# Patient Record
Sex: FEMALE | Race: BLACK OR AFRICAN AMERICAN | NOT HISPANIC OR LATINO | Employment: UNEMPLOYED | URBAN - METROPOLITAN AREA
[De-identification: names, ages, dates, MRNs, and addresses within clinical notes are randomized per-mention and may not be internally consistent; named-entity substitution may affect disease eponyms.]

---

## 2017-03-05 ENCOUNTER — HOSPITAL ENCOUNTER (EMERGENCY)
Facility: HOSPITAL | Age: 57
Discharge: HOME | End: 2017-03-05
Attending: FAMILY MEDICINE
Payer: COMMERCIAL

## 2017-03-05 VITALS
HEART RATE: 90 BPM | BODY MASS INDEX: 43.95 KG/M2 | HEIGHT: 68 IN | OXYGEN SATURATION: 100 % | SYSTOLIC BLOOD PRESSURE: 160 MMHG | WEIGHT: 290 LBS | TEMPERATURE: 99 F | RESPIRATION RATE: 18 BRPM | DIASTOLIC BLOOD PRESSURE: 89 MMHG

## 2017-03-05 DIAGNOSIS — M17.11 OSTEOARTHRITIS OF RIGHT KNEE, UNSPECIFIED OSTEOARTHRITIS TYPE: Primary | ICD-10-CM

## 2017-03-05 PROCEDURE — 99283 EMERGENCY DEPT VISIT LOW MDM: CPT

## 2017-03-05 RX ORDER — HYDROCODONE BITARTRATE AND ACETAMINOPHEN 5; 325 MG/1; MG/1
1 TABLET ORAL
Qty: 20 TABLET | Refills: 0 | Status: SHIPPED | OUTPATIENT
Start: 2017-03-05 | End: 2017-09-11 | Stop reason: SDUPTHER

## 2017-03-05 RX ORDER — METHYLPREDNISOLONE 4 MG/1
TABLET ORAL
Qty: 1 PACKAGE | Refills: 0 | Status: SHIPPED | OUTPATIENT
Start: 2017-03-05 | End: 2017-03-26

## 2017-03-05 RX ORDER — METHYLPREDNISOLONE 4 MG/1
TABLET ORAL
Qty: 1 PACKAGE | Refills: 0 | Status: SHIPPED | OUTPATIENT
Start: 2017-03-05 | End: 2017-03-05

## 2017-03-05 NOTE — ED AVS SNAPSHOT
[facility]  [address]  [place] [address]               Debbie Vo   3/5/2017 12:37 PM   ED    Description:  Female : 1960   Department:  [facility]           Your Care was Coordinated By:     Provider Role From Galindo Rowland MD Attending Provider 17 1304 --      Reason for Visit     Knee Pain           Diagnoses this Visit        Comments    Osteoarthritis of right knee, unspecified osteoarthritis type    -  Primary       ED Disposition     ED Disposition Condition Comment    Discharge             To Do List           Follow-up Information     Call Frank Krishnan MD.    Specialty:  Orthopedic Surgery    Why:  As needed, If symptoms worsen    Contact information:    [address]  [address]  [place] [address]  [phone]         These Medications        Disp Refills Start End    hydrocodone-acetaminophen 5-325mg (NORCO) 5-325 mg per tablet 20 tablet 0 3/5/2017     Take 1 tablet by mouth every 4 to 6 hours as needed for Pain. - Oral    Pharmacy: \[facility] [place]   [facility] #: [phone]       methylPREDNISolone (MEDROL DOSEPACK) 4 mg tablet 1 Package 0 3/5/2017 3/26/2017    Po as directed for joint inflammation    Pharmacy: [facility] [place]  [address] [place] [address] #: [phone]         [facility] On Call     [facility] On Call Nurse Care Line -  Assistance  Registered nurses in the [facility] On Call Center provide clinical advisement, health education, appointment booking, and other advisory services.  Call for this free service at [phone].             Medications           Message regarding Medications     Verify the changes and/or additions to your medication regime listed below are the same as discussed with your clinician today.  If any of these changes or additions are incorrect, please notify your healthcare provider.        START taking  these NEW medications        Refills    hydrocodone-acetaminophen 5-325mg (NORCO) 5-325 mg per tablet 0    Sig: Take 1 tablet by mouth every 4 to 6 hours as needed for Pain.    Class: Print    Route: Oral    methylPREDNISolone (MEDROL DOSEPACK) 4 mg tablet 0    Sig: Po as directed for joint inflammation    Class: Print           Verify that the below list of medications is an accurate representation of the medications you are currently taking.  If none reported, the list may be blank. If incorrect, please contact your healthcare provider. Carry this list with you in case of emergency.           Current Medications     acetaminophen (TYLENOL) 500 MG tablet Take 2 tablets (1,000 mg total) by mouth 3 (three) times daily as needed for Pain.    ibuprofen (ADVIL,MOTRIN) 200 MG tablet Take 2 tablets (400 mg total) by mouth every 6 (six) hours as needed for Pain.    losartan-hydrochlorothiazide 100-25 mg (HYZAAR) 100-25 mg per tablet Take 1 tablet by mouth once daily.    meclizine (MEDI-MECLIZINE) 25 mg tablet Take 25 mg by mouth 3 (three) times daily as needed.    metoprolol tartrate (LOPRESSOR) 50 MG tablet Take 1 tablet (50 mg total) by mouth 2 (two) times daily.    dextromethorphan-guaifenesin  mg (MUCINEX DM)  mg per 12 hr tablet Take 1 tablet by mouth every 12 (twelve) hours.    hydrocodone-acetaminophen 5-325mg (NORCO) 5-325 mg per tablet Take 1 tablet by mouth every 4 (four) hours as needed for Pain.    hydrocodone-acetaminophen 5-325mg (NORCO) 5-325 mg per tablet Take 1 tablet by mouth every 4 to 6 hours as needed for Pain.    methocarbamol (ROBAXIN) 500 MG Tab TK 2 T PO  TID    methylPREDNISolone (MEDROL DOSEPACK) 4 mg tablet Po as directed for joint inflammation    omeprazole (PRILOSEC) 20 MG capsule Take 1 capsule (20 mg total) by mouth once daily.    OXYMETAZOLINE HCL (NASAL SPRAY 12 HOUR NASL) by Nasal route.    valacyclovir (VALTREX) 1000 MG tablet Take 1 tablet (1,000 mg total) by mouth 3 (three)  "times daily.           Clinical Reference Information           Your Vitals Were     BP Pulse Temp Resp Height Weight    168/98 (BP Location: Left arm, Patient Position: Sitting, BP Method: Automatic) 97 98.7 °F (37.1 °C) (Oral) 14 5' 8" (1.727 m) 131.5 kg (290 lb)    SpO2 BMI             96% 44.09 kg/m2         Allergies as of 3/5/2017     No Known Allergies      Immunizations Administered on Date of Encounter - 3/5/2017     None      ED Micro, Lab, POCT     None      ED Imaging Orders     Start Ordered       Status Ordering Provider    03/05/17 1307 03/05/17 1307  X-Ray Knee 1 or 2 View Right  1 time imaging      Final result       Discharge References/Attachments     OSTEOARTHRITIS OF THE KNEE, UNDERSTANDING (ENGLISH)      [facility] Sign-Up     Activating your [facility] account is as easy as 1-2-3!     1) Visit [email or web address], select Sign Up Now, enter this activation code and your date of birth, then select Next.  Activation code not generated  Current Patient Portal Status: Account disabled      2) Create a username and password to use when you visit [facility] in the future and select a security question in case you lose your password and select Next.    3) Enter your e-mail address and click Sign Up!    Additional Information  If you have questions, please e-mail [email or web address] or call [phone] to talk to our [facility] staff. Remember, [facility] is NOT to be used for urgent needs. For medical emergencies, dial 911.          [facility] complies with applicable Federal civil rights laws and does not discriminate on the basis of race, color, national origin, age, disability, or sex.        Language Assistance Services     ATTENTION: Language assistance services are available, free of charge. Please call [phone].      ATENCIÓN: Si etiennela deep, tiene a marshall disposición servicios gratuitos de asistencia lingüística. Llame al [phone].     JAYDON Ý: N?u b?n nói Ti?ng Vi?t, có " các d?ch v? h? tr? sravanthi ng? mi?n phí dành cho b?n. G?i s? [phone].

## 2017-03-05 NOTE — ED PROVIDER NOTES
Encounter Date: 3/5/2017       History     Chief Complaint   Patient presents with    Knee Pain     I have right knee pain for some time now. I have an appointment with Dr Krishnan next month after my insurance kicks in April 1st. 5 days ago my right knee just started hurting more and I can't take it anymore     Review of patient's allergies indicates:  No Known Allergies  HPI Comments: Patient's a 56-year-old black female with a history of degenerative joint disease which has been progressively worsening over the last couple of months and is much more severe this week to the point patient can barely walk and comes for pain relief.  No fever or skin changes.    The history is provided by the patient.     Past Medical History:   Diagnosis Date    Benign essential hypertension     Colon cancer     Osteoarthritis      Past Surgical History:   Procedure Laterality Date    COLON SURGERY  2002    COLONOSCOPY N/A 3/24/2016    Procedure: COLONOSCOPY;  Surgeon: Ernst Bobby MD;  Location[facility];  Service: Endoscopy;  Laterality: N/A;  Needs Flex sigmoidoscopy    PARTIAL HYSTERECTOMY       Family History   Problem Relation Age of Onset    Cancer Mother     Kidney disease Father      Social History   Substance Use Topics    Smoking status: Never Smoker    Smokeless tobacco: Never Used    Alcohol use No     Review of Systems   Constitutional: Negative for fever.   Musculoskeletal: Positive for arthralgias, gait problem, joint swelling and myalgias. Negative for neck pain and neck stiffness.   Skin: Negative for color change.   Neurological: Negative for weakness and numbness.   All other systems reviewed and are negative.      Physical Exam   Initial Vitals   BP Pulse Resp Temp SpO2   03/05/17 1231 03/05/17 1231 03/05/17 1231 03/05/17 1231 03/05/17 1231   168/98 97 14 98.7 °F (37.1 °C) 96 %     Physical Exam    Nursing note and vitals reviewed.  Constitutional: She appears well-developed and  well-nourished.   HENT:   Head: Normocephalic.   Eyes: Pupils are equal, round, and reactive to light.   Neck: Neck supple.   Cardiovascular: Normal rate and regular rhythm.   Pulmonary/Chest: No respiratory distress.   Abdominal: She exhibits no distension. There is no tenderness.   Musculoskeletal:   Decreased flexion of the right knee compared with the left.  Bilateral sub-patellar crepitance.  Diffuse mild swelling around the right knee; no palpable effusion.   Neurological: She is alert and oriented to person, place, and time. No sensory deficit.   Skin: Skin is warm.   Psychiatric: She has a normal mood and affect.         ED Course   Procedures  Labs Reviewed - No data to display          Medical Decision Making:   Clinical Tests:   Radiological Study: Ordered and Reviewed  ED Management:  Severe degenerative changes right knee.  Patient has orthopedic appointment.  I will give a short course of Norco.  Advised anti-inflammatories, heat, weight loss.  Return to emergency as needed                   ED Course     Clinical Impression:   The encounter diagnosis was Osteoarthritis of right knee, unspecified osteoarthritis type.          ISMA Rowland MD  03/05/17 3220

## 2017-09-07 ENCOUNTER — TELEPHONE (OUTPATIENT)
Dept: INTERNAL MEDICINE | Facility: CLINIC | Age: 57
End: 2017-09-07

## 2017-09-07 DIAGNOSIS — Z00.00 ROUTINE GENERAL MEDICAL EXAMINATION AT A HEALTH CARE FACILITY: Primary | ICD-10-CM

## 2017-09-07 NOTE — TELEPHONE ENCOUNTER
----- Message from Sandi Reyna sent at 9/7/2017  9:29 AM CDT -----  Contact: The pt called  She is scheduled for Monday to see Dr Hussein for a check up.  Will she need labs before?

## 2017-09-11 ENCOUNTER — LAB VISIT (OUTPATIENT)
Dept: LAB | Facility: HOSPITAL | Age: 57
End: 2017-09-11
Attending: INTERNAL MEDICINE
Payer: MEDICARE

## 2017-09-11 ENCOUNTER — OFFICE VISIT (OUTPATIENT)
Dept: INTERNAL MEDICINE | Facility: CLINIC | Age: 57
End: 2017-09-11
Payer: MEDICARE

## 2017-09-11 VITALS
RESPIRATION RATE: 18 BRPM | HEART RATE: 80 BPM | DIASTOLIC BLOOD PRESSURE: 84 MMHG | BODY MASS INDEX: 44.04 KG/M2 | HEIGHT: 68 IN | SYSTOLIC BLOOD PRESSURE: 118 MMHG | TEMPERATURE: 98 F | WEIGHT: 290.56 LBS

## 2017-09-11 DIAGNOSIS — Z12.31 ENCOUNTER FOR SCREENING MAMMOGRAM FOR MALIGNANT NEOPLASM OF BREAST: ICD-10-CM

## 2017-09-11 DIAGNOSIS — I10 ESSENTIAL HYPERTENSION: ICD-10-CM

## 2017-09-11 DIAGNOSIS — Z23 NEED FOR PROPHYLACTIC VACCINATION AND INOCULATION AGAINST INFLUENZA: ICD-10-CM

## 2017-09-11 DIAGNOSIS — K21.9 GASTROESOPHAGEAL REFLUX DISEASE, ESOPHAGITIS PRESENCE NOT SPECIFIED: ICD-10-CM

## 2017-09-11 DIAGNOSIS — Z00.00 ROUTINE GENERAL MEDICAL EXAMINATION AT A HEALTH CARE FACILITY: ICD-10-CM

## 2017-09-11 DIAGNOSIS — M75.51 BURSITIS OF RIGHT SHOULDER: Primary | ICD-10-CM

## 2017-09-11 DIAGNOSIS — M54.16 LUMBAR RADICULOPATHY, CHRONIC: ICD-10-CM

## 2017-09-11 DIAGNOSIS — Z12.39 BREAST CANCER SCREENING, HIGH RISK PATIENT: ICD-10-CM

## 2017-09-11 DIAGNOSIS — E66.01 MORBID OBESITY WITH BMI OF 40.0-44.9, ADULT: ICD-10-CM

## 2017-09-11 DIAGNOSIS — R42 VERTIGO: ICD-10-CM

## 2017-09-11 PROCEDURE — 90471 IMMUNIZATION ADMIN: CPT | Mod: S$GLB,,, | Performed by: INTERNAL MEDICINE

## 2017-09-11 PROCEDURE — 90686 IIV4 VACC NO PRSV 0.5 ML IM: CPT | Mod: S$GLB,,, | Performed by: INTERNAL MEDICINE

## 2017-09-11 PROCEDURE — 99214 OFFICE O/P EST MOD 30 MIN: CPT | Mod: 25,S$GLB,, | Performed by: INTERNAL MEDICINE

## 2017-09-11 PROCEDURE — 99499 UNLISTED E&M SERVICE: CPT | Mod: S$GLB,,, | Performed by: INTERNAL MEDICINE

## 2017-09-11 PROCEDURE — 20610 DRAIN/INJ JOINT/BURSA W/O US: CPT | Mod: S$GLB,,, | Performed by: INTERNAL MEDICINE

## 2017-09-11 PROCEDURE — 3008F BODY MASS INDEX DOCD: CPT | Mod: S$GLB,,, | Performed by: INTERNAL MEDICINE

## 2017-09-11 PROCEDURE — 83036 HEMOGLOBIN GLYCOSYLATED A1C: CPT | Mod: PO

## 2017-09-11 PROCEDURE — 99999 PR PBB SHADOW E&M-EST. PATIENT-LVL III: CPT | Mod: PBBFAC,,, | Performed by: INTERNAL MEDICINE

## 2017-09-11 PROCEDURE — 3074F SYST BP LT 130 MM HG: CPT | Mod: S$GLB,,, | Performed by: INTERNAL MEDICINE

## 2017-09-11 PROCEDURE — 36415 COLL VENOUS BLD VENIPUNCTURE: CPT | Mod: PO

## 2017-09-11 PROCEDURE — 3079F DIAST BP 80-89 MM HG: CPT | Mod: S$GLB,,, | Performed by: INTERNAL MEDICINE

## 2017-09-11 RX ORDER — MECLIZINE HYDROCHLORIDE 25 MG/1
25 TABLET ORAL 3 TIMES DAILY PRN
Qty: 50 TABLET | Refills: 2 | Status: SHIPPED | OUTPATIENT
Start: 2017-09-11 | End: 2021-06-18 | Stop reason: SDUPTHER

## 2017-09-11 RX ORDER — METOPROLOL TARTRATE 50 MG/1
50 TABLET ORAL 2 TIMES DAILY
Qty: 60 TABLET | Refills: 3 | Status: CANCELLED | OUTPATIENT
Start: 2017-09-11

## 2017-09-11 RX ORDER — GABAPENTIN 300 MG/1
300 CAPSULE ORAL 3 TIMES DAILY
Qty: 270 CAPSULE | Refills: 3 | Status: SHIPPED | OUTPATIENT
Start: 2017-09-11 | End: 2018-05-10 | Stop reason: SDUPTHER

## 2017-09-11 RX ORDER — METOPROLOL SUCCINATE 100 MG/1
100 TABLET, EXTENDED RELEASE ORAL DAILY
Qty: 90 TABLET | Refills: 3 | Status: SHIPPED | OUTPATIENT
Start: 2017-09-11 | End: 2019-04-08 | Stop reason: SDUPTHER

## 2017-09-11 RX ORDER — LOSARTAN POTASSIUM AND HYDROCHLOROTHIAZIDE 25; 100 MG/1; MG/1
1 TABLET ORAL DAILY
Qty: 90 TABLET | Refills: 3 | Status: SHIPPED | OUTPATIENT
Start: 2017-09-11 | End: 2018-10-08

## 2017-09-11 RX ORDER — HYDROCODONE BITARTRATE AND ACETAMINOPHEN 5; 325 MG/1; MG/1
1 TABLET ORAL EVERY 4 HOURS PRN
Qty: 18 TABLET | Refills: 0 | Status: SHIPPED | OUTPATIENT
Start: 2017-09-11 | End: 2018-11-09 | Stop reason: SDUPTHER

## 2017-09-11 NOTE — PROGRESS NOTES
Subjective:       Patient ID: Debbie Vo is a 57 y.o. female.    Chief Complaint: Follow-up and Medication Refill    HPI  Checkup.  Labs pending.  LBP at baseline.  C/O 2 weeks of R shoulder pain w/o radicular sxs.  Having nasal congestion with some vertigo.  No CP, no SOB.  No abd pain or gERD sxs.  No recent mammogram.  No recent tetanus vaccine.  Review of Systems    Objective:      Physical Exam   Constitutional: She appears well-developed. No distress.   obese   HENT:   Head: Normocephalic.   Eyes: EOM are normal.   Neck: Normal range of motion. No tracheal deviation present.   Cardiovascular: Normal rate, regular rhythm, normal heart sounds and intact distal pulses.    Pulmonary/Chest: Effort normal and breath sounds normal. No respiratory distress.   Abdominal: Soft. Bowel sounds are normal. She exhibits no distension. There is no tenderness.   Musculoskeletal: She exhibits no edema.   R shoulder with painful internal rotation and + impingement sign   Neurological: She is alert. No cranial nerve deficit. She exhibits normal muscle tone. Coordination normal.   Skin: Skin is warm and dry. No rash noted. She is not diaphoretic. No erythema.   Psychiatric: She has a normal mood and affect. Her behavior is normal.   Vitals reviewed.      Assessment:       1. Bursitis of right shoulder    2. Essential hypertension    3. Gastroesophageal reflux disease, esophagitis presence not specified    4. Morbid obesity with BMI of 40.0-44.9, adult    5. Lumbar radiculopathy, chronic    6. Breast cancer screening, high risk patient    7. Need for prophylactic vaccination and inoculation against influenza    8. Encounter for screening mammogram for malignant neoplasm of breast     9. Vertigo        Plan:       Debbie was seen today for follow-up and medication refill.    Diagnoses and all orders for this visit:    Bursitis of right shoulder    Essential hypertension  -     losartan-hydrochlorothiazide 100-25 mg (HYZAAR)  100-25 mg per tablet; Take 1 tablet by mouth once daily.  -     metoprolol succinate (TOPROL-XL) 100 MG 24 hr tablet; Take 1 tablet (100 mg total) by mouth once daily.    Gastroesophageal reflux disease, esophagitis presence not specified    Morbid obesity with BMI of 40.0-44.9, adult    Lumbar radiculopathy, chronic  -     hydrocodone-acetaminophen 5-325mg (NORCO) 5-325 mg per tablet; Take 1 tablet by mouth every 4 (four) hours as needed for Pain.  -     gabapentin (NEURONTIN) 300 MG capsule; Take 1 capsule (300 mg total) by mouth 3 (three) times daily.    Breast cancer screening, high risk patient  -     Mammo Digital Screening Bilat with CAD; Future    Need for prophylactic vaccination and inoculation against influenza  -     Influenza - Quadrivalent (3 years & older) (PF)    Encounter for screening mammogram for malignant neoplasm of breast   -     Mammo Digital Screening Bilat with CAD; Future    Vertigo  -     meclizine (ANTIVERT) 25 mg tablet; Take 1 tablet (25 mg total) by mouth 3 (three) times daily as needed.    Other orders  -     Cancel: metoprolol tartrate (LOPRESSOR) 50 MG tablet; Take 1 tablet (50 mg total) by mouth 2 (two) times daily.      Return in about 1 month (around 10/11/2017).

## 2017-09-12 LAB
ESTIMATED AVG GLUCOSE: 111 MG/DL
HBA1C MFR BLD HPLC: 5.5 %

## 2018-02-28 ENCOUNTER — TELEPHONE (OUTPATIENT)
Dept: INTERNAL MEDICINE | Facility: CLINIC | Age: 58
End: 2018-02-28

## 2018-02-28 DIAGNOSIS — M54.16 LUMBAR RADICULOPATHY, CHRONIC: ICD-10-CM

## 2018-02-28 NOTE — TELEPHONE ENCOUNTER
----- Message from Nichol Doan sent at 2/28/2018  2:08 PM CST -----  Contact: [phone] self  Patient would like to speak with the doctor about getting a MRI or CT Scan prior to seeing a Pain Mgmt Doctor. Please call and advise.

## 2018-03-01 ENCOUNTER — HOSPITAL ENCOUNTER (OUTPATIENT)
Dept: RADIOLOGY | Facility: HOSPITAL | Age: 58
Discharge: HOME | End: 2018-03-01
Attending: INTERNAL MEDICINE
Payer: MEDICARE

## 2018-03-01 VITALS — BODY MASS INDEX: 43.95 KG/M2 | WEIGHT: 290 LBS | HEIGHT: 68 IN

## 2018-03-01 DIAGNOSIS — Z12.31 ENCOUNTER FOR SCREENING MAMMOGRAM FOR MALIGNANT NEOPLASM OF BREAST: ICD-10-CM

## 2018-03-01 DIAGNOSIS — Z12.39 BREAST CANCER SCREENING, HIGH RISK PATIENT: ICD-10-CM

## 2018-03-01 PROCEDURE — 77067 SCR MAMMO BI INCL CAD: CPT | Mod: TC,PO

## 2018-03-28 ENCOUNTER — HOSPITAL ENCOUNTER (OUTPATIENT)
Dept: RADIOLOGY | Facility: HOSPITAL | Age: 58
Discharge: HOME | End: 2018-03-28
Attending: INTERNAL MEDICINE
Payer: MEDICARE

## 2018-03-28 PROCEDURE — 72148 MRI LUMBAR SPINE W/O DYE: CPT | Mod: TC

## 2018-03-28 PROCEDURE — 72148 MRI LUMBAR SPINE W/O DYE: CPT | Mod: 26,,, | Performed by: RADIOLOGY

## 2018-04-24 ENCOUNTER — TELEPHONE (OUTPATIENT)
Dept: INTERNAL MEDICINE | Facility: CLINIC | Age: 58
End: 2018-04-24

## 2018-04-24 NOTE — TELEPHONE ENCOUNTER
----- Message from Ally Christine sent at 4/24/2018 11:35 AM CDT -----  Want MRI report. Patient can be reach on home phone.

## 2018-04-25 NOTE — TELEPHONE ENCOUNTER
Patient notified at 08:28 will check her schedule and call back to schedule follow-up appointment. Thanks

## 2018-04-27 ENCOUNTER — OFFICE VISIT (OUTPATIENT)
Dept: INTERNAL MEDICINE | Facility: CLINIC | Age: 58
End: 2018-04-27
Payer: MEDICARE

## 2018-04-27 VITALS
RESPIRATION RATE: 16 BRPM | DIASTOLIC BLOOD PRESSURE: 80 MMHG | HEART RATE: 84 BPM | BODY MASS INDEX: 44.41 KG/M2 | TEMPERATURE: 97 F | WEIGHT: 293 LBS | SYSTOLIC BLOOD PRESSURE: 120 MMHG | HEIGHT: 68 IN

## 2018-04-27 DIAGNOSIS — I10 ESSENTIAL HYPERTENSION: Primary | ICD-10-CM

## 2018-04-27 DIAGNOSIS — M54.16 LUMBAR RADICULOPATHY, CHRONIC: ICD-10-CM

## 2018-04-27 DIAGNOSIS — E66.01 MORBID OBESITY WITH BMI OF 40.0-44.9, ADULT: ICD-10-CM

## 2018-04-27 PROCEDURE — 3074F SYST BP LT 130 MM HG: CPT | Mod: CPTII,S$GLB,, | Performed by: INTERNAL MEDICINE

## 2018-04-27 PROCEDURE — 3079F DIAST BP 80-89 MM HG: CPT | Mod: CPTII,S$GLB,, | Performed by: INTERNAL MEDICINE

## 2018-04-27 PROCEDURE — 99999 PR PBB SHADOW E&M-EST. PATIENT-LVL III: CPT | Mod: PBBFAC,,, | Performed by: INTERNAL MEDICINE

## 2018-04-27 PROCEDURE — 99213 OFFICE O/P EST LOW 20 MIN: CPT | Mod: S$GLB,,, | Performed by: INTERNAL MEDICINE

## 2018-04-27 NOTE — PROGRESS NOTES
Subjective:       Patient ID: Debbie Vo is a 57 y.o. female.    Chief Complaint: Results; Follow-up; Back Pain; Rash (back); and Cough    HPI  Checkup.  C/O nasal congestion, HA, nonprod cough.  Still with R sided radicular pain when stands too long.  MRI 3/18 showed R sided disc protrusion at L3-4.  Review of Systems    Objective:      Physical Exam   Constitutional: She appears well-developed. No distress.   obese   HENT:   Head: Normocephalic.   Nasal mucosa inflamed   Eyes: EOM are normal.   Neck: Normal range of motion. No tracheal deviation present.   Cardiovascular: Normal rate, regular rhythm, normal heart sounds and intact distal pulses.    Pulmonary/Chest: Effort normal and breath sounds normal. No respiratory distress.   Abdominal: Soft. Bowel sounds are normal. She exhibits no distension. There is no tenderness.   Musculoskeletal: Normal range of motion. She exhibits no edema.   Neurological: She is alert. No cranial nerve deficit. She exhibits normal muscle tone. Coordination normal.   Skin: Skin is warm and dry. No rash noted. She is not diaphoretic. No erythema.   Psychiatric: She has a normal mood and affect. Her behavior is normal.   Vitals reviewed.      Assessment:       1. Essential hypertension    2. Lumbar radiculopathy, chronic    3. Morbid obesity with BMI of 40.0-44.9, adult        Plan:       Debbie was seen today for results, follow-up, back pain, rash and cough.    Diagnoses and all orders for this visit:    Essential hypertension   Well-cont    Lumbar radiculopathy, chronic   Going to pain mgt    Morbid obesity with BMI of 40.0-44.9, adult   Contemplating bariatric surgery    Follow-up in about 6 months (around 10/27/2018).

## 2018-05-10 ENCOUNTER — OFFICE VISIT (OUTPATIENT)
Dept: INTERNAL MEDICINE | Facility: CLINIC | Age: 58
End: 2018-05-10
Payer: MEDICARE

## 2018-05-10 VITALS
DIASTOLIC BLOOD PRESSURE: 98 MMHG | SYSTOLIC BLOOD PRESSURE: 130 MMHG | WEIGHT: 293 LBS | BODY MASS INDEX: 44.41 KG/M2 | TEMPERATURE: 98 F | HEIGHT: 68 IN | OXYGEN SATURATION: 97 % | HEART RATE: 91 BPM

## 2018-05-10 DIAGNOSIS — I10 ESSENTIAL HYPERTENSION: ICD-10-CM

## 2018-05-10 DIAGNOSIS — B35.4 TINEA CORPORIS: Primary | ICD-10-CM

## 2018-05-10 DIAGNOSIS — M54.16 LUMBAR RADICULOPATHY, CHRONIC: ICD-10-CM

## 2018-05-10 PROCEDURE — 3080F DIAST BP >= 90 MM HG: CPT | Mod: CPTII,S$GLB,, | Performed by: INTERNAL MEDICINE

## 2018-05-10 PROCEDURE — 3008F BODY MASS INDEX DOCD: CPT | Mod: CPTII,S$GLB,, | Performed by: INTERNAL MEDICINE

## 2018-05-10 PROCEDURE — 99213 OFFICE O/P EST LOW 20 MIN: CPT | Mod: S$GLB,,, | Performed by: INTERNAL MEDICINE

## 2018-05-10 PROCEDURE — 3075F SYST BP GE 130 - 139MM HG: CPT | Mod: CPTII,S$GLB,, | Performed by: INTERNAL MEDICINE

## 2018-05-10 PROCEDURE — 99999 PR PBB SHADOW E&M-EST. PATIENT-LVL III: CPT | Mod: PBBFAC,,, | Performed by: INTERNAL MEDICINE

## 2018-05-10 RX ORDER — KETOCONAZOLE 200 MG/1
200 TABLET ORAL DAILY
Qty: 10 TABLET | Refills: 0 | Status: SHIPPED | OUTPATIENT
Start: 2018-05-10 | End: 2018-05-20

## 2018-05-10 RX ORDER — GABAPENTIN 300 MG/1
300 CAPSULE ORAL 3 TIMES DAILY
Qty: 270 CAPSULE | Refills: 3 | Status: SHIPPED | OUTPATIENT
Start: 2018-05-10 | End: 2019-04-08 | Stop reason: SDUPTHER

## 2018-05-10 NOTE — PROGRESS NOTES
Subjective:       Patient ID: Debbie Vo is a 57 y.o. female.    Chief Complaint: Insect Bite    HPI  Pt c/o pruritic rash on arms and legs x 1 week.  Pt has household pets.  Also c/o sciatica flaring.  Review of Systems    Objective:      Physical Exam   Skin: Rash (tinea corporis all extremities) noted.       Assessment:       1. Tinea corporis    2. Essential hypertension    3. Lumbar radiculopathy, chronic        Plan:       Debbie was seen today for insect bite.    Diagnoses and all orders for this visit:    Tinea corporis  -     ketoconazole (NIZORAL) 200 mg Tab; Take 1 tablet (200 mg total) by mouth once daily.    Essential hypertension   Observe    Lumbar radiculopathy, chronic  -     gabapentin (NEURONTIN) 300 MG capsule; Take 1 capsule (300 mg total) by mouth 3 (three) times daily.      No Follow-up on file.

## 2018-05-17 ENCOUNTER — TELEPHONE (OUTPATIENT)
Dept: INTERNAL MEDICINE | Facility: CLINIC | Age: 58
End: 2018-05-17

## 2018-05-17 DIAGNOSIS — R23.2 HOT FLASHES: Primary | ICD-10-CM

## 2018-05-17 RX ORDER — FLUOXETINE HYDROCHLORIDE 20 MG/1
20 CAPSULE ORAL DAILY
Qty: 90 CAPSULE | Refills: 3 | Status: SHIPPED | OUTPATIENT
Start: 2018-05-17 | End: 2019-10-08 | Stop reason: SDUPTHER

## 2018-05-17 NOTE — TELEPHONE ENCOUNTER
----- Message from Nichol Doan sent at 5/17/2018 12:24 PM CDT -----  Contact: [phone]  Patient would like a Rx for medication for hot flashes [facility]. Please call and advise.

## 2018-05-29 ENCOUNTER — TELEPHONE (OUTPATIENT)
Dept: ADMINISTRATIVE | Facility: HOSPITAL | Age: 58
End: 2018-05-29

## 2018-05-29 NOTE — TELEPHONE ENCOUNTER
----- Message from Ines Bliss sent at 5/29/2018 10:11 AM CDT -----  Maddy [facility] called.   No. [phone]    Please fax script for [facility]. Fax no. [phone]

## 2018-05-29 NOTE — TELEPHONE ENCOUNTER
I returned call to [facility], spoke with Deo.  Stated there is nothing in this patient's records that shows request or need for a bath chair.

## 2018-05-31 NOTE — PROGRESS NOTES
[facility] New Patient Evaluation    Referred by: Jon Hussein MD  Reason for referral: M54.16 (ICD-10-CM) - Lumbar radiculopathy, chronic    CC:   Chief Complaint   Patient presents with    Low-back Pain    Hip Pain    Leg Pain       Last 3 PDI Scores 6/4/2018   Pain Disability Index (PDI) 55       HPI: Debbie Vo is a 58 y.o. female referred for low back pain into bilateral leg pain.  Her legs start to get numb and tingle as she walks which improves with sitting or leaning on a grocery cart.  She also notes a sharp pain in the lower back with certain maneuvers.    Location: low back  Severity: Currently: 7-8/10   Typical Range: 8/10     Exacerbation: 9/10   Onset: 5 years ago  Quality: Throbbing, Tingling and Numb  Radiation: bilateral lower extremities  Axial/Extremity Percentage of Pain: lower back 80% and bilateral leg pain 20%  Exacerbating Factors: standing, standing for more than 3 minutes and walking for more than 3 minutes  Mitigating Factors: nothing  Assoc: denies night fever/night sweats, urinary incontinence, bowel incontinence, significant weight loss, significant motor weakness and loss of sensations    Previous Therapies:  PT: Completed ~6 years ago but denies  HEP: Denies  TENS:  Injections:     - SIMONA with Dr. Hernandez in [facility] ~6 years ago for similar symptoms with 70-80% relief  Surgery: Denies  Medications:   - NSAIDS:   - MSK Relaxants:   - TCAs:   - SNRIs:   - Topicals:   - Anticonvulsants: Yes, current  - Opioids:     Current Pain Medications:  1. Gabapentin 300mg tid     Full Medication List:    Current Outpatient Prescriptions:     FLUoxetine (PROZAC) 20 MG capsule, Take 1 capsule (20 mg total) by mouth once daily., Disp: 90 capsule, Rfl: 3    gabapentin (NEURONTIN) 300 MG capsule, Take 1 capsule (300 mg total) by mouth 3 (three) times daily., Disp: 270 capsule, Rfl: 3    hydrocodone-acetaminophen 5-325mg (NORCO) 5-325 mg per tablet, Take 1 tablet by mouth every 4  (four) hours as needed for Pain., Disp: 18 tablet, Rfl: 0    ibuprofen (ADVIL,MOTRIN) 200 MG tablet, Take 2 tablets (400 mg total) by mouth every 6 (six) hours as needed for Pain., Disp: 30 tablet, Rfl: 0    losartan-hydrochlorothiazide 100-25 mg (HYZAAR) 100-25 mg per tablet, Take 1 tablet by mouth once daily., Disp: 90 tablet, Rfl: 3    meclizine (ANTIVERT) 25 mg tablet, Take 1 tablet (25 mg total) by mouth 3 (three) times daily as needed., Disp: 50 tablet, Rfl: 2    metoprolol succinate (TOPROL-XL) 100 MG 24 hr tablet, Take 1 tablet (100 mg total) by mouth once daily., Disp: 90 tablet, Rfl: 3    omeprazole (PRILOSEC) 20 MG capsule, Take 1 capsule (20 mg total) by mouth once daily., Disp: 90 capsule, Rfl: 3     Review of Systems:  Review of Systems   Constitutional: Negative for chills and fever.   HENT: Negative for nosebleeds.    Eyes: Negative for pain.   Respiratory: Negative for hemoptysis.    Cardiovascular: Negative for chest pain.   Gastrointestinal: Negative for nausea and vomiting.   Genitourinary: Negative for dysuria.   Musculoskeletal: Positive for back pain. Negative for falls.   Skin: Negative for rash.   Neurological: Positive for tingling and sensory change.   Endo/Heme/Allergies: Does not bruise/bleed easily.   Psychiatric/Behavioral: Positive for depression (Hx of). The patient is not nervous/anxious.        Allergies:  Patient has no known allergies.     Medical History:  Past Medical History:   Diagnosis Date    Benign essential hypertension     Colon cancer     Osteoarthritis         Surgical History:  Past Surgical History:   Procedure Laterality Date    COLON SURGERY  2002    COLONOSCOPY N/A 3/24/2016    Procedure: COLONOSCOPY;  Surgeon: Ernst Bobby MD;  Location: [facility];  Service: Endoscopy;  Laterality: N/A;  Needs Flex sigmoidoscopy    HYSTERECTOMY      PARTIAL HYSTERECTOMY          Social History:  Social History     Social History    Marital status:  Single     Spouse name: N/A    Number of children: N/A    Years of education: N/A     Occupational History    Not on file.     Social History Main Topics    Smoking status: Never Smoker    Smokeless tobacco: Never Used    Alcohol use No    Drug use: No    Sexual activity: Yes     Partners: Male     Other Topics Concern    Not on file     Social History Narrative    No narrative on file       Physical Exam:  Vitals:    18 1112   BP: 124/78   Pulse: 72   Weight: (!) 137 kg (302 lb)   PainSc:   8     General    Nursing note and vitals reviewed.  Constitutional: She is oriented to person, place, and time. She appears well-developed and well-nourished. No distress.   HENT:   Head: Normocephalic and atraumatic.   Nose: Nose normal.   Eyes: Conjunctivae and EOM are normal. Pupils are equal, round, and reactive to light. Right eye exhibits no discharge. Left eye exhibits no discharge. No scleral icterus.   Neck: No JVD present.   Cardiovascular: Intact distal pulses.    Pulmonary/Chest: Effort normal. No respiratory distress.   Abdominal: She exhibits no distension.   Neurological: She is alert and oriented to person, place, and time. Coordination normal.   Psychiatric: She has a normal mood and affect. Her behavior is normal. Judgment and thought content normal.     General Musculoskeletal Exam   Gait: normal     Back (L-Spine & T-Spine) / Neck (C-Spine) Exam     Tenderness Right paramedian tenderness of the Lower L-Spine. Left paramedian tenderness of the Lower L-Spine.     Back (L-Spine & T-Spine) Range of Motion   Extension: abnormal   Flexion: abnormal     Spinal Sensation   Right Side Sensation  L-Spine Level: normal  Left Side Sensation  L-Spine Level: normal    Other She has no scoliosis .      Muscle Strength   Right Lower Extremity   Hip Flexion: 5/5   Hip Extensors: 5/5  Quadriceps:  5/5   Hamstrin/5   Gastrocsoleus:  5/5/5  Left Lower Extremity   Hip Flexion: 5/5   Hip Extensors:  5/5  Quadriceps:  5/5   Hamstrin/5   Gastrocsoleus:  5/5/5    Reflexes     Left Side  Quadriceps:  2+  Achilles:  2+    Right Side   Quadriceps:  2+  Achilles:  2+      Imaging:  MRI Lumbar Spine Without Contrast 18  EXAMINATION:  MRI LUMBAR SPINE WITHOUT CONTRAST    CLINICAL HISTORY:  Low back pain, >6wks conservative tx, persistent-progressive sx, surgical candidate;Lumbar radiculopathy, >6wks conservative tx, persistent-progressive sx, surgical candidate; Radiculopathy, lumbar region    TECHNIQUE:  Multiplanar, multisequence MR images were acquired from the thoracolumbar junction to the sacrum without the administration of contrast.    COMPARISON:  CT abdomen pelvis 2016.    FINDINGS:  Alignment: Slight exaggeration of the normal lumbar lordosis.  Subtle anterolisthesis at L3-4, L4-5 and L5-S1..    Vertebrae: Normal marrow signal. No fracture.    Cord: Normal.  Conus terminates in good position.    Degenerative findings:    T12-L1, L1-2, L2-3: No focal disc herniation, spinal canal stenosis or significant neural foraminal narrowing.  Mild facet arthropathy.    L3-4: Moderate facet arthropathy, left more than right.  There is associated anterolisthesis with unroofing of the disc and mild disc bulging.  Mild endplate marginal osteophyte formation laterally.  Constellation of findings results in left greater than right lateral recess and neural foraminal encroachment.  Only modest narrowing of overall spinal canal diameter.    L4-L5: Moderate facet arthropathy, left more than right.  Mild anterolisthesis with unroofing of the disc.  Minimal disc bulging and degenerative endplate changes.  There is no significant narrowing of the overall spinal canal diameter.  Left greater than right lateral recess stenosis minimal neural foraminal encroachment.    L5-S1: Moderate facet arthropathy.  There is subtle anterolisthesis with degenerative disc disease including loss disc height, degenerative endplate  change in mild endplate marginal osteophyte formation.  No spinal stenosis or high-grade neural foraminal narrowing.    Paraspinal muscles & soft tissues: Unremarkable.     Labs:  BMP  Lab Results   Component Value Date     08/06/2016    K 3.9 08/06/2016     08/06/2016    CO2 26 08/06/2016    BUN 12 08/06/2016    CREATININE 0.7 08/06/2016    CALCIUM 9.4 08/06/2016    ANIONGAP 10 08/06/2016    ESTGFRAFRICA >60.0 08/06/2016    EGFRNONAA >60.0 08/06/2016     Lab Results   Component Value Date    ALT 15 08/06/2016    AST 9 (L) 08/06/2016    ALKPHOS 71 08/06/2016    BILITOT 1.2 (H) 08/06/2016       Assessment:  Problem List Items Addressed This Visit     Lumbar radiculopathy, chronic - Primary    Morbid obesity with BMI of 40.0-44.9, adult    Myofascial pain syndrome    DDD (degenerative disc disease), lumbar          58-year-old female with diffuse arthritis and chronic low back pain radiating into the bilateral lower extremities who presents complaining of progressively worsening lumbar radiculopathy for which she would like to try another lumbar epidural steroid injection. Her previous injection approximately 6 years ago provided excellent relief and her pain has been manageable since that time.    Treatment Plan:   PT/OT/HEP: None at this time  Procedures: Lumbar SIMONA  Medications: no changes recommended at this time  Imaging: No additional imaging recommended at this time.  Labs: Reviewed.  Medications are appropriately dosed for current hepatorenal function.    Follow Up: RTC p injection    Avi Morales Jr, MD  Interventional Pain Medicine / Anesthesiology    Disclaimer: This note was partly generated using dictation software which may occasionally result in transcription errors.

## 2018-06-04 ENCOUNTER — OFFICE VISIT (OUTPATIENT)
Dept: PAIN MEDICINE | Facility: CLINIC | Age: 58
End: 2018-06-04
Payer: MEDICARE

## 2018-06-04 ENCOUNTER — TELEPHONE (OUTPATIENT)
Dept: PAIN MEDICINE | Facility: CLINIC | Age: 58
End: 2018-06-04

## 2018-06-04 VITALS
SYSTOLIC BLOOD PRESSURE: 124 MMHG | WEIGHT: 293 LBS | DIASTOLIC BLOOD PRESSURE: 78 MMHG | BODY MASS INDEX: 45.92 KG/M2 | HEART RATE: 72 BPM

## 2018-06-04 DIAGNOSIS — M79.18 MYOFASCIAL PAIN SYNDROME: ICD-10-CM

## 2018-06-04 DIAGNOSIS — M54.16 LUMBAR RADICULOPATHY, CHRONIC: Primary | ICD-10-CM

## 2018-06-04 DIAGNOSIS — M51.36 DDD (DEGENERATIVE DISC DISEASE), LUMBAR: ICD-10-CM

## 2018-06-04 DIAGNOSIS — M54.16 LUMBAR RADICULOPATHY: Primary | ICD-10-CM

## 2018-06-04 DIAGNOSIS — E66.01 MORBID OBESITY WITH BMI OF 40.0-44.9, ADULT: ICD-10-CM

## 2018-06-04 PROBLEM — M51.369 DDD (DEGENERATIVE DISC DISEASE), LUMBAR: Status: ACTIVE | Noted: 2018-06-04

## 2018-06-04 PROCEDURE — 3074F SYST BP LT 130 MM HG: CPT | Mod: CPTII,S$GLB,, | Performed by: PAIN MEDICINE

## 2018-06-04 PROCEDURE — 3008F BODY MASS INDEX DOCD: CPT | Mod: CPTII,S$GLB,, | Performed by: PAIN MEDICINE

## 2018-06-04 PROCEDURE — 3078F DIAST BP <80 MM HG: CPT | Mod: CPTII,S$GLB,, | Performed by: PAIN MEDICINE

## 2018-06-04 PROCEDURE — 99204 OFFICE O/P NEW MOD 45 MIN: CPT | Mod: S$GLB,,, | Performed by: PAIN MEDICINE

## 2018-06-04 PROCEDURE — 99499 UNLISTED E&M SERVICE: CPT | Mod: S$GLB,,, | Performed by: PAIN MEDICINE

## 2018-06-04 PROCEDURE — 99999 PR PBB SHADOW E&M-EST. PATIENT-LVL II: CPT | Mod: PBBFAC,,, | Performed by: PAIN MEDICINE

## 2018-06-04 NOTE — LETTER
June 5, 2018      Jon Hussein MD  [address]  [address]  [facility] [address]           [facility]  [address]  [facility] [address]  Phone: [phone]          Patient: Debbie Vo   MR Number: [id number]   YOB: 1960   Date of Visit: 6/4/2018       Dear Dr. Jon Hussein:    Thank you for referring Debbie Vo to me for evaluation. Attached you will find relevant portions of my assessment and plan of care.    If you have questions, please do not hesitate to call me. I look forward to following Debbie Vo along with you.    Sincerely,    Avi Morales Jr., MD    Enclosure  CC:  No Recipients    If you would like to receive this communication electronically, please contact [email or web address] or [phone] to request more information on [facility] access.    For providers and/or their staff who would like to refer a patient to [facility], please contact us through our one-stop-shop provider referral line, [facility], at [phone].    If you feel you have received this communication in error or would no longer like to receive these types of communications, please e-mail [email or web address]

## 2018-06-14 ENCOUNTER — OFFICE VISIT (OUTPATIENT)
Dept: INTERNAL MEDICINE | Facility: CLINIC | Age: 58
End: 2018-06-14
Payer: MEDICARE

## 2018-06-14 VITALS
BODY MASS INDEX: 44.41 KG/M2 | SYSTOLIC BLOOD PRESSURE: 114 MMHG | HEIGHT: 68 IN | HEART RATE: 88 BPM | RESPIRATION RATE: 12 BRPM | WEIGHT: 293 LBS | TEMPERATURE: 98 F | DIASTOLIC BLOOD PRESSURE: 82 MMHG

## 2018-06-14 DIAGNOSIS — I10 ESSENTIAL HYPERTENSION: ICD-10-CM

## 2018-06-14 DIAGNOSIS — J06.9 UPPER RESPIRATORY TRACT INFECTION, UNSPECIFIED TYPE: Primary | ICD-10-CM

## 2018-06-14 PROCEDURE — 99999 PR PBB SHADOW E&M-EST. PATIENT-LVL III: CPT | Mod: PBBFAC,,, | Performed by: INTERNAL MEDICINE

## 2018-06-14 PROCEDURE — 99213 OFFICE O/P EST LOW 20 MIN: CPT | Mod: S$GLB,,, | Performed by: INTERNAL MEDICINE

## 2018-06-14 PROCEDURE — 3079F DIAST BP 80-89 MM HG: CPT | Mod: CPTII,S$GLB,, | Performed by: INTERNAL MEDICINE

## 2018-06-14 PROCEDURE — 3074F SYST BP LT 130 MM HG: CPT | Mod: CPTII,S$GLB,, | Performed by: INTERNAL MEDICINE

## 2018-06-14 PROCEDURE — 3008F BODY MASS INDEX DOCD: CPT | Mod: CPTII,S$GLB,, | Performed by: INTERNAL MEDICINE

## 2018-06-14 RX ORDER — HYDROCODONE BITARTRATE AND HOMATROPINE METHYLBROMIDE ORAL SOLUTION 5; 1.5 MG/5ML; MG/5ML
5 LIQUID ORAL EVERY 4 HOURS PRN
Qty: 175 ML | Refills: 0 | Status: SHIPPED | OUTPATIENT
Start: 2018-06-14 | End: 2018-06-24

## 2018-06-14 RX ORDER — METHYLPREDNISOLONE 4 MG/1
TABLET ORAL
Qty: 1 PACKAGE | Refills: 0 | Status: SHIPPED | OUTPATIENT
Start: 2018-06-14 | End: 2018-10-08

## 2018-06-14 NOTE — PROGRESS NOTES
Subjective:       Patient ID: Debbie Vo is a 58 y.o. female.    Chief Complaint: Cough (dry) and Sinus Problem (symptoms x 2 days)    HPI  Pt with 2 days of facial pain, coryza, min prod cough.  Had chills yesterday.  No SOB.  Back feeling beter.  Review of Systems    Objective:      Physical Exam   Constitutional: She appears well-developed. No distress.   obese   HENT:   Head: Normocephalic.   Mouth/Throat: Oropharynx is clear and moist.   Max sinuses tender   Eyes: EOM are normal.   Neck: Normal range of motion. No tracheal deviation present.   Cardiovascular: Normal rate, regular rhythm and intact distal pulses.    Pulmonary/Chest: Effort normal and breath sounds normal. No respiratory distress.   Abdominal: She exhibits no distension.   Musculoskeletal: Normal range of motion. She exhibits no edema.   Neurological: She is alert. No cranial nerve deficit. She exhibits normal muscle tone. Coordination normal.   Skin: Skin is warm and dry. No rash noted. She is not diaphoretic. No erythema.   Psychiatric: She has a normal mood and affect. Her behavior is normal.   Vitals reviewed.      Assessment:       1. Upper respiratory tract infection, unspecified type    2. Essential hypertension        Plan:       Debbie was seen today for cough and sinus problem.    Diagnoses and all orders for this visit:    Upper respiratory tract infection, unspecified type  -     methylPREDNISolone (MEDROL DOSEPACK) 4 mg tablet; use as directed  -     hydrocodone-homatropine 5-1.5 mg/5 ml (HYCODAN) 5-1.5 mg/5 mL Syrp; Take 5 mLs by mouth every 4 (four) hours as needed (cough).    Essential hypertension   Well-cont      Follow-up if symptoms worsen or fail to improve.

## 2018-06-18 NOTE — DISCHARGE INSTRUCTIONS
Home Care Instructions Pain Management:    1.  DIET:    You may resume your normal diet today.    2.  BATHING:    You may shower with luke warm water.    3.  DRESSING:    You may remove your bandage today.    4.  ACTIVITY LEVEL:      You may resume your normal activities 24 hours after your procedure.    5.  MEDICATIONS:    You may resume your normal medications today.    6.  SPECIAL INSTRUCTIONS:    No heat to the injection site for 24 hours including bath or shower, heating pad, moist heat or hot tubs.    Use an ice pack to the injection site for any pain or discomfort.  Apply ice packs for 20 minute intervals as needed.    If you have received any sedatives by mouth today, you can not drive for 12 hours.    If you have received sedation through an IV, you can not drive for 24 hours.    PLEASE CALL YOUR DOCTOR FOR THE FOLLOWIN.  Redness or swelling around the injection site.  2.  Fever of 101 degrees.  3.  Drainage (pus) from the injection site.  4.  For any continuous bleeding (some dried blood over the incision is normal.)    FOR EMERGENCIES:    If any unusual problems or difficulties occur during clinic hours, call [phone] or dial [phone].    Follow up with with your physician in 2-3 weeks.       Procedural Sedation  Procedural sedation is medicine to ease discomfort, pain, and anxiety during a procedure. The medicine is often given through an IV (intravenous) line in your arm or hand. In some cases, the medicine may be taken by mouth or inhaled. While you are under sedation, you will likely be awake. But you may not remember anything afterward.  Why procedural sedation is used  Sedation is used for many types of procedures. The goal is to reduce pain, anxiety, and stressful memories of a procedure. It can also help your healthcare provider treat you. For example, having a broken bone fixed may be easier if you feel relaxed.  Procedural sedation is used only for short, basic procedures. It is not used  for complex surgeries. Some procedures that use this type of sedation include:  · Dental surgery  · Breast biopsy, to take a sample of breast tissue  · Endoscopy, to look at gastrointestinal problems  · Bronchoscopy, to check for lung problems  · Bone or joint realignment, to fix a broken bone or dislocated joint  · Minor foot or skin surgery  · Electrical cardioversion, to restore a normal heart rhythm  · Lumbar puncture, to assess neurological disease  Risks of procedural sedation  Procedural sedation has some risks and possible side effects, such as:  · Headache  · Nausea and vomiting  · Unpleasant memory of the procedure  · Lowered rate of breathing  · Changes in heart rate and blood pressure (rare)  · Inhalation of stomach contents into your lungs (rare)  Side effects will likely go away shortly after the procedure. Your healthcare team will watch your heart rate and breathing during your sedation. This is to help prevent problems.  Your own risks may vary based on your age and your overall health. They also depend on the type of sedation you are given. Talk with your healthcare provider about the risks that apply most to you.  Getting ready for procedural sedation  Talk with your healthcare provider about how to get ready for your procedure. Tell him or her about all the medicines you take. This includes over-the-counter medicines such as ibuprofen. It also includes vitamins, herbs, and other supplements. You may need to stop taking some medicines before the procedure, such as blood thinners and aspirin. If you smoke, you should stop to lessen the chance of a lung issue. Talk with your healthcare provider if you need help to stop smoking.  Tell your healthcare provider if you:  · Have had any problems in the past with sedation or anesthesia  · Have had any recent changes in your health, such as an infection or fever  · Are pregnant or think you may be pregnant  Also be sure to:  · Ask a family member or friend  to take you home after the procedure. You cant drive on the day you receive sedation.  · Not eat or drink after midnight the night before your procedure, if advised.  · Not plan on making any important decisions, such as financial or legal, for the day after you receive the sedation.  · Follow all other instructions from your healthcare provider.  During your procedural sedation  You may have your procedure in a hospital or a medical clinic. Sedation is done by a trained healthcare provider. In general, you can expect the following:  · You will be given medicine through an IV line in your arm or hand. Or you may receive a shot. The medicine may also be given by mouth. Or you may inhale it through a mask.  · If you receive medicine through an IV, you may feel the effects very quickly. You will start to feel relaxed and drowsy.  · During the procedure, your heart rate, breathing, and blood pressure will be closely watched. Your breathing and blood pressure may decrease a little. But you will likely not need help with your breathing. You may receive a little extra oxygen through a mask or through some soft plastic prongs underneath your nose.  · You will probably be awake the entire time. If you do fall asleep, you should be easy to wake up, if needed. You should feel little or no pain.  · When your procedure is over, the sedative medicine will be stopped.  After your procedural sedation  You will begin to feel more awake and aware. But you will likely be drowsy for a while afterward. You will be closely watched as you become more alert. You may have a faint memory of the procedure. Or you may not remember it at all.  You should be able to return home within an hour or two after your procedure. Plan to have someone stay with you for a few hours. Side effects such as headache and nausea may go away quickly. Tell your healthcare provider if they continue.  Dont drive or make any important decisions for at least 24  hours. Be sure to follow all after-care instructions.     When to call your healthcare provider  Have someone call your healthcare provider right away if you have any of these:  · Drowsiness that gets worse  · Weakness or dizziness that gets worse  · Repeated vomiting  · You cant be awakened  · Severe or ongoing pain from the procedure, not relieved by the pain medicine   Date Last Reviewed: 2/1/2017  © 0792-1294 [employer]. [address] [place] [address], [place] [address]. All rights reserved. This information is not intended as a substitute for professional medical care. Always follow your healthcare professional's instructions.

## 2018-06-21 ENCOUNTER — SURGERY (OUTPATIENT)
Age: 58
End: 2018-06-21

## 2018-06-21 ENCOUNTER — HOSPITAL ENCOUNTER (OUTPATIENT)
Facility: HOSPITAL | Age: 58
Discharge: HOME | End: 2018-06-21
Attending: PAIN MEDICINE | Admitting: PAIN MEDICINE
Payer: MEDICARE

## 2018-06-21 VITALS
OXYGEN SATURATION: 98 % | TEMPERATURE: 98 F | SYSTOLIC BLOOD PRESSURE: 129 MMHG | HEIGHT: 68 IN | BODY MASS INDEX: 44.41 KG/M2 | HEART RATE: 87 BPM | DIASTOLIC BLOOD PRESSURE: 80 MMHG | WEIGHT: 293 LBS | RESPIRATION RATE: 15 BRPM

## 2018-06-21 DIAGNOSIS — M54.16 LUMBAR RADICULOPATHY, CHRONIC: Primary | ICD-10-CM

## 2018-06-21 DIAGNOSIS — M54.16 LUMBAR RADICULOPATHY: ICD-10-CM

## 2018-06-21 PROCEDURE — 25000003 PHARM REV CODE 250: Performed by: PAIN MEDICINE

## 2018-06-21 PROCEDURE — 63600175 PHARM REV CODE 636 W HCPCS: Performed by: PAIN MEDICINE

## 2018-06-21 PROCEDURE — 62323 NJX INTERLAMINAR LMBR/SAC: CPT | Performed by: PAIN MEDICINE

## 2018-06-21 PROCEDURE — 25500020 PHARM REV CODE 255: Performed by: PAIN MEDICINE

## 2018-06-21 PROCEDURE — 62322 NJX INTERLAMINAR LMBR/SAC: CPT | Performed by: PAIN MEDICINE

## 2018-06-21 PROCEDURE — 62323 NJX INTERLAMINAR LMBR/SAC: CPT | Mod: ,,, | Performed by: PAIN MEDICINE

## 2018-06-21 RX ORDER — ALPRAZOLAM 0.5 MG/1
0.5 TABLET, ORALLY DISINTEGRATING ORAL ONCE AS NEEDED
Status: COMPLETED | OUTPATIENT
Start: 2018-06-21 | End: 2018-06-21

## 2018-06-21 RX ORDER — LIDOCAINE HYDROCHLORIDE 10 MG/ML
INJECTION INFILTRATION; PERINEURAL
Status: DISCONTINUED | OUTPATIENT
Start: 2018-06-21 | End: 2018-06-21 | Stop reason: HOSPADM

## 2018-06-21 RX ORDER — DEXAMETHASONE SODIUM PHOSPHATE 100 MG/10ML
INJECTION INTRAMUSCULAR; INTRAVENOUS
Status: DISCONTINUED | OUTPATIENT
Start: 2018-06-21 | End: 2018-06-21 | Stop reason: HOSPADM

## 2018-06-21 RX ADMIN — ALPRAZOLAM 0.5 MG: 0.5 TABLET, ORALLY DISINTEGRATING ORAL at 10:06

## 2018-06-21 RX ADMIN — DEXAMETHASONE SODIUM PHOSPHATE 5 MG: 10 INJECTION INTRAMUSCULAR; INTRAVENOUS at 11:06

## 2018-06-21 RX ADMIN — DEXAMETHASONE SODIUM PHOSPHATE 10 MG: 10 INJECTION INTRAMUSCULAR; INTRAVENOUS at 11:06

## 2018-06-21 RX ADMIN — LIDOCAINE HYDROCHLORIDE 1 ML: 10 INJECTION, SOLUTION INFILTRATION; PERINEURAL at 11:06

## 2018-06-21 RX ADMIN — IOHEXOL 5 ML: 300 INJECTION, SOLUTION INTRAVENOUS at 11:06

## 2018-06-21 NOTE — DISCHARGE SUMMARY
[facility]  Discharge Note  Short Stay     Admit Date: 6/21/2018    Discharge Date: 6/21/2018     Attending Physician: Avi Morales Jr, MD    Diagnoses:  Active Hospital Problems    Diagnosis  POA    *Lumbar radiculopathy [M54.16]  Yes      Resolved Hospital Problems    Diagnosis Date Resolved POA   No resolved problems to display.     Discharged Condition: Good     Hospital Course: Patient was admitted for an outpatient interventional pain management procedure and tolerated the procedure well with no complications.     Final Diagnoses: Same as principal problem.     Disposition: Home or Self Care     Follow up/Patient Instructions:    Follow-up Information     Avi Morales Jr, MD. Go in 3 weeks.    Specialty:  Pain Medicine  Why:  Post-procedural Follow Up As Scheduled, Call to make an appointment if you do not have one  Contact information:  [address]  [address]  [place] [address]  [phone]                   Reconciled Medications:     Medication List      CONTINUE taking these medications    FLUoxetine 20 MG capsule  Commonly known as:  PROZAC  Take 1 capsule (20 mg total) by mouth once daily.     gabapentin 300 MG capsule  Commonly known as:  NEURONTIN  Take 1 capsule (300 mg total) by mouth 3 (three) times daily.     HYDROcodone-acetaminophen 5-325 mg per tablet  Commonly known as:  NORCO  Take 1 tablet by mouth every 4 (four) hours as needed for Pain.     hydrocodone-homatropine 5-1.5 mg/5 ml 5-1.5 mg/5 mL Syrp  Commonly known as:  HYCODAN  Take 5 mLs by mouth every 4 (four) hours as needed (cough).     losartan-hydrochlorothiazide 100-25 mg 100-25 mg per tablet  Commonly known as:  HYZAAR  Take 1 tablet by mouth once daily.     meclizine 25 mg tablet  Commonly known as:  ANTIVERT  Take 1 tablet (25 mg total) by mouth 3 (three) times daily as needed.     methylPREDNISolone 4 mg tablet  Commonly known as:  MEDROL DOSEPACK  use as directed     metoprolol succinate 100 MG 24 hr  tablet  Commonly known as:  TOPROL-XL  Take 1 tablet (100 mg total) by mouth once daily.     omeprazole 20 MG capsule  Commonly known as:  PRILOSEC  Take 1 capsule (20 mg total) by mouth once daily.            Discharge Procedure Orders (must include Diet, Follow-up, Activity)  Call MD for:  temperature >100.4     Call MD for:  severe uncontrolled pain     Call MD for:  redness, tenderness, or signs of infection (pain, swelling, redness, odor or green/yellow discharge around incision site)     Call MD for:  difficulty breathing or increased cough     Call MD for:  severe persistent headache     Call MD for:  worsening rash     Remove dressing in 24 hours

## 2018-06-21 NOTE — H&P (VIEW-ONLY)
[facility] New Patient Evaluation    Referred by: Jon Hussein MD  Reason for referral: M54.16 (ICD-10-CM) - Lumbar radiculopathy, chronic    CC:   Chief Complaint   Patient presents with    Low-back Pain    Hip Pain    Leg Pain       Last 3 PDI Scores 6/4/2018   Pain Disability Index (PDI) 55       HPI: Debbie Vo is a 58 y.o. female referred for low back pain into bilateral leg pain.  Her legs start to get numb and tingle as she walks which improves with sitting or leaning on a grocery cart.  She also notes a sharp pain in the lower back with certain maneuvers.    Location: low back  Severity: Currently: 7-8/10   Typical Range: 8/10     Exacerbation: 9/10   Onset: 5 years ago  Quality: Throbbing, Tingling and Numb  Radiation: bilateral lower extremities  Axial/Extremity Percentage of Pain: lower back 80% and bilateral leg pain 20%  Exacerbating Factors: standing, standing for more than 3 minutes and walking for more than 3 minutes  Mitigating Factors: nothing  Assoc: denies night fever/night sweats, urinary incontinence, bowel incontinence, significant weight loss, significant motor weakness and loss of sensations    Previous Therapies:  PT: Completed ~6 years ago but denies  HEP: Denies  TENS:  Injections:     - SIMONA with Dr. Hernandez in [facility] ~6 years ago for similar symptoms with 70-80% relief  Surgery: Denies  Medications:   - NSAIDS:   - MSK Relaxants:   - TCAs:   - SNRIs:   - Topicals:   - Anticonvulsants: Yes, current  - Opioids:     Current Pain Medications:  1. Gabapentin 300mg tid     Full Medication List:    Current Outpatient Prescriptions:     FLUoxetine (PROZAC) 20 MG capsule, Take 1 capsule (20 mg total) by mouth once daily., Disp: 90 capsule, Rfl: 3    gabapentin (NEURONTIN) 300 MG capsule, Take 1 capsule (300 mg total) by mouth 3 (three) times daily., Disp: 270 capsule, Rfl: 3    hydrocodone-acetaminophen 5-325mg (NORCO) 5-325 mg per tablet, Take 1 tablet by mouth every 4  (four) hours as needed for Pain., Disp: 18 tablet, Rfl: 0    ibuprofen (ADVIL,MOTRIN) 200 MG tablet, Take 2 tablets (400 mg total) by mouth every 6 (six) hours as needed for Pain., Disp: 30 tablet, Rfl: 0    losartan-hydrochlorothiazide 100-25 mg (HYZAAR) 100-25 mg per tablet, Take 1 tablet by mouth once daily., Disp: 90 tablet, Rfl: 3    meclizine (ANTIVERT) 25 mg tablet, Take 1 tablet (25 mg total) by mouth 3 (three) times daily as needed., Disp: 50 tablet, Rfl: 2    metoprolol succinate (TOPROL-XL) 100 MG 24 hr tablet, Take 1 tablet (100 mg total) by mouth once daily., Disp: 90 tablet, Rfl: 3    omeprazole (PRILOSEC) 20 MG capsule, Take 1 capsule (20 mg total) by mouth once daily., Disp: 90 capsule, Rfl: 3     Review of Systems:  Review of Systems   Constitutional: Negative for chills and fever.   HENT: Negative for nosebleeds.    Eyes: Negative for pain.   Respiratory: Negative for hemoptysis.    Cardiovascular: Negative for chest pain.   Gastrointestinal: Negative for nausea and vomiting.   Genitourinary: Negative for dysuria.   Musculoskeletal: Positive for back pain. Negative for falls.   Skin: Negative for rash.   Neurological: Positive for tingling and sensory change.   Endo/Heme/Allergies: Does not bruise/bleed easily.   Psychiatric/Behavioral: Positive for depression (Hx of). The patient is not nervous/anxious.        Allergies:  Patient has no known allergies.     Medical History:  Past Medical History:   Diagnosis Date    Benign essential hypertension     Colon cancer     Osteoarthritis         Surgical History:  Past Surgical History:   Procedure Laterality Date    COLON SURGERY  2002    COLONOSCOPY N/A 3/24/2016    Procedure: COLONOSCOPY;  Surgeon: Ernst Bobby MD;  Location: [facility];  Service: Endoscopy;  Laterality: N/A;  Needs Flex sigmoidoscopy    HYSTERECTOMY      PARTIAL HYSTERECTOMY          Social History:  Social History     Social History    Marital status:  Single     Spouse name: N/A    Number of children: N/A    Years of education: N/A     Occupational History    Not on file.     Social History Main Topics    Smoking status: Never Smoker    Smokeless tobacco: Never Used    Alcohol use No    Drug use: No    Sexual activity: Yes     Partners: Male     Other Topics Concern    Not on file     Social History Narrative    No narrative on file       Physical Exam:  Vitals:    18 1112   BP: 124/78   Pulse: 72   Weight: (!) 137 kg (302 lb)   PainSc:   8     General    Nursing note and vitals reviewed.  Constitutional: She is oriented to person, place, and time. She appears well-developed and well-nourished. No distress.   HENT:   Head: Normocephalic and atraumatic.   Nose: Nose normal.   Eyes: Conjunctivae and EOM are normal. Pupils are equal, round, and reactive to light. Right eye exhibits no discharge. Left eye exhibits no discharge. No scleral icterus.   Neck: No JVD present.   Cardiovascular: Intact distal pulses.    Pulmonary/Chest: Effort normal. No respiratory distress.   Abdominal: She exhibits no distension.   Neurological: She is alert and oriented to person, place, and time. Coordination normal.   Psychiatric: She has a normal mood and affect. Her behavior is normal. Judgment and thought content normal.     General Musculoskeletal Exam   Gait: normal     Back (L-Spine & T-Spine) / Neck (C-Spine) Exam     Tenderness Right paramedian tenderness of the Lower L-Spine. Left paramedian tenderness of the Lower L-Spine.     Back (L-Spine & T-Spine) Range of Motion   Extension: abnormal   Flexion: abnormal     Spinal Sensation   Right Side Sensation  L-Spine Level: normal  Left Side Sensation  L-Spine Level: normal    Other She has no scoliosis .      Muscle Strength   Right Lower Extremity   Hip Flexion: 5/5   Hip Extensors: 5/5  Quadriceps:  5/5   Hamstrin/5   Gastrocsoleus:  5/5/5  Left Lower Extremity   Hip Flexion: 5/5   Hip Extensors:  5/5  Quadriceps:  5/5   Hamstrin/5   Gastrocsoleus:  5/5/5    Reflexes     Left Side  Quadriceps:  2+  Achilles:  2+    Right Side   Quadriceps:  2+  Achilles:  2+      Imaging:  MRI Lumbar Spine Without Contrast 18  EXAMINATION:  MRI LUMBAR SPINE WITHOUT CONTRAST    CLINICAL HISTORY:  Low back pain, >6wks conservative tx, persistent-progressive sx, surgical candidate;Lumbar radiculopathy, >6wks conservative tx, persistent-progressive sx, surgical candidate; Radiculopathy, lumbar region    TECHNIQUE:  Multiplanar, multisequence MR images were acquired from the thoracolumbar junction to the sacrum without the administration of contrast.    COMPARISON:  CT abdomen pelvis 2016.    FINDINGS:  Alignment: Slight exaggeration of the normal lumbar lordosis.  Subtle anterolisthesis at L3-4, L4-5 and L5-S1..    Vertebrae: Normal marrow signal. No fracture.    Cord: Normal.  Conus terminates in good position.    Degenerative findings:    T12-L1, L1-2, L2-3: No focal disc herniation, spinal canal stenosis or significant neural foraminal narrowing.  Mild facet arthropathy.    L3-4: Moderate facet arthropathy, left more than right.  There is associated anterolisthesis with unroofing of the disc and mild disc bulging.  Mild endplate marginal osteophyte formation laterally.  Constellation of findings results in left greater than right lateral recess and neural foraminal encroachment.  Only modest narrowing of overall spinal canal diameter.    L4-L5: Moderate facet arthropathy, left more than right.  Mild anterolisthesis with unroofing of the disc.  Minimal disc bulging and degenerative endplate changes.  There is no significant narrowing of the overall spinal canal diameter.  Left greater than right lateral recess stenosis minimal neural foraminal encroachment.    L5-S1: Moderate facet arthropathy.  There is subtle anterolisthesis with degenerative disc disease including loss disc height, degenerative endplate  change in mild endplate marginal osteophyte formation.  No spinal stenosis or high-grade neural foraminal narrowing.    Paraspinal muscles & soft tissues: Unremarkable.     Labs:  BMP  Lab Results   Component Value Date     08/06/2016    K 3.9 08/06/2016     08/06/2016    CO2 26 08/06/2016    BUN 12 08/06/2016    CREATININE 0.7 08/06/2016    CALCIUM 9.4 08/06/2016    ANIONGAP 10 08/06/2016    ESTGFRAFRICA >60.0 08/06/2016    EGFRNONAA >60.0 08/06/2016     Lab Results   Component Value Date    ALT 15 08/06/2016    AST 9 (L) 08/06/2016    ALKPHOS 71 08/06/2016    BILITOT 1.2 (H) 08/06/2016       Assessment:  Problem List Items Addressed This Visit     Lumbar radiculopathy, chronic - Primary    Morbid obesity with BMI of 40.0-44.9, adult    Myofascial pain syndrome    DDD (degenerative disc disease), lumbar          58-year-old female with diffuse arthritis and chronic low back pain radiating into the bilateral lower extremities who presents complaining of progressively worsening lumbar radiculopathy for which she would like to try another lumbar epidural steroid injection. Her previous injection approximately 6 years ago provided excellent relief and her pain has been manageable since that time.    Treatment Plan:   PT/OT/HEP: None at this time  Procedures: Lumbar SIMONA  Medications: no changes recommended at this time  Imaging: No additional imaging recommended at this time.  Labs: Reviewed.  Medications are appropriately dosed for current hepatorenal function.    Follow Up: RTC p injection    Avi Morales Jr, MD  Interventional Pain Medicine / Anesthesiology    Disclaimer: This note was partly generated using dictation software which may occasionally result in transcription errors.

## 2018-06-21 NOTE — OP NOTE
"Procedure Note    Pre-operative Diagnosis: Lumbar radiculopathy  Post-operative Diagnosis: Lumbar radiculopathy  Procedure Date: 06/21/2018  Procedure: (1) Lumbar Epidural Steroid Injection and (2) Intraoperative fluoroscopy        Anesthesia: Local    Indications: To alleviate pain and suffering, and reduce functional impairment.    Procedure in Detail:   The patients history and physical exam were reviewed. The risks, benefits and alternatives to the procedure were discussed, and all questions were answered to the patients satisfaction. The patient agreed to proceed, and written informed consent was verified.    The patient was brought into the procedure room and placed in the prone position on the fluoroscopy table. The area of the lumbar spine was prepped with Chloraprep and draped in a sterile manner. The L5-S1 interspace was identified and marked under AP fluoroscopy. The skin and subcutaneous tissues overlying the targeted interspace were anesthetized with 3-5 mL of 1% lidocaine using a 25G, 1.5" needle. A 17G, 5" Tuohy epidural needle was directed toward the interspace under fluoroscopic guidance until the ligamentum flavum was engaged. From this point, a loss of resistance technique with a glass syringe and saline was used to identify entrance of the needle into the epidural space. Once loss of resistance was observed 1 mL of contrast solution was injected live. An appropriate epidurogram was noted.    A 5 mL mixture consisting of saline, 2 mL 1% Lidocaine and 15 mg of Dexamethasone was injected slowly and without resistance.  The needle was removed and a bandage applied to puncture site.    Blood Loss: nil    Disposition: The patient tolerated the procedure well, and there were no apparent complications. Vital signs remained stable throughout the procedure. The patient was taken to the recovery area where written discharge instructions for the procedure were given.     Follow-up: RTC as " scheduled      Avi Morales Jr, MD  Interventional Pain Medicine / Anesthesiology

## 2018-06-21 NOTE — OP NOTE
[facility]  Discharge Note  Short Stay     Admit Date: 6/21/2018    Discharge Date: 6/21/2018     Attending Physician: Avi Morales Jr, MD    Diagnoses:  Active Hospital Problems    Diagnosis  POA    *Lumbar radiculopathy [M54.16]  Yes      Resolved Hospital Problems    Diagnosis Date Resolved POA   No resolved problems to display.     Discharged Condition: Good     Hospital Course: Patient was admitted for an outpatient interventional pain management procedure and tolerated the procedure well with no complications.     Final Diagnoses: Same as principal problem.     Disposition: Home or Self Care     Follow up/Patient Instructions:    Follow-up Information     Avi Morales Jr, MD. Go in 3 weeks.    Specialty:  Pain Medicine  Why:  Post-procedural Follow Up As Scheduled, Call to make an appointment if you do not have one  Contact information:  [address]  [address]  [place] [address]  [phone]                   Reconciled Medications:     Medication List      CONTINUE taking these medications    FLUoxetine 20 MG capsule  Commonly known as:  PROZAC  Take 1 capsule (20 mg total) by mouth once daily.     gabapentin 300 MG capsule  Commonly known as:  NEURONTIN  Take 1 capsule (300 mg total) by mouth 3 (three) times daily.     HYDROcodone-acetaminophen 5-325 mg per tablet  Commonly known as:  NORCO  Take 1 tablet by mouth every 4 (four) hours as needed for Pain.     hydrocodone-homatropine 5-1.5 mg/5 ml 5-1.5 mg/5 mL Syrp  Commonly known as:  HYCODAN  Take 5 mLs by mouth every 4 (four) hours as needed (cough).     losartan-hydrochlorothiazide 100-25 mg 100-25 mg per tablet  Commonly known as:  HYZAAR  Take 1 tablet by mouth once daily.     meclizine 25 mg tablet  Commonly known as:  ANTIVERT  Take 1 tablet (25 mg total) by mouth 3 (three) times daily as needed.     methylPREDNISolone 4 mg tablet  Commonly known as:  MEDROL DOSEPACK  use as directed     metoprolol succinate 100 MG 24 hr  tablet  Commonly known as:  TOPROL-XL  Take 1 tablet (100 mg total) by mouth once daily.     omeprazole 20 MG capsule  Commonly known as:  PRILOSEC  Take 1 capsule (20 mg total) by mouth once daily.            Discharge Procedure Orders (must include Diet, Follow-up, Activity)  Call MD for:  temperature >100.4     Call MD for:  severe uncontrolled pain     Call MD for:  redness, tenderness, or signs of infection (pain, swelling, redness, odor or green/yellow discharge around incision site)     Call MD for:  difficulty breathing or increased cough     Call MD for:  severe persistent headache     Call MD for:  worsening rash     Remove dressing in 24 hours

## 2018-07-02 ENCOUNTER — TELEPHONE (OUTPATIENT)
Dept: INTERNAL MEDICINE | Facility: CLINIC | Age: 58
End: 2018-07-02

## 2018-07-02 NOTE — TELEPHONE ENCOUNTER
----- Message from Leigh Manuel sent at 7/2/2018  1:58 PM CDT -----  Contact: self/[phone]  Patient is requesting a refill on her medications.  Please call and advise when sent to pharmacy.    FLUoxetine (PROZAC) 20 MG capsule    [facility] [place] - [address]

## 2018-10-08 ENCOUNTER — OFFICE VISIT (OUTPATIENT)
Dept: INTERNAL MEDICINE | Facility: CLINIC | Age: 58
End: 2018-10-08
Payer: MEDICARE

## 2018-10-08 VITALS
WEIGHT: 293 LBS | HEIGHT: 68 IN | OXYGEN SATURATION: 97 % | BODY MASS INDEX: 44.41 KG/M2 | HEART RATE: 86 BPM | DIASTOLIC BLOOD PRESSURE: 86 MMHG | SYSTOLIC BLOOD PRESSURE: 136 MMHG

## 2018-10-08 DIAGNOSIS — F33.42 RECURRENT MAJOR DEPRESSIVE DISORDER, IN FULL REMISSION: ICD-10-CM

## 2018-10-08 DIAGNOSIS — Z00.00 ROUTINE GENERAL MEDICAL EXAMINATION AT A HEALTH CARE FACILITY: Primary | ICD-10-CM

## 2018-10-08 DIAGNOSIS — D69.2 SENILE PURPURA: ICD-10-CM

## 2018-10-08 DIAGNOSIS — E66.01 MORBID OBESITY WITH BMI OF 40.0-44.9, ADULT: ICD-10-CM

## 2018-10-08 DIAGNOSIS — K21.9 GASTROESOPHAGEAL REFLUX DISEASE, ESOPHAGITIS PRESENCE NOT SPECIFIED: ICD-10-CM

## 2018-10-08 DIAGNOSIS — M54.16 LUMBAR RADICULOPATHY, CHRONIC: ICD-10-CM

## 2018-10-08 DIAGNOSIS — I10 ESSENTIAL HYPERTENSION: ICD-10-CM

## 2018-10-08 PROBLEM — M51.369 DDD (DEGENERATIVE DISC DISEASE), LUMBAR: Status: RESOLVED | Noted: 2018-06-04 | Resolved: 2018-10-08

## 2018-10-08 PROBLEM — M51.36 DDD (DEGENERATIVE DISC DISEASE), LUMBAR: Status: RESOLVED | Noted: 2018-06-04 | Resolved: 2018-10-08

## 2018-10-08 PROCEDURE — 3075F SYST BP GE 130 - 139MM HG: CPT | Mod: CPTII,,, | Performed by: INTERNAL MEDICINE

## 2018-10-08 PROCEDURE — 3079F DIAST BP 80-89 MM HG: CPT | Mod: CPTII,,, | Performed by: INTERNAL MEDICINE

## 2018-10-08 PROCEDURE — 99214 OFFICE O/P EST MOD 30 MIN: CPT | Mod: S$PBB,,, | Performed by: INTERNAL MEDICINE

## 2018-10-08 PROCEDURE — 99999 PR PBB SHADOW E&M-EST. PATIENT-LVL III: CPT | Mod: PBBFAC,,, | Performed by: INTERNAL MEDICINE

## 2018-10-08 PROCEDURE — 90686 IIV4 VACC NO PRSV 0.5 ML IM: CPT | Mod: PBBFAC,PN

## 2018-10-08 PROCEDURE — 99213 OFFICE O/P EST LOW 20 MIN: CPT | Mod: PBBFAC,PN,25 | Performed by: INTERNAL MEDICINE

## 2018-10-08 PROCEDURE — 99499 UNLISTED E&M SERVICE: CPT | Mod: S$GLB,,, | Performed by: INTERNAL MEDICINE

## 2018-10-08 RX ORDER — LOSARTAN POTASSIUM AND HYDROCHLOROTHIAZIDE 25; 100 MG/1; MG/1
1 TABLET ORAL DAILY
Qty: 90 TABLET | Refills: 3 | Status: SHIPPED | OUTPATIENT
Start: 2018-10-08 | End: 2018-11-08

## 2018-10-08 NOTE — PROGRESS NOTES
Subjective:       Patient ID: Debbie Vo is a 58 y.o. female.    Chief Complaint: Follow-up; Medication Refill; Nasal Congestion; and Otalgia (right ear)    HPI  Wellness check.  C/O nausea on new generic Hyzaar.  C/O R ear pain.  LBP no better s/p epidural steroid injections.  No depression.  No GERD on PPI.  No CP, SOB.  Review of Systems   Constitutional: Negative for chills and fever.   HENT: Negative for congestion.    Eyes: Negative for visual disturbance.   Respiratory: Negative for shortness of breath.    Cardiovascular: Negative for chest pain.   Gastrointestinal: Negative for abdominal pain.   Endocrine: Negative for cold intolerance.   Genitourinary: Negative for dysuria and urgency.   Musculoskeletal: Negative for arthralgias and myalgias.   Skin: Negative for rash.   Allergic/Immunologic: Negative for environmental allergies.   Neurological: Negative for weakness.   Hematological: Does not bruise/bleed easily.   Psychiatric/Behavioral: The patient is not nervous/anxious.    All other systems reviewed and are negative.      Objective:      Physical Exam   Constitutional: She appears well-developed. No distress.   HENT:   Head: Normocephalic.   R TM distended with serous fluid   Eyes: EOM are normal.   Neck: Normal range of motion. No tracheal deviation present.   Cardiovascular: Normal rate, regular rhythm, normal heart sounds and intact distal pulses.   Pulmonary/Chest: Effort normal and breath sounds normal. No respiratory distress.   Abdominal: Soft. Bowel sounds are normal. She exhibits no distension. There is no tenderness.   Musculoskeletal: Normal range of motion. She exhibits no edema.   Neurological: She is alert. No cranial nerve deficit. She exhibits normal muscle tone. Coordination normal.   Skin: Skin is warm and dry. No rash noted. She is not diaphoretic. No erythema.   Purpura all exts   Psychiatric: She has a normal mood and affect. Her behavior is normal.   Vitals  reviewed.      Assessment:       1. Routine general medical examination at a health care facility    2. Essential hypertension    3. Lumbar radiculopathy, chronic    4. Morbid obesity with BMI of 40.0-44.9, adult    5. Gastroesophageal reflux disease, esophagitis presence not specified    6. Recurrent major depressive disorder, in full remission    7. Senile purpura        Plan:       Debbie was seen today for follow-up, medication refill, nasal congestion and otalgia.    Diagnoses and all orders for this visit:    Routine general medical examination at a health care facility  -     Influenza - Quadrivalent (3 years & older) (PF)  -     Hepatitis C antibody; Future    Essential hypertension  -     losartan-hydrochlorothiazide 100-25 mg (HYZAAR) 100-25 mg per tablet; Take 1 tablet by mouth once daily.    Lumbar radiculopathy, chronic   Cont rx    Morbid obesity with BMI of 40.0-44.9, adult   Urged weight loss    Gastroesophageal reflux disease, esophagitis presence not specified   Cont rx    Recurrent major depressive disorder, in full remission   well-cont    Senile purpura   monitor    Follow-up in about 6 months (around 4/8/2019).

## 2018-11-02 ENCOUNTER — TELEPHONE (OUTPATIENT)
Dept: INTERNAL MEDICINE | Facility: CLINIC | Age: 58
End: 2018-11-02

## 2018-11-02 NOTE — TELEPHONE ENCOUNTER
----- Message from Noe Lawson MA sent at 11/2/2018 11:37 AM CDT -----  Contact: [phone]/self  The above pt came in to see you on 10/08/2018, please advice  ----- Message -----  From: Dora Cardozo  Sent: 11/2/2018  11:22 AM  To: [facility] Staff    Patient requesting to speak with you regarding changing her blood pressure medication. Please advise.

## 2018-11-05 ENCOUNTER — OUTSIDE PLACE OF SERVICE (OUTPATIENT)
Dept: CARDIOLOGY | Facility: CLINIC | Age: 58
End: 2018-11-05
Payer: MEDICARE

## 2018-11-05 PROCEDURE — 93010 ELECTROCARDIOGRAM REPORT: CPT | Mod: S$GLB,,, | Performed by: STUDENT IN AN ORGANIZED HEALTH CARE EDUCATION/TRAINING PROGRAM

## 2018-11-08 ENCOUNTER — OFFICE VISIT (OUTPATIENT)
Dept: INTERNAL MEDICINE | Facility: CLINIC | Age: 58
End: 2018-11-08
Payer: MEDICARE

## 2018-11-08 VITALS
BODY MASS INDEX: 44.41 KG/M2 | SYSTOLIC BLOOD PRESSURE: 148 MMHG | DIASTOLIC BLOOD PRESSURE: 98 MMHG | WEIGHT: 293 LBS | HEART RATE: 105 BPM | HEIGHT: 68 IN

## 2018-11-08 DIAGNOSIS — I10 ESSENTIAL HYPERTENSION: ICD-10-CM

## 2018-11-08 DIAGNOSIS — M54.16 LUMBAR RADICULOPATHY, CHRONIC: ICD-10-CM

## 2018-11-08 DIAGNOSIS — B02.9 HERPES ZOSTER WITHOUT COMPLICATION: Primary | ICD-10-CM

## 2018-11-08 PROCEDURE — 99999 PR PBB SHADOW E&M-EST. PATIENT-LVL III: CPT | Mod: PBBFAC,,, | Performed by: INTERNAL MEDICINE

## 2018-11-08 PROCEDURE — 99213 OFFICE O/P EST LOW 20 MIN: CPT | Mod: PBBFAC,PN | Performed by: INTERNAL MEDICINE

## 2018-11-08 PROCEDURE — 99213 OFFICE O/P EST LOW 20 MIN: CPT | Mod: S$GLB,,, | Performed by: INTERNAL MEDICINE

## 2018-11-08 RX ORDER — FAMCICLOVIR 500 MG/1
500 TABLET ORAL 3 TIMES DAILY
Qty: 21 TABLET | Refills: 0 | Status: SHIPPED | OUTPATIENT
Start: 2018-11-08 | End: 2018-11-15

## 2018-11-08 RX ORDER — VALSARTAN AND HYDROCHLOROTHIAZIDE 320; 25 MG/1; MG/1
1 TABLET, FILM COATED ORAL DAILY
Qty: 90 TABLET | Refills: 3 | Status: SHIPPED | OUTPATIENT
Start: 2018-11-08 | End: 2020-03-02

## 2018-11-08 NOTE — PROGRESS NOTES
Subjective:       Patient ID: Debbie Vo is a 58 y.o. female.    Chief Complaint: Follow-up (ED follow up for strained shoulder)    HPI  Pt seen in [facility] ER 4 days ago for L shoulder pain dxed with shoulder strain.  3 days ago pt developed a rash in her L axilla.  Pain improving.  Review of Systems    Objective:      Physical Exam   Constitutional: She appears well-developed. No distress.   obese   HENT:   Head: Normocephalic.   Eyes: EOM are normal.   Neck: Normal range of motion. No tracheal deviation present.   Cardiovascular: Normal rate, regular rhythm and intact distal pulses.   Pulmonary/Chest: Effort normal. No respiratory distress.   Abdominal: She exhibits no distension.   Musculoskeletal: Normal range of motion. She exhibits no edema.   Neurological: She is alert. No cranial nerve deficit. She exhibits normal muscle tone. Coordination normal.   Skin: Skin is warm and dry. Rash (zoster in L T1 distribution) noted. She is not diaphoretic. No erythema.   Psychiatric: She has a normal mood and affect. Her behavior is normal.   Vitals reviewed.      Assessment:       1. Herpes zoster without complication    2. Essential hypertension    3. Lumbar radiculopathy, chronic        Plan:       Debbie was seen today for follow-up.    Diagnoses and all orders for this visit:    Herpes zoster without complication  -     famciclovir (FAMVIR) 500 MG tablet; Take 1 tablet (500 mg total) by mouth 3 (three) times daily. for 7 days    Essential hypertension  -     valsartan-hydrochlorothiazide (DIOVAN-HCT) 320-25 mg per tablet; Take 1 tablet by mouth once daily.   Cont other rx      Follow-up in about 1 month (around 12/8/2018).

## 2018-11-09 ENCOUNTER — TELEPHONE (OUTPATIENT)
Dept: INTERNAL MEDICINE | Facility: CLINIC | Age: 58
End: 2018-11-09

## 2018-11-09 DIAGNOSIS — M54.16 LUMBAR RADICULOPATHY, CHRONIC: ICD-10-CM

## 2018-11-09 RX ORDER — HYDROCODONE BITARTRATE AND ACETAMINOPHEN 5; 325 MG/1; MG/1
1 TABLET ORAL EVERY 4 HOURS PRN
Qty: 30 TABLET | Refills: 0 | Status: SHIPPED | OUTPATIENT
Start: 2018-11-09 | End: 2018-12-03 | Stop reason: SDUPTHER

## 2018-11-09 NOTE — TELEPHONE ENCOUNTER
----- Message from Cathy Rasmussen sent at 11/9/2018  1:04 PM CST -----  Contact: [phone]/ self   Pt called stating she was diagnose with singles yesterday and she wants to know if there is a cream that can be sent to the pharmacy because its really painful . Please advise

## 2018-11-19 ENCOUNTER — OFFICE VISIT (OUTPATIENT)
Dept: INTERNAL MEDICINE | Facility: CLINIC | Age: 58
End: 2018-11-19
Payer: MEDICARE

## 2018-11-19 VITALS
DIASTOLIC BLOOD PRESSURE: 88 MMHG | WEIGHT: 292.13 LBS | SYSTOLIC BLOOD PRESSURE: 134 MMHG | HEART RATE: 100 BPM | HEIGHT: 68 IN | BODY MASS INDEX: 44.27 KG/M2

## 2018-11-19 DIAGNOSIS — B02.9 HERPES ZOSTER WITHOUT COMPLICATION: ICD-10-CM

## 2018-11-19 DIAGNOSIS — I10 ESSENTIAL HYPERTENSION: Primary | ICD-10-CM

## 2018-11-19 DIAGNOSIS — Z11.59 ENCOUNTER FOR HEPATITIS C SCREENING TEST FOR LOW RISK PATIENT: ICD-10-CM

## 2018-11-19 PROCEDURE — 99213 OFFICE O/P EST LOW 20 MIN: CPT | Mod: S$GLB,,, | Performed by: INTERNAL MEDICINE

## 2018-11-19 PROCEDURE — 3075F SYST BP GE 130 - 139MM HG: CPT | Mod: CPTII,S$GLB,, | Performed by: INTERNAL MEDICINE

## 2018-11-19 PROCEDURE — 3008F BODY MASS INDEX DOCD: CPT | Mod: CPTII,S$GLB,, | Performed by: INTERNAL MEDICINE

## 2018-11-19 PROCEDURE — 99999 PR PBB SHADOW E&M-EST. PATIENT-LVL III: CPT | Mod: PBBFAC,,, | Performed by: INTERNAL MEDICINE

## 2018-11-19 PROCEDURE — 3079F DIAST BP 80-89 MM HG: CPT | Mod: CPTII,S$GLB,, | Performed by: INTERNAL MEDICINE

## 2018-11-19 NOTE — PROGRESS NOTES
Subjective:       Patient ID: Debbie Vo is a 58 y.o. female.    Chief Complaint: Follow-up (for shingles)    HPI  Recheck.  Rash fading, but still with some paresthesias around L shoulder.  Overall feels better.  Review of Systems    Objective:      Physical Exam   Constitutional: She appears well-developed. No distress.   obese   HENT:   Head: Normocephalic.   Eyes: EOM are normal.   Neck: Normal range of motion. No tracheal deviation present.   Cardiovascular: Normal rate, regular rhythm and intact distal pulses.   Pulmonary/Chest: Effort normal and breath sounds normal. No respiratory distress.   Abdominal: She exhibits no distension.   Musculoskeletal: Normal range of motion. She exhibits no edema.   Neurological: She is alert. No cranial nerve deficit. She exhibits normal muscle tone. Coordination normal.   Skin: Skin is warm and dry. Rash (discoloration L T1 dist) noted. She is not diaphoretic. No erythema.   Psychiatric: She has a normal mood and affect. Her behavior is normal.   Vitals reviewed.      Assessment:       1. Essential hypertension    2. Herpes zoster without complication    3. Encounter for hepatitis C screening test for low risk patient        Plan:       Debbie was seen today for follow-up.    Diagnoses and all orders for this visit:    Essential hypertension   Well-cont    Herpes zoster without complication   Cont gabapentin    Encounter for hepatitis C screening test for low risk patient  -     Hepatitis C antibody; Future      Follow-up in about 6 months (around 5/19/2019).

## 2018-12-03 DIAGNOSIS — M54.16 LUMBAR RADICULOPATHY, CHRONIC: ICD-10-CM

## 2018-12-03 RX ORDER — HYDROCODONE BITARTRATE AND ACETAMINOPHEN 5; 325 MG/1; MG/1
1 TABLET ORAL EVERY 4 HOURS PRN
Qty: 30 TABLET | Refills: 0 | Status: SHIPPED | OUTPATIENT
Start: 2018-12-03 | End: 2018-12-19 | Stop reason: SDUPTHER

## 2018-12-03 NOTE — TELEPHONE ENCOUNTER
----- Message from Aarti Madison sent at 12/3/2018  1:21 PM CST -----  Contact: [phone]/self  Refill request for HYDROcodone-acetaminophen (NORCO) 5-325 mg per tablet  Pharmacy: [facility] [place] - [address]

## 2018-12-17 ENCOUNTER — OFFICE VISIT (OUTPATIENT)
Dept: INTERNAL MEDICINE | Facility: CLINIC | Age: 58
End: 2018-12-17
Payer: MEDICARE

## 2018-12-17 VITALS
HEIGHT: 68 IN | SYSTOLIC BLOOD PRESSURE: 144 MMHG | HEART RATE: 89 BPM | BODY MASS INDEX: 44.32 KG/M2 | DIASTOLIC BLOOD PRESSURE: 90 MMHG | WEIGHT: 292.44 LBS

## 2018-12-17 DIAGNOSIS — J06.9 UPPER RESPIRATORY TRACT INFECTION, UNSPECIFIED TYPE: ICD-10-CM

## 2018-12-17 DIAGNOSIS — I10 ESSENTIAL HYPERTENSION: ICD-10-CM

## 2018-12-17 DIAGNOSIS — B02.29 POST HERPETIC NEURALGIA: Primary | ICD-10-CM

## 2018-12-17 PROCEDURE — 99213 OFFICE O/P EST LOW 20 MIN: CPT | Mod: S$GLB,,, | Performed by: INTERNAL MEDICINE

## 2018-12-17 PROCEDURE — 99999 PR PBB SHADOW E&M-EST. PATIENT-LVL III: CPT | Mod: PBBFAC,,, | Performed by: INTERNAL MEDICINE

## 2018-12-17 PROCEDURE — 3077F SYST BP >= 140 MM HG: CPT | Mod: CPTII,S$GLB,, | Performed by: INTERNAL MEDICINE

## 2018-12-17 PROCEDURE — 3008F BODY MASS INDEX DOCD: CPT | Mod: CPTII,S$GLB,, | Performed by: INTERNAL MEDICINE

## 2018-12-17 PROCEDURE — 3080F DIAST BP >= 90 MM HG: CPT | Mod: CPTII,S$GLB,, | Performed by: INTERNAL MEDICINE

## 2018-12-17 RX ORDER — TOPIRAMATE 50 MG/1
50 TABLET, FILM COATED ORAL NIGHTLY
Qty: 90 TABLET | Refills: 3 | Status: SHIPPED | OUTPATIENT
Start: 2018-12-17 | End: 2020-03-06 | Stop reason: SDUPTHER

## 2018-12-17 RX ORDER — METHYLPREDNISOLONE 4 MG/1
TABLET ORAL
Qty: 1 PACKAGE | Refills: 0 | Status: SHIPPED | OUTPATIENT
Start: 2018-12-17 | End: 2020-10-30

## 2018-12-17 NOTE — PROGRESS NOTES
Subjective:       Patient ID: Debbie Vo is a 58 y.o. female.    Chief Complaint: Herpes Zoster (possible on shoulders)    HPI  Pt with persistent burning pain in R T1 dermatome.  Also c/o nasal congestion and a clogged L ear.  No F/C.  Review of Systems    Objective:      Physical Exam   Constitutional: She appears well-developed. No distress.   HENT:   Head: Normocephalic.   L TM distended   Eyes: EOM are normal.   Neck: Normal range of motion. No tracheal deviation present.   Cardiovascular: Normal rate, regular rhythm and intact distal pulses.   Pulmonary/Chest: Effort normal. No respiratory distress.   Abdominal: She exhibits no distension.   Musculoskeletal: Normal range of motion. She exhibits no edema.   Neurological: She is alert. No cranial nerve deficit. She exhibits normal muscle tone. Coordination normal.   Skin: Skin is warm and dry. No rash noted. She is not diaphoretic. No erythema.   Psychiatric: She has a normal mood and affect. Her behavior is normal.   Vitals reviewed.      Assessment:       1. Post herpetic neuralgia    2. Upper respiratory tract infection, unspecified type    3. Essential hypertension        Plan:       Debbie was seen today for herpes zoster.    Diagnoses and all orders for this visit:    Post herpetic neuralgia  -     topiramate (TOPAMAX) 50 MG tablet; Take 1 tablet (50 mg total) by mouth every evening.    Upper respiratory tract infection, unspecified type  -     methylPREDNISolone (MEDROL DOSEPACK) 4 mg tablet; use as directed    Essential hypertension   monitor    No Follow-up on file.

## 2018-12-19 ENCOUNTER — TELEPHONE (OUTPATIENT)
Dept: INTERNAL MEDICINE | Facility: CLINIC | Age: 58
End: 2018-12-19

## 2018-12-19 DIAGNOSIS — M54.16 LUMBAR RADICULOPATHY, CHRONIC: ICD-10-CM

## 2018-12-19 RX ORDER — HYDROCODONE BITARTRATE AND ACETAMINOPHEN 5; 325 MG/1; MG/1
1 TABLET ORAL EVERY 4 HOURS PRN
Qty: 30 TABLET | Refills: 0 | Status: SHIPPED | OUTPATIENT
Start: 2018-12-19 | End: 2019-04-08 | Stop reason: SDUPTHER

## 2018-12-19 NOTE — TELEPHONE ENCOUNTER
----- Message from Neo Lawson MA sent at 12/19/2018  1:27 PM CST -----  Contact: Self [phone]  Type: Rx    Name of medication(s): HYDROcodone-acetaminophen (NORCO) 5-325 mg per tablet    Is this a refill? New rx? Refill    Who prescribed medication? Dr Hussein    Pharmacy Name, Phone, & Location:[facility] [place] [address]    Comments:Please advice    Thanks

## 2019-04-08 ENCOUNTER — OFFICE VISIT (OUTPATIENT)
Dept: INTERNAL MEDICINE | Facility: CLINIC | Age: 59
End: 2019-04-08
Payer: MEDICARE

## 2019-04-08 VITALS
SYSTOLIC BLOOD PRESSURE: 124 MMHG | DIASTOLIC BLOOD PRESSURE: 88 MMHG | WEIGHT: 293 LBS | HEART RATE: 106 BPM | OXYGEN SATURATION: 97 % | TEMPERATURE: 99 F | BODY MASS INDEX: 45.62 KG/M2

## 2019-04-08 DIAGNOSIS — M54.16 LUMBAR RADICULOPATHY, CHRONIC: ICD-10-CM

## 2019-04-08 DIAGNOSIS — I10 ESSENTIAL HYPERTENSION: ICD-10-CM

## 2019-04-08 DIAGNOSIS — R73.9 HYPERGLYCEMIA: ICD-10-CM

## 2019-04-08 DIAGNOSIS — D69.2 SENILE PURPURA: ICD-10-CM

## 2019-04-08 DIAGNOSIS — E66.01 MORBID OBESITY WITH BMI OF 40.0-44.9, ADULT: ICD-10-CM

## 2019-04-08 DIAGNOSIS — K21.9 GASTROESOPHAGEAL REFLUX DISEASE, ESOPHAGITIS PRESENCE NOT SPECIFIED: ICD-10-CM

## 2019-04-08 DIAGNOSIS — K21.9 GASTROESOPHAGEAL REFLUX DISEASE WITHOUT ESOPHAGITIS: ICD-10-CM

## 2019-04-08 DIAGNOSIS — Z00.00 ROUTINE GENERAL MEDICAL EXAMINATION AT A HEALTH CARE FACILITY: Primary | ICD-10-CM

## 2019-04-08 DIAGNOSIS — B02.29 POST HERPETIC NEURALGIA: ICD-10-CM

## 2019-04-08 PROCEDURE — 3079F DIAST BP 80-89 MM HG: CPT | Mod: CPTII,S$GLB,, | Performed by: INTERNAL MEDICINE

## 2019-04-08 PROCEDURE — 99214 OFFICE O/P EST MOD 30 MIN: CPT | Mod: S$GLB,,, | Performed by: INTERNAL MEDICINE

## 2019-04-08 PROCEDURE — 99499 RISK ADDL DX/OHS AUDIT: ICD-10-PCS | Mod: S$GLB,,, | Performed by: INTERNAL MEDICINE

## 2019-04-08 PROCEDURE — 99999 PR PBB SHADOW E&M-EST. PATIENT-LVL III: ICD-10-PCS | Mod: PBBFAC,,, | Performed by: INTERNAL MEDICINE

## 2019-04-08 PROCEDURE — 99999 PR PBB SHADOW E&M-EST. PATIENT-LVL III: CPT | Mod: PBBFAC,,, | Performed by: INTERNAL MEDICINE

## 2019-04-08 PROCEDURE — 99214 PR OFFICE/OUTPT VISIT, EST, LEVL IV, 30-39 MIN: ICD-10-PCS | Mod: S$GLB,,, | Performed by: INTERNAL MEDICINE

## 2019-04-08 PROCEDURE — 99499 UNLISTED E&M SERVICE: CPT | Mod: S$GLB,,, | Performed by: INTERNAL MEDICINE

## 2019-04-08 PROCEDURE — 3074F PR MOST RECENT SYSTOLIC BLOOD PRESSURE < 130 MM HG: ICD-10-PCS | Mod: CPTII,S$GLB,, | Performed by: INTERNAL MEDICINE

## 2019-04-08 PROCEDURE — 3074F SYST BP LT 130 MM HG: CPT | Mod: CPTII,S$GLB,, | Performed by: INTERNAL MEDICINE

## 2019-04-08 PROCEDURE — 3079F PR MOST RECENT DIASTOLIC BLOOD PRESSURE 80-89 MM HG: ICD-10-PCS | Mod: CPTII,S$GLB,, | Performed by: INTERNAL MEDICINE

## 2019-04-08 RX ORDER — OMEPRAZOLE 20 MG/1
20 CAPSULE, DELAYED RELEASE ORAL DAILY
Qty: 90 CAPSULE | Refills: 3 | Status: SHIPPED | OUTPATIENT
Start: 2019-04-08 | End: 2019-10-08 | Stop reason: SDUPTHER

## 2019-04-08 RX ORDER — METOPROLOL SUCCINATE 100 MG/1
100 TABLET, EXTENDED RELEASE ORAL DAILY
Qty: 90 TABLET | Refills: 1 | Status: SHIPPED | OUTPATIENT
Start: 2019-04-08 | End: 2020-03-06 | Stop reason: SDUPTHER

## 2019-04-08 RX ORDER — GABAPENTIN 300 MG/1
300 CAPSULE ORAL 3 TIMES DAILY
Qty: 270 CAPSULE | Refills: 1 | Status: SHIPPED | OUTPATIENT
Start: 2019-04-08 | End: 2019-10-08 | Stop reason: SDUPTHER

## 2019-04-08 RX ORDER — HYDROCODONE BITARTRATE AND ACETAMINOPHEN 5; 325 MG/1; MG/1
1 TABLET ORAL EVERY 4 HOURS PRN
Qty: 30 TABLET | Refills: 0 | Status: SHIPPED | OUTPATIENT
Start: 2019-04-08 | End: 2019-10-08

## 2019-04-08 NOTE — PROGRESS NOTES
Subjective:       Patient ID: Debbie Vo is a 58 y.o. female.    Chief Complaint: Follow-up; Sinus Problem; Otalgia; Medication Refill; and Heartburn    HPI  Wellness check.  Chart reviewed.  Weight up 8 lbs/ 4 mo.  Postherpetic pain improving.  LBP no better.  No depression.  No CP, SOB.  Review of Systems   All other systems reviewed and are negative.      Objective:      Physical Exam   Constitutional: She appears well-developed. No distress.   obese   HENT:   Head: Normocephalic.   Eyes: EOM are normal.   Neck: Normal range of motion. No tracheal deviation present.   Cardiovascular: Normal rate, regular rhythm, normal heart sounds and intact distal pulses.   Pulmonary/Chest: Effort normal and breath sounds normal. No respiratory distress.   Abdominal: Soft. Bowel sounds are normal. She exhibits no distension. There is no tenderness.   Musculoskeletal: Normal range of motion. She exhibits no edema.   Neurological: She is alert. No cranial nerve deficit. She exhibits normal muscle tone. Coordination normal.   Skin: Skin is warm and dry. No rash noted. She is not diaphoretic. No erythema.   Psychiatric: She has a normal mood and affect. Her behavior is normal.   Vitals reviewed.      Assessment:       1. Routine general medical examination at a health care facility    2. Lumbar radiculopathy, chronic    3. Post herpetic neuralgia    4. Essential hypertension    5. Morbid obesity with BMI of 40.0-44.9, adult    6. Gastroesophageal reflux disease, esophagitis presence not specified    7. Senile purpura    8. Gastroesophageal reflux disease without esophagitis    9. Hyperglycemia        Plan:       Debbie was seen today for follow-up, sinus problem, otalgia, medication refill and heartburn.    Diagnoses and all orders for this visit:    Routine general medical examination at a health care facility    Lumbar radiculopathy, chronic   Cont rx    Post herpetic neuralgia   Cont rx    Essential  hypertension   Well-cont    Morbid obesity with BMI of 40.0-44.9, adult  -     Hemoglobin A1c; Future    Gastroesophageal reflux disease, esophagitis presence not specified    Senile purpura   Monitor    Gastroesophageal reflux disease without esophagitis  -     omeprazole (PRILOSEC) 20 MG capsule; Take 1 capsule (20 mg total) by mouth once daily.    Hyperglycemia  -     Hemoglobin A1c; Future      Follow up in about 6 months (around 10/8/2019).

## 2019-04-08 NOTE — TELEPHONE ENCOUNTER
----- Message from Ines Bliss sent at 4/8/2019  1:55 PM CDT -----  No. [phone]    Patient had an appointment this morning.   Hydrocodone, Gabapentin, and Metoprolol were not at [facility] in [place].    Please call in.

## 2019-10-08 ENCOUNTER — OFFICE VISIT (OUTPATIENT)
Dept: INTERNAL MEDICINE | Facility: CLINIC | Age: 59
End: 2019-10-08
Payer: MEDICARE

## 2019-10-08 ENCOUNTER — LAB VISIT (OUTPATIENT)
Dept: LAB | Facility: HOSPITAL | Age: 59
End: 2019-10-08
Attending: INTERNAL MEDICINE
Payer: MEDICARE

## 2019-10-08 VITALS
BODY MASS INDEX: 44.27 KG/M2 | OXYGEN SATURATION: 97 % | DIASTOLIC BLOOD PRESSURE: 70 MMHG | HEART RATE: 86 BPM | HEIGHT: 68 IN | SYSTOLIC BLOOD PRESSURE: 122 MMHG | WEIGHT: 292.13 LBS

## 2019-10-08 DIAGNOSIS — I10 ESSENTIAL HYPERTENSION: Primary | ICD-10-CM

## 2019-10-08 DIAGNOSIS — E66.01 MORBID OBESITY WITH BMI OF 40.0-44.9, ADULT: ICD-10-CM

## 2019-10-08 DIAGNOSIS — K21.9 GASTROESOPHAGEAL REFLUX DISEASE WITHOUT ESOPHAGITIS: ICD-10-CM

## 2019-10-08 DIAGNOSIS — G56.02 CARPAL TUNNEL SYNDROME OF LEFT WRIST: ICD-10-CM

## 2019-10-08 DIAGNOSIS — M54.16 LUMBAR RADICULOPATHY, CHRONIC: ICD-10-CM

## 2019-10-08 DIAGNOSIS — R23.2 HOT FLASHES: ICD-10-CM

## 2019-10-08 DIAGNOSIS — Z23 NEED FOR PROPHYLACTIC VACCINATION AND INOCULATION AGAINST INFLUENZA: ICD-10-CM

## 2019-10-08 DIAGNOSIS — K21.9 GASTROESOPHAGEAL REFLUX DISEASE, ESOPHAGITIS PRESENCE NOT SPECIFIED: ICD-10-CM

## 2019-10-08 DIAGNOSIS — B02.29 POST HERPETIC NEURALGIA: ICD-10-CM

## 2019-10-08 DIAGNOSIS — R73.9 HYPERGLYCEMIA: ICD-10-CM

## 2019-10-08 DIAGNOSIS — Z11.59 ENCOUNTER FOR HEPATITIS C SCREENING TEST FOR LOW RISK PATIENT: ICD-10-CM

## 2019-10-08 LAB
ESTIMATED AVG GLUCOSE: 120 MG/DL (ref 68–131)
HBA1C MFR BLD HPLC: 5.8 % (ref 4–5.6)

## 2019-10-08 PROCEDURE — 99214 PR OFFICE/OUTPT VISIT, EST, LEVL IV, 30-39 MIN: ICD-10-PCS | Mod: 25,S$GLB,, | Performed by: INTERNAL MEDICINE

## 2019-10-08 PROCEDURE — 99999 PR PBB SHADOW E&M-EST. PATIENT-LVL III: CPT | Mod: PBBFAC,,, | Performed by: INTERNAL MEDICINE

## 2019-10-08 PROCEDURE — 3074F SYST BP LT 130 MM HG: CPT | Mod: CPTII,S$GLB,, | Performed by: INTERNAL MEDICINE

## 2019-10-08 PROCEDURE — 3074F PR MOST RECENT SYSTOLIC BLOOD PRESSURE < 130 MM HG: ICD-10-PCS | Mod: CPTII,S$GLB,, | Performed by: INTERNAL MEDICINE

## 2019-10-08 PROCEDURE — 3008F PR BODY MASS INDEX (BMI) DOCUMENTED: ICD-10-PCS | Mod: CPTII,S$GLB,, | Performed by: INTERNAL MEDICINE

## 2019-10-08 PROCEDURE — G0008 FLU VACCINE (QUAD) GREATER THAN OR EQUAL TO 3YO PRESERVATIVE FREE IM: ICD-10-PCS | Mod: S$GLB,,, | Performed by: INTERNAL MEDICINE

## 2019-10-08 PROCEDURE — 83036 HEMOGLOBIN GLYCOSYLATED A1C: CPT

## 2019-10-08 PROCEDURE — 99214 OFFICE O/P EST MOD 30 MIN: CPT | Mod: 25,S$GLB,, | Performed by: INTERNAL MEDICINE

## 2019-10-08 PROCEDURE — 90686 FLU VACCINE (QUAD) GREATER THAN OR EQUAL TO 3YO PRESERVATIVE FREE IM: ICD-10-PCS | Mod: S$GLB,,, | Performed by: INTERNAL MEDICINE

## 2019-10-08 PROCEDURE — 90686 IIV4 VACC NO PRSV 0.5 ML IM: CPT | Mod: S$GLB,,, | Performed by: INTERNAL MEDICINE

## 2019-10-08 PROCEDURE — 36415 COLL VENOUS BLD VENIPUNCTURE: CPT | Mod: PO

## 2019-10-08 PROCEDURE — G0008 ADMIN INFLUENZA VIRUS VAC: HCPCS | Mod: S$GLB,,, | Performed by: INTERNAL MEDICINE

## 2019-10-08 PROCEDURE — 3008F BODY MASS INDEX DOCD: CPT | Mod: CPTII,S$GLB,, | Performed by: INTERNAL MEDICINE

## 2019-10-08 PROCEDURE — 3078F PR MOST RECENT DIASTOLIC BLOOD PRESSURE < 80 MM HG: ICD-10-PCS | Mod: CPTII,S$GLB,, | Performed by: INTERNAL MEDICINE

## 2019-10-08 PROCEDURE — 99499 UNLISTED E&M SERVICE: CPT | Mod: S$GLB,,, | Performed by: INTERNAL MEDICINE

## 2019-10-08 PROCEDURE — 3078F DIAST BP <80 MM HG: CPT | Mod: CPTII,S$GLB,, | Performed by: INTERNAL MEDICINE

## 2019-10-08 PROCEDURE — 99499 RISK ADDL DX/OHS AUDIT: ICD-10-PCS | Mod: S$GLB,,, | Performed by: INTERNAL MEDICINE

## 2019-10-08 PROCEDURE — 86803 HEPATITIS C AB TEST: CPT | Mod: PO

## 2019-10-08 PROCEDURE — 99999 PR PBB SHADOW E&M-EST. PATIENT-LVL III: ICD-10-PCS | Mod: PBBFAC,,, | Performed by: INTERNAL MEDICINE

## 2019-10-08 RX ORDER — FLUOXETINE HYDROCHLORIDE 20 MG/1
20 CAPSULE ORAL DAILY
Qty: 90 CAPSULE | Refills: 3 | Status: SHIPPED | OUTPATIENT
Start: 2019-10-08 | End: 2020-03-06 | Stop reason: SDUPTHER

## 2019-10-08 RX ORDER — OMEPRAZOLE 20 MG/1
20 CAPSULE, DELAYED RELEASE ORAL DAILY
Qty: 90 CAPSULE | Refills: 3 | Status: SHIPPED | OUTPATIENT
Start: 2019-10-08 | End: 2020-03-06 | Stop reason: SDUPTHER

## 2019-10-08 RX ORDER — GABAPENTIN 300 MG/1
300 CAPSULE ORAL 3 TIMES DAILY
Qty: 270 CAPSULE | Refills: 1 | Status: SHIPPED | OUTPATIENT
Start: 2019-10-08 | End: 2020-03-06 | Stop reason: SDUPTHER

## 2019-10-08 RX ORDER — HYDROCODONE BITARTRATE AND ACETAMINOPHEN 5; 325 MG/1; MG/1
1 TABLET ORAL EVERY 4 HOURS PRN
Qty: 30 TABLET | Refills: 0 | Status: SHIPPED | OUTPATIENT
Start: 2019-10-08 | End: 2020-03-09 | Stop reason: SDUPTHER

## 2019-10-08 NOTE — PROGRESS NOTES
Subjective:       Patient ID: Debbie Vo is a 59 y.o. female.    Chief Complaint: Dizziness; Follow-up (six month ); Wrist Pain (left); Sinus Problem (possible); and Medication Refill (Prilosec, Fluoxetine & Norco)    HPI  Checkup.  Chart reviewed.  Weight down 8 lbs/ 6 mo.  No cP, sOB.  No depression.  Minimal postherpetic pain, LBP at baseline.  C/O persistent L CTS despite qhs splint, dropping things with L hand.  Review of Systems   Constitutional: Negative for chills and fever.   HENT: Negative for congestion.    Eyes: Negative for visual disturbance.   Respiratory: Negative for shortness of breath.    Cardiovascular: Negative for chest pain.   Gastrointestinal: Negative for abdominal pain.   Endocrine: Negative for cold intolerance.   Genitourinary: Negative for dysuria and urgency.   Musculoskeletal: Negative for arthralgias and myalgias.   Skin: Negative for rash.   Allergic/Immunologic: Negative for environmental allergies.   Neurological: Negative for weakness.   Hematological: Does not bruise/bleed easily.   Psychiatric/Behavioral: The patient is not nervous/anxious.    All other systems reviewed and are negative.      Objective:      Physical Exam   Constitutional: She appears well-developed.   obese   HENT:   Head: Normocephalic.   Eyes: EOM are normal.   Neck: Normal range of motion.   Cardiovascular: Normal rate, regular rhythm, normal heart sounds and intact distal pulses.   Pulmonary/Chest: Effort normal and breath sounds normal.   Abdominal: Bowel sounds are normal.   Musculoskeletal: Normal range of motion. She exhibits no edema.   L thenar atrophy   Lymphadenopathy:     She has no cervical adenopathy.   Neurological: She is alert. No cranial nerve deficit. She exhibits normal muscle tone. Coordination normal.   Skin: Skin is warm and dry.   Psychiatric: She has a normal mood and affect. Her behavior is normal.   Vitals reviewed.      Assessment:       1. Essential hypertension     2. Post herpetic neuralgia    3. Morbid obesity with BMI of 40.0-44.9, adult    4. Gastroesophageal reflux disease, esophagitis presence not specified    5. Carpal tunnel syndrome of left wrist    6. Need for prophylactic vaccination and inoculation against influenza        Plan:       Debbie was seen today for dizziness, follow-up, wrist pain, sinus problem and medication refill.    Diagnoses and all orders for this visit:    Essential hypertension   Well-cont    Post herpetic neuralgia    Morbid obesity with BMI of 40.0-44.9, adult   Cont weight loss    Gastroesophageal reflux disease, esophagitis presence not specified   Quiescent    Carpal tunnel syndrome of left wrist    To see Dr Krishnan    Need for prophylactic vaccination and inoculation against influenza  -     Influenza - Quadrivalent (PF)      Follow up in about 6 months (around 4/8/2020).

## 2019-10-09 LAB — HCV AB SERPL QL IA: NEGATIVE

## 2020-02-28 DIAGNOSIS — I10 ESSENTIAL HYPERTENSION: ICD-10-CM

## 2020-03-02 RX ORDER — VALSARTAN AND HYDROCHLOROTHIAZIDE 320; 25 MG/1; MG/1
TABLET, FILM COATED ORAL
Qty: 90 TABLET | Refills: 4 | Status: SHIPPED | OUTPATIENT
Start: 2020-03-02 | End: 2020-03-06 | Stop reason: SDUPTHER

## 2020-03-06 ENCOUNTER — TELEPHONE (OUTPATIENT)
Dept: INTERNAL MEDICINE | Facility: CLINIC | Age: 60
End: 2020-03-06

## 2020-03-06 ENCOUNTER — OFFICE VISIT (OUTPATIENT)
Dept: INTERNAL MEDICINE | Facility: CLINIC | Age: 60
End: 2020-03-06
Payer: MEDICARE

## 2020-03-06 VITALS
HEIGHT: 68 IN | DIASTOLIC BLOOD PRESSURE: 80 MMHG | OXYGEN SATURATION: 98 % | WEIGHT: 291.44 LBS | HEART RATE: 83 BPM | BODY MASS INDEX: 44.17 KG/M2 | SYSTOLIC BLOOD PRESSURE: 110 MMHG

## 2020-03-06 DIAGNOSIS — Z12.39 BREAST CANCER SCREENING, HIGH RISK PATIENT: ICD-10-CM

## 2020-03-06 DIAGNOSIS — E66.01 MORBID OBESITY WITH BMI OF 40.0-44.9, ADULT: ICD-10-CM

## 2020-03-06 DIAGNOSIS — F33.42 RECURRENT MAJOR DEPRESSIVE DISORDER, IN FULL REMISSION: ICD-10-CM

## 2020-03-06 DIAGNOSIS — Z00.00 ROUTINE GENERAL MEDICAL EXAMINATION AT A HEALTH CARE FACILITY: Primary | ICD-10-CM

## 2020-03-06 DIAGNOSIS — Z11.4 ENCOUNTER FOR SCREENING FOR HUMAN IMMUNODEFICIENCY VIRUS (HIV): ICD-10-CM

## 2020-03-06 DIAGNOSIS — R73.03 PREDIABETES: ICD-10-CM

## 2020-03-06 DIAGNOSIS — Z85.038 PERSONAL HISTORY OF COLON CANCER: ICD-10-CM

## 2020-03-06 DIAGNOSIS — B02.29 POST HERPETIC NEURALGIA: ICD-10-CM

## 2020-03-06 DIAGNOSIS — I10 ESSENTIAL HYPERTENSION: ICD-10-CM

## 2020-03-06 DIAGNOSIS — Z12.31 ENCOUNTER FOR SCREENING MAMMOGRAM FOR MALIGNANT NEOPLASM OF BREAST: ICD-10-CM

## 2020-03-06 DIAGNOSIS — D69.2 SENILE PURPURA: ICD-10-CM

## 2020-03-06 DIAGNOSIS — K21.9 GASTROESOPHAGEAL REFLUX DISEASE, ESOPHAGITIS PRESENCE NOT SPECIFIED: ICD-10-CM

## 2020-03-06 DIAGNOSIS — K21.9 GASTROESOPHAGEAL REFLUX DISEASE WITHOUT ESOPHAGITIS: ICD-10-CM

## 2020-03-06 DIAGNOSIS — J06.9 UPPER RESPIRATORY TRACT INFECTION, UNSPECIFIED TYPE: ICD-10-CM

## 2020-03-06 DIAGNOSIS — R23.2 HOT FLASHES: ICD-10-CM

## 2020-03-06 DIAGNOSIS — L29.9 PRURITUS: ICD-10-CM

## 2020-03-06 DIAGNOSIS — M54.16 LUMBAR RADICULOPATHY, CHRONIC: ICD-10-CM

## 2020-03-06 PROCEDURE — 99999 PR PBB SHADOW E&M-EST. PATIENT-LVL III: ICD-10-PCS | Mod: PBBFAC,,, | Performed by: INTERNAL MEDICINE

## 2020-03-06 PROCEDURE — 99396 PREV VISIT EST AGE 40-64: CPT | Mod: S$GLB,,, | Performed by: INTERNAL MEDICINE

## 2020-03-06 PROCEDURE — 3074F SYST BP LT 130 MM HG: CPT | Mod: CPTII,S$GLB,, | Performed by: INTERNAL MEDICINE

## 2020-03-06 PROCEDURE — 3079F DIAST BP 80-89 MM HG: CPT | Mod: CPTII,S$GLB,, | Performed by: INTERNAL MEDICINE

## 2020-03-06 PROCEDURE — 99396 PR PREVENTIVE VISIT,EST,40-64: ICD-10-PCS | Mod: S$GLB,,, | Performed by: INTERNAL MEDICINE

## 2020-03-06 PROCEDURE — 99999 PR PBB SHADOW E&M-EST. PATIENT-LVL III: CPT | Mod: PBBFAC,,, | Performed by: INTERNAL MEDICINE

## 2020-03-06 PROCEDURE — 99499 UNLISTED E&M SERVICE: CPT | Mod: S$GLB,,, | Performed by: INTERNAL MEDICINE

## 2020-03-06 PROCEDURE — 3074F PR MOST RECENT SYSTOLIC BLOOD PRESSURE < 130 MM HG: ICD-10-PCS | Mod: CPTII,S$GLB,, | Performed by: INTERNAL MEDICINE

## 2020-03-06 PROCEDURE — 99499 RISK ADDL DX/OHS AUDIT: ICD-10-PCS | Mod: S$GLB,,, | Performed by: INTERNAL MEDICINE

## 2020-03-06 PROCEDURE — 3079F PR MOST RECENT DIASTOLIC BLOOD PRESSURE 80-89 MM HG: ICD-10-PCS | Mod: CPTII,S$GLB,, | Performed by: INTERNAL MEDICINE

## 2020-03-06 RX ORDER — BENZONATATE 200 MG/1
200 CAPSULE ORAL 3 TIMES DAILY PRN
Qty: 30 CAPSULE | Refills: 3 | Status: SHIPPED | OUTPATIENT
Start: 2020-03-06 | End: 2020-03-16

## 2020-03-06 RX ORDER — TOPIRAMATE 50 MG/1
50 TABLET, FILM COATED ORAL NIGHTLY
Qty: 90 TABLET | Refills: 3 | Status: SHIPPED | OUTPATIENT
Start: 2020-03-06 | End: 2020-12-07 | Stop reason: SDUPTHER

## 2020-03-06 RX ORDER — GABAPENTIN 300 MG/1
300 CAPSULE ORAL 3 TIMES DAILY
Qty: 270 CAPSULE | Refills: 1 | Status: SHIPPED | OUTPATIENT
Start: 2020-03-06 | End: 2020-12-07 | Stop reason: SDUPTHER

## 2020-03-06 RX ORDER — METOPROLOL SUCCINATE 100 MG/1
100 TABLET, EXTENDED RELEASE ORAL DAILY
Qty: 90 TABLET | Refills: 1 | Status: SHIPPED | OUTPATIENT
Start: 2020-03-06 | End: 2020-12-07 | Stop reason: SDUPTHER

## 2020-03-06 RX ORDER — FLUOXETINE HYDROCHLORIDE 20 MG/1
20 CAPSULE ORAL DAILY
Qty: 90 CAPSULE | Refills: 3 | Status: SHIPPED | OUTPATIENT
Start: 2020-03-06 | End: 2020-12-07 | Stop reason: SDUPTHER

## 2020-03-06 RX ORDER — MOMETASONE FUROATE 1 MG/G
CREAM TOPICAL DAILY
Qty: 1 TUBE | Refills: 3 | Status: SHIPPED | OUTPATIENT
Start: 2020-03-06 | End: 2021-12-16

## 2020-03-06 RX ORDER — VALSARTAN AND HYDROCHLOROTHIAZIDE 320; 25 MG/1; MG/1
1 TABLET, FILM COATED ORAL DAILY
Qty: 90 TABLET | Refills: 4 | Status: SHIPPED | OUTPATIENT
Start: 2020-03-06 | End: 2020-12-07

## 2020-03-06 RX ORDER — OMEPRAZOLE 20 MG/1
20 CAPSULE, DELAYED RELEASE ORAL DAILY
Qty: 90 CAPSULE | Refills: 3 | Status: SHIPPED | OUTPATIENT
Start: 2020-03-06 | End: 2021-11-10 | Stop reason: SDUPTHER

## 2020-03-06 NOTE — TELEPHONE ENCOUNTER
----- Message from Celia Card sent at 3/6/2020 11:35 AM CST -----  Type:  Patient Returning Call    Who Called: Debbie  Who Left Message for Patient pt   Does the patient know what this is regarding?:valsartan-hydrochlorothiazide (DIOVAN-HCT) 320-25 mg per   this meds is on hold now so she want to know what can u give her to take for   Would the patient rather a call back or a response via [facility]?  Call   Best Call Back Number: [phone]  Additional Information:  meds

## 2020-03-06 NOTE — PROGRESS NOTES
Subjective:       Patient ID: Debbie Vo is a 59 y.o. female.    Chief Complaint: Medication Refill    HPI  Wellness check.  Chart reviewed.  No postherpetic pain, sciatica quiescent/  C/O min prod cough, no F/C, SOB.  No current GERD sxs.  No depression.  C/O pruritic spot on back.  Review of Systems   All other systems reviewed and are negative.      Objective:      Physical Exam   Constitutional: She appears well-developed.   obese   HENT:   Head: Normocephalic.   Mouth/Throat: Oropharynx is clear and moist.   Eyes: EOM are normal.   Neck: Normal range of motion.   Cardiovascular: Normal rate, regular rhythm, normal heart sounds and intact distal pulses.   Pulmonary/Chest: Effort normal and breath sounds normal.   Abdominal: Bowel sounds are normal.   Musculoskeletal: Normal range of motion. She exhibits no edema.   Lymphadenopathy:     She has no cervical adenopathy.   Neurological: She is alert. No cranial nerve deficit. She exhibits normal muscle tone. Coordination normal.   Skin: Skin is warm and dry.   Excoriations over R scapula   Psychiatric: She has a normal mood and affect. Her behavior is normal.   Vitals reviewed.      Assessment:       1. Routine general medical examination at a health care facility    2. Morbid obesity with BMI of 40.0-44.9, adult    3. Gastroesophageal reflux disease, esophagitis presence not specified    4. Senile purpura    5. Essential hypertension    6. Post herpetic neuralgia    7. Recurrent major depressive disorder, in full remission    8. Upper respiratory tract infection, unspecified type    9. Breast cancer screening, high risk patient    10. Pruritus    11. Hot flashes    12. Lumbar radiculopathy, chronic    13. Gastroesophageal reflux disease without esophagitis    14. Encounter for screening mammogram for malignant neoplasm of breast     15. Prediabetes    16. Personal history of colon cancer    17. Encounter for screening for human immunodeficiency virus  (HIV)         Plan:       Debbie was seen today for medication refill.    Diagnoses and all orders for this visit:    Routine general medical examination at a health care facility  -     HIV 1/2 Ag/Ab (4th Gen); Future    Morbid obesity with BMI of 40.0-44.9, adult    Gastroesophageal reflux disease, esophagitis presence not specified    Senile purpura    Essential hypertension  -     metoprolol succinate (TOPROL-XL) 100 MG 24 hr tablet; Take 1 tablet (100 mg total) by mouth once daily.  -     valsartan-hydrochlorothiazide (DIOVAN-HCT) 320-25 mg per tablet; Take 1 tablet by mouth once daily.    Post herpetic neuralgia  -     topiramate (TOPAMAX) 50 MG tablet; Take 1 tablet (50 mg total) by mouth every evening.    Recurrent major depressive disorder, in full remission    Upper respiratory tract infection, unspecified type  -     benzonatate (TESSALON) 200 MG capsule; Take 1 capsule (200 mg total) by mouth 3 (three) times daily as needed for Cough.    Breast cancer screening, high risk patient  -     Mammo Digital Screening Bilat w/ Tahir; Future    Pruritus  -     mometasone 0.1% (ELOCON) 0.1 % cream; Apply topically once daily. for 10 days    Hot flashes  -     FLUoxetine 20 MG capsule; Take 1 capsule (20 mg total) by mouth once daily.    Lumbar radiculopathy, chronic  -     gabapentin (NEURONTIN) 300 MG capsule; Take 1 capsule (300 mg total) by mouth 3 (three) times daily.    Gastroesophageal reflux disease without esophagitis  -     omeprazole (PRILOSEC) 20 MG capsule; Take 1 capsule (20 mg total) by mouth once daily.    Encounter for screening mammogram for malignant neoplasm of breast   -     Mammo Digital Screening Bilat w/ Tahir; Future    Prediabetes  -     Hemoglobin A1c; Future    Personal history of colon cancer    Encounter for screening for human immunodeficiency virus (HIV)   -     HIV 1/2 Ag/Ab (4th Gen); Future      No follow-ups on file.

## 2020-03-09 ENCOUNTER — TELEPHONE (OUTPATIENT)
Dept: INTERNAL MEDICINE | Facility: CLINIC | Age: 60
End: 2020-03-09

## 2020-03-09 DIAGNOSIS — I10 ESSENTIAL HYPERTENSION: Primary | ICD-10-CM

## 2020-03-09 DIAGNOSIS — M54.16 LUMBAR RADICULOPATHY, CHRONIC: ICD-10-CM

## 2020-03-09 RX ORDER — HYDROCODONE BITARTRATE AND ACETAMINOPHEN 5; 325 MG/1; MG/1
1 TABLET ORAL EVERY 4 HOURS PRN
Qty: 30 TABLET | Refills: 0 | Status: SHIPPED | OUTPATIENT
Start: 2020-03-09 | End: 2020-12-07 | Stop reason: SDUPTHER

## 2020-03-09 RX ORDER — OLMESARTAN MEDOXOMIL AND HYDROCHLOROTHIAZIDE 40/25 40; 25 MG/1; MG/1
1 TABLET ORAL DAILY
Qty: 90 TABLET | Refills: 4 | Status: SHIPPED | OUTPATIENT
Start: 2020-03-09 | End: 2020-11-27

## 2020-03-09 NOTE — TELEPHONE ENCOUNTER
----- Message from Kaileeeder Cruz sent at 3/9/2020  1:59 PM CDT -----  Contact: [phone]/self  Patient is requesting a call back regarding her pharmacy does not carry the   valsartan-hydrochlorothiazide (DIOVAN-HCT) 320-25 mg per tablets please send a replacement    Please refill  HYDROcodone-acetaminophen (NORCO) 5-325 mg per tablet. Take 1 tablet by mouth every 4 (four) hours as needed for Pain. - Oral  [facility] [place]   [facility] [place] [facility] [address] [facility]

## 2020-03-09 NOTE — TELEPHONE ENCOUNTER
Pt also stated in message that pharmacy is out of valsartan-hydrochlorothiazide (DIOVAN-HCT) 320-25 mg,  requesting something else.

## 2020-04-21 ENCOUNTER — TELEPHONE (OUTPATIENT)
Dept: FAMILY MEDICINE | Facility: CLINIC | Age: 60
End: 2020-04-21

## 2020-06-04 ENCOUNTER — HOSPITAL ENCOUNTER (OUTPATIENT)
Dept: RADIOLOGY | Facility: HOSPITAL | Age: 60
Discharge: HOME | End: 2020-06-04
Attending: INTERNAL MEDICINE
Payer: MEDICARE

## 2020-06-04 DIAGNOSIS — Z12.31 ENCOUNTER FOR SCREENING MAMMOGRAM FOR MALIGNANT NEOPLASM OF BREAST: ICD-10-CM

## 2020-06-04 DIAGNOSIS — Z12.39 BREAST CANCER SCREENING, HIGH RISK PATIENT: ICD-10-CM

## 2020-06-04 PROCEDURE — 77067 SCR MAMMO BI INCL CAD: CPT | Mod: TC,PO

## 2020-10-30 ENCOUNTER — OFFICE VISIT (OUTPATIENT)
Dept: INTERNAL MEDICINE | Facility: CLINIC | Age: 60
End: 2020-10-30
Payer: MEDICARE

## 2020-10-30 VITALS
WEIGHT: 288.81 LBS | SYSTOLIC BLOOD PRESSURE: 118 MMHG | BODY MASS INDEX: 43.77 KG/M2 | OXYGEN SATURATION: 98 % | DIASTOLIC BLOOD PRESSURE: 70 MMHG | HEIGHT: 68 IN | HEART RATE: 76 BPM

## 2020-10-30 DIAGNOSIS — R73.9 HYPERGLYCEMIA: ICD-10-CM

## 2020-10-30 DIAGNOSIS — J06.9 UPPER RESPIRATORY TRACT INFECTION, UNSPECIFIED TYPE: ICD-10-CM

## 2020-10-30 DIAGNOSIS — I10 ESSENTIAL HYPERTENSION: Primary | ICD-10-CM

## 2020-10-30 DIAGNOSIS — Z23 NEED FOR PROPHYLACTIC VACCINATION AND INOCULATION AGAINST INFLUENZA: ICD-10-CM

## 2020-10-30 DIAGNOSIS — H60.502 ACUTE OTITIS EXTERNA OF LEFT EAR, UNSPECIFIED TYPE: ICD-10-CM

## 2020-10-30 PROCEDURE — 3008F BODY MASS INDEX DOCD: CPT | Mod: CPTII,S$GLB,, | Performed by: INTERNAL MEDICINE

## 2020-10-30 PROCEDURE — 3078F PR MOST RECENT DIASTOLIC BLOOD PRESSURE < 80 MM HG: ICD-10-PCS | Mod: CPTII,S$GLB,, | Performed by: INTERNAL MEDICINE

## 2020-10-30 PROCEDURE — 3074F SYST BP LT 130 MM HG: CPT | Mod: CPTII,S$GLB,, | Performed by: INTERNAL MEDICINE

## 2020-10-30 PROCEDURE — 99214 PR OFFICE/OUTPT VISIT, EST, LEVL IV, 30-39 MIN: ICD-10-PCS | Mod: 25,S$GLB,, | Performed by: INTERNAL MEDICINE

## 2020-10-30 PROCEDURE — 99999 PR PBB SHADOW E&M-EST. PATIENT-LVL IV: CPT | Mod: PBBFAC,,, | Performed by: INTERNAL MEDICINE

## 2020-10-30 PROCEDURE — 3078F DIAST BP <80 MM HG: CPT | Mod: CPTII,S$GLB,, | Performed by: INTERNAL MEDICINE

## 2020-10-30 PROCEDURE — G0008 FLU VACCINE (QUAD) GREATER THAN OR EQUAL TO 3YO PRESERVATIVE FREE IM: ICD-10-PCS | Mod: S$GLB,,, | Performed by: INTERNAL MEDICINE

## 2020-10-30 PROCEDURE — 90686 IIV4 VACC NO PRSV 0.5 ML IM: CPT | Mod: S$GLB,,, | Performed by: INTERNAL MEDICINE

## 2020-10-30 PROCEDURE — 3008F PR BODY MASS INDEX (BMI) DOCUMENTED: ICD-10-PCS | Mod: CPTII,S$GLB,, | Performed by: INTERNAL MEDICINE

## 2020-10-30 PROCEDURE — G0008 ADMIN INFLUENZA VIRUS VAC: HCPCS | Mod: S$GLB,,, | Performed by: INTERNAL MEDICINE

## 2020-10-30 PROCEDURE — 99999 PR PBB SHADOW E&M-EST. PATIENT-LVL IV: ICD-10-PCS | Mod: PBBFAC,,, | Performed by: INTERNAL MEDICINE

## 2020-10-30 PROCEDURE — 99214 OFFICE O/P EST MOD 30 MIN: CPT | Mod: 25,S$GLB,, | Performed by: INTERNAL MEDICINE

## 2020-10-30 PROCEDURE — 3074F PR MOST RECENT SYSTOLIC BLOOD PRESSURE < 130 MM HG: ICD-10-PCS | Mod: CPTII,S$GLB,, | Performed by: INTERNAL MEDICINE

## 2020-10-30 PROCEDURE — 90686 FLU VACCINE (QUAD) GREATER THAN OR EQUAL TO 3YO PRESERVATIVE FREE IM: ICD-10-PCS | Mod: S$GLB,,, | Performed by: INTERNAL MEDICINE

## 2020-10-30 RX ORDER — NEOMYCIN SULFATE, POLYMYXIN B SULFATE AND HYDROCORTISONE 10; 3.5; 1 MG/ML; MG/ML; [USP'U]/ML
3 SUSPENSION/ DROPS AURICULAR (OTIC) 3 TIMES DAILY
Qty: 10 ML | Refills: 0 | Status: SHIPPED | OUTPATIENT
Start: 2020-10-30 | End: 2021-12-16

## 2020-10-30 RX ORDER — METHYLPREDNISOLONE 4 MG/1
TABLET ORAL
Qty: 1 PACKAGE | Refills: 0 | Status: SHIPPED | OUTPATIENT
Start: 2020-10-30 | End: 2020-11-27 | Stop reason: CLARIF

## 2020-10-30 NOTE — PROGRESS NOTES
Subjective:       Patient ID: Debbie Vo is a 60 y.o. female.    Chief Complaint: Generalized Body Aches (earache both) and Headache (between the eyes)    HPI  Checkup.  Chart reviewed.  Weight down 3 lbs/ 7 mo.  Pt c/o 3 days of L sided facial pain, nasal congestion, L otalgia.  Using Q-tips.  No F/C.  LBP at baseline.  Review of Systems   All other systems reviewed and are negative.      Objective:      Physical Exam  Vitals signs reviewed.   Constitutional:       General: She is not in acute distress.     Appearance: She is well-developed. She is obese.   HENT:      Head: Normocephalic.      Comments: TMs distended with serous fluid  L ext aud canal imflamed     Mouth/Throat:      Mouth: Mucous membranes are moist.      Comments: L max sinus tender  Eyes:      General: No scleral icterus.     Extraocular Movements: Extraocular movements intact.      Conjunctiva/sclera: Conjunctivae normal.   Neck:      Musculoskeletal: Normal range of motion.   Cardiovascular:      Rate and Rhythm: Normal rate and regular rhythm.      Pulses: Normal pulses.      Heart sounds: Normal heart sounds.   Pulmonary:      Effort: Pulmonary effort is normal.      Breath sounds: Normal breath sounds.   Abdominal:      General: There is no distension.   Musculoskeletal: Normal range of motion.      Right lower leg: No edema.      Left lower leg: No edema.   Lymphadenopathy:      Cervical: No cervical adenopathy.   Skin:     General: Skin is warm and dry.   Neurological:      General: No focal deficit present.      Mental Status: She is alert.      Cranial Nerves: No cranial nerve deficit.      Motor: No abnormal muscle tone.      Coordination: Coordination normal.   Psychiatric:         Mood and Affect: Mood normal.         Behavior: Behavior normal.         Assessment:       1. Essential hypertension    2. Hyperglycemia    3. Acute otitis externa of left ear, unspecified type    4. Need for prophylactic vaccination and  inoculation against influenza    5. Upper respiratory tract infection, unspecified type        Plan:       Debbie was seen today for generalized body aches and headache.    Diagnoses and all orders for this visit:    Essential hypertension   Well-cont    Hyperglycemia   Await labs    Acute otitis externa of left ear, unspecified type  -     neomycin-polymyxin-hydrocortisone (CORTISPORIN) 3.5-10,000-1 mg/mL-unit/mL-% otic suspension; Place 3 drops into both ears 3 (three) times daily.    Need for prophylactic vaccination and inoculation against influenza  -     Influenza - Quadrivalent (PF)    Upper respiratory tract infection, unspecified type  -     methylPREDNISolone (MEDROL DOSEPACK) 4 mg tablet; use as directed      Follow up if symptoms worsen or fail to improve.

## 2020-11-27 ENCOUNTER — HOSPITAL ENCOUNTER (EMERGENCY)
Facility: HOSPITAL | Age: 60
Discharge: HOME | End: 2020-11-27
Attending: EMERGENCY MEDICINE
Payer: MEDICARE

## 2020-11-27 VITALS
RESPIRATION RATE: 18 BRPM | OXYGEN SATURATION: 99 % | TEMPERATURE: 99 F | BODY MASS INDEX: 43.65 KG/M2 | HEART RATE: 97 BPM | HEIGHT: 68 IN | SYSTOLIC BLOOD PRESSURE: 117 MMHG | DIASTOLIC BLOOD PRESSURE: 73 MMHG | WEIGHT: 288 LBS

## 2020-11-27 DIAGNOSIS — M10.9 ACUTE GOUT OF RIGHT FOOT, UNSPECIFIED CAUSE: Primary | ICD-10-CM

## 2020-11-27 PROCEDURE — 99284 EMERGENCY DEPT VISIT MOD MDM: CPT | Mod: 25,ER

## 2020-11-27 PROCEDURE — 96372 THER/PROPH/DIAG INJ SC/IM: CPT | Mod: ER

## 2020-11-27 PROCEDURE — 25000003 PHARM REV CODE 250: Mod: ER | Performed by: EMERGENCY MEDICINE

## 2020-11-27 PROCEDURE — 63600175 PHARM REV CODE 636 W HCPCS: Mod: ER | Performed by: EMERGENCY MEDICINE

## 2020-11-27 RX ORDER — DEXAMETHASONE SODIUM PHOSPHATE 4 MG/ML
8 INJECTION, SOLUTION INTRA-ARTICULAR; INTRALESIONAL; INTRAMUSCULAR; INTRAVENOUS; SOFT TISSUE
Status: COMPLETED | OUTPATIENT
Start: 2020-11-27 | End: 2020-11-27

## 2020-11-27 RX ORDER — HYDROCODONE BITARTRATE AND ACETAMINOPHEN 10; 325 MG/1; MG/1
1 TABLET ORAL
Status: COMPLETED | OUTPATIENT
Start: 2020-11-27 | End: 2020-11-27

## 2020-11-27 RX ORDER — MELOXICAM 7.5 MG/1
7.5 TABLET ORAL DAILY
Qty: 7 TABLET | Refills: 0 | Status: SHIPPED | OUTPATIENT
Start: 2020-11-27 | End: 2020-12-04

## 2020-11-27 RX ADMIN — HYDROCODONE BITARTRATE AND ACETAMINOPHEN 1 TABLET: 10; 325 TABLET ORAL at 09:11

## 2020-11-27 RX ADMIN — DEXAMETHASONE SODIUM PHOSPHATE 8 MG: 4 INJECTION, SOLUTION INTRAMUSCULAR; INTRAVENOUS at 09:11

## 2020-12-02 NOTE — ED PROVIDER NOTES
"Encounter Date: 11/27/2020       History     Chief Complaint   Patient presents with    Insect Bite     Pt to er with pain inright great toe from a "insect bite" that occurred yesterday. Pt seen at [facility] this morning and put on antibiotics (Doxycycline) for a "cellulitis." Pt to er tonight because "Pain" and doctor did not give her pain medications. Pt had a "piece of a percocet at home" that she took earlier that helped.      Patient currently presents with a chief complaint of pain to the RIGHT foot.  This was acquired yesterday.  Patient notes associated symptoms of pain and swelling.  Denies associated loss of ROM or sensation.  Patient was seen at an [facility] yesterday where the patient was felt to have an insect bite and was placed on ABX.          Review of patient's allergies indicates:  No Known Allergies  Past Medical History:   Diagnosis Date    Benign essential hypertension     Colon cancer     Osteoarthritis      Past Surgical History:   Procedure Laterality Date    COLON SURGERY  2002    COLONOSCOPY N/A 3/24/2016    Procedure: COLONOSCOPY;  Surgeon: Ernst Bobby MD;  Location: [facility] ENDO;  Service: Endoscopy;  Laterality: N/A;  Needs Flex sigmoidoscopy    EPIDURAL STEROID INJECTION INTO LUMBAR SPINE N/A 6/21/2018    Procedure: Injection-steroid-epidural-lumbar;  Surgeon: Avi Morales Jr., MD;  Location: [facility] PAIN MGT;  Service: Pain Management;  Laterality: N/A;  ORAL SEDATION    HYSTERECTOMY      PARTIAL HYSTERECTOMY       Family History   Problem Relation Age of Onset    Cancer Mother     Kidney disease Father     BRCA 1/2 Sister     Breast cancer Sister      Social History     Tobacco Use    Smoking status: Never Smoker    Smokeless tobacco: Never Used   Substance Use Topics    Alcohol use: No    Drug use: No     Review of Systems   Constitutional: Negative for chills and fever.   HENT: Negative for congestion and rhinorrhea.    Respiratory: Negative for cough and " shortness of breath.    Cardiovascular: Negative for chest pain and palpitations.   Gastrointestinal: Negative for abdominal pain, diarrhea and vomiting.   Genitourinary: Negative for dysuria.   Skin: Negative for color change and rash.   Allergic/Immunologic: Negative for immunocompromised state.   Neurological: Negative for dizziness and light-headedness.   Hematological: Negative for adenopathy. Does not bruise/bleed easily.       Physical Exam     Initial Vitals [11/27/20 2027]   BP Pulse Resp Temp SpO2   (!) 146/76 106 18 98.7 °F (37.1 °C) 99 %      MAP       --         Physical Exam    Nursing note and vitals reviewed.  Constitutional: She appears well-developed and well-nourished. She is not diaphoretic. No distress.   HENT:   Head: Normocephalic and atraumatic.   Cardiovascular: Normal rate, regular rhythm, normal heart sounds and intact distal pulses.   Pulmonary/Chest: Breath sounds normal. No respiratory distress.   Musculoskeletal:        Right foot: Tenderness, bony tenderness and swelling present. No deformity.        Feet:    Neurological: She is alert and oriented to person, place, and time.   Skin: Skin is warm and dry.         ED Course   Procedures  Labs Reviewed - No data to display       Imaging Results    None          Medical Decision Making:   ED Management:  All findings were reviewed with the patient/family in detail along with a diagnosis of gout.  I see no indication of an emergent process beyond that addressed during our encounter but have duly counseled the patient/family regarding the need for prompt follow-up as well as the indications that should prompt immediate return to the emergency room should new or worrisome developments occur.  The patient has additionally been provided with printed information regarding diagnosis as well as instructions regarding follow up and any medications intended to manage the patient's aforementioned conditions.  The patient/family communicates  understanding of all this information and all remaining questions and concerns were addressed at this time.                                   Clinical Impression:       ICD-10-CM ICD-9-CM   1. Acute gout of right foot, unspecified cause  M10.9 274.01                          ED Disposition Condition    Discharge Stable        ED Prescriptions     Medication Sig Dispense Start Date End Date Auth. Provider    meloxicam (MOBIC) 7.5 MG tablet Take 1 tablet (7.5 mg total) by mouth once daily. for 7 days 7 tablet 11/27/2020 12/4/2020 Roman Anderson MD        Follow-up Information     Follow up With Specialties Details Why Contact Info    Jon Hussein MD Internal Medicine Schedule an appointment as soon as possible for a visit on 11/30/2020 for reassessment [address]  [address]  [place] [address]  [phone]                                         Roman Anderson MD  12/01/20 9982

## 2020-12-07 ENCOUNTER — OFFICE VISIT (OUTPATIENT)
Dept: INTERNAL MEDICINE | Facility: CLINIC | Age: 60
End: 2020-12-07
Payer: MEDICARE

## 2020-12-07 VITALS
HEIGHT: 68 IN | DIASTOLIC BLOOD PRESSURE: 72 MMHG | WEIGHT: 290.38 LBS | SYSTOLIC BLOOD PRESSURE: 138 MMHG | BODY MASS INDEX: 44.01 KG/M2 | OXYGEN SATURATION: 97 % | HEART RATE: 99 BPM

## 2020-12-07 DIAGNOSIS — I10 ESSENTIAL HYPERTENSION: ICD-10-CM

## 2020-12-07 DIAGNOSIS — R23.2 HOT FLASHES: ICD-10-CM

## 2020-12-07 DIAGNOSIS — M54.16 LUMBAR RADICULOPATHY, CHRONIC: ICD-10-CM

## 2020-12-07 DIAGNOSIS — E66.01 MORBID OBESITY WITH BMI OF 40.0-44.9, ADULT: ICD-10-CM

## 2020-12-07 DIAGNOSIS — B02.29 POST HERPETIC NEURALGIA: ICD-10-CM

## 2020-12-07 DIAGNOSIS — M10.9 PODAGRA: Primary | ICD-10-CM

## 2020-12-07 PROCEDURE — 99999 PR PBB SHADOW E&M-EST. PATIENT-LVL IV: ICD-10-PCS | Mod: PBBFAC,,, | Performed by: INTERNAL MEDICINE

## 2020-12-07 PROCEDURE — 3075F SYST BP GE 130 - 139MM HG: CPT | Mod: CPTII,S$GLB,, | Performed by: INTERNAL MEDICINE

## 2020-12-07 PROCEDURE — 3078F DIAST BP <80 MM HG: CPT | Mod: CPTII,S$GLB,, | Performed by: INTERNAL MEDICINE

## 2020-12-07 PROCEDURE — 3075F PR MOST RECENT SYSTOLIC BLOOD PRESS GE 130-139MM HG: ICD-10-PCS | Mod: CPTII,S$GLB,, | Performed by: INTERNAL MEDICINE

## 2020-12-07 PROCEDURE — 3008F BODY MASS INDEX DOCD: CPT | Mod: CPTII,S$GLB,, | Performed by: INTERNAL MEDICINE

## 2020-12-07 PROCEDURE — 1125F AMNT PAIN NOTED PAIN PRSNT: CPT | Mod: S$GLB,,, | Performed by: INTERNAL MEDICINE

## 2020-12-07 PROCEDURE — 99214 PR OFFICE/OUTPT VISIT, EST, LEVL IV, 30-39 MIN: ICD-10-PCS | Mod: S$GLB,,, | Performed by: INTERNAL MEDICINE

## 2020-12-07 PROCEDURE — 99999 PR PBB SHADOW E&M-EST. PATIENT-LVL IV: CPT | Mod: PBBFAC,,, | Performed by: INTERNAL MEDICINE

## 2020-12-07 PROCEDURE — 1125F PR PAIN SEVERITY QUANTIFIED, PAIN PRESENT: ICD-10-PCS | Mod: S$GLB,,, | Performed by: INTERNAL MEDICINE

## 2020-12-07 PROCEDURE — 99214 OFFICE O/P EST MOD 30 MIN: CPT | Mod: S$GLB,,, | Performed by: INTERNAL MEDICINE

## 2020-12-07 PROCEDURE — 3078F PR MOST RECENT DIASTOLIC BLOOD PRESSURE < 80 MM HG: ICD-10-PCS | Mod: CPTII,S$GLB,, | Performed by: INTERNAL MEDICINE

## 2020-12-07 PROCEDURE — 3008F PR BODY MASS INDEX (BMI) DOCUMENTED: ICD-10-PCS | Mod: CPTII,S$GLB,, | Performed by: INTERNAL MEDICINE

## 2020-12-07 RX ORDER — VALSARTAN 320 MG/1
320 TABLET ORAL DAILY
Qty: 90 TABLET | Refills: 3 | Status: SHIPPED | OUTPATIENT
Start: 2020-12-07 | End: 2021-12-16 | Stop reason: SDUPTHER

## 2020-12-07 RX ORDER — FLUOXETINE HYDROCHLORIDE 20 MG/1
20 CAPSULE ORAL DAILY
Qty: 90 CAPSULE | Refills: 4 | Status: SHIPPED | OUTPATIENT
Start: 2020-12-07 | End: 2023-06-29 | Stop reason: SDUPTHER

## 2020-12-07 RX ORDER — METOPROLOL SUCCINATE 100 MG/1
100 TABLET, EXTENDED RELEASE ORAL DAILY
Qty: 90 TABLET | Refills: 4 | Status: SHIPPED | OUTPATIENT
Start: 2020-12-07 | End: 2021-12-16 | Stop reason: SDUPTHER

## 2020-12-07 RX ORDER — GABAPENTIN 300 MG/1
300 CAPSULE ORAL 3 TIMES DAILY
Qty: 270 CAPSULE | Refills: 4 | Status: SHIPPED | OUTPATIENT
Start: 2020-12-07 | End: 2021-07-01

## 2020-12-07 RX ORDER — PREDNISONE 20 MG/1
TABLET ORAL
Qty: 14 TABLET | Refills: 0 | Status: SHIPPED | OUTPATIENT
Start: 2020-12-07 | End: 2021-01-04

## 2020-12-07 RX ORDER — AMLODIPINE BESYLATE 5 MG/1
5 TABLET ORAL DAILY
Qty: 90 TABLET | Refills: 4 | Status: SHIPPED | OUTPATIENT
Start: 2020-12-07 | End: 2021-12-16 | Stop reason: SDUPTHER

## 2020-12-07 RX ORDER — HYDROCODONE BITARTRATE AND ACETAMINOPHEN 5; 325 MG/1; MG/1
1 TABLET ORAL EVERY 4 HOURS PRN
Qty: 30 TABLET | Refills: 0 | Status: SHIPPED | OUTPATIENT
Start: 2020-12-07 | End: 2021-06-28 | Stop reason: SDUPTHER

## 2020-12-07 RX ORDER — TOPIRAMATE 50 MG/1
50 TABLET, FILM COATED ORAL NIGHTLY
Qty: 90 TABLET | Refills: 4 | Status: SHIPPED | OUTPATIENT
Start: 2020-12-07 | End: 2023-06-29 | Stop reason: SDUPTHER

## 2020-12-07 NOTE — TELEPHONE ENCOUNTER
----- Message from Shana Johnson sent at 12/7/2020  3:34 PM CST -----  Regarding: Refills  Contact: [phone]  Patient is calling to get refills on her medication sent to [facility] - [address] [phone] (Phone) [phone] (Fax)    1. topiramate (TOPAMAX) 50 MG tablet 90 tablet     2. gabapentin (NEURONTIN) 300 MG capsule 270 capsule     3. metoprolol succinate (TOPROL-XL) 100 MG 24 hr tablet 90 tablet     4. HYDROcodone-acetaminophen (NORCO) 5-325 mg per tablet 30 tablet     5. FLUoxetine 20 MG capsule 90 capsule

## 2020-12-07 NOTE — PROGRESS NOTES
Subjective:       Patient ID: Debbie Vo is a 60 y.o. female.    Chief Complaint: Follow-up (gout in rt foot went to er 2 weeks  ago,need med refiills) and Foot Pain    HPI  Pt with 2 weeks of R foot pain s/p eating shrimp.  Had a temporary resp to a steroid shot given in ER.  No trauma, no F/C.  Weight stable.  No hx of gout.  Chart reviewed.  Review of Systems   All other systems reviewed and are negative.      Objective:      Physical Exam  Vitals signs reviewed.   Constitutional:       General: She is not in acute distress.     Appearance: She is well-developed. She is obese.   HENT:      Head: Normocephalic.      Mouth/Throat:      Mouth: Mucous membranes are moist.   Eyes:      General: No scleral icterus.     Extraocular Movements: Extraocular movements intact.      Conjunctiva/sclera: Conjunctivae normal.   Neck:      Musculoskeletal: Normal range of motion.   Cardiovascular:      Rate and Rhythm: Normal rate and regular rhythm.      Pulses: Normal pulses.      Heart sounds: Normal heart sounds.   Pulmonary:      Effort: Pulmonary effort is normal.      Breath sounds: Normal breath sounds.   Abdominal:      General: There is no distension.   Musculoskeletal: Normal range of motion.         General: Swelling (R 1st MTP) present.      Right lower leg: No edema.      Left lower leg: No edema.   Lymphadenopathy:      Cervical: No cervical adenopathy.   Skin:     General: Skin is warm and dry.   Neurological:      General: No focal deficit present.      Mental Status: She is alert.      Cranial Nerves: No cranial nerve deficit.      Motor: No abnormal muscle tone.      Coordination: Coordination normal.   Psychiatric:         Mood and Affect: Mood normal.         Behavior: Behavior normal.         Assessment:       1. Podagra    2. Essential hypertension    3. Morbid obesity with BMI of 40.0-44.9, adult        Plan:       Debbie was seen today for follow-up and foot pain.    Diagnoses and all  orders for this visit:    Podagra  -     Uric Acid; Future  -     Basic Metabolic Panel; Future  -     predniSONE (DELTASONE) 20 MG tablet; 2 tabs po qd x 7 days   D/c HCTZ    Essential hypertension  -     Basic Metabolic Panel; Future  -     amLODIPine (NORVASC) 5 MG tablet; Take 1 tablet (5 mg total) by mouth once daily.  -     valsartan (DIOVAN) 320 MG tablet; Take 1 tablet (320 mg total) by mouth once daily.    Morbid obesity with BMI of 40.0-44.9, adult   Strongly urged weight loss      Follow up in about 2 weeks (around 12/21/2020).

## 2020-12-07 NOTE — PATIENT INSTRUCTIONS
Gout    Gout is an inflammation of a joint due to a build-up of gout crystals in the joint fluid. This occurs when there is an excess of uric acid (a normal waste product) in the body. Uric acid builds up in the body when the kidneys are unable to filter enough of it from the blood. This may occur with age. It is also associated with kidney disease. Gout occurs more often in people with obesity, diabetes, high blood pressure, or high levels of fats in the blood. It may run in families. Gout tends to come and go. A flare up of gout is called an attack. Drinking alcohol or eating certain foods (such as shellfish or foods with additives such as high-fructose corn syrup) may increase uric acid levels in the blood and cause a gout attack.  During a gout attack, the affected joint may become a hot, red, swollen and painful. If you have had one attack of gout, you are likely to have another. An attack of gout can be treated with medicine. If these attacks become frequent, a daily medicine may be prescribed to help the kidneys remove uric acid from the body.  Home care  During a gout attack:  · Rest painful joints. If gout affects the joints of your foot or leg, you may want to use crutches for the first few days to keep from bearing weight on the affected joint.  · When sitting or lying down, raise the painful joint to a level higher than your heart.  · Apply an ice pack (ice cubes in a plastic bag wrapped in a thin towel) over the injured area for 20 minutes every 1 to 2 hours the first day for pain relief. Continue this 3 to 4 times a day for swelling and pain.  · Avoid alcohol and foods listed below (see Preventing attacks) during a gout attack. Drink extra fluid to help flush the uric acid through your kidneys.  · If you were prescribed a medicine to treat gout, take it as your healthcare provider has instructed. Don't skip doses.  · Take anti-inflammatory medicine as directed.   · If pain medicines have been  prescribed, take them exactly as directed.    Preventing attacks  · Minimize or avoid alcohol use. Excess alcohol intake can cause a gout attack.  · Limit these foods and beverages:  ¨ Organ meats, such as kidneys and liver  ¨ Certain seafoods (anchovies, sardines, shrimp, scallops, herring, mackerel)  ¨ Wild game, meat extracts and meat gravies  ¨ Foods and beverages sweetened with high-fructose corn syrup, such as sodas  · Eat a healthy diet including low-fat and nonfat dairy, whole grains, and vegetables.  · If you are overweight, talk to your healthcare provider about a weight reduction plan. Avoid fasting or extreme low calorie diets (less than 900 calories per day). This will increase uric acid levels in the body.  · If you have diabetes or high blood pressure, work with your doctor to manage these conditions.  · Protect the joint from injury. Trauma can trigger a gout attack.  Follow-up care  Follow up with your healthcare provider, or as advised.   When to seek medical advice  Call your healthcare provider if you have any of the following:  · Fever over 100.4°F (38.ºC) with worsening joint pain  · Increasing redness around the joint  · Pain developing in another joint  · Repeated vomiting, abdominal pain, or blood in the vomit or stool (black or red color)  Date Last Reviewed: 3/1/2017  © 4260-8805 [employer]. [address] [place] [address], [place] [address]. All rights reserved. This information is not intended as a substitute for professional medical care. Always follow your healthcare professional's instructions.        Treating Gout Attacks     Raising the joint above the level of your heart can help reduce gout symptoms.     Gout is a disease that affects the joints. It is caused by excess uric acid in your blood stream that may lead to crystals forming in your joints. Left untreated, it can lead to painful foot and joint deformities and even kidney problems. But, by treating gout early, you can  relieve pain and help prevent future problems. Gout can usually be treated with medication and proper diet. In severe cases, surgery may be needed.  Gout attacks are painful and often happen more than once. Taking medications may reduce pain and prevent attacks in the future. There are also some things you can do at home to relieve symptoms.  Medications for gout  Your healthcare provider may prescribe a daily medication to reduce levels of uric acid. Reducing your uric acid levels may help prevent gout attacks. Allopurinol is one commonly used medication taken daily to reduce uric acid levels. Other medications can help relieve pain and swelling during an acute attack. Medicines such as NSAIDs (nonsteroidal anti-inflammatory medicines), steroids, and colchicine may be prescribed for intermittent use to relieve an acute gout attack. Be sure to take your medication as directed.  What you can do  Below are some things you can do at home to relieve gout symptoms. Your healthcare provider may have other tips.  · Rest the painful joint as much as you can.  · Raise the painful joint so it is at a level higher than your heart.  · Use ice for 10 minutes every 1-2 hours as possible.  How can I prevent gout?  With a little effort, you may be able to prevent gout attacks in the future. Here are some things you can do:  · Avoid foods high in purines  ¨ Certain meats (red meat, processed meat, turkey)  ¨ Organ meats (kidney, liver, sweetbread)  ¨ Shellfish (lobster, crab, shrimp, scallop, mussel)  ¨ Certain fish (anchovy, sardine, herring, mackerel)  · Take any medications prescribed by your healthcare provider.  · Lose weight if you need to.  · Reduce high fructose corn syrup in meals and drinks.  · Reduce or eliminate consumption of alcohol, particularly beer, but also red wine and spirits.  · Control blood pressure, diabetes, and cholesterol.  · Drink plenty of water to help flush uric acid from your body.  Date Last  Reviewed: 2/1/2016  © 6979-7418 [employer]. [address] [place] [address], [place] [address]. All rights reserved. This information is not intended as a substitute for professional medical care. Always follow your healthcare professional's instructions.        Gout Diet  Gout is a painful condition caused by an excess of uric acid, a waste product made by the body. Uric acid forms crystals that collect in the joints. The immune response to these crystals brings on symptoms of joint pain and swelling. This is called a gout attack. Often, medications and diet changes are combined to manage gout. Below are some guidelines for changing your diet to help you manage gout and prevent attacks. Your health care provider will help you determine the best eating plan for you.     Eating to manage gout  Weight loss for those who are overweight may help reduce gout attacks.  Eat less of these foods  Eating too many foods containing purines may raise the levels of uric acid in your body. This raises your risk for a gout attack. Try to limit these foods and drinks:  · Alcohol, such as beer and red wine. You may be told to avoid alcohol completely.  · Soft drinks that contain sugar or high fructose corn syrup  · Certain fish, including anchovies, sardines, fish eggs, and herring  · Shellfish  · Certain meats, such as red meat, hot dogs, luncheon meats, and turkey  · Organ meats, such as liver, kidneys, and sweetbreads  · Legumes, such as dried beans and peas  · Other high fat foods such as gravy, whole milk, and high fat cheeses  · Vegetables such as asparagus, cauliflower, spinach, and mushrooms used to be thought to contribute to an increased risk for a gout attack, but recent studies show that high purine vegetables don't increase the risk for a gout attack.  Eat more of these foods  Other foods may be helpful for people with gout. Add some of these foods to your diet:  · Cherries contain chemicals that may lower uric  acid.  · Omega fatty acids. These are found in some fatty fish such as salmon, certain oils (flax, olive, or nut), and nuts themselves. Omega fatty acids may help prevent inflammation due to gout.  · Dairy products that are low-fat or fat-free, such as cheese and yogurt  · Complex carbohydrate foods, including whole grains, brown rice, oats, and beans  · Coffee, in moderation  · Water, approximately 64 ounces per day  Follow-up care  Follow up with your healthcare provider as advised.  When to seek medical advice  Call your healthcare provider right away if any of these occur:  · Return of gout symptoms, usually at night:  · Severe pain, swelling, and heat in a joint, especially the base of the big toe  · Affected joint is hard to move  · Skin of the affected joint is purple or red  · Fever of 100.4°F (38°C) or higher  · Pain that doesn't get better even with prescribed medicine   Date Last Reviewed: 1/12/2016  © 5023-0165 [employer]. [address] [place] [address], [place] [address]. All rights reserved. This information is not intended as a substitute for professional medical care. Always follow your healthcare professional's instructions.

## 2020-12-16 ENCOUNTER — LAB VISIT (OUTPATIENT)
Dept: LAB | Facility: HOSPITAL | Age: 60
End: 2020-12-16
Attending: INTERNAL MEDICINE
Payer: MEDICARE

## 2020-12-16 DIAGNOSIS — M10.9 PODAGRA: ICD-10-CM

## 2020-12-16 DIAGNOSIS — I10 ESSENTIAL HYPERTENSION: ICD-10-CM

## 2020-12-16 LAB
ANION GAP SERPL CALC-SCNC: 5 MMOL/L (ref 8–16)
CALCIUM SERPL-MCNC: 9 MG/DL (ref 8.7–10.5)
CHLORIDE SERPL-SCNC: 107 MMOL/L (ref 95–110)
CO2 SERPL-SCNC: 31 MMOL/L (ref 23–29)
CREAT SERPL-MCNC: 0.76 MG/DL (ref 0.5–1.4)
EST. GFR  (AFRICAN AMERICAN): >60 ML/MIN/1.73 M^2
EST. GFR  (NON AFRICAN AMERICAN): >60 ML/MIN/1.73 M^2
GLUCOSE SERPL-MCNC: 101 MG/DL (ref 70–110)
POTASSIUM SERPL-SCNC: 4.5 MMOL/L (ref 3.5–5.1)
SODIUM SERPL-SCNC: 143 MMOL/L (ref 136–145)
URATE SERPL-MCNC: 7.6 MG/DL (ref 2.4–5.7)
UUN UR-MCNC: 17 MG/DL (ref 7–17)

## 2020-12-16 PROCEDURE — 80048 BASIC METABOLIC PNL TOTAL CA: CPT | Mod: PO

## 2020-12-16 PROCEDURE — 36415 COLL VENOUS BLD VENIPUNCTURE: CPT | Mod: PO

## 2020-12-16 PROCEDURE — 84550 ASSAY OF BLOOD/URIC ACID: CPT

## 2020-12-22 ENCOUNTER — OFFICE VISIT (OUTPATIENT)
Dept: INTERNAL MEDICINE | Facility: CLINIC | Age: 60
End: 2020-12-22
Payer: MEDICARE

## 2020-12-22 VITALS
DIASTOLIC BLOOD PRESSURE: 70 MMHG | OXYGEN SATURATION: 97 % | HEART RATE: 99 BPM | SYSTOLIC BLOOD PRESSURE: 116 MMHG | WEIGHT: 290.88 LBS | HEIGHT: 68 IN | BODY MASS INDEX: 44.08 KG/M2

## 2020-12-22 DIAGNOSIS — R73.9 HYPERGLYCEMIA: ICD-10-CM

## 2020-12-22 DIAGNOSIS — I10 ESSENTIAL HYPERTENSION: Primary | ICD-10-CM

## 2020-12-22 DIAGNOSIS — M1A.0710 CHRONIC GOUT OF RIGHT ANKLE, UNSPECIFIED CAUSE: ICD-10-CM

## 2020-12-22 PROCEDURE — 99214 PR OFFICE/OUTPT VISIT, EST, LEVL IV, 30-39 MIN: ICD-10-PCS | Mod: S$GLB,,, | Performed by: INTERNAL MEDICINE

## 2020-12-22 PROCEDURE — 3008F PR BODY MASS INDEX (BMI) DOCUMENTED: ICD-10-PCS | Mod: CPTII,S$GLB,, | Performed by: INTERNAL MEDICINE

## 2020-12-22 PROCEDURE — 3078F DIAST BP <80 MM HG: CPT | Mod: CPTII,S$GLB,, | Performed by: INTERNAL MEDICINE

## 2020-12-22 PROCEDURE — 3078F PR MOST RECENT DIASTOLIC BLOOD PRESSURE < 80 MM HG: ICD-10-PCS | Mod: CPTII,S$GLB,, | Performed by: INTERNAL MEDICINE

## 2020-12-22 PROCEDURE — 3008F BODY MASS INDEX DOCD: CPT | Mod: CPTII,S$GLB,, | Performed by: INTERNAL MEDICINE

## 2020-12-22 PROCEDURE — 99999 PR PBB SHADOW E&M-EST. PATIENT-LVL III: ICD-10-PCS | Mod: PBBFAC,,, | Performed by: INTERNAL MEDICINE

## 2020-12-22 PROCEDURE — 99999 PR PBB SHADOW E&M-EST. PATIENT-LVL III: CPT | Mod: PBBFAC,,, | Performed by: INTERNAL MEDICINE

## 2020-12-22 PROCEDURE — 99214 OFFICE O/P EST MOD 30 MIN: CPT | Mod: S$GLB,,, | Performed by: INTERNAL MEDICINE

## 2020-12-22 PROCEDURE — 3074F SYST BP LT 130 MM HG: CPT | Mod: CPTII,S$GLB,, | Performed by: INTERNAL MEDICINE

## 2020-12-22 PROCEDURE — 1125F PR PAIN SEVERITY QUANTIFIED, PAIN PRESENT: ICD-10-PCS | Mod: S$GLB,,, | Performed by: INTERNAL MEDICINE

## 2020-12-22 PROCEDURE — 1125F AMNT PAIN NOTED PAIN PRSNT: CPT | Mod: S$GLB,,, | Performed by: INTERNAL MEDICINE

## 2020-12-22 PROCEDURE — 3074F PR MOST RECENT SYSTOLIC BLOOD PRESSURE < 130 MM HG: ICD-10-PCS | Mod: CPTII,S$GLB,, | Performed by: INTERNAL MEDICINE

## 2020-12-22 NOTE — PROGRESS NOTES
Subjective:       Patient ID: Debbie Vo is a 60 y.o. female.    Chief Complaint: Follow-up (2 week) and Foot Pain (burning under both/pain around Rt toe)    HPI  Recheck.  Chart reviewed.  Weight unchanged.  R podagra largely resolved but R great toe still aches a bit.  Ambulating w/o difficulty.  No new complaints.  Review of Systems   All other systems reviewed and are negative.      Objective:      Physical Exam  Vitals signs reviewed.   Constitutional:       General: She is not in acute distress.     Appearance: She is well-developed. She is obese.   HENT:      Head: Normocephalic.      Mouth/Throat:      Mouth: Mucous membranes are moist.   Eyes:      General: No scleral icterus.     Extraocular Movements: Extraocular movements intact.      Conjunctiva/sclera: Conjunctivae normal.   Neck:      Musculoskeletal: Normal range of motion.   Cardiovascular:      Rate and Rhythm: Normal rate and regular rhythm.      Pulses: Normal pulses.      Heart sounds: Normal heart sounds.   Pulmonary:      Effort: Pulmonary effort is normal.      Breath sounds: Normal breath sounds.   Abdominal:      General: There is no distension.   Musculoskeletal: Normal range of motion.      Right lower leg: No edema.      Left lower leg: No edema.   Lymphadenopathy:      Cervical: No cervical adenopathy.   Skin:     General: Skin is warm and dry.   Neurological:      General: No focal deficit present.      Mental Status: She is alert.      Cranial Nerves: No cranial nerve deficit.      Motor: No abnormal muscle tone.      Coordination: Coordination normal.   Psychiatric:         Mood and Affect: Mood normal.         Behavior: Behavior normal.         Assessment:       1. Essential hypertension    2. Chronic gout of right ankle, unspecified cause    3. Hyperglycemia        Plan:       Debbie was seen today for follow-up and foot pain.    Diagnoses and all orders for this visit:    Essential  hypertension   Well-cont    Chronic gout of right ankle, unspecified cause  -     Uric Acid; Future    Hyperglycemia  -     Hemoglobin A1C; Future      Follow up in about 6 months (around 6/22/2021).

## 2021-01-04 ENCOUNTER — OFFICE VISIT (OUTPATIENT)
Dept: INTERNAL MEDICINE | Facility: CLINIC | Age: 61
End: 2021-01-04
Payer: MEDICARE

## 2021-01-04 VITALS
HEART RATE: 97 BPM | HEIGHT: 68 IN | SYSTOLIC BLOOD PRESSURE: 128 MMHG | WEIGHT: 290 LBS | OXYGEN SATURATION: 97 % | DIASTOLIC BLOOD PRESSURE: 76 MMHG | BODY MASS INDEX: 43.95 KG/M2

## 2021-01-04 DIAGNOSIS — I10 ESSENTIAL HYPERTENSION: ICD-10-CM

## 2021-01-04 DIAGNOSIS — E66.01 MORBID OBESITY WITH BMI OF 40.0-44.9, ADULT: ICD-10-CM

## 2021-01-04 DIAGNOSIS — M1A.0710 CHRONIC GOUT OF RIGHT ANKLE, UNSPECIFIED CAUSE: Primary | ICD-10-CM

## 2021-01-04 PROCEDURE — 3078F PR MOST RECENT DIASTOLIC BLOOD PRESSURE < 80 MM HG: ICD-10-PCS | Mod: CPTII,S$GLB,, | Performed by: INTERNAL MEDICINE

## 2021-01-04 PROCEDURE — 99999 PR PBB SHADOW E&M-EST. PATIENT-LVL IV: CPT | Mod: PBBFAC,,, | Performed by: INTERNAL MEDICINE

## 2021-01-04 PROCEDURE — 99213 OFFICE O/P EST LOW 20 MIN: CPT | Mod: S$GLB,,, | Performed by: INTERNAL MEDICINE

## 2021-01-04 PROCEDURE — 99213 PR OFFICE/OUTPT VISIT, EST, LEVL III, 20-29 MIN: ICD-10-PCS | Mod: S$GLB,,, | Performed by: INTERNAL MEDICINE

## 2021-01-04 PROCEDURE — 99499 RISK ADDL DX/OHS AUDIT: ICD-10-PCS | Mod: S$GLB,,, | Performed by: INTERNAL MEDICINE

## 2021-01-04 PROCEDURE — 99999 PR PBB SHADOW E&M-EST. PATIENT-LVL IV: ICD-10-PCS | Mod: PBBFAC,,, | Performed by: INTERNAL MEDICINE

## 2021-01-04 PROCEDURE — 3074F SYST BP LT 130 MM HG: CPT | Mod: CPTII,S$GLB,, | Performed by: INTERNAL MEDICINE

## 2021-01-04 PROCEDURE — 1125F PR PAIN SEVERITY QUANTIFIED, PAIN PRESENT: ICD-10-PCS | Mod: S$GLB,,, | Performed by: INTERNAL MEDICINE

## 2021-01-04 PROCEDURE — 3078F DIAST BP <80 MM HG: CPT | Mod: CPTII,S$GLB,, | Performed by: INTERNAL MEDICINE

## 2021-01-04 PROCEDURE — 3008F BODY MASS INDEX DOCD: CPT | Mod: CPTII,S$GLB,, | Performed by: INTERNAL MEDICINE

## 2021-01-04 PROCEDURE — 3008F PR BODY MASS INDEX (BMI) DOCUMENTED: ICD-10-PCS | Mod: CPTII,S$GLB,, | Performed by: INTERNAL MEDICINE

## 2021-01-04 PROCEDURE — 99499 UNLISTED E&M SERVICE: CPT | Mod: S$GLB,,, | Performed by: INTERNAL MEDICINE

## 2021-01-04 PROCEDURE — 1125F AMNT PAIN NOTED PAIN PRSNT: CPT | Mod: S$GLB,,, | Performed by: INTERNAL MEDICINE

## 2021-01-04 PROCEDURE — 3074F PR MOST RECENT SYSTOLIC BLOOD PRESSURE < 130 MM HG: ICD-10-PCS | Mod: CPTII,S$GLB,, | Performed by: INTERNAL MEDICINE

## 2021-01-04 RX ORDER — PREDNISONE 20 MG/1
TABLET ORAL
Qty: 14 TABLET | Refills: 0 | Status: SHIPPED | OUTPATIENT
Start: 2021-01-04 | End: 2021-02-12

## 2021-01-04 RX ORDER — DOXYCYCLINE 100 MG/1
CAPSULE ORAL
COMMUNITY
Start: 2020-11-27 | End: 2021-06-18 | Stop reason: ALTCHOICE

## 2021-01-04 RX ORDER — ALLOPURINOL 300 MG/1
300 TABLET ORAL DAILY
Qty: 90 TABLET | Refills: 3 | Status: SHIPPED | OUTPATIENT
Start: 2021-01-04 | End: 2021-02-11 | Stop reason: SDUPTHER

## 2021-01-04 RX ORDER — BENZONATATE 200 MG/1
CAPSULE ORAL
COMMUNITY
Start: 2020-10-29 | End: 2021-04-07

## 2021-02-11 DIAGNOSIS — M1A.0710 CHRONIC GOUT OF RIGHT ANKLE, UNSPECIFIED CAUSE: ICD-10-CM

## 2021-02-11 RX ORDER — ALLOPURINOL 300 MG/1
300 TABLET ORAL DAILY
Qty: 90 TABLET | Refills: 3 | Status: SHIPPED | OUTPATIENT
Start: 2021-02-11 | End: 2021-06-28

## 2021-03-10 ENCOUNTER — TELEPHONE (OUTPATIENT)
Dept: INTERNAL MEDICINE | Facility: CLINIC | Age: 61
End: 2021-03-10

## 2021-03-10 DIAGNOSIS — L60.0 INGROWN TOENAIL: Primary | ICD-10-CM

## 2021-03-11 ENCOUNTER — OFFICE VISIT (OUTPATIENT)
Dept: PODIATRY | Facility: CLINIC | Age: 61
End: 2021-03-11
Payer: MEDICARE

## 2021-03-11 VITALS — HEART RATE: 99 BPM | DIASTOLIC BLOOD PRESSURE: 85 MMHG | SYSTOLIC BLOOD PRESSURE: 137 MMHG

## 2021-03-11 DIAGNOSIS — L60.0 INGROWN TOENAIL: ICD-10-CM

## 2021-03-11 PROCEDURE — 99999 PR PBB SHADOW E&M-EST. PATIENT-LVL IV: CPT | Mod: PBBFAC,,, | Performed by: PODIATRIST

## 2021-03-11 PROCEDURE — 3079F DIAST BP 80-89 MM HG: CPT | Mod: CPTII,S$GLB,, | Performed by: PODIATRIST

## 2021-03-11 PROCEDURE — 3075F SYST BP GE 130 - 139MM HG: CPT | Mod: CPTII,S$GLB,, | Performed by: PODIATRIST

## 2021-03-11 PROCEDURE — 99203 OFFICE O/P NEW LOW 30 MIN: CPT | Mod: S$GLB,,, | Performed by: PODIATRIST

## 2021-03-11 PROCEDURE — 3075F PR MOST RECENT SYSTOLIC BLOOD PRESS GE 130-139MM HG: ICD-10-PCS | Mod: CPTII,S$GLB,, | Performed by: PODIATRIST

## 2021-03-11 PROCEDURE — 99999 PR PBB SHADOW E&M-EST. PATIENT-LVL IV: ICD-10-PCS | Mod: PBBFAC,,, | Performed by: PODIATRIST

## 2021-03-11 PROCEDURE — 1125F PR PAIN SEVERITY QUANTIFIED, PAIN PRESENT: ICD-10-PCS | Mod: S$GLB,,, | Performed by: PODIATRIST

## 2021-03-11 PROCEDURE — 1125F AMNT PAIN NOTED PAIN PRSNT: CPT | Mod: S$GLB,,, | Performed by: PODIATRIST

## 2021-03-11 PROCEDURE — 3079F PR MOST RECENT DIASTOLIC BLOOD PRESSURE 80-89 MM HG: ICD-10-PCS | Mod: CPTII,S$GLB,, | Performed by: PODIATRIST

## 2021-03-11 PROCEDURE — 99203 PR OFFICE/OUTPT VISIT, NEW, LEVL III, 30-44 MIN: ICD-10-PCS | Mod: S$GLB,,, | Performed by: PODIATRIST

## 2021-04-07 DIAGNOSIS — J06.9 ACUTE UPPER RESPIRATORY INFECTION, UNSPECIFIED: ICD-10-CM

## 2021-04-07 RX ORDER — BENZONATATE 200 MG/1
CAPSULE ORAL
Qty: 30 CAPSULE | Refills: 4 | Status: SHIPPED | OUTPATIENT
Start: 2021-04-07 | End: 2023-01-25 | Stop reason: SDUPTHER

## 2021-04-23 ENCOUNTER — OFFICE VISIT (OUTPATIENT)
Dept: INTERNAL MEDICINE | Facility: CLINIC | Age: 61
End: 2021-04-23
Payer: MEDICARE

## 2021-04-23 VITALS
SYSTOLIC BLOOD PRESSURE: 130 MMHG | WEIGHT: 293 LBS | TEMPERATURE: 99 F | HEART RATE: 97 BPM | DIASTOLIC BLOOD PRESSURE: 78 MMHG | OXYGEN SATURATION: 97 % | HEIGHT: 68 IN | BODY MASS INDEX: 44.41 KG/M2

## 2021-04-23 DIAGNOSIS — E66.01 MORBID OBESITY WITH BMI OF 40.0-44.9, ADULT: ICD-10-CM

## 2021-04-23 DIAGNOSIS — M10.9 PODAGRA: Primary | ICD-10-CM

## 2021-04-23 DIAGNOSIS — I10 ESSENTIAL HYPERTENSION: ICD-10-CM

## 2021-04-23 PROCEDURE — 99213 PR OFFICE/OUTPT VISIT, EST, LEVL III, 20-29 MIN: ICD-10-PCS | Mod: S$GLB,,, | Performed by: INTERNAL MEDICINE

## 2021-04-23 PROCEDURE — 99999 PR PBB SHADOW E&M-EST. PATIENT-LVL IV: ICD-10-PCS | Mod: PBBFAC,,, | Performed by: INTERNAL MEDICINE

## 2021-04-23 PROCEDURE — 99499 RISK ADDL DX/OHS AUDIT: ICD-10-PCS | Mod: S$GLB,,, | Performed by: INTERNAL MEDICINE

## 2021-04-23 PROCEDURE — 3008F BODY MASS INDEX DOCD: CPT | Mod: CPTII,S$GLB,, | Performed by: INTERNAL MEDICINE

## 2021-04-23 PROCEDURE — 99499 UNLISTED E&M SERVICE: CPT | Mod: S$GLB,,, | Performed by: INTERNAL MEDICINE

## 2021-04-23 PROCEDURE — 99999 PR PBB SHADOW E&M-EST. PATIENT-LVL IV: CPT | Mod: PBBFAC,,, | Performed by: INTERNAL MEDICINE

## 2021-04-23 PROCEDURE — 99213 OFFICE O/P EST LOW 20 MIN: CPT | Mod: S$GLB,,, | Performed by: INTERNAL MEDICINE

## 2021-04-23 PROCEDURE — 3008F PR BODY MASS INDEX (BMI) DOCUMENTED: ICD-10-PCS | Mod: CPTII,S$GLB,, | Performed by: INTERNAL MEDICINE

## 2021-04-23 PROCEDURE — 1126F PR PAIN SEVERITY QUANTIFIED, NO PAIN PRESENT: ICD-10-PCS | Mod: S$GLB,,, | Performed by: INTERNAL MEDICINE

## 2021-04-23 PROCEDURE — 1126F AMNT PAIN NOTED NONE PRSNT: CPT | Mod: S$GLB,,, | Performed by: INTERNAL MEDICINE

## 2021-04-23 RX ORDER — PREDNISONE 20 MG/1
TABLET ORAL
Qty: 14 TABLET | Refills: 0 | Status: SHIPPED | OUTPATIENT
Start: 2021-04-23 | End: 2022-07-18

## 2021-04-27 ENCOUNTER — TELEPHONE (OUTPATIENT)
Dept: PODIATRY | Facility: CLINIC | Age: 61
End: 2021-04-27

## 2021-06-07 ENCOUNTER — TELEPHONE (OUTPATIENT)
Dept: FAMILY MEDICINE | Facility: CLINIC | Age: 61
End: 2021-06-07

## 2021-06-11 ENCOUNTER — HOSPITAL ENCOUNTER (EMERGENCY)
Facility: HOSPITAL | Age: 61
Discharge: HOME | End: 2021-06-11
Attending: EMERGENCY MEDICINE
Payer: MEDICARE

## 2021-06-11 VITALS
RESPIRATION RATE: 18 BRPM | OXYGEN SATURATION: 100 % | SYSTOLIC BLOOD PRESSURE: 158 MMHG | HEIGHT: 68 IN | DIASTOLIC BLOOD PRESSURE: 91 MMHG | HEART RATE: 85 BPM | TEMPERATURE: 99 F | BODY MASS INDEX: 43.95 KG/M2 | WEIGHT: 290 LBS

## 2021-06-11 DIAGNOSIS — J02.9 VIRAL PHARYNGITIS: Primary | ICD-10-CM

## 2021-06-11 LAB — GROUP A STREP, MOLECULAR: NEGATIVE

## 2021-06-11 PROCEDURE — 96372 THER/PROPH/DIAG INJ SC/IM: CPT | Mod: ER

## 2021-06-11 PROCEDURE — 87651 STREP A DNA AMP PROBE: CPT | Mod: ER | Performed by: PHYSICIAN ASSISTANT

## 2021-06-11 PROCEDURE — 63600175 PHARM REV CODE 636 W HCPCS: Mod: ER | Performed by: PHYSICIAN ASSISTANT

## 2021-06-11 PROCEDURE — 99284 EMERGENCY DEPT VISIT MOD MDM: CPT | Mod: 25,ER

## 2021-06-11 RX ORDER — DEXAMETHASONE SODIUM PHOSPHATE 4 MG/ML
6 INJECTION, SOLUTION INTRA-ARTICULAR; INTRALESIONAL; INTRAMUSCULAR; INTRAVENOUS; SOFT TISSUE
Status: COMPLETED | OUTPATIENT
Start: 2021-06-11 | End: 2021-06-11

## 2021-06-11 RX ADMIN — DEXAMETHASONE SODIUM PHOSPHATE 6 MG: 4 INJECTION, SOLUTION INTRA-ARTICULAR; INTRALESIONAL; INTRAMUSCULAR; INTRAVENOUS; SOFT TISSUE at 05:06

## 2021-06-14 ENCOUNTER — TELEPHONE (OUTPATIENT)
Dept: FAMILY MEDICINE | Facility: CLINIC | Age: 61
End: 2021-06-14

## 2021-06-18 ENCOUNTER — OFFICE VISIT (OUTPATIENT)
Dept: FAMILY MEDICINE | Facility: CLINIC | Age: 61
End: 2021-06-18
Payer: MEDICARE

## 2021-06-18 VITALS
DIASTOLIC BLOOD PRESSURE: 82 MMHG | WEIGHT: 293 LBS | SYSTOLIC BLOOD PRESSURE: 122 MMHG | HEIGHT: 68 IN | TEMPERATURE: 97 F | OXYGEN SATURATION: 95 % | HEART RATE: 102 BPM | BODY MASS INDEX: 44.41 KG/M2

## 2021-06-18 DIAGNOSIS — R42 DIZZINESS: Primary | ICD-10-CM

## 2021-06-18 DIAGNOSIS — R42 VERTIGO: ICD-10-CM

## 2021-06-18 DIAGNOSIS — J06.9 URI, ACUTE: ICD-10-CM

## 2021-06-18 PROCEDURE — 3008F PR BODY MASS INDEX (BMI) DOCUMENTED: ICD-10-PCS | Mod: CPTII,S$GLB,, | Performed by: FAMILY MEDICINE

## 2021-06-18 PROCEDURE — 1126F PR PAIN SEVERITY QUANTIFIED, NO PAIN PRESENT: ICD-10-PCS | Mod: S$GLB,,, | Performed by: FAMILY MEDICINE

## 2021-06-18 PROCEDURE — 99213 OFFICE O/P EST LOW 20 MIN: CPT | Mod: S$GLB,,, | Performed by: FAMILY MEDICINE

## 2021-06-18 PROCEDURE — 3008F BODY MASS INDEX DOCD: CPT | Mod: CPTII,S$GLB,, | Performed by: FAMILY MEDICINE

## 2021-06-18 PROCEDURE — 1126F AMNT PAIN NOTED NONE PRSNT: CPT | Mod: S$GLB,,, | Performed by: FAMILY MEDICINE

## 2021-06-18 PROCEDURE — 99213 PR OFFICE/OUTPT VISIT, EST, LEVL III, 20-29 MIN: ICD-10-PCS | Mod: S$GLB,,, | Performed by: FAMILY MEDICINE

## 2021-06-18 RX ORDER — LORATADINE 10 MG/1
10 TABLET ORAL DAILY
Qty: 30 TABLET | Refills: 0 | Status: SHIPPED | OUTPATIENT
Start: 2021-06-18 | End: 2021-07-18

## 2021-06-18 RX ORDER — MECLIZINE HYDROCHLORIDE 25 MG/1
25 TABLET ORAL 3 TIMES DAILY PRN
Qty: 50 TABLET | Refills: 2 | Status: SHIPPED | OUTPATIENT
Start: 2021-06-18 | End: 2021-12-16 | Stop reason: SDUPTHER

## 2021-06-28 ENCOUNTER — OFFICE VISIT (OUTPATIENT)
Dept: FAMILY MEDICINE | Facility: CLINIC | Age: 61
End: 2021-06-28
Payer: MEDICARE

## 2021-06-28 VITALS
SYSTOLIC BLOOD PRESSURE: 126 MMHG | TEMPERATURE: 97 F | HEART RATE: 105 BPM | WEIGHT: 293 LBS | BODY MASS INDEX: 44.41 KG/M2 | OXYGEN SATURATION: 95 % | HEIGHT: 68 IN | DIASTOLIC BLOOD PRESSURE: 84 MMHG

## 2021-06-28 DIAGNOSIS — Z13.6 SCREENING FOR CARDIOVASCULAR CONDITION: ICD-10-CM

## 2021-06-28 DIAGNOSIS — R73.9 HYPERGLYCEMIA: ICD-10-CM

## 2021-06-28 DIAGNOSIS — E66.01 MORBID OBESITY WITH BMI OF 40.0-44.9, ADULT: ICD-10-CM

## 2021-06-28 DIAGNOSIS — M54.16 LUMBAR RADICULOPATHY, CHRONIC: ICD-10-CM

## 2021-06-28 DIAGNOSIS — Z12.31 SCREENING MAMMOGRAM FOR HIGH-RISK PATIENT: ICD-10-CM

## 2021-06-28 DIAGNOSIS — F33.42 RECURRENT MAJOR DEPRESSIVE DISORDER, IN FULL REMISSION: ICD-10-CM

## 2021-06-28 DIAGNOSIS — Z85.038 HISTORY OF COLON CANCER: ICD-10-CM

## 2021-06-28 DIAGNOSIS — Z12.11 COLON CANCER SCREENING: ICD-10-CM

## 2021-06-28 DIAGNOSIS — I10 ESSENTIAL HYPERTENSION: Primary | ICD-10-CM

## 2021-06-28 PROBLEM — B02.29 POST HERPETIC NEURALGIA: Status: RESOLVED | Noted: 2018-12-17 | Resolved: 2021-06-28

## 2021-06-28 PROCEDURE — 1126F PR PAIN SEVERITY QUANTIFIED, NO PAIN PRESENT: ICD-10-PCS | Mod: S$GLB,,, | Performed by: STUDENT IN AN ORGANIZED HEALTH CARE EDUCATION/TRAINING PROGRAM

## 2021-06-28 PROCEDURE — 3008F BODY MASS INDEX DOCD: CPT | Mod: CPTII,S$GLB,, | Performed by: STUDENT IN AN ORGANIZED HEALTH CARE EDUCATION/TRAINING PROGRAM

## 2021-06-28 PROCEDURE — 99499 RISK ADDL DX/OHS AUDIT: ICD-10-PCS | Mod: S$GLB,,, | Performed by: STUDENT IN AN ORGANIZED HEALTH CARE EDUCATION/TRAINING PROGRAM

## 2021-06-28 PROCEDURE — 99214 OFFICE O/P EST MOD 30 MIN: CPT | Mod: S$GLB,,, | Performed by: STUDENT IN AN ORGANIZED HEALTH CARE EDUCATION/TRAINING PROGRAM

## 2021-06-28 PROCEDURE — 3008F PR BODY MASS INDEX (BMI) DOCUMENTED: ICD-10-PCS | Mod: CPTII,S$GLB,, | Performed by: STUDENT IN AN ORGANIZED HEALTH CARE EDUCATION/TRAINING PROGRAM

## 2021-06-28 PROCEDURE — 1126F AMNT PAIN NOTED NONE PRSNT: CPT | Mod: S$GLB,,, | Performed by: STUDENT IN AN ORGANIZED HEALTH CARE EDUCATION/TRAINING PROGRAM

## 2021-06-28 PROCEDURE — 99214 PR OFFICE/OUTPT VISIT, EST, LEVL IV, 30-39 MIN: ICD-10-PCS | Mod: S$GLB,,, | Performed by: STUDENT IN AN ORGANIZED HEALTH CARE EDUCATION/TRAINING PROGRAM

## 2021-06-28 PROCEDURE — 99499 UNLISTED E&M SERVICE: CPT | Mod: S$GLB,,, | Performed by: STUDENT IN AN ORGANIZED HEALTH CARE EDUCATION/TRAINING PROGRAM

## 2021-06-28 RX ORDER — HYDROCODONE BITARTRATE AND ACETAMINOPHEN 5; 325 MG/1; MG/1
1 TABLET ORAL EVERY 4 HOURS PRN
Qty: 30 TABLET | Refills: 0 | Status: SHIPPED | OUTPATIENT
Start: 2021-06-28 | End: 2022-02-09 | Stop reason: SDUPTHER

## 2021-06-30 ENCOUNTER — PATIENT OUTREACH (OUTPATIENT)
Dept: ADMINISTRATIVE | Facility: OTHER | Age: 61
End: 2021-06-30

## 2021-07-01 ENCOUNTER — OFFICE VISIT (OUTPATIENT)
Dept: ORTHOPEDICS | Facility: CLINIC | Age: 61
End: 2021-07-01
Payer: MEDICARE

## 2021-07-01 DIAGNOSIS — M54.16 LUMBAR RADICULOPATHY, CHRONIC: ICD-10-CM

## 2021-07-01 PROCEDURE — 99999 PR PBB SHADOW E&M-EST. PATIENT-LVL III: CPT | Mod: PBBFAC,,, | Performed by: ORTHOPAEDIC SURGERY

## 2021-07-01 PROCEDURE — 99999 PR PBB SHADOW E&M-EST. PATIENT-LVL III: ICD-10-PCS | Mod: PBBFAC,,, | Performed by: ORTHOPAEDIC SURGERY

## 2021-07-01 PROCEDURE — 99204 PR OFFICE/OUTPT VISIT, NEW, LEVL IV, 45-59 MIN: ICD-10-PCS | Mod: S$GLB,,, | Performed by: ORTHOPAEDIC SURGERY

## 2021-07-01 PROCEDURE — 99204 OFFICE O/P NEW MOD 45 MIN: CPT | Mod: S$GLB,,, | Performed by: ORTHOPAEDIC SURGERY

## 2021-07-01 RX ORDER — PREGABALIN 75 MG/1
75 CAPSULE ORAL 2 TIMES DAILY
Qty: 60 CAPSULE | Refills: 6 | Status: SHIPPED | OUTPATIENT
Start: 2021-07-01 | End: 2021-12-16

## 2021-07-09 ENCOUNTER — TELEPHONE (OUTPATIENT)
Dept: FAMILY MEDICINE | Facility: CLINIC | Age: 61
End: 2021-07-09

## 2021-07-09 ENCOUNTER — HOSPITAL ENCOUNTER (OUTPATIENT)
Dept: RADIOLOGY | Facility: HOSPITAL | Age: 61
Discharge: HOME | End: 2021-07-09
Attending: STUDENT IN AN ORGANIZED HEALTH CARE EDUCATION/TRAINING PROGRAM
Payer: MEDICARE

## 2021-07-09 DIAGNOSIS — Z12.31 SCREENING MAMMOGRAM FOR HIGH-RISK PATIENT: ICD-10-CM

## 2021-07-09 PROCEDURE — 77067 SCR MAMMO BI INCL CAD: CPT | Mod: TC,PO

## 2021-07-15 ENCOUNTER — OFFICE VISIT (OUTPATIENT)
Dept: ORTHOPEDICS | Facility: CLINIC | Age: 61
End: 2021-07-15
Payer: MEDICARE

## 2021-07-15 DIAGNOSIS — M54.41 CHRONIC BILATERAL LOW BACK PAIN WITH BILATERAL SCIATICA: Primary | ICD-10-CM

## 2021-07-15 DIAGNOSIS — M54.42 CHRONIC BILATERAL LOW BACK PAIN WITH BILATERAL SCIATICA: Primary | ICD-10-CM

## 2021-07-15 DIAGNOSIS — G89.29 CHRONIC BILATERAL LOW BACK PAIN WITH BILATERAL SCIATICA: Primary | ICD-10-CM

## 2021-07-15 PROCEDURE — 99441 PR PHYSICIAN TELEPHONE EVALUATION 5-10 MIN: ICD-10-PCS | Mod: 95,,, | Performed by: ORTHOPAEDIC SURGERY

## 2021-07-15 PROCEDURE — 99441 PR PHYSICIAN TELEPHONE EVALUATION 5-10 MIN: CPT | Mod: 95,,, | Performed by: ORTHOPAEDIC SURGERY

## 2021-07-22 ENCOUNTER — TELEPHONE (OUTPATIENT)
Dept: PAIN MEDICINE | Facility: CLINIC | Age: 61
End: 2021-07-22

## 2021-07-27 ENCOUNTER — OFFICE VISIT (OUTPATIENT)
Dept: PAIN MEDICINE | Facility: CLINIC | Age: 61
End: 2021-07-27
Payer: MEDICARE

## 2021-07-27 ENCOUNTER — TELEPHONE (OUTPATIENT)
Dept: PAIN MEDICINE | Facility: CLINIC | Age: 61
End: 2021-07-27

## 2021-07-27 VITALS — DIASTOLIC BLOOD PRESSURE: 90 MMHG | SYSTOLIC BLOOD PRESSURE: 128 MMHG | HEART RATE: 101 BPM

## 2021-07-27 DIAGNOSIS — M53.3 SACROILIAC JOINT DYSFUNCTION: ICD-10-CM

## 2021-07-27 DIAGNOSIS — M47.816 LUMBAR SPONDYLOSIS: ICD-10-CM

## 2021-07-27 DIAGNOSIS — M54.50 CHRONIC BILATERAL LOW BACK PAIN WITHOUT SCIATICA: ICD-10-CM

## 2021-07-27 DIAGNOSIS — G89.29 CHRONIC BILATERAL LOW BACK PAIN WITHOUT SCIATICA: ICD-10-CM

## 2021-07-27 DIAGNOSIS — M43.17 SPONDYLOLISTHESIS OF LUMBOSACRAL REGION: ICD-10-CM

## 2021-07-27 DIAGNOSIS — M46.1 SACROILIITIS: Primary | ICD-10-CM

## 2021-07-27 DIAGNOSIS — M43.16 SPONDYLOLISTHESIS OF LUMBAR REGION: Primary | ICD-10-CM

## 2021-07-27 DIAGNOSIS — M51.36 DDD (DEGENERATIVE DISC DISEASE), LUMBAR: ICD-10-CM

## 2021-07-27 PROCEDURE — 1159F PR MEDICATION LIST DOCUMENTED IN MEDICAL RECORD: ICD-10-PCS | Mod: CPTII,S$GLB,, | Performed by: PHYSICAL MEDICINE & REHABILITATION

## 2021-07-27 PROCEDURE — 3080F PR MOST RECENT DIASTOLIC BLOOD PRESSURE >= 90 MM HG: ICD-10-PCS | Mod: CPTII,S$GLB,, | Performed by: PHYSICAL MEDICINE & REHABILITATION

## 2021-07-27 PROCEDURE — 1160F PR REVIEW ALL MEDS BY PRESCRIBER/CLIN PHARMACIST DOCUMENTED: ICD-10-PCS | Mod: CPTII,S$GLB,, | Performed by: PHYSICAL MEDICINE & REHABILITATION

## 2021-07-27 PROCEDURE — 1160F RVW MEDS BY RX/DR IN RCRD: CPT | Mod: CPTII,S$GLB,, | Performed by: PHYSICAL MEDICINE & REHABILITATION

## 2021-07-27 PROCEDURE — 3074F PR MOST RECENT SYSTOLIC BLOOD PRESSURE < 130 MM HG: ICD-10-PCS | Mod: CPTII,S$GLB,, | Performed by: PHYSICAL MEDICINE & REHABILITATION

## 2021-07-27 PROCEDURE — 1159F MED LIST DOCD IN RCRD: CPT | Mod: CPTII,S$GLB,, | Performed by: PHYSICAL MEDICINE & REHABILITATION

## 2021-07-27 PROCEDURE — 3074F SYST BP LT 130 MM HG: CPT | Mod: CPTII,S$GLB,, | Performed by: PHYSICAL MEDICINE & REHABILITATION

## 2021-07-27 PROCEDURE — 1125F PR PAIN SEVERITY QUANTIFIED, PAIN PRESENT: ICD-10-PCS | Mod: CPTII,S$GLB,, | Performed by: PHYSICAL MEDICINE & REHABILITATION

## 2021-07-27 PROCEDURE — 99204 PR OFFICE/OUTPT VISIT, NEW, LEVL IV, 45-59 MIN: ICD-10-PCS | Mod: S$GLB,,, | Performed by: PHYSICAL MEDICINE & REHABILITATION

## 2021-07-27 PROCEDURE — 1125F AMNT PAIN NOTED PAIN PRSNT: CPT | Mod: CPTII,S$GLB,, | Performed by: PHYSICAL MEDICINE & REHABILITATION

## 2021-07-27 PROCEDURE — 99999 PR PBB SHADOW E&M-EST. PATIENT-LVL IV: ICD-10-PCS | Mod: PBBFAC,,, | Performed by: PHYSICAL MEDICINE & REHABILITATION

## 2021-07-27 PROCEDURE — 99999 PR PBB SHADOW E&M-EST. PATIENT-LVL IV: CPT | Mod: PBBFAC,,, | Performed by: PHYSICAL MEDICINE & REHABILITATION

## 2021-07-27 PROCEDURE — 3080F DIAST BP >= 90 MM HG: CPT | Mod: CPTII,S$GLB,, | Performed by: PHYSICAL MEDICINE & REHABILITATION

## 2021-07-27 PROCEDURE — 99204 OFFICE O/P NEW MOD 45 MIN: CPT | Mod: S$GLB,,, | Performed by: PHYSICAL MEDICINE & REHABILITATION

## 2021-07-27 NOTE — H&P (VIEW-ONLY)
Pain Medicine    Chief Complaint:   Chief Complaint   Patient presents with    Low-back Pain       History of Present Illness: Debbie Vo is a 61 y.o. female here for chronic low back pain.     Onset: 6 years  Location: low back, left > right  Radiation: into buttocks and posterior thighs with an achy pain  Timing: intermittent  Quality: Aching  Exacerbating Factors: standing, walking  Alleviating Factors: sitting, leaning forward  Associated Symptoms: denies night fever/night sweats, urinary incontinence, bowel incontinence, significant motor weakness and loss of sensations    Severity: Currently: 9/10   Typical Range: 7-10/10     Exacerbation: 10/10     She currently notes that the pain limits her ability to walk, not able to make it 200 feet before needing a rest and requests temporary handicapped permit to help her make it to the grocery store.          Previous Interventions:  - 06/21/18 L5/S1 ILESI, no relief    Previous Therapies:  PT/OT: yes, has not maintained HEP  Relevant Surgery: no   Previous Medications:   - NSAIDS:   - Muscle Relaxants:    - TCAs:   - SNRIs:   - Topicals:   - Anticonvulsants: Lyrica 75  - Opioids: Norco    Current Pain Medications:  1. Lyrica 75mg daily  2. Norco 5/325mg PRN, usually every other day    Blood Thinners: None    Full Medication List:    Current Outpatient Medications:     amLODIPine (NORVASC) 5 MG tablet, Take 1 tablet (5 mg total) by mouth once daily., Disp: 90 tablet, Rfl: 4    FLUoxetine 20 MG capsule, Take 1 capsule (20 mg total) by mouth once daily., Disp: 90 capsule, Rfl: 4    HYDROcodone-acetaminophen (NORCO) 5-325 mg per tablet, Take 1 tablet by mouth every 4 (four) hours as needed for Pain., Disp: 30 tablet, Rfl: 0    meclizine (ANTIVERT) 25 mg tablet, Take 1 tablet (25 mg total) by mouth 3 (three) times daily as needed., Disp: 50 tablet, Rfl: 2    metoprolol succinate (TOPROL-XL) 100 MG 24 hr tablet, Take 1 tablet (100 mg total) by mouth  once daily., Disp: 90 tablet, Rfl: 4    pregabalin (LYRICA) 75 MG capsule, Take 1 capsule (75 mg total) by mouth 2 (two) times daily., Disp: 60 capsule, Rfl: 6    benzonatate (TESSALON) 200 MG capsule, take 1 BY MOUTH THREE TIMES DAILY AS NEEDED FOR COUGH (Patient not taking: Reported on 7/1/2021), Disp: 30 capsule, Rfl: 4    loratadine (CLARITIN) 10 mg tablet, Take 1 tablet (10 mg total) by mouth once daily., Disp: 30 tablet, Rfl: 0    mometasone 0.1% (ELOCON) 0.1 % cream, Apply topically once daily. for 10 days, Disp: 1 Tube, Rfl: 3    neomycin-polymyxin-hydrocortisone (CORTISPORIN) 3.5-10,000-1 mg/mL-unit/mL-% otic suspension, Place 3 drops into both ears 3 (three) times daily. (Patient not taking: Reported on 7/1/2021), Disp: 10 mL, Rfl: 0    omeprazole (PRILOSEC) 20 MG capsule, Take 1 capsule (20 mg total) by mouth once daily., Disp: 90 capsule, Rfl: 3    predniSONE (DELTASONE) 20 MG tablet, take 2 BY MOUTH DAILY for 7 days (Patient not taking: Reported on 6/28/2021), Disp: 14 tablet, Rfl: 0    topiramate (TOPAMAX) 50 MG tablet, Take 1 tablet (50 mg total) by mouth every evening. (Patient not taking: Reported on 6/18/2021), Disp: 90 tablet, Rfl: 4    valsartan (DIOVAN) 320 MG tablet, Take 1 tablet (320 mg total) by mouth once daily. (Patient not taking: Reported on 6/18/2021), Disp: 90 tablet, Rfl: 3     Review of Systems:  Review of Systems   Constitutional: Negative for chills and fever.   HENT: Negative for ear discharge and ear pain.    Eyes: Negative for pain and discharge.   Respiratory: Negative for cough and shortness of breath.    Cardiovascular: Negative for chest pain and palpitations.   Gastrointestinal: Negative for nausea and vomiting.   Genitourinary: Negative for dysuria and flank pain.   Musculoskeletal: Positive for back pain and myalgias.   Skin: Negative for itching and rash.   Neurological: Negative for tingling, sensory change and weakness.   All other systems reviewed and are  negative.      Allergies:  Patient has no known allergies.     Medical History:   has a past medical history of Benign essential hypertension, Colon cancer, and Osteoarthritis.    Surgical History:   has a past surgical history that includes Partial hysterectomy; Colon surgery (2002); Colonoscopy (N/A, 3/24/2016); Hysterectomy; and Epidural steroid injection into lumbar spine (N/A, 6/21/2018).    Family History:  family history includes BRCA 1/2 in her sister; Breast cancer in her sister; Cancer in her mother; Kidney disease in her father.    Social History:   reports that she has never smoked. She has never used smokeless tobacco. She reports that she does not drink alcohol and does not use drugs.    Physical Exam:  BP (!) 128/90   Pulse 101   GEN: No acute distress. Calm, comfortable  HENT: Normocephalic, atraumatic, moist mucous membranes  EYE: Anicteric sclera, non-injected.   CV: Non-diaphoretic. Regular Rate. Radial Pulses 2+.  RESP: Breathing comfortably. Chest expansion symmetric.  EXT: No clubbing, cyanosis.   SKIN: Warm, & dry to palpation. No visible rashes or lesions of exposed skin.   PSYCH: Pleasant mood and appropriate affect. Recent and remote memory intact.   GAIT: Independent, antalgic ambulation  Lumbar Spine Exam:       Inspection: No erythema, bruising. No surgical incisions      Palpation: (+) TTP of lumbar facet and sacroiliac joints bilaterally, Left > right.       ROM: Not Limited in flexion, extension, lateral bending, but painful      (+) Facet loading bilaterally      (-) Straight Leg Raise bilaterally      (-) Lasegue sign bilaterally      (+) VESTA bilaterally, worse on the left      (-) FADIR bilaterally  Neurologic Exam:     Alert. Speech is fluent and appropriate.     Strength: 5/5 throughout bilateral lower extremities     Sensation: Grossly intact to light touch in bilateral lower extremities     Reflexes: 1+ in b/l patella, achilles     Tone: No abnormality appreciated in  bilateral lower extremities     No Clonus         Imaging:  - MRI Lumbar spine 7/14/21:  Grade 1 anterolisthesis of L4 on L5 with the posterior margin of the L4 vertebral body anteriorly possitioned a distance of 4 mm relative to the posterior margin of the L5 vertebral body. Grade 1 anterolisthesis of L5 on S1 with the posterior margin of the L5 vertebral body anteriorly possitioned a distance of 3 mm relative to the posterior margin of the S1 vertebral body. Grade 1 anterolisthesis of S1 on S2 with the posterior margin of the S1 vertebral body anteriorly possitioned a distance of 3 mm relative to the posterior margin of the S2 vertebral body.     The bone marrow signal intensity of the osseous elements of the lumbar spine is normal. There is no evidence of fracture, bone marrow edema or bone marrow replacement process within the lumbar spine, visualized elements of the lower thoracic spine or the sacrum.    The anterior longitudinal spinal ligament, posterior longitudinal spinal ligament, ligamentum flavum, interspinous ligaments and supraspinal ligaments are intact at all locations.    The conus medullaris is normal in position and appearance. Its tip lies posterior to the cephalic aspect of the L1 vertebral body.    The prevertebral soft tissues are normal. The paraspinal musculature is normal. The visualized retroperitoneal soft tissues are normal.    T11-T12: Normal signal intensity within the nucleus pulposus and normal disc contour. No evidence of neural foraminal or central vertebral canal stenosis.        T12-L1: Normal signal intensity within the nucleus pulposus and normal disc contour. No evidence of neural foraminal or central vertebral canal stenosis.    L1-L2: Normal signal intensity within the nucleus pulposus and normal disc contour. No evidence of neural foraminal or central vertebral canal stenosis.     L2-L3: Normal signal intensity within the nucleus pulposus and normal disc contour. No  evidence of neural foraminal or central vertebral canal stenosis.    L3-L4: Diminished signal intensity in the nucleus pulposus with difuse bulge of the annulus fibrosis causing bilateral neural foraminal stenosis which is equivalent side to side. No central vertebral canal stenosis. No interval change.    L4-L5: Diminished signal intensity in the nucleus pulposus with difuse bulge of the annulus fibrosis causing bilateral neural foraminal stenosis which is equivalent side to side exacerbated by grade 1 anterolisthesis. Central vertebral canal stenosis with the AP diameter of the spinal canal up to 6 mm with crowding and redundancy of the nerve roots in the cauda equina. No substantial interval change.    L5-S1: Diminished signal intensity in the nucleus pulposus with vacuum phenomenon and modic endplate degenerative vertebral body signal changes difuse bulge of the annulus fibrosis causing bilateral neural foraminal stenosis which is equivalent side to side exacerbated by grade 1 anterolisthesis. No central vertebral canal stenosis.    The facet joints at all levels of the lumbar spine exhibit mild-to-moderate changes of osteoarthritis consisting of thinning of the articular cartilage with periarticular sclerosis,  marginal osteophyte formation and ligamentum flavum hypertroph      - MRI Lumbar Spine Without Contrast 03/28/2018  FINDINGS:  Alignment: Slight exaggeration of the normal lumbar lordosis.  Subtle anterolisthesis at L3-4, L4-5 and L5-S1..  Vertebrae: Normal marrow signal. No fracture.  Cord: Normal.  Conus terminates in good position.  Degenerative findings:  T12-L1, L1-2, L2-3: No focal disc herniation, spinal canal stenosis or significant neural foraminal narrowing.  Mild facet arthropathy.  L3-4: Moderate facet arthropathy, left more than right.  There is associated anterolisthesis with unroofing of the disc and mild disc bulging.  Mild endplate marginal osteophyte formation laterally.  Constellation  of findings results in left greater than right lateral recess and neural foraminal encroachment.  Only modest narrowing of overall spinal canal diameter.  L4-L5: Moderate facet arthropathy, left more than right.  Mild anterolisthesis with unroofing of the disc.  Minimal disc bulging and degenerative endplate changes.  There is no significant narrowing of the overall spinal canal diameter.  Left greater than right lateral recess stenosis minimal neural foraminal encroachment.  L5-S1: Moderate facet arthropathy.  There is subtle anterolisthesis with degenerative disc disease including loss disc height, degenerative endplate change in mild endplate marginal osteophyte formation.  No spinal stenosis or high-grade neural foraminal narrowing   Paraspinal muscles & soft tissues: Unremarkable.    Labs:  BMP  Lab Results   Component Value Date     12/16/2020    K 4.5 12/16/2020     12/16/2020    CO2 31 (H) 12/16/2020    BUN 17 12/16/2020    CREATININE 0.76 12/16/2020    CALCIUM 9.0 12/16/2020    ANIONGAP 5 (L) 12/16/2020    ESTGFRAFRICA >60.0 12/16/2020    EGFRNONAA >60.0 12/16/2020     Lab Results   Component Value Date    ALT 15 08/06/2016    AST 9 (L) 08/06/2016    ALKPHOS 71 08/06/2016    BILITOT 1.2 (H) 08/06/2016     Lab Results   Component Value Date     08/06/2016       Assessment:  Debbie Vo is a 61 y.o. female with the following diagnoses based on history, exam, and imaging:    Problem List Items Addressed This Visit     None      Visit Diagnoses     Spondylolisthesis of lumbar region    -  Primary    Relevant Orders    Ambulatory referral/consult to Physical/Occupational Therapy    Spondylolisthesis of lumbosacral region        Sacroiliac joint dysfunction        Relevant Orders    Ambulatory referral/consult to Physical/Occupational Therapy    DDD (degenerative disc disease), lumbar        Lumbar spondylosis        Chronic bilateral low back pain without sciatica        Relevant  Orders    Ambulatory referral/consult to Physical/Occupational Therapy          This is a pleasant 61 y.o. lady presenting with:     - Chronic low back pain, likely SI joint dysfunction vs facet arthropathy. Pain radiates into backs of thighs, but she had limited relief with prior SIMONA.   - Comorbidities: obesity, GERD, h/o colon cancer. Depression.     Treatment Plan:   - PT/OT/HEP: Refer to PT. Discussed benefits of exercise for pain.  - Will sign temporary permit for handicapped parking to allow her to grocery shop    - Procedures: Schedule bilateral SIJ injection   - If limited relief, plan for bilateral L4-5, L5-S1 diagnostic MBBs.  - Medications: No changes recommended at this time.  - Imaging: Reviewed.   - Labs: Reviewed.  Medications are appropriately dosed for current hepatorenal function.    Follow Up: RTC 2-4 weeks after SIJ injection with MD Lucia Whelan M.D.  Interventional Pain Medicine / Physical Medicine & Rehabilitation    Disclaimer: This note was partly generated using dictation software which may occasionally result in transcription errors.

## 2021-07-28 ENCOUNTER — CLINICAL SUPPORT (OUTPATIENT)
Dept: REHABILITATION | Facility: HOSPITAL | Age: 61
End: 2021-07-28
Payer: MEDICARE

## 2021-07-28 DIAGNOSIS — M53.3 SACROILIAC JOINT DYSFUNCTION: ICD-10-CM

## 2021-07-28 DIAGNOSIS — M53.86 DECREASED ROM OF LUMBAR SPINE: ICD-10-CM

## 2021-07-28 DIAGNOSIS — G89.29 CHRONIC BILATERAL LOW BACK PAIN WITHOUT SCIATICA: ICD-10-CM

## 2021-07-28 DIAGNOSIS — M62.830 MUSCLE SPASM OF BACK: ICD-10-CM

## 2021-07-28 DIAGNOSIS — R26.2 DIFFICULTY WALKING: ICD-10-CM

## 2021-07-28 DIAGNOSIS — M54.50 CHRONIC BILATERAL LOW BACK PAIN WITHOUT SCIATICA: ICD-10-CM

## 2021-07-28 DIAGNOSIS — M43.16 SPONDYLOLISTHESIS OF LUMBAR REGION: ICD-10-CM

## 2021-07-28 PROCEDURE — 97161 PT EVAL LOW COMPLEX 20 MIN: CPT | Mod: PN

## 2021-08-04 ENCOUNTER — CLINICAL SUPPORT (OUTPATIENT)
Dept: REHABILITATION | Facility: HOSPITAL | Age: 61
End: 2021-08-04
Payer: MEDICARE

## 2021-08-04 DIAGNOSIS — M53.86 DECREASED ROM OF LUMBAR SPINE: ICD-10-CM

## 2021-08-04 DIAGNOSIS — M62.830 MUSCLE SPASM OF BACK: ICD-10-CM

## 2021-08-04 DIAGNOSIS — M54.50 CHRONIC BILATERAL LOW BACK PAIN WITHOUT SCIATICA: ICD-10-CM

## 2021-08-04 DIAGNOSIS — G89.29 CHRONIC BILATERAL LOW BACK PAIN WITHOUT SCIATICA: ICD-10-CM

## 2021-08-04 DIAGNOSIS — R26.2 DIFFICULTY WALKING: ICD-10-CM

## 2021-08-04 PROCEDURE — 97110 THERAPEUTIC EXERCISES: CPT | Mod: PN

## 2021-08-05 ENCOUNTER — TELEPHONE (OUTPATIENT)
Dept: PAIN MEDICINE | Facility: CLINIC | Age: 61
End: 2021-08-05

## 2021-08-05 DIAGNOSIS — M51.36 DDD (DEGENERATIVE DISC DISEASE), LUMBAR: ICD-10-CM

## 2021-08-05 DIAGNOSIS — M47.816 LUMBAR SPONDYLOSIS: ICD-10-CM

## 2021-08-05 DIAGNOSIS — M43.16 SPONDYLOLISTHESIS OF LUMBAR REGION: ICD-10-CM

## 2021-08-05 DIAGNOSIS — G89.29 CHRONIC BILATERAL LOW BACK PAIN WITHOUT SCIATICA: Primary | ICD-10-CM

## 2021-08-05 DIAGNOSIS — M54.50 CHRONIC BILATERAL LOW BACK PAIN WITHOUT SCIATICA: Primary | ICD-10-CM

## 2021-08-05 DIAGNOSIS — M53.3 SACROILIAC JOINT DYSFUNCTION: ICD-10-CM

## 2021-08-05 DIAGNOSIS — M43.17 SPONDYLOLISTHESIS OF LUMBOSACRAL REGION: ICD-10-CM

## 2021-08-06 ENCOUNTER — TELEPHONE (OUTPATIENT)
Dept: PAIN MEDICINE | Facility: CLINIC | Age: 61
End: 2021-08-06

## 2021-08-09 ENCOUNTER — TELEPHONE (OUTPATIENT)
Dept: FAMILY MEDICINE | Facility: CLINIC | Age: 61
End: 2021-08-09

## 2021-08-09 ENCOUNTER — TELEPHONE (OUTPATIENT)
Dept: PAIN MEDICINE | Facility: CLINIC | Age: 61
End: 2021-08-09

## 2021-08-09 RX ORDER — LORATADINE 10 MG/1
10 TABLET ORAL DAILY
Qty: 90 TABLET | Refills: 3 | Status: SHIPPED | OUTPATIENT
Start: 2021-08-09 | End: 2022-02-09 | Stop reason: SDUPTHER

## 2021-08-10 ENCOUNTER — CLINICAL SUPPORT (OUTPATIENT)
Dept: REHABILITATION | Facility: HOSPITAL | Age: 61
End: 2021-08-10
Payer: MEDICARE

## 2021-08-10 DIAGNOSIS — M62.830 MUSCLE SPASM OF BACK: ICD-10-CM

## 2021-08-10 DIAGNOSIS — M53.86 DECREASED ROM OF LUMBAR SPINE: ICD-10-CM

## 2021-08-10 DIAGNOSIS — M54.50 CHRONIC BILATERAL LOW BACK PAIN WITHOUT SCIATICA: ICD-10-CM

## 2021-08-10 DIAGNOSIS — R26.2 DIFFICULTY WALKING: ICD-10-CM

## 2021-08-10 DIAGNOSIS — G89.29 CHRONIC BILATERAL LOW BACK PAIN WITHOUT SCIATICA: ICD-10-CM

## 2021-08-10 PROCEDURE — 97110 THERAPEUTIC EXERCISES: CPT | Mod: PN

## 2021-08-24 ENCOUNTER — TELEPHONE (OUTPATIENT)
Dept: PAIN MEDICINE | Facility: CLINIC | Age: 61
End: 2021-08-24

## 2021-08-24 NOTE — DISCHARGE INSTRUCTIONS
Home Care Instructions Pain Management:    1.  DIET:    You may resume your normal diet today.    2.  BATHING:    You may shower with luke warm water.    3.  DRESSING:    You may remove your bandage today.    4.  ACTIVITY LEVEL:      You may resume your normal activities 24 hours after your procedure.    5.  MEDICATIONS:    You may resume your normal medications today.    6.  SPECIAL INSTRUCTIONS:    No heat to the injection site for 24 hours including bath or shower, heating pad, moist heat or hot tubs.    Use an ice pack to the injection site for any pain or discomfort.  Apply ice packs for 20 minute intervals as needed.    If you have received any sedatives by mouth today, you can not drive for 12 hours.    If you have received sedation through an IV, you can not drive for 24 hours.    PLEASE CALL YOUR DOCTOR FOR THE FOLLOWIN.  Redness or swelling around the injection site.  2.  Fever of 101 degrees.  3.  Drainage (pus) from the injection site.  4.  For any continuous bleeding (some dried blood over the incision is normal.)    FOR EMERGENCIES:    If any unusual problems or difficulties occur during clinic hours, call [phone] or dial [phone].    Follow up with with your physician in 2-3 weeks.

## 2021-08-25 ENCOUNTER — TELEPHONE (OUTPATIENT)
Dept: REHABILITATION | Facility: HOSPITAL | Age: 61
End: 2021-08-25

## 2021-08-25 ENCOUNTER — HOSPITAL ENCOUNTER (OUTPATIENT)
Facility: HOSPITAL | Age: 61
Discharge: HOME | End: 2021-08-25
Attending: PHYSICAL MEDICINE & REHABILITATION | Admitting: PHYSICAL MEDICINE & REHABILITATION
Payer: MEDICARE

## 2021-08-25 VITALS
WEIGHT: 293 LBS | TEMPERATURE: 97 F | HEART RATE: 85 BPM | RESPIRATION RATE: 20 BRPM | HEIGHT: 68 IN | BODY MASS INDEX: 44.41 KG/M2 | SYSTOLIC BLOOD PRESSURE: 121 MMHG | OXYGEN SATURATION: 100 % | DIASTOLIC BLOOD PRESSURE: 75 MMHG

## 2021-08-25 DIAGNOSIS — M46.1 BILATERAL SACROILIITIS: Primary | ICD-10-CM

## 2021-08-25 DIAGNOSIS — R52 PAIN: ICD-10-CM

## 2021-08-25 PROCEDURE — 27096 INJECT SACROILIAC JOINT: CPT | Mod: 50 | Performed by: PHYSICAL MEDICINE & REHABILITATION

## 2021-08-25 PROCEDURE — 25000003 PHARM REV CODE 250: Performed by: PHYSICAL MEDICINE & REHABILITATION

## 2021-08-25 PROCEDURE — 25500020 PHARM REV CODE 255: Performed by: PHYSICAL MEDICINE & REHABILITATION

## 2021-08-25 PROCEDURE — 27096 INJECT SACROILIAC JOINT: CPT | Mod: 50,,, | Performed by: PHYSICAL MEDICINE & REHABILITATION

## 2021-08-25 PROCEDURE — 63600175 PHARM REV CODE 636 W HCPCS: Performed by: PHYSICAL MEDICINE & REHABILITATION

## 2021-08-25 PROCEDURE — 27096 PR INJECTION,SACROILIAC JOINT: ICD-10-PCS | Mod: 50,,, | Performed by: PHYSICAL MEDICINE & REHABILITATION

## 2021-08-25 RX ORDER — METHYLPREDNISOLONE ACETATE 40 MG/ML
INJECTION, SUSPENSION INTRA-ARTICULAR; INTRALESIONAL; INTRAMUSCULAR; SOFT TISSUE
Status: DISCONTINUED | OUTPATIENT
Start: 2021-08-25 | End: 2021-08-25 | Stop reason: HOSPADM

## 2021-08-25 RX ORDER — SODIUM CHLORIDE 9 MG/ML
INJECTION, SOLUTION INTRAVENOUS CONTINUOUS
Status: DISCONTINUED | OUTPATIENT
Start: 2021-08-25 | End: 2021-08-25 | Stop reason: HOSPADM

## 2021-08-25 RX ORDER — LIDOCAINE HYDROCHLORIDE 10 MG/ML
INJECTION INFILTRATION; PERINEURAL
Status: DISCONTINUED | OUTPATIENT
Start: 2021-08-25 | End: 2021-08-25 | Stop reason: HOSPADM

## 2021-08-25 RX ORDER — BUPIVACAINE HYDROCHLORIDE 2.5 MG/ML
INJECTION, SOLUTION EPIDURAL; INFILTRATION; INTRACAUDAL
Status: DISCONTINUED | OUTPATIENT
Start: 2021-08-25 | End: 2021-08-25 | Stop reason: HOSPADM

## 2021-08-25 NOTE — PLAN OF CARE
Bandaids applied to bilateral SI joint injection sites x2 -dry and intact. No active bleeding or hematoma present. Patient remained stable and without complaint throughout this procedure.

## 2021-08-25 NOTE — DISCHARGE SUMMARY
[facility]  Discharge Note  Short Stay     Admit Date: 8/25/2021    Discharge Date: 8/25/2021     Attending Physician: Lucia Carroll M.D.    Diagnoses:  Active Hospital Problems    Diagnosis  POA    *Sacroiliitis [M46.1]  Unknown      Resolved Hospital Problems   No resolved problems to display.     Discharged Condition: Good     Hospital Course: Patient was admitted for an outpatient interventional pain management procedure and tolerated the procedure well with no complications.     Final Diagnoses: Same as principal problem.     Disposition: Home or Self Care     Follow up/Patient Instructions:   Follow-up in 1-2 weeks unless otherwise instructed. May return sooner as needed.       Reconciled Medications:     Medication List      CONTINUE taking these medications    amLODIPine 5 MG tablet  Commonly known as: NORVASC  Take 1 tablet (5 mg total) by mouth once daily.     benzonatate 200 MG capsule  Commonly known as: TESSALON  take 1 BY MOUTH THREE TIMES DAILY AS NEEDED FOR COUGH     FLUoxetine 20 MG capsule  Take 1 capsule (20 mg total) by mouth once daily.     HYDROcodone-acetaminophen 5-325 mg per tablet  Commonly known as: NORCO  Take 1 tablet by mouth every 4 (four) hours as needed for Pain.     loratadine 10 mg tablet  Commonly known as: CLARITIN  Take 1 tablet (10 mg total) by mouth once daily.     meclizine 25 mg tablet  Commonly known as: ANTIVERT  Take 1 tablet (25 mg total) by mouth 3 (three) times daily as needed.     metoprolol succinate 100 MG 24 hr tablet  Commonly known as: TOPROL-XL  Take 1 tablet (100 mg total) by mouth once daily.     mometasone 0.1% 0.1 % cream  Commonly known as: ELOCON  Apply topically once daily. for 10 days     neomycin-polymyxin-hydrocortisone 3.5-10,000-1 mg/mL-unit/mL-% otic suspension  Commonly known as: CORTISPORIN  Place 3 drops into both ears 3 (three) times daily.     omeprazole 20 MG capsule  Commonly known as: PRILOSEC  Take 1 capsule (20 mg total) by  mouth once daily.     predniSONE 20 MG tablet  Commonly known as: DELTASONE  take 2 BY MOUTH DAILY for 7 days     pregabalin 75 MG capsule  Commonly known as: LYRICA  Take 1 capsule (75 mg total) by mouth 2 (two) times daily.     topiramate 50 MG tablet  Commonly known as: TOPAMAX  Take 1 tablet (50 mg total) by mouth every evening.     valsartan 320 MG tablet  Commonly known as: DIOVAN  Take 1 tablet (320 mg total) by mouth once daily.           Discharge Procedure Orders (must include Diet, Follow-up, Activity)   Ice to affected area   Order Comments: 20 minutes of ice or until area numb to the touch if area is sore 2-3 times per day as needed     No driving until:   Order Comments: Until following day     Notify your health care provider if you experience any of the following:  temperature >100.4     Notify your health care provider if you experience any of the following:  persistent nausea and vomiting or diarrhea     Notify your health care provider if you experience any of the following:  severe uncontrolled pain     Notify your health care provider if you experience any of the following:  redness, tenderness, or signs of infection (pain, swelling, redness, odor or green/yellow discharge around incision site)     Notify your health care provider if you experience any of the following:  difficulty breathing or increased cough     Notify your health care provider if you experience any of the following:  severe persistent headache     Notify your health care provider if you experience any of the following:  worsening rash     Notify your health care provider if you experience any of the following:  persistent dizziness, light-headedness, or visual disturbances     Notify your health care provider if you experience any of the following:  increased confusion or weakness     No dressing needed     Shower on day dressing removed (No bath)       Lucia Carroll M.D.  Interventional Pain Medicine / Physical Medicine &  Rehabilitation

## 2021-08-25 NOTE — INTERVAL H&P NOTE
The patient has been examined and the H&P has been reviewed:    I concur with the findings and no changes have occurred since H&P was written.    Procedure risks, benefits and alternative options discussed and understood by patient/family.          Active Hospital Problems    Diagnosis  POA    Pain [R52]  Yes      Resolved Hospital Problems   No resolved problems to display.

## 2021-08-25 NOTE — OP NOTE
Sacroiliac Joint Injection Under Fluoroscopic Guidance:  I have reviewed the patient's medications, allergies and relevant histories prior to the procedure and no contraindications have been identified. The risks, benefits and alternatives to the procedure were discussed with the patient, and all questions regarding the procedure were answered to the patient's satisfaction. I personally obtained Debbie's consent prior to the start of the procedure and the signed consent can be found in the patient's chart.                                                         Time-out was taken to identify patient, procedure, laterality, and allergies prior to starting the procedure.       Date of Service: 08/25/2021  Procedure: Bilateral Sacroiliac Joint Injection  Pre-Operative Diagnosis: Sacroiliac joint dysfunction/pain  Post-Operative Diagnosis: Sacroiliac joint dysfunction/pain     Physician: Lucia Carroll M.D.  Assistants: None    Medications Injected:  Depo-Medrol 40 mg/mL and 3 mL of Bupivacaine 0.25% (2 mL injected per side).  Local Anesthetic: Xylocaine 1% 10 mL with Sodium Bicarbonate 1ml.   Sedation Medications: None    Procedural Technique: Laying in the prone position, the patient was prepped and draped in the usual sterile fashion using ChloraPrep and fenestrated drape.  The area was determined under fluoroscopy.  Local Xylocaine was injected by raising a wheel and penetrating the subcutaneous tissue using a 25-gauge 1.5 inch hypodermic needle.  The 3.5 inch 25-gauge spinal needle was introduced into the sacroiliac joint(s) of interest stated above.  Negative pressure applied to confirm no intravascular placement.  Omnipaque was injected to confirm placement and to confirm that there was no vascular runoff.  The medication was then injected slowly.  Needle removed and bandage applied.     Estimated Blood Loss:  None.  Complications:  None.     Disposition: The patient tolerated the procedure well, and there were no  apparent complications. Vital signs remained stable throughout the procedure. The patient was taken to the recovery area and monitored. The patient was supplied with written discharge instructions for the procedure. If helpful, we can repeat as needed. The patient was discharged in a stable condition.    Follow-Up: We will see the patient back in 1-2 weeks or the patient may call to inform of status.

## 2021-09-08 ENCOUNTER — HOSPITAL ENCOUNTER (EMERGENCY)
Facility: HOSPITAL | Age: 61
Discharge: HOME | End: 2021-09-08
Attending: EMERGENCY MEDICINE
Payer: MEDICARE

## 2021-09-08 VITALS
BODY MASS INDEX: 43.95 KG/M2 | TEMPERATURE: 98 F | HEART RATE: 97 BPM | RESPIRATION RATE: 25 BRPM | DIASTOLIC BLOOD PRESSURE: 69 MMHG | OXYGEN SATURATION: 98 % | WEIGHT: 290 LBS | HEIGHT: 68 IN | SYSTOLIC BLOOD PRESSURE: 125 MMHG

## 2021-09-08 DIAGNOSIS — M25.519 SHOULDER PAIN: ICD-10-CM

## 2021-09-08 DIAGNOSIS — S46.812A STRAIN OF LEFT TRAPEZIUS MUSCLE, INITIAL ENCOUNTER: Primary | ICD-10-CM

## 2021-09-08 PROCEDURE — 93010 ELECTROCARDIOGRAM REPORT: CPT | Mod: ,,, | Performed by: INTERNAL MEDICINE

## 2021-09-08 PROCEDURE — 99284 EMERGENCY DEPT VISIT MOD MDM: CPT | Mod: ER

## 2021-09-08 PROCEDURE — 25000003 PHARM REV CODE 250: Mod: ER | Performed by: EMERGENCY MEDICINE

## 2021-09-08 PROCEDURE — 93005 ELECTROCARDIOGRAM TRACING: CPT | Mod: ER

## 2021-09-08 PROCEDURE — 93010 EKG 12-LEAD: ICD-10-PCS | Mod: ,,, | Performed by: INTERNAL MEDICINE

## 2021-09-08 RX ORDER — CYCLOBENZAPRINE HCL 10 MG
10 TABLET ORAL
Status: COMPLETED | OUTPATIENT
Start: 2021-09-08 | End: 2021-09-08

## 2021-09-08 RX ORDER — CYCLOBENZAPRINE HCL 10 MG
10 TABLET ORAL 3 TIMES DAILY PRN
Qty: 15 TABLET | Refills: 0 | Status: SHIPPED | OUTPATIENT
Start: 2021-09-08 | End: 2021-09-13

## 2021-09-08 RX ORDER — TRAMADOL HYDROCHLORIDE 50 MG/1
50 TABLET ORAL
Status: COMPLETED | OUTPATIENT
Start: 2021-09-08 | End: 2021-09-08

## 2021-09-08 RX ORDER — MELOXICAM 7.5 MG/1
7.5 TABLET ORAL DAILY
Qty: 7 TABLET | Refills: 0 | Status: SHIPPED | OUTPATIENT
Start: 2021-09-08 | End: 2021-09-15

## 2021-09-08 RX ADMIN — TRAMADOL HYDROCHLORIDE 50 MG: 50 TABLET, FILM COATED ORAL at 06:09

## 2021-09-08 RX ADMIN — CYCLOBENZAPRINE 10 MG: 10 TABLET, FILM COATED ORAL at 06:09

## 2021-09-17 ENCOUNTER — PATIENT OUTREACH (OUTPATIENT)
Dept: ADMINISTRATIVE | Facility: OTHER | Age: 61
End: 2021-09-17

## 2021-09-20 ENCOUNTER — OFFICE VISIT (OUTPATIENT)
Dept: PAIN MEDICINE | Facility: CLINIC | Age: 61
End: 2021-09-20
Payer: MEDICARE

## 2021-09-20 VITALS
HEART RATE: 64 BPM | WEIGHT: 289.88 LBS | BODY MASS INDEX: 44.08 KG/M2 | SYSTOLIC BLOOD PRESSURE: 130 MMHG | DIASTOLIC BLOOD PRESSURE: 70 MMHG

## 2021-09-20 DIAGNOSIS — R52 PAIN: ICD-10-CM

## 2021-09-20 DIAGNOSIS — M46.1 SACROILIITIS: ICD-10-CM

## 2021-09-20 DIAGNOSIS — M47.816 LUMBAR SPONDYLOSIS: ICD-10-CM

## 2021-09-20 DIAGNOSIS — M53.3 SACROILIAC JOINT DYSFUNCTION: Primary | ICD-10-CM

## 2021-09-20 DIAGNOSIS — M43.16 SPONDYLOLISTHESIS OF LUMBAR REGION: ICD-10-CM

## 2021-09-20 DIAGNOSIS — M46.1 BILATERAL SACROILIITIS: ICD-10-CM

## 2021-09-20 DIAGNOSIS — M51.36 DDD (DEGENERATIVE DISC DISEASE), LUMBAR: ICD-10-CM

## 2021-09-20 DIAGNOSIS — G89.29 CHRONIC BILATERAL LOW BACK PAIN WITHOUT SCIATICA: ICD-10-CM

## 2021-09-20 DIAGNOSIS — M54.50 CHRONIC BILATERAL LOW BACK PAIN WITHOUT SCIATICA: ICD-10-CM

## 2021-09-20 DIAGNOSIS — M43.17 SPONDYLOLISTHESIS OF LUMBOSACRAL REGION: ICD-10-CM

## 2021-09-20 PROCEDURE — 1159F PR MEDICATION LIST DOCUMENTED IN MEDICAL RECORD: ICD-10-PCS | Mod: CPTII,S$GLB,, | Performed by: NURSE PRACTITIONER

## 2021-09-20 PROCEDURE — 1159F MED LIST DOCD IN RCRD: CPT | Mod: CPTII,S$GLB,, | Performed by: NURSE PRACTITIONER

## 2021-09-20 PROCEDURE — 1160F PR REVIEW ALL MEDS BY PRESCRIBER/CLIN PHARMACIST DOCUMENTED: ICD-10-PCS | Mod: CPTII,S$GLB,, | Performed by: NURSE PRACTITIONER

## 2021-09-20 PROCEDURE — 99213 PR OFFICE/OUTPT VISIT, EST, LEVL III, 20-29 MIN: ICD-10-PCS | Mod: S$GLB,,, | Performed by: NURSE PRACTITIONER

## 2021-09-20 PROCEDURE — 99213 OFFICE O/P EST LOW 20 MIN: CPT | Mod: S$GLB,,, | Performed by: NURSE PRACTITIONER

## 2021-09-20 PROCEDURE — 99999 PR PBB SHADOW E&M-EST. PATIENT-LVL III: CPT | Mod: PBBFAC,,, | Performed by: NURSE PRACTITIONER

## 2021-09-20 PROCEDURE — 4010F PR ACE/ARB THEARPY RXD/TAKEN: ICD-10-PCS | Mod: CPTII,S$GLB,, | Performed by: NURSE PRACTITIONER

## 2021-09-20 PROCEDURE — 3008F PR BODY MASS INDEX (BMI) DOCUMENTED: ICD-10-PCS | Mod: CPTII,S$GLB,, | Performed by: NURSE PRACTITIONER

## 2021-09-20 PROCEDURE — 3008F BODY MASS INDEX DOCD: CPT | Mod: CPTII,S$GLB,, | Performed by: NURSE PRACTITIONER

## 2021-09-20 PROCEDURE — 4010F ACE/ARB THERAPY RXD/TAKEN: CPT | Mod: CPTII,S$GLB,, | Performed by: NURSE PRACTITIONER

## 2021-09-20 PROCEDURE — 3075F SYST BP GE 130 - 139MM HG: CPT | Mod: CPTII,S$GLB,, | Performed by: NURSE PRACTITIONER

## 2021-09-20 PROCEDURE — 3075F PR MOST RECENT SYSTOLIC BLOOD PRESS GE 130-139MM HG: ICD-10-PCS | Mod: CPTII,S$GLB,, | Performed by: NURSE PRACTITIONER

## 2021-09-20 PROCEDURE — 1160F RVW MEDS BY RX/DR IN RCRD: CPT | Mod: CPTII,S$GLB,, | Performed by: NURSE PRACTITIONER

## 2021-09-20 PROCEDURE — 99999 PR PBB SHADOW E&M-EST. PATIENT-LVL III: ICD-10-PCS | Mod: PBBFAC,,, | Performed by: NURSE PRACTITIONER

## 2021-09-20 PROCEDURE — 3078F DIAST BP <80 MM HG: CPT | Mod: CPTII,S$GLB,, | Performed by: NURSE PRACTITIONER

## 2021-09-20 PROCEDURE — 3078F PR MOST RECENT DIASTOLIC BLOOD PRESSURE < 80 MM HG: ICD-10-PCS | Mod: CPTII,S$GLB,, | Performed by: NURSE PRACTITIONER

## 2021-09-20 PROCEDURE — 99499 UNLISTED E&M SERVICE: CPT | Mod: S$GLB,,, | Performed by: NURSE PRACTITIONER

## 2021-09-20 PROCEDURE — 99499 RISK ADDL DX/OHS AUDIT: ICD-10-PCS | Mod: S$GLB,,, | Performed by: NURSE PRACTITIONER

## 2021-11-09 ENCOUNTER — TELEPHONE (OUTPATIENT)
Dept: FAMILY MEDICINE | Facility: CLINIC | Age: 61
End: 2021-11-09
Payer: MEDICARE

## 2021-11-10 ENCOUNTER — OFFICE VISIT (OUTPATIENT)
Dept: FAMILY MEDICINE | Facility: CLINIC | Age: 61
End: 2021-11-10
Payer: MEDICARE

## 2021-11-10 VITALS
HEART RATE: 97 BPM | OXYGEN SATURATION: 97 % | SYSTOLIC BLOOD PRESSURE: 128 MMHG | HEIGHT: 68 IN | WEIGHT: 289 LBS | BODY MASS INDEX: 43.8 KG/M2 | DIASTOLIC BLOOD PRESSURE: 82 MMHG

## 2021-11-10 DIAGNOSIS — R05.9 COUGH: ICD-10-CM

## 2021-11-10 DIAGNOSIS — J32.9 BACTERIAL SINUSITIS: Primary | ICD-10-CM

## 2021-11-10 DIAGNOSIS — K21.9 GASTROESOPHAGEAL REFLUX DISEASE WITHOUT ESOPHAGITIS: ICD-10-CM

## 2021-11-10 DIAGNOSIS — B96.89 BACTERIAL SINUSITIS: Primary | ICD-10-CM

## 2021-11-10 DIAGNOSIS — H60.502 ACUTE OTITIS EXTERNA OF LEFT EAR, UNSPECIFIED TYPE: ICD-10-CM

## 2021-11-10 PROCEDURE — 1160F PR REVIEW ALL MEDS BY PRESCRIBER/CLIN PHARMACIST DOCUMENTED: ICD-10-PCS | Mod: CPTII,S$GLB,, | Performed by: STUDENT IN AN ORGANIZED HEALTH CARE EDUCATION/TRAINING PROGRAM

## 2021-11-10 PROCEDURE — 1159F MED LIST DOCD IN RCRD: CPT | Mod: CPTII,S$GLB,, | Performed by: STUDENT IN AN ORGANIZED HEALTH CARE EDUCATION/TRAINING PROGRAM

## 2021-11-10 PROCEDURE — 3079F PR MOST RECENT DIASTOLIC BLOOD PRESSURE 80-89 MM HG: ICD-10-PCS | Mod: CPTII,S$GLB,, | Performed by: STUDENT IN AN ORGANIZED HEALTH CARE EDUCATION/TRAINING PROGRAM

## 2021-11-10 PROCEDURE — 3074F SYST BP LT 130 MM HG: CPT | Mod: CPTII,S$GLB,, | Performed by: STUDENT IN AN ORGANIZED HEALTH CARE EDUCATION/TRAINING PROGRAM

## 2021-11-10 PROCEDURE — 3008F BODY MASS INDEX DOCD: CPT | Mod: CPTII,S$GLB,, | Performed by: STUDENT IN AN ORGANIZED HEALTH CARE EDUCATION/TRAINING PROGRAM

## 2021-11-10 PROCEDURE — 99499 UNLISTED E&M SERVICE: CPT | Mod: S$GLB,,, | Performed by: STUDENT IN AN ORGANIZED HEALTH CARE EDUCATION/TRAINING PROGRAM

## 2021-11-10 PROCEDURE — 1159F PR MEDICATION LIST DOCUMENTED IN MEDICAL RECORD: ICD-10-PCS | Mod: CPTII,S$GLB,, | Performed by: STUDENT IN AN ORGANIZED HEALTH CARE EDUCATION/TRAINING PROGRAM

## 2021-11-10 PROCEDURE — 3008F PR BODY MASS INDEX (BMI) DOCUMENTED: ICD-10-PCS | Mod: CPTII,S$GLB,, | Performed by: STUDENT IN AN ORGANIZED HEALTH CARE EDUCATION/TRAINING PROGRAM

## 2021-11-10 PROCEDURE — 4010F ACE/ARB THERAPY RXD/TAKEN: CPT | Mod: CPTII,S$GLB,, | Performed by: STUDENT IN AN ORGANIZED HEALTH CARE EDUCATION/TRAINING PROGRAM

## 2021-11-10 PROCEDURE — 99499 RISK ADDL DX/OHS AUDIT: ICD-10-PCS | Mod: S$GLB,,, | Performed by: STUDENT IN AN ORGANIZED HEALTH CARE EDUCATION/TRAINING PROGRAM

## 2021-11-10 PROCEDURE — 99214 OFFICE O/P EST MOD 30 MIN: CPT | Mod: S$GLB,,, | Performed by: STUDENT IN AN ORGANIZED HEALTH CARE EDUCATION/TRAINING PROGRAM

## 2021-11-10 PROCEDURE — 4010F PR ACE/ARB THEARPY RXD/TAKEN: ICD-10-PCS | Mod: CPTII,S$GLB,, | Performed by: STUDENT IN AN ORGANIZED HEALTH CARE EDUCATION/TRAINING PROGRAM

## 2021-11-10 PROCEDURE — 1160F RVW MEDS BY RX/DR IN RCRD: CPT | Mod: CPTII,S$GLB,, | Performed by: STUDENT IN AN ORGANIZED HEALTH CARE EDUCATION/TRAINING PROGRAM

## 2021-11-10 PROCEDURE — 3074F PR MOST RECENT SYSTOLIC BLOOD PRESSURE < 130 MM HG: ICD-10-PCS | Mod: CPTII,S$GLB,, | Performed by: STUDENT IN AN ORGANIZED HEALTH CARE EDUCATION/TRAINING PROGRAM

## 2021-11-10 PROCEDURE — 99214 PR OFFICE/OUTPT VISIT, EST, LEVL IV, 30-39 MIN: ICD-10-PCS | Mod: S$GLB,,, | Performed by: STUDENT IN AN ORGANIZED HEALTH CARE EDUCATION/TRAINING PROGRAM

## 2021-11-10 PROCEDURE — 3079F DIAST BP 80-89 MM HG: CPT | Mod: CPTII,S$GLB,, | Performed by: STUDENT IN AN ORGANIZED HEALTH CARE EDUCATION/TRAINING PROGRAM

## 2021-11-10 RX ORDER — METHYLPREDNISOLONE 4 MG/1
TABLET ORAL
Qty: 1 EACH | Refills: 0 | Status: SHIPPED | OUTPATIENT
Start: 2021-11-10 | End: 2021-12-01

## 2021-11-10 RX ORDER — CODEINE PHOSPHATE AND GUAIFENESIN 10; 100 MG/5ML; MG/5ML
5 SOLUTION ORAL EVERY 6 HOURS PRN
Qty: 100 ML | Refills: 1 | Status: SHIPPED | OUTPATIENT
Start: 2021-11-10 | End: 2021-11-20

## 2021-11-10 RX ORDER — OMEPRAZOLE 20 MG/1
20 CAPSULE, DELAYED RELEASE ORAL DAILY
Qty: 90 CAPSULE | Refills: 3 | Status: SHIPPED | OUTPATIENT
Start: 2021-11-10 | End: 2022-02-09 | Stop reason: SDUPTHER

## 2021-11-10 RX ORDER — AZITHROMYCIN 250 MG/1
TABLET, FILM COATED ORAL
Qty: 6 TABLET | Refills: 0 | Status: SHIPPED | OUTPATIENT
Start: 2021-11-10 | End: 2021-11-15

## 2021-12-03 ENCOUNTER — TELEPHONE (OUTPATIENT)
Dept: FAMILY MEDICINE | Facility: CLINIC | Age: 61
End: 2021-12-03
Payer: MEDICARE

## 2021-12-03 RX ORDER — PROBENECID AND COLCHICINE .5; 5 MG/1; MG/1
1 TABLET ORAL DAILY
Qty: 10 TABLET | Refills: 0 | Status: SHIPPED | OUTPATIENT
Start: 2021-12-03 | End: 2021-12-08 | Stop reason: SDUPTHER

## 2021-12-08 ENCOUNTER — TELEPHONE (OUTPATIENT)
Dept: FAMILY MEDICINE | Facility: CLINIC | Age: 61
End: 2021-12-08
Payer: MEDICARE

## 2021-12-08 RX ORDER — TRAMADOL HYDROCHLORIDE 50 MG/1
50 TABLET ORAL EVERY 8 HOURS PRN
Qty: 15 TABLET | Refills: 0 | Status: SHIPPED | OUTPATIENT
Start: 2021-12-08 | End: 2022-07-18

## 2021-12-08 RX ORDER — PROBENECID AND COLCHICINE .5; 5 MG/1; MG/1
1 TABLET ORAL DAILY
Qty: 10 TABLET | Refills: 0 | Status: SHIPPED | OUTPATIENT
Start: 2021-12-08 | End: 2022-07-18

## 2021-12-16 ENCOUNTER — OFFICE VISIT (OUTPATIENT)
Dept: FAMILY MEDICINE | Facility: CLINIC | Age: 61
End: 2021-12-16
Payer: MEDICARE

## 2021-12-16 VITALS
OXYGEN SATURATION: 96 % | HEIGHT: 68 IN | SYSTOLIC BLOOD PRESSURE: 110 MMHG | HEART RATE: 102 BPM | DIASTOLIC BLOOD PRESSURE: 78 MMHG | BODY MASS INDEX: 43.57 KG/M2 | WEIGHT: 287.5 LBS | TEMPERATURE: 98 F

## 2021-12-16 DIAGNOSIS — M54.16 LUMBAR RADICULOPATHY, CHRONIC: ICD-10-CM

## 2021-12-16 DIAGNOSIS — I10 ESSENTIAL HYPERTENSION: ICD-10-CM

## 2021-12-16 DIAGNOSIS — R09.82 POST-NASAL DRIP: Primary | ICD-10-CM

## 2021-12-16 DIAGNOSIS — H60.502 ACUTE OTITIS EXTERNA OF LEFT EAR, UNSPECIFIED TYPE: ICD-10-CM

## 2021-12-16 DIAGNOSIS — R05.9 COUGH: ICD-10-CM

## 2021-12-16 DIAGNOSIS — R42 VERTIGO: ICD-10-CM

## 2021-12-16 PROCEDURE — 99214 PR OFFICE/OUTPT VISIT, EST, LEVL IV, 30-39 MIN: ICD-10-PCS | Mod: S$GLB,,, | Performed by: STUDENT IN AN ORGANIZED HEALTH CARE EDUCATION/TRAINING PROGRAM

## 2021-12-16 PROCEDURE — 4010F ACE/ARB THERAPY RXD/TAKEN: CPT | Mod: CPTII,S$GLB,, | Performed by: STUDENT IN AN ORGANIZED HEALTH CARE EDUCATION/TRAINING PROGRAM

## 2021-12-16 PROCEDURE — 99214 OFFICE O/P EST MOD 30 MIN: CPT | Mod: S$GLB,,, | Performed by: STUDENT IN AN ORGANIZED HEALTH CARE EDUCATION/TRAINING PROGRAM

## 2021-12-16 PROCEDURE — 4010F PR ACE/ARB THEARPY RXD/TAKEN: ICD-10-PCS | Mod: CPTII,S$GLB,, | Performed by: STUDENT IN AN ORGANIZED HEALTH CARE EDUCATION/TRAINING PROGRAM

## 2021-12-16 RX ORDER — NEOMYCIN SULFATE, POLYMYXIN B SULFATE AND HYDROCORTISONE 10; 3.5; 1 MG/ML; MG/ML; [USP'U]/ML
3 SUSPENSION/ DROPS AURICULAR (OTIC) 3 TIMES DAILY
Qty: 10 ML | Refills: 0 | Status: SHIPPED | OUTPATIENT
Start: 2021-12-16 | End: 2022-10-04 | Stop reason: SDUPTHER

## 2021-12-16 RX ORDER — GABAPENTIN 300 MG/1
300 CAPSULE ORAL 3 TIMES DAILY
COMMUNITY
End: 2021-12-16 | Stop reason: SDUPTHER

## 2021-12-16 RX ORDER — FLUTICASONE PROPIONATE 50 MCG
1 SPRAY, SUSPENSION (ML) NASAL DAILY
Qty: 10 ML | Refills: 0 | Status: SHIPPED | OUTPATIENT
Start: 2021-12-16 | End: 2022-03-28 | Stop reason: SDUPTHER

## 2021-12-16 RX ORDER — AMLODIPINE BESYLATE 5 MG/1
5 TABLET ORAL DAILY
Qty: 90 TABLET | Refills: 4 | Status: SHIPPED | OUTPATIENT
Start: 2021-12-16 | End: 2023-06-07 | Stop reason: SDUPTHER

## 2021-12-16 RX ORDER — GABAPENTIN 300 MG/1
300 CAPSULE ORAL 3 TIMES DAILY PRN
Qty: 270 CAPSULE | Refills: 3 | Status: SHIPPED | OUTPATIENT
Start: 2021-12-16 | End: 2022-10-05 | Stop reason: SDUPTHER

## 2021-12-16 RX ORDER — MECLIZINE HYDROCHLORIDE 25 MG/1
25 TABLET ORAL 3 TIMES DAILY PRN
Qty: 50 TABLET | Refills: 2 | Status: SHIPPED | OUTPATIENT
Start: 2021-12-16 | End: 2023-11-13 | Stop reason: SDUPTHER

## 2021-12-16 RX ORDER — METOPROLOL SUCCINATE 100 MG/1
100 TABLET, EXTENDED RELEASE ORAL DAILY
Qty: 90 TABLET | Refills: 4 | Status: SHIPPED | OUTPATIENT
Start: 2021-12-16 | End: 2023-06-29 | Stop reason: SDUPTHER

## 2021-12-16 RX ORDER — VALSARTAN 320 MG/1
320 TABLET ORAL DAILY
Qty: 90 TABLET | Refills: 3 | Status: SHIPPED | OUTPATIENT
Start: 2021-12-16 | End: 2022-02-09 | Stop reason: SDUPTHER

## 2021-12-16 RX ORDER — PROMETHAZINE HYDROCHLORIDE AND DEXTROMETHORPHAN HYDROBROMIDE 6.25; 15 MG/5ML; MG/5ML
5 SYRUP ORAL EVERY 4 HOURS PRN
Qty: 180 ML | Refills: 0 | Status: SHIPPED | OUTPATIENT
Start: 2021-12-16 | End: 2021-12-26

## 2021-12-22 ENCOUNTER — TELEPHONE (OUTPATIENT)
Dept: FAMILY MEDICINE | Facility: CLINIC | Age: 61
End: 2021-12-22
Payer: MEDICARE

## 2022-02-07 ENCOUNTER — TELEPHONE (OUTPATIENT)
Dept: FAMILY MEDICINE | Facility: CLINIC | Age: 62
End: 2022-02-07
Payer: MEDICARE

## 2022-02-07 NOTE — TELEPHONE ENCOUNTER
I CHRISTIAN for the pt Thursday is not available     I advised pt to call back as we can schedule her Wednesday with dr thacker

## 2022-02-07 NOTE — TELEPHONE ENCOUNTER
----- Message from Yajaira Saleh sent at 2/7/2022  2:16 PM CST -----  Type:  Needs Medical Advice    Who Called:  pt  Symptoms (please be specific):  gout and arthritis   How long has patient had these symptoms:   1 week      Would the patient rather a call back or a response via [facility]? Call   Best Call Back Number: [phone]   Additional Information:  pt sister has an appt on 2/10/2022 and she would like to be seen on that day also since she has to bring sister to appt

## 2022-02-08 ENCOUNTER — TELEPHONE (OUTPATIENT)
Dept: FAMILY MEDICINE | Facility: CLINIC | Age: 62
End: 2022-02-08
Payer: MEDICARE

## 2022-02-08 NOTE — TELEPHONE ENCOUNTER
----- Message from Aarti Madison sent at 2/8/2022 11:19 AM CST -----  Contact: patient  [phone]  Patient returning your call  Please advise

## 2022-02-09 ENCOUNTER — OFFICE VISIT (OUTPATIENT)
Dept: FAMILY MEDICINE | Facility: CLINIC | Age: 62
End: 2022-02-09
Payer: MEDICARE

## 2022-02-09 VITALS
WEIGHT: 290.44 LBS | TEMPERATURE: 98 F | OXYGEN SATURATION: 98 % | SYSTOLIC BLOOD PRESSURE: 140 MMHG | BODY MASS INDEX: 46.68 KG/M2 | DIASTOLIC BLOOD PRESSURE: 86 MMHG | HEIGHT: 66 IN | HEART RATE: 90 BPM

## 2022-02-09 DIAGNOSIS — M79.674 PAIN IN TOE OF RIGHT FOOT: Primary | ICD-10-CM

## 2022-02-09 DIAGNOSIS — R09.82 POST-NASAL DRIP: ICD-10-CM

## 2022-02-09 DIAGNOSIS — I10 ESSENTIAL HYPERTENSION: ICD-10-CM

## 2022-02-09 DIAGNOSIS — K21.9 GASTROESOPHAGEAL REFLUX DISEASE WITHOUT ESOPHAGITIS: ICD-10-CM

## 2022-02-09 DIAGNOSIS — M54.16 LUMBAR RADICULOPATHY, CHRONIC: ICD-10-CM

## 2022-02-09 PROCEDURE — 1160F PR REVIEW ALL MEDS BY PRESCRIBER/CLIN PHARMACIST DOCUMENTED: ICD-10-PCS | Mod: CPTII,S$GLB,, | Performed by: FAMILY MEDICINE

## 2022-02-09 PROCEDURE — 3077F SYST BP >= 140 MM HG: CPT | Mod: CPTII,S$GLB,, | Performed by: FAMILY MEDICINE

## 2022-02-09 PROCEDURE — 3077F PR MOST RECENT SYSTOLIC BLOOD PRESSURE >= 140 MM HG: ICD-10-PCS | Mod: CPTII,S$GLB,, | Performed by: FAMILY MEDICINE

## 2022-02-09 PROCEDURE — 99499 RISK ADDL DX/OHS AUDIT: ICD-10-PCS | Mod: S$GLB,,, | Performed by: FAMILY MEDICINE

## 2022-02-09 PROCEDURE — 99213 PR OFFICE/OUTPT VISIT, EST, LEVL III, 20-29 MIN: ICD-10-PCS | Mod: S$GLB,,, | Performed by: FAMILY MEDICINE

## 2022-02-09 PROCEDURE — 3079F DIAST BP 80-89 MM HG: CPT | Mod: CPTII,S$GLB,, | Performed by: FAMILY MEDICINE

## 2022-02-09 PROCEDURE — 99499 UNLISTED E&M SERVICE: CPT | Mod: S$GLB,,, | Performed by: FAMILY MEDICINE

## 2022-02-09 PROCEDURE — 3008F PR BODY MASS INDEX (BMI) DOCUMENTED: ICD-10-PCS | Mod: CPTII,S$GLB,, | Performed by: FAMILY MEDICINE

## 2022-02-09 PROCEDURE — 1160F RVW MEDS BY RX/DR IN RCRD: CPT | Mod: CPTII,S$GLB,, | Performed by: FAMILY MEDICINE

## 2022-02-09 PROCEDURE — 3079F PR MOST RECENT DIASTOLIC BLOOD PRESSURE 80-89 MM HG: ICD-10-PCS | Mod: CPTII,S$GLB,, | Performed by: FAMILY MEDICINE

## 2022-02-09 PROCEDURE — 99213 OFFICE O/P EST LOW 20 MIN: CPT | Mod: S$GLB,,, | Performed by: FAMILY MEDICINE

## 2022-02-09 PROCEDURE — 1159F MED LIST DOCD IN RCRD: CPT | Mod: CPTII,S$GLB,, | Performed by: FAMILY MEDICINE

## 2022-02-09 PROCEDURE — 3008F BODY MASS INDEX DOCD: CPT | Mod: CPTII,S$GLB,, | Performed by: FAMILY MEDICINE

## 2022-02-09 PROCEDURE — 1159F PR MEDICATION LIST DOCUMENTED IN MEDICAL RECORD: ICD-10-PCS | Mod: CPTII,S$GLB,, | Performed by: FAMILY MEDICINE

## 2022-02-09 RX ORDER — HYDROCODONE BITARTRATE AND ACETAMINOPHEN 5; 325 MG/1; MG/1
1 TABLET ORAL EVERY 4 HOURS PRN
Qty: 30 TABLET | Refills: 0 | Status: SHIPPED | OUTPATIENT
Start: 2022-02-09 | End: 2022-07-18

## 2022-02-09 RX ORDER — DICLOFENAC SODIUM 75 MG/1
75 TABLET, DELAYED RELEASE ORAL 2 TIMES DAILY
Qty: 60 TABLET | Refills: 3 | Status: SHIPPED | OUTPATIENT
Start: 2022-02-09 | End: 2023-06-07 | Stop reason: SDUPTHER

## 2022-02-09 RX ORDER — LORATADINE 10 MG/1
10 TABLET ORAL DAILY
Qty: 90 TABLET | Refills: 3 | Status: SHIPPED | OUTPATIENT
Start: 2022-02-09 | End: 2022-10-04 | Stop reason: SDUPTHER

## 2022-02-09 RX ORDER — OMEPRAZOLE 20 MG/1
20 CAPSULE, DELAYED RELEASE ORAL DAILY
Qty: 90 CAPSULE | Refills: 3 | Status: SHIPPED | OUTPATIENT
Start: 2022-02-09 | End: 2023-06-29 | Stop reason: SDUPTHER

## 2022-02-09 RX ORDER — VALSARTAN 320 MG/1
320 TABLET ORAL DAILY
Qty: 90 TABLET | Refills: 3 | Status: SHIPPED | OUTPATIENT
Start: 2022-02-09 | End: 2023-06-07 | Stop reason: SDUPTHER

## 2022-02-09 NOTE — PROGRESS NOTES
Subjective:       Patient ID: Debbie Vo is a 61 y.o. female.    Chief Complaint: Gout and Arthritis    60 y/o female with hx of HTN and chronic OA, under control, now having right big toe pain x one week, recurring pain  Denies any injury or trauma  Was prescribed colbenemid in December with minimal relief      Review of Systems    Objective:      Physical Exam  Vitals and nursing note reviewed.   Constitutional:       General: She is active. She is not in acute distress.Vital signs are normal.      Appearance: Normal appearance. She is well-developed and well-nourished. She is not diaphoretic.   HENT:      Head: Normocephalic and atraumatic.      Nose: Nose normal.      Mouth/Throat:      Mouth: Oropharynx is clear and moist and mucous membranes are normal.   Eyes:      General: Lids are normal.      Conjunctiva/sclera: Conjunctivae normal.   Neck:      Trachea: Trachea and phonation normal.   Cardiovascular:      Rate and Rhythm: Normal rate and regular rhythm.      Pulses: Normal pulses and intact distal pulses.      Heart sounds: Normal heart sounds.   Pulmonary:      Effort: Pulmonary effort is normal.      Breath sounds: Normal breath sounds.   Abdominal:      General: Bowel sounds are normal. There is no abdominal bruit.      Palpations: Abdomen is soft. There is no mass or pulsatile mass.   Musculoskeletal:         General: No deformity or edema.      Cervical back: Full passive range of motion without pain, normal range of motion and neck supple.      Comments: Right foot big toe , no swelling, erythema, no warmth or edema     Skin:     General: Skin is warm, dry and intact.   Neurological:      Mental Status: She is alert and oriented to person, place, and time.      Sensory: No sensory deficit.      Deep Tendon Reflexes: Strength normal and reflexes are normal and symmetric.   Psychiatric:         Mood and Affect: Mood and affect normal.         Speech: Speech normal.         Behavior:  Behavior normal. Behavior is cooperative.         Thought Content: Thought content normal.         Cognition and Memory: Cognition and memory normal.         Judgment: Judgment normal.         Assessment:       1. Pain in toe of right foot        Plan:         Debbie was seen today for gout and arthritis.    Diagnoses and all orders for this visit:    Pain in toe of right foot    Other orders  -     diclofenac (VOLTAREN) 75 MG EC tablet; Take 1 tablet (75 mg total) by mouth 2 (two) times daily.           Pt also advised to get uric acid checked on her routine lab draw

## 2022-02-09 NOTE — PROGRESS NOTES
Patient, Debbie Vo (MRN #[id number]), presented with a recorded BMI of 46.88 kg/m^2 consistent with the definition of morbid obesity (ICD-10 E66.01). The patient's morbid obesity was monitored, evaluated, addressed and/or treated. This addendum to the medical record is made on 02/09/2022.

## 2022-02-09 NOTE — PATIENT INSTRUCTIONS
Patient Education       Foot Sprain ED   General Information   You came to the Emergency Department (ED) for a foot sprain. Inside your foot, you have tough bands of tissue called ligaments that hold the bones together. When you hurt your foot, one or more of these ligaments stretched or tore. Now your foot is swollen and sore. You may have pain when you try to walk or move your foot.  You may be waiting on some test results. The staff will contact you if there are concerning results.  What care is needed at home?   · Call your regular doctor to let them know you were in the ED. Make a follow-up appointment if you were told to.  · Rest your foot. You can use crutches to help keep the weight off of your foot.  · Place an ice pack or a bag of frozen vegetables wrapped in a towel over the painful part. Never put ice right on the skin. Use ice every 1 to 2 hours for 10 to 15 minutes at a time. Use for the first 24 to 48 hours after your injury.  · Wrap your foot with an elastic bandage to help with swelling.  · Prop your foot on pillows, keeping it raised above the level of your heart. This may help lessen pain and swelling.  · Slowly begin to stretch your foot when swelling and pain improve.  When do I need to get emergency help?   · Return to the ED if:   ? Your toes turn blue or gray.  When do I need to call the doctor?   · The pain or swelling gets worse.  · Your ankle is not stable or feels wobbly.  · You can no longer put weight on your foot.  · You have new or worsening symptoms.  Last Reviewed Date   2020-08-03  Consumer Information Use and Disclaimer   This information is not specific medical advice and does not replace information you receive from your health care provider. This is only a brief summary of general information. It does NOT include all information about conditions, illnesses, injuries, tests, procedures, treatments, therapies, discharge instructions or life-style choices that may apply to you.  You must talk with your health care provider for complete information about your health and treatment options. This information should not be used to decide whether or not to accept your health care providers advice, instructions or recommendations. Only your health care provider has the knowledge and training to provide advice that is right for you.  Copyright   Copyright © 2021 [employer] and its affiliates and/or licensors. All rights reserved.

## 2022-03-21 ENCOUNTER — OFFICE VISIT (OUTPATIENT)
Dept: DERMATOLOGY | Facility: CLINIC | Age: 62
End: 2022-03-21
Payer: MEDICARE

## 2022-03-21 DIAGNOSIS — L82.1 SEBORRHEIC KERATOSIS: ICD-10-CM

## 2022-03-21 DIAGNOSIS — L29.9 PRURITUS: ICD-10-CM

## 2022-03-21 DIAGNOSIS — D48.5 NEOPLASM OF UNCERTAIN BEHAVIOR OF SKIN: Primary | ICD-10-CM

## 2022-03-21 DIAGNOSIS — L85.3 XEROSIS OF SKIN: ICD-10-CM

## 2022-03-21 PROCEDURE — 1159F PR MEDICATION LIST DOCUMENTED IN MEDICAL RECORD: ICD-10-PCS | Mod: CPTII,S$GLB,, | Performed by: DERMATOLOGY

## 2022-03-21 PROCEDURE — 4010F PR ACE/ARB THEARPY RXD/TAKEN: ICD-10-PCS | Mod: CPTII,S$GLB,, | Performed by: DERMATOLOGY

## 2022-03-21 PROCEDURE — 99204 OFFICE O/P NEW MOD 45 MIN: CPT | Mod: S$GLB,,, | Performed by: DERMATOLOGY

## 2022-03-21 PROCEDURE — 99999 PR PBB SHADOW E&M-EST. PATIENT-LVL IV: CPT | Mod: PBBFAC,,, | Performed by: DERMATOLOGY

## 2022-03-21 PROCEDURE — 1160F PR REVIEW ALL MEDS BY PRESCRIBER/CLIN PHARMACIST DOCUMENTED: ICD-10-PCS | Mod: CPTII,S$GLB,, | Performed by: DERMATOLOGY

## 2022-03-21 PROCEDURE — 1160F RVW MEDS BY RX/DR IN RCRD: CPT | Mod: CPTII,S$GLB,, | Performed by: DERMATOLOGY

## 2022-03-21 PROCEDURE — 1159F MED LIST DOCD IN RCRD: CPT | Mod: CPTII,S$GLB,, | Performed by: DERMATOLOGY

## 2022-03-21 PROCEDURE — 99204 PR OFFICE/OUTPT VISIT, NEW, LEVL IV, 45-59 MIN: ICD-10-PCS | Mod: S$GLB,,, | Performed by: DERMATOLOGY

## 2022-03-21 PROCEDURE — 99999 PR PBB SHADOW E&M-EST. PATIENT-LVL IV: ICD-10-PCS | Mod: PBBFAC,,, | Performed by: DERMATOLOGY

## 2022-03-21 PROCEDURE — 4010F ACE/ARB THERAPY RXD/TAKEN: CPT | Mod: CPTII,S$GLB,, | Performed by: DERMATOLOGY

## 2022-03-21 RX ORDER — TRIAMCINOLONE ACETONIDE 1 MG/G
CREAM TOPICAL 2 TIMES DAILY PRN
Qty: 80 G | Refills: 3 | Status: SHIPPED | OUTPATIENT
Start: 2022-03-21 | End: 2024-03-20 | Stop reason: SDUPTHER

## 2022-03-21 RX ORDER — AMMONIUM LACTATE 12 G/100G
LOTION TOPICAL
Qty: 225 G | Refills: 11 | Status: SHIPPED | OUTPATIENT
Start: 2022-03-21

## 2022-03-21 NOTE — PROGRESS NOTES
Subjective:       Patient ID:  Debbie Vo is a 61 y.o. female who presents for   Chief Complaint   Patient presents with    Skin Discoloration     Pt states the discoloration is located on her legs and face     Itching     Pt states she is itching all over      History of Present Illness: The patient presents with chief complaint of pruritus.  Location: back  Duration: several years  Signs/Symptoms: pruritus    Prior treatments: none    She also c/o dark spots of the legs and face.         Review of Systems   Constitutional: Negative for fever and chills.   Gastrointestinal: Negative for nausea and vomiting.   Skin: Positive for itching, rash, dry skin and activity-related sunscreen use. Negative for daily sunscreen use and recent sunburn.   Hematologic/Lymphatic: Does not bruise/bleed easily.        Objective:    Physical Exam   Constitutional: She appears well-developed and well-nourished. No distress.   Neurological: She is alert and oriented to person, place, and time. She is not disoriented.   Psychiatric: She has a normal mood and affect.   Skin:   Areas Examined (abnormalities noted in diagram):   Head / Face Inspection Performed  Neck Inspection Performed  RUE Inspected  LUE Inspection Performed  RLE Inspected  LLE Inspection Performed  Nails and Digits Inspection Performed                   Diagram Legend     Erythematous scaling macule/papule c/w actinic keratosis       Vascular papule c/w angioma      Pigmented verrucoid papule/plaque c/w seborrheic keratosis      Yellow umbilicated papule c/w sebaceous hyperplasia      Irregularly shaped tan macule c/w lentigo     1-2 mm smooth white papules consistent with Milia      Movable subcutaneous cyst with punctum c/w epidermal inclusion cyst      Subcutaneous movable cyst c/w pilar cyst      Firm pink to brown papule c/w dermatofibroma      Pedunculated fleshy papule(s) c/w skin tag(s)      Evenly pigmented macule c/w junctional nevus      Mildly variegated pigmented, slightly irregular-bordered macule c/w mildly atypical nevus      Flesh colored to evenly pigmented papule c/w intradermal nevus       Pink pearly papule/plaque c/w basal cell carcinoma      Erythematous hyperkeratotic cursted plaque c/w SCC      Surgical scar with no sign of skin cancer recurrence      Open and closed comedones      Inflammatory papules and pustules      Verrucoid papule consistent consistent with wart     Erythematous eczematous patches and plaques     Dystrophic onycholytic nail with subungual debris c/w onychomycosis     Umbilicated papule    Erythematous-base heme-crusted tan verrucoid plaque consistent with inflamed seborrheic keratosis     Erythematous Silvery Scaling Plaque c/w Psoriasis     See annotation                Assessment / Plan:        Neoplasm of uncertain behavior of skin  Suspicious lesions of the bilateral cheeks. Recommend shave biopsy, procedure discussed in detail.  Pt refuses against medical advice.  Recommend surveillance by patient and f/u in derm in 6 months. Photodocumentation done today.     Seborrheic keratosis  Reassurance given. Discussed diagnosis and that lesions are benign.  AAD handout given.     Pruritus  -     triamcinolone acetonide 0.1% (KENALOG) 0.1 % cream; Apply topically 2 (two) times daily as needed (dryness or itching).  Dispense: 80 g; Refill: 3  -     Of the back, will start above med. Reviewed dry skin care.    Xerosis of skin  -     ammonium lactate (LAC-HYDRIN) 12 % lotion; Apply to damp skin after bathing  Dispense: 225 g; Refill: 11             Follow up in about 6 months (around 9/21/2022).

## 2022-03-21 NOTE — PATIENT INSTRUCTIONS
Monitoring Moles  Moles, also called nevi, are small, pigmented (colored) marks on the skin. They have no known purpose. Many moles appear before age 30, but they also increase frequently as people age. Moles most often are benign (not cancer) and harmless. But some become cancerous. Thats why you need to watch the moles on your body and tell your health care provider about any concern you.    What are moles?  Moles are a type of pigmented reji. Freckles, which often are sprinkled across the bridge of the nose, the cheeks, and the arms, are another type of pigmented reji. Moles can appear on any part of the body. There are many types, sizes, and shapes of moles. Most moles are solid brown. In most cases, they are flat or dome-shaped, smooth, and have well-defined edges. Freckles are flat.  Why worry about moles?  Most moles are benign and dont require treatment. You can have moles removed if you dont like the way they look or feel. But moles that appear after you are 30 or that change in certain ways may become a problem. These moles may turn into melanoma, a type of skin cancer. Melanoma is one of the fastest growing cancers in the [place], but it is often curable if caught early. But this disease can be life-threatening, particularly when not diagnosed early. The more moles someone has, the higher the risk. Risk is also higher for those who have had more lifetime exposure to the sun, severe blistering sunburns, exposure to tanning beds, a prior personal history of cancer, and those with a family history of skin cancer. To manage your risk, its smart to check your moles for changes and ask your health care provider to perform a thorough skin exam when you have a physical exam. To do this, you first need to learn where your moles are. Then, be sure to check your moles each month.    Checking your moles  You can check many of your moles each month. You can do this right after you shower and before you get  dressed. Check your body from head to toe. Then, make a list of your moles. If you find any new moles or changes in your moles, call your health care provider. To check your moles, youll need:  A full-length mirror  A stool or chair to sit on while you check your feet  If you have a lot of moles, take digital photos of them each month. Make sure to take photos both up close and from a distance. These can help you see if any moles change over time.  When to seek medical treatment  See your health care provider if your moles hurt, itch, ooze, bleed, thicken, become crusty, or show other changes. Also, be sure to call your health care provider if your moles show any of the following signs of melanoma:  A change in size, shape, color, or elevation  Asymmetry (when the sides dont match)  Ragged, notched, or blurred borders  Varied colors within the same mole  Size is larger than 5 mm or 6 mm in diameter (the size of a pencil eraser)  © 3798-3264 [employer]. [address] [place] [address]. All rights reserved. This information is not intended as a substitute for professional medical care. Always follow your healthcare professional's instructions.

## 2022-03-23 ENCOUNTER — OFFICE VISIT (OUTPATIENT)
Dept: OPHTHALMOLOGY | Facility: CLINIC | Age: 62
End: 2022-03-23
Payer: MEDICARE

## 2022-03-23 DIAGNOSIS — H52.03 HYPEROPIA WITH PRESBYOPIA OF BOTH EYES: ICD-10-CM

## 2022-03-23 DIAGNOSIS — H52.4 HYPEROPIA WITH PRESBYOPIA OF BOTH EYES: ICD-10-CM

## 2022-03-23 DIAGNOSIS — H25.13 NUCLEAR SCLEROTIC CATARACT OF BOTH EYES: Primary | ICD-10-CM

## 2022-03-23 DIAGNOSIS — H04.211 EPIPHORA DUE TO EXCESS LACRIMATION OF RIGHT SIDE: ICD-10-CM

## 2022-03-23 PROCEDURE — 92004 COMPRE OPH EXAM NEW PT 1/>: CPT | Mod: S$GLB,,, | Performed by: OPTOMETRIST

## 2022-03-23 PROCEDURE — 4010F PR ACE/ARB THEARPY RXD/TAKEN: ICD-10-PCS | Mod: CPTII,S$GLB,, | Performed by: OPTOMETRIST

## 2022-03-23 PROCEDURE — 1159F PR MEDICATION LIST DOCUMENTED IN MEDICAL RECORD: ICD-10-PCS | Mod: CPTII,S$GLB,, | Performed by: OPTOMETRIST

## 2022-03-23 PROCEDURE — 99999 PR PBB SHADOW E&M-EST. PATIENT-LVL III: ICD-10-PCS | Mod: PBBFAC,,, | Performed by: OPTOMETRIST

## 2022-03-23 PROCEDURE — 92004 PR EYE EXAM, NEW PATIENT,COMPREHESV: ICD-10-PCS | Mod: S$GLB,,, | Performed by: OPTOMETRIST

## 2022-03-23 PROCEDURE — 99999 PR PBB SHADOW E&M-EST. PATIENT-LVL III: CPT | Mod: PBBFAC,,, | Performed by: OPTOMETRIST

## 2022-03-23 PROCEDURE — 92015 DETERMINE REFRACTIVE STATE: CPT | Mod: S$GLB,,, | Performed by: OPTOMETRIST

## 2022-03-23 PROCEDURE — 4010F ACE/ARB THERAPY RXD/TAKEN: CPT | Mod: CPTII,S$GLB,, | Performed by: OPTOMETRIST

## 2022-03-23 PROCEDURE — 92015 PR REFRACTION: ICD-10-PCS | Mod: S$GLB,,, | Performed by: OPTOMETRIST

## 2022-03-23 PROCEDURE — 1159F MED LIST DOCD IN RCRD: CPT | Mod: CPTII,S$GLB,, | Performed by: OPTOMETRIST

## 2022-03-23 NOTE — PROGRESS NOTES
HPI     NP to DKT  Patient here today for yearly eye exam  Vision changes since last eye exam?: Yes at distance and near  Wears PAL glasses but broken     Any eye pain today: No    Other ocular symptoms: No    Interested in contact lens fitting today? No                Last edited by Gabrielle Galvin, PCT on 3/23/2022 10:02 AM. (History)              Assessment /Plan     For exam results, see Encounter Report.    Nuclear sclerotic cataract of both eyes  Cataracts not significantly affecting activities of daily living and therefore surgery is not indicated at this time.   Will continue to monitor over the next 12 months. Pt to call or RTC with any significant change in vision prior to next visit.     Hyperopia with presbyopia of both eyes  Eyeglass Final Rx     Eyeglass Final Rx       Sphere Cylinder Axis Add    Right +0.75 DS  +2.50    Left +1.00 +0.75 065 +2.50                Epiphora due to excess lacrimation of right side  No punctal stenosis on examination today, recommend trial preservative free drops 4 times daily as needed, RTC if no improvement with MD on procedure day for possible D and I evaluation.     RTC 1 year for CEE with DFE sooner if any changes to vision or worsening symptoms.

## 2022-03-28 ENCOUNTER — TELEPHONE (OUTPATIENT)
Dept: FAMILY MEDICINE | Facility: CLINIC | Age: 62
End: 2022-03-28
Payer: MEDICARE

## 2022-03-28 DIAGNOSIS — R09.82 POST-NASAL DRIP: ICD-10-CM

## 2022-03-28 RX ORDER — FLUTICASONE PROPIONATE 50 MCG
1 SPRAY, SUSPENSION (ML) NASAL DAILY
Qty: 10 ML | Refills: 5 | Status: SHIPPED | OUTPATIENT
Start: 2022-03-28 | End: 2023-07-13 | Stop reason: SDUPTHER

## 2022-03-28 NOTE — TELEPHONE ENCOUNTER
----- Message from Hemantroyer Darian sent at 3/28/2022  9:54 AM CDT -----  Contact: [phone]/self  Type:  RX Refill Request    Who Called: pt   Refill or New Rx:refill  RX Name and Strength:fluticasone propionate (FLONASE) 50 mcg/actuation nasal spray  How is the patient currently taking it? (ex. 1XDay):1  Is this a 30 day or 90 day RX:30  Preferred Pharmacy with phone number:[address]   [phone]  Local or Mail Order: local   Ordering Provider:lion  Would the patient rather a call back or a response via [facility]? Call back   Best Call Back Number:[phone]  Additional Information: n/a

## 2022-04-25 ENCOUNTER — DOCUMENTATION ONLY (OUTPATIENT)
Dept: REHABILITATION | Facility: HOSPITAL | Age: 62
End: 2022-04-25
Payer: MEDICARE

## 2022-04-25 PROBLEM — M54.50 PAIN IN LOWER BACK: Status: RESOLVED | Noted: 2021-07-28 | Resolved: 2022-04-25

## 2022-04-25 PROBLEM — M53.86 DECREASED ROM OF LUMBAR SPINE: Status: RESOLVED | Noted: 2021-07-28 | Resolved: 2022-04-25

## 2022-04-25 PROBLEM — M62.830 MUSCLE SPASM OF BACK: Status: RESOLVED | Noted: 2021-07-28 | Resolved: 2022-04-25

## 2022-04-25 PROBLEM — R26.2 DIFFICULTY WALKING: Status: RESOLVED | Noted: 2021-07-28 | Resolved: 2022-04-25

## 2022-04-25 NOTE — PROGRESS NOTES
[facility]  PT Discharge Note    Name: Debbie Vo  Clinic Number: [id number]    Therapy Diagnosis:        Encounter Diagnoses   Name Primary?    Decreased ROM of lumbar spine      Chronic bilateral low back pain without sciatica      Muscle spasm of back      Difficulty walking        Physician: Lucia Carroll MD     Visit Date: 8/10/2021     Physician Orders: PT Eval and Treat   Medical Diagnosis from Referral: M54.5,G89.29 (ICD-10-CM) - Chronic bilateral low back pain without sciatica   M53.3 (ICD-10-CM) - Sacroiliac joint dysfunction, M43.16 (ICD-10-CM) - Spondylolisthesis of lumbar region      Evaluation Date: 7/28/2021      Date of Last visit: 8/10/2021  Total Visits Received: 3    ASSESSMENT      Extension sensitivity due to stenosis/spondylolisthesis. Limited in knee ROM due to OA limiting function ie sit to stands and ability to get in hook lying to help promote posterior pelvic tilt        Discharge reason: Patient has not attended therapy since 8/10/2021    Discharge FOTO Score: 57    Goals:  Short Term Goals: 5 weeks   1. Pt will be independent with HEP performance in order to continue PT progress at home.   2. Pt will report pain at worst of 5/10 or less in low back   3. Pt will report ability to stand and walk for 10 to 15 minutes  4. Pt will improve LE strength by 1/2 grade throughout     Long Term Goals: 10 weeks   1. Pt will improve FOTO disability score to 50% disability or less in order to improve overall QOL and return to PLOF.   2. Pt will report pain at worst of 3/10 or less in low back  3. Pt will report ability to stand and walk for > 30 to 40 minutes  4. Pt will improve LE strength to 4/5 or greater   5. Pt to demonstrate independence in HEP and ability to perform posterior pelvic tilt in standing to be able to reduce Symptoms      PLAN   This patient is discharged from Physical Therapy      Jackie Logan, PT

## 2022-06-14 ENCOUNTER — PES CALL (OUTPATIENT)
Dept: ADMINISTRATIVE | Facility: CLINIC | Age: 62
End: 2022-06-14
Payer: MEDICARE

## 2022-07-02 ENCOUNTER — HOSPITAL ENCOUNTER (EMERGENCY)
Facility: HOSPITAL | Age: 62
Discharge: HOME | End: 2022-07-02
Attending: FAMILY MEDICINE
Payer: MEDICARE

## 2022-07-02 VITALS
WEIGHT: 280 LBS | HEIGHT: 68 IN | SYSTOLIC BLOOD PRESSURE: 152 MMHG | DIASTOLIC BLOOD PRESSURE: 73 MMHG | RESPIRATION RATE: 20 BRPM | HEART RATE: 76 BPM | TEMPERATURE: 98 F | BODY MASS INDEX: 42.44 KG/M2 | OXYGEN SATURATION: 98 %

## 2022-07-02 DIAGNOSIS — M25.569 KNEE PAIN: ICD-10-CM

## 2022-07-02 DIAGNOSIS — M25.561 ACUTE PAIN OF RIGHT KNEE: Primary | ICD-10-CM

## 2022-07-02 PROCEDURE — 96372 THER/PROPH/DIAG INJ SC/IM: CPT | Performed by: PHYSICIAN ASSISTANT

## 2022-07-02 PROCEDURE — 99284 EMERGENCY DEPT VISIT MOD MDM: CPT | Mod: 25,ER

## 2022-07-02 PROCEDURE — 63600175 PHARM REV CODE 636 W HCPCS: Mod: ER | Performed by: PHYSICIAN ASSISTANT

## 2022-07-02 PROCEDURE — 25000003 PHARM REV CODE 250: Mod: ER | Performed by: PHYSICIAN ASSISTANT

## 2022-07-02 RX ORDER — TRAMADOL HYDROCHLORIDE 50 MG/1
50 TABLET ORAL
Status: COMPLETED | OUTPATIENT
Start: 2022-07-02 | End: 2022-07-02

## 2022-07-02 RX ORDER — DEXAMETHASONE SODIUM PHOSPHATE 4 MG/ML
8 INJECTION, SOLUTION INTRA-ARTICULAR; INTRALESIONAL; INTRAMUSCULAR; INTRAVENOUS; SOFT TISSUE
Status: COMPLETED | OUTPATIENT
Start: 2022-07-02 | End: 2022-07-02

## 2022-07-02 RX ADMIN — TRAMADOL HYDROCHLORIDE 50 MG: 50 TABLET, COATED ORAL at 11:07

## 2022-07-02 RX ADMIN — DEXAMETHASONE SODIUM PHOSPHATE 8 MG: 4 INJECTION, SOLUTION INTRA-ARTICULAR; INTRALESIONAL; INTRAMUSCULAR; INTRAVENOUS; SOFT TISSUE at 01:07

## 2022-07-02 NOTE — ED PROVIDER NOTES
"Encounter Date: 7/2/2022       History     Chief Complaint   Patient presents with    Knee Pain     PT states "I twisted my knee, I already need a knee replacement" PT states she was trying to sat on the commode but it was too low for her. Pt denies fall states her knee twisted as she was sitting.      62-year-old female presents emergency department for evaluation of acute exacerbation of chronic right knee pain.  She reports that she has a history of arthritis and need for knee replacement on the right knee for several years.  She states that yesterday evening when attempting to go to the restroom she twisted her knee and had acute exacerbation of the pain.  She denies falling down, hitting her head or losing consciousness.  She reports that she usually uses her cane and is continuing to use at this morning.  She denies any radiation of pain to her hip or low back.  She denies any numbness, tingling, weakness or swelling to the lower legs bilaterally.  She denies any direct trauma to the knee.  She reports that she attempted treatment with Aleve last night with only minimal relief of symptoms.  No treatment was attempted today prior to arrival.        Review of patient's allergies indicates:  No Known Allergies  Past Medical History:   Diagnosis Date    Benign essential hypertension     Colon cancer     Osteoarthritis      Past Surgical History:   Procedure Laterality Date    COLON SURGERY  2002    COLONOSCOPY N/A 3/24/2016    Procedure: COLONOSCOPY;  Surgeon: Ernst Bobby MD;  Location: [facility] ENDO;  Service: Endoscopy;  Laterality: N/A;  Needs Flex sigmoidoscopy    EPIDURAL STEROID INJECTION INTO LUMBAR SPINE N/A 6/21/2018    Procedure: Injection-steroid-epidural-lumbar;  Surgeon: Avi Morales Jr., MD;  Location: [facility] PAIN MGT;  Service: Pain Management;  Laterality: N/A;  ORAL SEDATION    HYSTERECTOMY      INJECTION OF ANESTHETIC AGENT INTO SACROILIAC JOINT Bilateral 8/25/2021    " Procedure: BLOCK, SACROILIAC JOINT*-- bilateral;  Surgeon: Lucia Carroll MD;  Location: [facility] PAIN MGT;  Service: Pain Management;  Laterality: Bilateral;    PARTIAL HYSTERECTOMY       Family History   Problem Relation Age of Onset    Cancer Mother     Kidney disease Father     BRCA 1/2 Sister     Breast cancer Sister      Social History     Tobacco Use    Smoking status: Never Smoker    Smokeless tobacco: Never Used   Substance Use Topics    Alcohol use: No    Drug use: No     Review of Systems   Constitutional: Negative for activity change, appetite change and fever.   HENT: Negative for congestion, rhinorrhea, sinus pressure, sore throat, trouble swallowing and voice change.    Eyes: Negative for photophobia, redness and visual disturbance.   Respiratory: Negative for chest tightness, shortness of breath and wheezing.    Cardiovascular: Negative for chest pain.   Gastrointestinal: Negative for abdominal pain, constipation, diarrhea, nausea and vomiting.   Musculoskeletal: Positive for arthralgias. Negative for back pain, joint swelling and neck pain.   Skin: Negative for rash.   Neurological: Negative for dizziness, syncope, light-headedness, numbness and headaches.       Physical Exam     Initial Vitals [07/02/22 1127]   BP Pulse Resp Temp SpO2   (!) 171/90 82 20 98.1 °F (36.7 °C) 99 %      MAP       --         Physical Exam    Nursing note and vitals reviewed.  Constitutional: She appears well-developed and well-nourished. She is not diaphoretic. No distress.   HENT:   Head: Normocephalic and atraumatic.   Right Ear: External ear normal.   Left Ear: External ear normal.   Nose: Nose normal.   Mouth/Throat: Oropharynx is clear and moist.   Eyes: Conjunctivae are normal.   Neck: Neck supple.   Normal range of motion.  Cardiovascular: Normal rate, regular rhythm and normal heart sounds.   Pulmonary/Chest: Breath sounds normal. No respiratory distress. She has no wheezes. She has no rhonchi. She has no  rales. She exhibits no tenderness.   Musculoskeletal:      Cervical back: Normal range of motion and neck supple. No tenderness or bony tenderness.      Thoracic back: No tenderness or bony tenderness.      Lumbar back: No tenderness or bony tenderness.      Right hip: No tenderness or bony tenderness.      Right upper leg: No tenderness or bony tenderness.      Right knee: Bony tenderness present. Normal range of motion. Tenderness present over the lateral joint line.      Right lower leg: No swelling, tenderness or bony tenderness.      Right ankle: No swelling. No tenderness.      Right Achilles Tendon: No tenderness.      Right foot: Normal range of motion. No swelling, tenderness or bony tenderness.     Neurological: She is alert and oriented to person, place, and time.   Skin: Skin is warm and dry.   Psychiatric: She has a normal mood and affect.         ED Course   Procedures  Labs Reviewed - No data to display       Imaging Results          X-Ray Knee 1 or 2 View Right (Final result)  Result time 07/02/22 12:22:18    Final result by Win Irizarry MD (07/02/22 12:22:18)                 Impression:      Overall stable advanced arthrosis of the knee, as above.  No acute abnormality.      Electronically signed by: Win Irizarry  Date:    07/02/2022  Time:    12:22             Narrative:    EXAMINATION:  XR KNEE 1 OR 2 VIEW RIGHT    CLINICAL HISTORY:  Pain in unspecified knee    TECHNIQUE:  AP and lateral views of the right knee were performed.    COMPARISON:  Right knee radiograph 03/05/2017    FINDINGS:  Severe joint space narrowing at the lateral compartment.  Moderate medial compartment joint space narrowing.  Moderate marginal osteophytosis.  Severe patellofemoral joint space narrowing and marginal osteophytosis.  Prominent intra-articular body at the anterior joint line.  No significant effusion.  No acute fracture or dislocation.  No destructive appearing osseous lesion.                                  Medications   traMADoL tablet 50 mg (50 mg Oral Given 7/2/22 8347)     Medical Decision Making:   Initial Assessment:   62-year-old female presents emergency department for evaluation left knee pain status post twisting injury.  Physical exam reveals a nontoxic-appearing female in no acute distress.  Patient is afebrile vital signs within normal limits.  Neurological exam reveals an alert and oriented patient.  Neck is supple, nontender to palpation.  Lungs clear to auscultation bilaterally.  Examination of the right lower extremity reveals tenderness to palpation noted over the lateral joint line of the right knee.  No erythema, edema, ecchymosis or laxity noted.  Full range of motion, sensation and peripheral pulses intact in lower extremities bilaterally.  Differential Diagnosis:   X-ray ordered to assess possible osseous injury including fracture dislocation  Arthralgia  Knee strain  ED Management:  X-ray report reveals overall stable advanced arthrosis of the knee.  No acute abnormality noted.  Discussed these findings at length patient verbalizes understanding and agreement course of treatment.  Instructed patient to follow up with her orthopedist for re-evaluation and to return to the emergency department immediately for any new or worsening symptoms.                      Clinical Impression:   Final diagnoses:  [M25.569] Knee pain  [M25.561] Acute pain of right knee (Primary)          ED Disposition Condition    Discharge Stable        ED Prescriptions     None        Follow-up Information     Follow up With Specialties Details Why Contact Info    Sergio Pitt MD Internal Medicine In 3 days  [address] [place] [id number] [phone]             Elena Macias PA-C  07/02/22 3416

## 2022-07-02 NOTE — DISCHARGE INSTRUCTIONS
You are instructed to follow-up with your primary care provider and orthopedist for re-evaluation within 7 days.  You are instructed to return to the emergency department immediately for any new or worsening symptoms.

## 2022-07-07 ENCOUNTER — TELEPHONE (OUTPATIENT)
Dept: ADMINISTRATIVE | Facility: CLINIC | Age: 62
End: 2022-07-07
Payer: MEDICARE

## 2022-07-07 NOTE — TELEPHONE ENCOUNTER
Called pt, informed pt I was calling to remind pt of her in office EAWV on 7/8/22; clinic location provided to patient; pt confirmed appointment

## 2022-07-08 ENCOUNTER — OFFICE VISIT (OUTPATIENT)
Dept: INTERNAL MEDICINE | Facility: CLINIC | Age: 62
End: 2022-07-08
Payer: MEDICARE

## 2022-07-08 VITALS
HEART RATE: 99 BPM | DIASTOLIC BLOOD PRESSURE: 84 MMHG | BODY MASS INDEX: 43.93 KG/M2 | OXYGEN SATURATION: 98 % | SYSTOLIC BLOOD PRESSURE: 130 MMHG | HEIGHT: 68 IN | WEIGHT: 289.88 LBS

## 2022-07-08 DIAGNOSIS — D69.2 SENILE PURPURA: ICD-10-CM

## 2022-07-08 DIAGNOSIS — I10 ESSENTIAL HYPERTENSION: ICD-10-CM

## 2022-07-08 DIAGNOSIS — M54.16 LUMBAR RADICULOPATHY, CHRONIC: ICD-10-CM

## 2022-07-08 DIAGNOSIS — Z00.00 ENCOUNTER FOR PREVENTIVE HEALTH EXAMINATION: Primary | ICD-10-CM

## 2022-07-08 DIAGNOSIS — M79.18 MYOFASCIAL PAIN SYNDROME: ICD-10-CM

## 2022-07-08 DIAGNOSIS — Z85.038 HISTORY OF COLON CANCER: ICD-10-CM

## 2022-07-08 DIAGNOSIS — K43.9 ABDOMINAL WALL HERNIA: Chronic | ICD-10-CM

## 2022-07-08 DIAGNOSIS — Z13.6 ENCOUNTER FOR LIPID SCREENING FOR CARDIOVASCULAR DISEASE: ICD-10-CM

## 2022-07-08 DIAGNOSIS — M46.1 SACROILIITIS: ICD-10-CM

## 2022-07-08 DIAGNOSIS — K21.9 GASTROESOPHAGEAL REFLUX DISEASE, UNSPECIFIED WHETHER ESOPHAGITIS PRESENT: ICD-10-CM

## 2022-07-08 DIAGNOSIS — Z12.31 ENCOUNTER FOR SCREENING MAMMOGRAM FOR BREAST CANCER: ICD-10-CM

## 2022-07-08 DIAGNOSIS — G56.02 CARPAL TUNNEL SYNDROME OF LEFT WRIST: ICD-10-CM

## 2022-07-08 DIAGNOSIS — Z13.220 ENCOUNTER FOR LIPID SCREENING FOR CARDIOVASCULAR DISEASE: ICD-10-CM

## 2022-07-08 DIAGNOSIS — E66.01 MORBID OBESITY WITH BMI OF 40.0-44.9, ADULT: ICD-10-CM

## 2022-07-08 DIAGNOSIS — F33.42 RECURRENT MAJOR DEPRESSIVE DISORDER, IN FULL REMISSION: ICD-10-CM

## 2022-07-08 PROCEDURE — 3075F SYST BP GE 130 - 139MM HG: CPT | Mod: CPTII,S$GLB,, | Performed by: NURSE PRACTITIONER

## 2022-07-08 PROCEDURE — 3079F DIAST BP 80-89 MM HG: CPT | Mod: CPTII,S$GLB,, | Performed by: NURSE PRACTITIONER

## 2022-07-08 PROCEDURE — 99999 PR PBB SHADOW E&M-EST. PATIENT-LVL V: CPT | Mod: PBBFAC,,, | Performed by: NURSE PRACTITIONER

## 2022-07-08 PROCEDURE — 99999 PR PBB SHADOW E&M-EST. PATIENT-LVL V: ICD-10-PCS | Mod: PBBFAC,,, | Performed by: NURSE PRACTITIONER

## 2022-07-08 PROCEDURE — 1160F RVW MEDS BY RX/DR IN RCRD: CPT | Mod: CPTII,S$GLB,, | Performed by: NURSE PRACTITIONER

## 2022-07-08 PROCEDURE — 4010F PR ACE/ARB THEARPY RXD/TAKEN: ICD-10-PCS | Mod: CPTII,S$GLB,, | Performed by: NURSE PRACTITIONER

## 2022-07-08 PROCEDURE — 3079F PR MOST RECENT DIASTOLIC BLOOD PRESSURE 80-89 MM HG: ICD-10-PCS | Mod: CPTII,S$GLB,, | Performed by: NURSE PRACTITIONER

## 2022-07-08 PROCEDURE — 4010F ACE/ARB THERAPY RXD/TAKEN: CPT | Mod: CPTII,S$GLB,, | Performed by: NURSE PRACTITIONER

## 2022-07-08 PROCEDURE — 3075F PR MOST RECENT SYSTOLIC BLOOD PRESS GE 130-139MM HG: ICD-10-PCS | Mod: CPTII,S$GLB,, | Performed by: NURSE PRACTITIONER

## 2022-07-08 PROCEDURE — 1159F PR MEDICATION LIST DOCUMENTED IN MEDICAL RECORD: ICD-10-PCS | Mod: CPTII,S$GLB,, | Performed by: NURSE PRACTITIONER

## 2022-07-08 PROCEDURE — 3008F PR BODY MASS INDEX (BMI) DOCUMENTED: ICD-10-PCS | Mod: CPTII,S$GLB,, | Performed by: NURSE PRACTITIONER

## 2022-07-08 PROCEDURE — G0439 PPPS, SUBSEQ VISIT: HCPCS | Mod: S$GLB,,, | Performed by: NURSE PRACTITIONER

## 2022-07-08 PROCEDURE — 3008F BODY MASS INDEX DOCD: CPT | Mod: CPTII,S$GLB,, | Performed by: NURSE PRACTITIONER

## 2022-07-08 PROCEDURE — 1159F MED LIST DOCD IN RCRD: CPT | Mod: CPTII,S$GLB,, | Performed by: NURSE PRACTITIONER

## 2022-07-08 PROCEDURE — 1160F PR REVIEW ALL MEDS BY PRESCRIBER/CLIN PHARMACIST DOCUMENTED: ICD-10-PCS | Mod: CPTII,S$GLB,, | Performed by: NURSE PRACTITIONER

## 2022-07-08 PROCEDURE — 99499 RISK ADDL DX/OHS AUDIT: ICD-10-PCS | Mod: S$GLB,,, | Performed by: NURSE PRACTITIONER

## 2022-07-08 PROCEDURE — G0439 PR MEDICARE ANNUAL WELLNESS SUBSEQUENT VISIT: ICD-10-PCS | Mod: S$GLB,,, | Performed by: NURSE PRACTITIONER

## 2022-07-08 PROCEDURE — 99499 UNLISTED E&M SERVICE: CPT | Mod: S$GLB,,, | Performed by: NURSE PRACTITIONER

## 2022-07-08 NOTE — PROGRESS NOTES
"  Debbie Vo presented for a  Medicare AWV and comprehensive Health Risk Assessment today. The following components were reviewed and updated:    · Medical history  · Family History  · Social history  · Allergies and Current Medications  · Health Risk Assessment  · Health Maintenance  · Care Team         ** See Completed Assessments for Annual Wellness Visit within the encounter summary.**         The following assessments were completed:  · Living Situation  · CAGE  · Depression Screening  · Timed Get Up and Go  · Whisper Test  · Cognitive Function Screening  · Nutrition Screening  · ADL Screening  · PAQ Screening        Vitals:    07/08/22 1505   BP: 130/84   BP Location: Right arm   Patient Position: Sitting   BP Method: Medium (Manual)   Pulse: 99   SpO2: 98%   Weight: 131.5 kg (289 lb 14.5 oz)   Height: 5' 8" (1.727 m)     Body mass index is 44.08 kg/m².  Physical Exam  Vitals and nursing note reviewed.   Constitutional:       General: She is not in acute distress.     Appearance: She is well-developed. She is morbidly obese. She is not ill-appearing.   HENT:      Head: Normocephalic and atraumatic.   Eyes:      Pupils: Pupils are equal, round, and reactive to light.   Cardiovascular:      Rate and Rhythm: Normal rate and regular rhythm.      Heart sounds: Normal heart sounds.   Pulmonary:      Effort: Pulmonary effort is normal. No respiratory distress.      Breath sounds: Normal breath sounds.   Musculoskeletal:      Cervical back: Normal range of motion.   Skin:     General: Skin is warm and dry.   Neurological:      Mental Status: She is alert and oriented to person, place, and time.      Coordination: Coordination normal.   Psychiatric:         Mood and Affect: Mood normal.         Behavior: Behavior normal.         Thought Content: Thought content normal.         Judgment: Judgment normal.           Diagnoses and health risks identified today and associated recommendations/orders:    1. Encounter " for preventive health examination    2. Morbid obesity with BMI of 40.0-44.9, adult  Current BMI 44.08. Lifestyle modifications discussed with patient.     3. Senile purpura  Chronic; stable. Continue current treatment plan as previously prescribed by PCP.     4. Recurrent major depressive disorder, in full remission  Chronic; stable. Continue current treatment plan as previously prescribed by PCP.     5. Sacroiliitis  Chronic; stable. Continue current treatment plan as previously prescribed by PCP.    6. Myofascial pain syndrome  Chronic; stable. Continue current treatment plan as previously prescribed by PCP.    7. Lumbar radiculopathy, chronic  Chronic; stable. Continue current treatment plan as previously prescribed by PCP.    8. Carpal tunnel syndrome of left wrist  Chronic; stable. Continue current treatment plan as previously prescribed by PCP.    9. Essential hypertension  Chronic; stable. Continue current treatment plan as previously prescribed by PCP.    10. Gastroesophageal reflux disease, unspecified whether esophagitis present  Chronic; stable. Continue current treatment plan as previously prescribed by PCP.    11. Abdominal wall hernia  Chronic; stable. Continue current treatment plan as previously prescribed by PCP.    12. History of colon cancer  Chronic; stable. Continue current treatment plan as previously prescribed by PCP.    13. Encounter for lipid screening for cardiovascular disease  - Lipid Panel; Future    14. Encounter for screening mammogram for breast cancer  - Mammo Digital Screening Bilat w/ Tahir; Future      Provided Devaki with a 5-10 year written screening schedule and personal prevention plan. Recommendations were developed using the USPSTF age appropriate recommendations. Education, counseling, and referrals were provided as needed. After Visit Summary printed and given to patient which includes a list of additional screenings\tests needed.    Follow up for AWV in 1  year.    Y'Teresa Almendarez NP  I offered to discuss advanced care planning, including how to pick a person who would make decisions for you if you were unable to make them for yourself, called a health care power of , and what kind of decisions you might make such as use of life sustaining treatments such as ventilators and tube feeding when faced with a life limiting illness recorded on a living will that they will need to know. (How you want to be cared for as you near the end of your natural life)     X Patient is interested in learning more about how to make advanced directives.  I provided them paperwork and offered to discuss this with them.

## 2022-07-08 NOTE — PATIENT INSTRUCTIONS
Counseling and Referral of Other Preventative  (Italic type indicates deductible and co-insurance are waived)    Patient Name: Debbie Vo  Today's Date: 7/8/2022    Health Maintenance         Date Due Completion Date    HIV Screening Never done ---    Shingles Vaccine (2 of 2) 12/03/2019 10/8/2019 (Done)    Override on 10/8/2019: Done    Lipid Panel 08/06/2021 8/6/2016    COVID-19 Vaccine (3 - Booster for Moderna series) 09/12/2021 4/12/2021    Mammogram 07/09/2022 7/9/2021    Override on 3/1/2018: Done ([facility])    Influenza Vaccine (1) 09/01/2022 10/30/2020    Colorectal Cancer Screening 03/24/2026 3/24/2016    TETANUS VACCINE 09/11/2027 9/11/2017 (Done)    Override on 9/11/2017: Done          Orders Placed This Encounter   Procedures    Mammo Digital Screening Bilat w/ Tahir    Lipid Panel     The following information is provided to all patients.  This information is to help you find resources for any of the problems found today that may be affecting your health:                Living healthy guide: www.[facility].[place].gov      Understanding Diabetes: www.diabetes.org      Eating healthy: www.cdc.gov/healthyweight      CDC home safety checklist: www.cdc.gov/steadi/patient.html      Agency on Aging: www.goea.[place].[facility]      Alcoholics anonymous (AA): www.aa.org      Physical Activity: www.rhonda.nih.gov/fe4mfqu      Tobacco use: www.quitwithusla.org

## 2022-07-08 NOTE — Clinical Note
Primary Care Providers: Sergio Pitt MD, MD (General)  Your patient was seen today for a HRA visit. Gap(s) in care (HEDIS gaps) have been identified during this visit that require additional testing and possible follow up.  Orders Placed This Encounter     Mammo Digital Screening Bilat w/ Tahir         Standing Status: Future         Standing Expiration Date: 7/8/2024         Order Specific Question: May the Radiologist modify the order per protocol to meet the clinical needs of the patient?         Answer: Yes         Order Specific Question: Release to patient         Answer: Immediate     Lipid Panel  These orders were placed using [facility] approved protocol and any results will be forwarded to your office for appropriate follow up. I have included a copy of my visit note; please review the note and feel free to contact me with any questions.   Thank you for allowing me to participate in the care of your patients. Margaux Almendarez NP

## 2022-07-18 ENCOUNTER — OFFICE VISIT (OUTPATIENT)
Dept: ORTHOPEDICS | Facility: CLINIC | Age: 62
End: 2022-07-18
Payer: MEDICARE

## 2022-07-18 VITALS
DIASTOLIC BLOOD PRESSURE: 90 MMHG | WEIGHT: 293 LBS | SYSTOLIC BLOOD PRESSURE: 141 MMHG | HEIGHT: 68 IN | BODY MASS INDEX: 44.41 KG/M2 | HEART RATE: 99 BPM

## 2022-07-18 DIAGNOSIS — M17.11 PRIMARY OSTEOARTHRITIS OF RIGHT KNEE: Primary | ICD-10-CM

## 2022-07-18 DIAGNOSIS — M25.561 ACUTE PAIN OF RIGHT KNEE: ICD-10-CM

## 2022-07-18 PROCEDURE — 3080F DIAST BP >= 90 MM HG: CPT | Mod: CPTII,S$GLB,, | Performed by: PHYSICIAN ASSISTANT

## 2022-07-18 PROCEDURE — 1159F PR MEDICATION LIST DOCUMENTED IN MEDICAL RECORD: ICD-10-PCS | Mod: CPTII,S$GLB,, | Performed by: PHYSICIAN ASSISTANT

## 2022-07-18 PROCEDURE — 4010F ACE/ARB THERAPY RXD/TAKEN: CPT | Mod: CPTII,S$GLB,, | Performed by: PHYSICIAN ASSISTANT

## 2022-07-18 PROCEDURE — 99999 PR PBB SHADOW E&M-EST. PATIENT-LVL IV: ICD-10-PCS | Mod: PBBFAC,,, | Performed by: PHYSICIAN ASSISTANT

## 2022-07-18 PROCEDURE — 1160F PR REVIEW ALL MEDS BY PRESCRIBER/CLIN PHARMACIST DOCUMENTED: ICD-10-PCS | Mod: CPTII,S$GLB,, | Performed by: PHYSICIAN ASSISTANT

## 2022-07-18 PROCEDURE — 99999 PR PBB SHADOW E&M-EST. PATIENT-LVL IV: CPT | Mod: PBBFAC,,, | Performed by: PHYSICIAN ASSISTANT

## 2022-07-18 PROCEDURE — 3008F PR BODY MASS INDEX (BMI) DOCUMENTED: ICD-10-PCS | Mod: CPTII,S$GLB,, | Performed by: PHYSICIAN ASSISTANT

## 2022-07-18 PROCEDURE — 20610 DRAIN/INJ JOINT/BURSA W/O US: CPT | Mod: RT,S$GLB,, | Performed by: PHYSICIAN ASSISTANT

## 2022-07-18 PROCEDURE — 99213 OFFICE O/P EST LOW 20 MIN: CPT | Mod: 25,S$GLB,, | Performed by: PHYSICIAN ASSISTANT

## 2022-07-18 PROCEDURE — 3080F PR MOST RECENT DIASTOLIC BLOOD PRESSURE >= 90 MM HG: ICD-10-PCS | Mod: CPTII,S$GLB,, | Performed by: PHYSICIAN ASSISTANT

## 2022-07-18 PROCEDURE — 3077F PR MOST RECENT SYSTOLIC BLOOD PRESSURE >= 140 MM HG: ICD-10-PCS | Mod: CPTII,S$GLB,, | Performed by: PHYSICIAN ASSISTANT

## 2022-07-18 PROCEDURE — 99213 PR OFFICE/OUTPT VISIT, EST, LEVL III, 20-29 MIN: ICD-10-PCS | Mod: 25,S$GLB,, | Performed by: PHYSICIAN ASSISTANT

## 2022-07-18 PROCEDURE — 1159F MED LIST DOCD IN RCRD: CPT | Mod: CPTII,S$GLB,, | Performed by: PHYSICIAN ASSISTANT

## 2022-07-18 PROCEDURE — 20610 LARGE JOINT ASPIRATION/INJECTION: R KNEE: ICD-10-PCS | Mod: RT,S$GLB,, | Performed by: PHYSICIAN ASSISTANT

## 2022-07-18 PROCEDURE — 1160F RVW MEDS BY RX/DR IN RCRD: CPT | Mod: CPTII,S$GLB,, | Performed by: PHYSICIAN ASSISTANT

## 2022-07-18 PROCEDURE — 4010F PR ACE/ARB THEARPY RXD/TAKEN: ICD-10-PCS | Mod: CPTII,S$GLB,, | Performed by: PHYSICIAN ASSISTANT

## 2022-07-18 PROCEDURE — 3008F BODY MASS INDEX DOCD: CPT | Mod: CPTII,S$GLB,, | Performed by: PHYSICIAN ASSISTANT

## 2022-07-18 PROCEDURE — 3077F SYST BP >= 140 MM HG: CPT | Mod: CPTII,S$GLB,, | Performed by: PHYSICIAN ASSISTANT

## 2022-07-18 RX ORDER — TRIAMCINOLONE ACETONIDE 40 MG/ML
40 INJECTION, SUSPENSION INTRA-ARTICULAR; INTRAMUSCULAR
Status: DISCONTINUED | OUTPATIENT
Start: 2022-07-18 | End: 2022-07-18 | Stop reason: HOSPADM

## 2022-07-18 RX ADMIN — TRIAMCINOLONE ACETONIDE 40 MG: 40 INJECTION, SUSPENSION INTRA-ARTICULAR; INTRAMUSCULAR at 10:07

## 2022-07-18 NOTE — PROCEDURES
Large Joint Aspiration/Injection: R knee    Date/Time: 7/18/2022 10:15 AM  Performed by: Arely Gale PA-C  Authorized by: Arely Gale PA-C     Consent Done?:  Yes (Verbal)  Indications:  Pain  Site marked: the procedure site was marked    Timeout: prior to procedure the correct patient, procedure, and site was verified    Prep: patient was prepped and draped in usual sterile fashion    Local anesthesia used?: No    Local anesthetic:  Topical anesthetic and lidocaine 1% without epinephrine    Details:  Needle Size:  22 G  Ultrasonic Guidance for needle placement?: No    Approach:  Anterolateral  Location:  Knee  Site:  R knee  Medications:  40 mg triamcinolone acetonide 40 mg/mL  Patient tolerance:  Patient tolerated the procedure well with no immediate complications

## 2022-07-18 NOTE — PROGRESS NOTES
Subjective:      Patient ID: Debbie Vo is a 62 y.o. female.    Chief Complaint: Pain of the Right Knee      62-year-old morbidly obese female presents with 3 week history of worsening right knee pain.  She states she has chronic right knee pain but twisted her right knee when sitting on the toilet 3 weeks ago.  Her pain is laterally.  Worse with bending and walking.  She notes swelling and stiffness.  She was previously seen [facility] who was giving her injections.  She has not been there recently.  She has tried physical therapy in the past without relief.  She currently takes diclofenac which helps.      Review of Systems   Constitutional: Negative for chills and fever.   Cardiovascular: Negative for chest pain.   Respiratory: Negative for cough.    Hematologic/Lymphatic: Does not bruise/bleed easily.   Skin: Negative for poor wound healing and rash.   Musculoskeletal: Positive for arthritis, joint pain, joint swelling, muscle weakness, myalgias and stiffness.   Gastrointestinal: Negative for abdominal pain.   Genitourinary: Negative for bladder incontinence.   Neurological: Negative for dizziness, loss of balance and weakness.   Psychiatric/Behavioral: Negative for altered mental status.       Review of patient's allergies indicates:  No Known Allergies     Current Outpatient Medications   Medication Sig Dispense Refill    amLODIPine (NORVASC) 5 MG tablet Take 1 tablet (5 mg total) by mouth once daily. 90 tablet 4    ammonium lactate (LAC-HYDRIN) 12 % lotion Apply to damp skin after bathing 225 g 11    benzonatate (TESSALON) 200 MG capsule take 1 BY MOUTH THREE TIMES DAILY AS NEEDED FOR COUGH 30 capsule 4    diclofenac (VOLTAREN) 75 MG EC tablet Take 1 tablet (75 mg total) by mouth 2 (two) times daily. 60 tablet 3    fluticasone propionate (FLONASE) 50 mcg/actuation nasal spray 1 spray (50 mcg total) by Each Nostril route once daily. 10 mL 5    gabapentin (NEURONTIN) 300 MG  "capsule Take 1 capsule (300 mg total) by mouth 3 (three) times daily as needed (pain). 270 capsule 3    loratadine (CLARITIN) 10 mg tablet Take 1 tablet (10 mg total) by mouth once daily. 90 tablet 3    meclizine (ANTIVERT) 25 mg tablet Take 1 tablet (25 mg total) by mouth 3 (three) times daily as needed. 50 tablet 2    metoprolol succinate (TOPROL-XL) 100 MG 24 hr tablet Take 1 tablet (100 mg total) by mouth once daily. 90 tablet 4    neomycin-polymyxin-hydrocortisone (CORTISPORIN) 3.5-10,000-0.5 mg/g-unit/g-% cream Apply topically 2 (two) times daily. 10 g 2    neomycin-polymyxin-hydrocortisone (CORTISPORIN) 3.5-10,000-1 mg/mL-unit/mL-% otic suspension Place 3 drops into both ears 3 (three) times daily. 10 mL 0    triamcinolone acetonide 0.1% (KENALOG) 0.1 % cream Apply topically 2 (two) times daily as needed (dryness or itching). 80 g 3    valsartan (DIOVAN) 320 MG tablet Take 1 tablet (320 mg total) by mouth once daily. 90 tablet 3    FLUoxetine 20 MG capsule Take 1 capsule (20 mg total) by mouth once daily. 90 capsule 4    omeprazole (PRILOSEC) 20 MG capsule Take 1 capsule (20 mg total) by mouth once daily. 90 capsule 3    topiramate (TOPAMAX) 50 MG tablet Take 1 tablet (50 mg total) by mouth every evening. 90 tablet 4     No current facility-administered medications for this visit.        The patient's relevant past medical, surgical, and social history was reviewed in Epic.       Objective:      VITAL SIGNS: BP (!) 141/90 (BP Location: Left arm, Patient Position: Sitting, BP Method: Large (Automatic))   Pulse 99   Ht 5' 8" (1.727 m)   Wt 133.4 kg (294 lb 3.3 oz)   BMI 44.73 kg/m²     General    Nursing note and vitals reviewed.  Constitutional: She is oriented to person, place, and time. She appears well-developed.   obese   Neurological: She is alert and oriented to person, place, and time. She has normal reflexes.     General Musculoskeletal Exam   Gait: antalgic       Right Knee Exam "     Inspection   Swelling: present    Tenderness   The patient is tender to palpation of the lateral joint line.    Range of Motion   Extension: 10   Flexion: 80     Comments:  Valgus deformity     Muscle Strength   Right Lower Extremity   Quadriceps:  4/5   Left Lower Extremity   Quadriceps:  4/5            Assessment:       1. Primary osteoarthritis of right knee    2. Acute pain of right knee          Plan:         Debbie was seen today for pain.    Diagnoses and all orders for this visit:    Primary osteoarthritis of right knee  -     Large Joint Aspiration/Injection: R knee    Acute pain of right knee  -     Ambulatory referral/consult to Orthopedics  -     Large Joint Aspiration/Injection: R knee      Right knee bone-on-bone osteoarthritis with valgus alignment.  Discussed the natural history of arthritis to the patient.  I will repeat a right knee Kenalog injection shins she has had relief with this in the past.  I explained to the patient eventually she may need a total knee replacement.  She would have to lose weight for this.  Follow-up at any time.        Arely Gale PA-C   07/18/2022

## 2022-09-14 DIAGNOSIS — I10 ESSENTIAL HYPERTENSION: ICD-10-CM

## 2022-09-28 ENCOUNTER — OFFICE VISIT (OUTPATIENT)
Dept: ORTHOPEDICS | Facility: CLINIC | Age: 62
End: 2022-09-28
Payer: MEDICARE

## 2022-09-28 VITALS
DIASTOLIC BLOOD PRESSURE: 95 MMHG | WEIGHT: 288.69 LBS | HEART RATE: 78 BPM | HEIGHT: 68 IN | BODY MASS INDEX: 43.75 KG/M2 | SYSTOLIC BLOOD PRESSURE: 152 MMHG

## 2022-09-28 DIAGNOSIS — M17.11 PRIMARY OSTEOARTHRITIS OF RIGHT KNEE: Primary | ICD-10-CM

## 2022-09-28 PROCEDURE — 99499 NO LOS: ICD-10-PCS | Mod: S$GLB,,, | Performed by: PHYSICIAN ASSISTANT

## 2022-09-28 PROCEDURE — 3080F PR MOST RECENT DIASTOLIC BLOOD PRESSURE >= 90 MM HG: ICD-10-PCS | Mod: CPTII,S$GLB,, | Performed by: PHYSICIAN ASSISTANT

## 2022-09-28 PROCEDURE — 3008F PR BODY MASS INDEX (BMI) DOCUMENTED: ICD-10-PCS | Mod: CPTII,S$GLB,, | Performed by: PHYSICIAN ASSISTANT

## 2022-09-28 PROCEDURE — 3008F BODY MASS INDEX DOCD: CPT | Mod: CPTII,S$GLB,, | Performed by: PHYSICIAN ASSISTANT

## 2022-09-28 PROCEDURE — 3080F DIAST BP >= 90 MM HG: CPT | Mod: CPTII,S$GLB,, | Performed by: PHYSICIAN ASSISTANT

## 2022-09-28 PROCEDURE — 4010F PR ACE/ARB THEARPY RXD/TAKEN: ICD-10-PCS | Mod: CPTII,S$GLB,, | Performed by: PHYSICIAN ASSISTANT

## 2022-09-28 PROCEDURE — 99999 PR PBB SHADOW E&M-EST. PATIENT-LVL III: CPT | Mod: PBBFAC,,, | Performed by: PHYSICIAN ASSISTANT

## 2022-09-28 PROCEDURE — 20610 DRAIN/INJ JOINT/BURSA W/O US: CPT | Mod: RT,S$GLB,, | Performed by: PHYSICIAN ASSISTANT

## 2022-09-28 PROCEDURE — 4010F ACE/ARB THERAPY RXD/TAKEN: CPT | Mod: CPTII,S$GLB,, | Performed by: PHYSICIAN ASSISTANT

## 2022-09-28 PROCEDURE — 1159F PR MEDICATION LIST DOCUMENTED IN MEDICAL RECORD: ICD-10-PCS | Mod: CPTII,S$GLB,, | Performed by: PHYSICIAN ASSISTANT

## 2022-09-28 PROCEDURE — 99499 UNLISTED E&M SERVICE: CPT | Mod: S$GLB,,, | Performed by: PHYSICIAN ASSISTANT

## 2022-09-28 PROCEDURE — 3077F SYST BP >= 140 MM HG: CPT | Mod: CPTII,S$GLB,, | Performed by: PHYSICIAN ASSISTANT

## 2022-09-28 PROCEDURE — 20610 LARGE JOINT ASPIRATION/INJECTION: R KNEE: ICD-10-PCS | Mod: RT,S$GLB,, | Performed by: PHYSICIAN ASSISTANT

## 2022-09-28 PROCEDURE — 99999 PR PBB SHADOW E&M-EST. PATIENT-LVL III: ICD-10-PCS | Mod: PBBFAC,,, | Performed by: PHYSICIAN ASSISTANT

## 2022-09-28 PROCEDURE — 1159F MED LIST DOCD IN RCRD: CPT | Mod: CPTII,S$GLB,, | Performed by: PHYSICIAN ASSISTANT

## 2022-09-28 PROCEDURE — 3077F PR MOST RECENT SYSTOLIC BLOOD PRESSURE >= 140 MM HG: ICD-10-PCS | Mod: CPTII,S$GLB,, | Performed by: PHYSICIAN ASSISTANT

## 2022-09-28 RX ORDER — KETOROLAC TROMETHAMINE 30 MG/ML
30 INJECTION, SOLUTION INTRAMUSCULAR; INTRAVENOUS
Status: DISCONTINUED | OUTPATIENT
Start: 2022-09-28 | End: 2022-09-28 | Stop reason: HOSPADM

## 2022-09-28 RX ADMIN — KETOROLAC TROMETHAMINE 30 MG: 30 INJECTION, SOLUTION INTRAMUSCULAR; INTRAVENOUS at 01:09

## 2022-09-28 NOTE — PROGRESS NOTES
Subjective:      Patient ID: Debbie Vo is a 62 y.o. female.    Chief Complaint: Pain of the Right Knee      60yo obese female follow up right knee bone on bone osteoarthritis. Kenalog injections help for two months. Would like to know other options today.       Review of Systems   Constitutional: Negative for chills and fever.   Cardiovascular:  Negative for chest pain.   Respiratory:  Negative for cough.    Hematologic/Lymphatic: Does not bruise/bleed easily.   Skin:  Negative for poor wound healing and rash.   Musculoskeletal:  Positive for joint pain, myalgias and stiffness.   Gastrointestinal:  Negative for abdominal pain.   Genitourinary:  Negative for bladder incontinence.   Neurological:  Negative for dizziness, loss of balance and weakness.   Psychiatric/Behavioral:  Negative for altered mental status.      Review of patient's allergies indicates:  No Known Allergies     Current Outpatient Medications   Medication Sig Dispense Refill    amLODIPine (NORVASC) 5 MG tablet Take 1 tablet (5 mg total) by mouth once daily. 90 tablet 4    ammonium lactate (LAC-HYDRIN) 12 % lotion Apply to damp skin after bathing 225 g 11    benzonatate (TESSALON) 200 MG capsule take 1 BY MOUTH THREE TIMES DAILY AS NEEDED FOR COUGH 30 capsule 4    diclofenac (VOLTAREN) 75 MG EC tablet Take 1 tablet (75 mg total) by mouth 2 (two) times daily. 60 tablet 3    FLUoxetine 20 MG capsule Take 1 capsule (20 mg total) by mouth once daily. 90 capsule 4    fluticasone propionate (FLONASE) 50 mcg/actuation nasal spray 1 spray (50 mcg total) by Each Nostril route once daily. 10 mL 5    gabapentin (NEURONTIN) 300 MG capsule Take 1 capsule (300 mg total) by mouth 3 (three) times daily as needed (pain). 270 capsule 3    loratadine (CLARITIN) 10 mg tablet Take 1 tablet (10 mg total) by mouth once daily. 90 tablet 3    meclizine (ANTIVERT) 25 mg tablet Take 1 tablet (25 mg total) by mouth 3 (three) times daily as needed. 50 tablet 2  "   metoprolol succinate (TOPROL-XL) 100 MG 24 hr tablet Take 1 tablet (100 mg total) by mouth once daily. 90 tablet 4    neomycin-polymyxin-hydrocortisone (CORTISPORIN) 3.5-10,000-0.5 mg/g-unit/g-% cream Apply topically 2 (two) times daily. 10 g 2    neomycin-polymyxin-hydrocortisone (CORTISPORIN) 3.5-10,000-1 mg/mL-unit/mL-% otic suspension Place 3 drops into both ears 3 (three) times daily. 10 mL 0    omeprazole (PRILOSEC) 20 MG capsule Take 1 capsule (20 mg total) by mouth once daily. 90 capsule 3    topiramate (TOPAMAX) 50 MG tablet Take 1 tablet (50 mg total) by mouth every evening. 90 tablet 4    triamcinolone acetonide 0.1% (KENALOG) 0.1 % cream Apply topically 2 (two) times daily as needed (dryness or itching). 80 g 3    valsartan (DIOVAN) 320 MG tablet Take 1 tablet (320 mg total) by mouth once daily. 90 tablet 3     No current facility-administered medications for this visit.        The patient's relevant past medical, surgical, and social history was reviewed in Epic.       Objective:      VITAL SIGNS: BP (!) 152/95   Pulse 78   Ht 5' 8" (1.727 m)   Wt 131 kg (288 lb 11.1 oz)   BMI 43.90 kg/m²     Ortho/SPM Exam         Assessment:       1. Primary osteoarthritis of right knee          Plan:         Debbie was seen today for pain.    Diagnoses and all orders for this visit:    Primary osteoarthritis of right knee  -     Large Joint Aspiration/Injection: R knee    Can only offer the patient toradol inejction today. She can come back October 18 or later for repeat Kenalog if she would like.       Diagnoses and plan discussed with the patient, as well as the expected course and duration of his symptoms.  All questions and concerns were addressed prior to the end of the visit.   Instructed patient to call office if they have any future questions/concerns or to schedule apt. Patient will return to see me if symptoms worsen or fail to improve    Note dictated with voice recognition software, please excuse " any grammatical errors.        Arely Gale PA-C   09/28/2022

## 2022-09-28 NOTE — PROCEDURES
Large Joint Aspiration/Injection: R knee    Date/Time: 9/28/2022 1:15 PM  Performed by: Arely Gale PA-C  Authorized by: Arely Gale PA-C     Consent Done?:  Yes (Verbal)  Indications:  Arthritis  Site marked: the procedure site was marked    Timeout: prior to procedure the correct patient, procedure, and site was verified    Prep: patient was prepped and draped in usual sterile fashion      Local anesthesia used?: Yes    Local anesthetic:  Topical anesthetic and lidocaine 1% without epinephrine    Details:  Needle Size:  22 G  Ultrasonic Guidance for needle placement?: No    Approach:  Anteromedial  Location:  Knee  Site:  R knee  Medications:  30 mg ketorolac 30 mg/mL (1 mL)  Patient tolerance:  Patient tolerated the procedure well with no immediate complications

## 2022-10-04 ENCOUNTER — OFFICE VISIT (OUTPATIENT)
Dept: FAMILY MEDICINE | Facility: CLINIC | Age: 62
End: 2022-10-04
Payer: MEDICARE

## 2022-10-04 VITALS
BODY MASS INDEX: 43.62 KG/M2 | WEIGHT: 287.81 LBS | TEMPERATURE: 98 F | OXYGEN SATURATION: 96 % | HEART RATE: 90 BPM | HEIGHT: 68 IN | SYSTOLIC BLOOD PRESSURE: 132 MMHG | DIASTOLIC BLOOD PRESSURE: 82 MMHG

## 2022-10-04 DIAGNOSIS — Z13.6 SCREENING FOR CARDIOVASCULAR CONDITION: ICD-10-CM

## 2022-10-04 DIAGNOSIS — J30.2 SEASONAL ALLERGIES: ICD-10-CM

## 2022-10-04 DIAGNOSIS — M25.511 ACUTE PAIN OF RIGHT SHOULDER: Primary | ICD-10-CM

## 2022-10-04 DIAGNOSIS — R05.9 COUGH, UNSPECIFIED TYPE: ICD-10-CM

## 2022-10-04 DIAGNOSIS — I10 ESSENTIAL HYPERTENSION: ICD-10-CM

## 2022-10-04 DIAGNOSIS — R73.9 HYPERGLYCEMIA: ICD-10-CM

## 2022-10-04 DIAGNOSIS — Z23 NEED FOR INFLUENZA VACCINATION: ICD-10-CM

## 2022-10-04 DIAGNOSIS — Z12.31 SCREENING MAMMOGRAM FOR HIGH-RISK PATIENT: ICD-10-CM

## 2022-10-04 DIAGNOSIS — H60.502 ACUTE OTITIS EXTERNA OF LEFT EAR, UNSPECIFIED TYPE: ICD-10-CM

## 2022-10-04 DIAGNOSIS — M54.16 LUMBAR RADICULOPATHY, CHRONIC: ICD-10-CM

## 2022-10-04 PROCEDURE — 3075F PR MOST RECENT SYSTOLIC BLOOD PRESS GE 130-139MM HG: ICD-10-PCS | Mod: CPTII,S$GLB,, | Performed by: STUDENT IN AN ORGANIZED HEALTH CARE EDUCATION/TRAINING PROGRAM

## 2022-10-04 PROCEDURE — 4010F ACE/ARB THERAPY RXD/TAKEN: CPT | Mod: CPTII,S$GLB,, | Performed by: STUDENT IN AN ORGANIZED HEALTH CARE EDUCATION/TRAINING PROGRAM

## 2022-10-04 PROCEDURE — 1159F MED LIST DOCD IN RCRD: CPT | Mod: CPTII,S$GLB,, | Performed by: STUDENT IN AN ORGANIZED HEALTH CARE EDUCATION/TRAINING PROGRAM

## 2022-10-04 PROCEDURE — 4010F PR ACE/ARB THEARPY RXD/TAKEN: ICD-10-PCS | Mod: CPTII,S$GLB,, | Performed by: STUDENT IN AN ORGANIZED HEALTH CARE EDUCATION/TRAINING PROGRAM

## 2022-10-04 PROCEDURE — 1159F PR MEDICATION LIST DOCUMENTED IN MEDICAL RECORD: ICD-10-PCS | Mod: CPTII,S$GLB,, | Performed by: STUDENT IN AN ORGANIZED HEALTH CARE EDUCATION/TRAINING PROGRAM

## 2022-10-04 PROCEDURE — 1160F RVW MEDS BY RX/DR IN RCRD: CPT | Mod: CPTII,S$GLB,, | Performed by: STUDENT IN AN ORGANIZED HEALTH CARE EDUCATION/TRAINING PROGRAM

## 2022-10-04 PROCEDURE — 99214 PR OFFICE/OUTPT VISIT, EST, LEVL IV, 30-39 MIN: ICD-10-PCS | Mod: 25,S$GLB,, | Performed by: STUDENT IN AN ORGANIZED HEALTH CARE EDUCATION/TRAINING PROGRAM

## 2022-10-04 PROCEDURE — 90686 FLU VACCINE (QUAD) GREATER THAN OR EQUAL TO 3YO PRESERVATIVE FREE IM: ICD-10-PCS | Mod: S$GLB,,, | Performed by: STUDENT IN AN ORGANIZED HEALTH CARE EDUCATION/TRAINING PROGRAM

## 2022-10-04 PROCEDURE — G0008 FLU VACCINE (QUAD) GREATER THAN OR EQUAL TO 3YO PRESERVATIVE FREE IM: ICD-10-PCS | Mod: S$GLB,,, | Performed by: STUDENT IN AN ORGANIZED HEALTH CARE EDUCATION/TRAINING PROGRAM

## 2022-10-04 PROCEDURE — 90686 IIV4 VACC NO PRSV 0.5 ML IM: CPT | Mod: S$GLB,,, | Performed by: STUDENT IN AN ORGANIZED HEALTH CARE EDUCATION/TRAINING PROGRAM

## 2022-10-04 PROCEDURE — 3044F PR MOST RECENT HEMOGLOBIN A1C LEVEL <7.0%: ICD-10-PCS | Mod: CPTII,S$GLB,, | Performed by: STUDENT IN AN ORGANIZED HEALTH CARE EDUCATION/TRAINING PROGRAM

## 2022-10-04 PROCEDURE — 1160F PR REVIEW ALL MEDS BY PRESCRIBER/CLIN PHARMACIST DOCUMENTED: ICD-10-PCS | Mod: CPTII,S$GLB,, | Performed by: STUDENT IN AN ORGANIZED HEALTH CARE EDUCATION/TRAINING PROGRAM

## 2022-10-04 PROCEDURE — 3079F DIAST BP 80-89 MM HG: CPT | Mod: CPTII,S$GLB,, | Performed by: STUDENT IN AN ORGANIZED HEALTH CARE EDUCATION/TRAINING PROGRAM

## 2022-10-04 PROCEDURE — 3008F BODY MASS INDEX DOCD: CPT | Mod: CPTII,S$GLB,, | Performed by: STUDENT IN AN ORGANIZED HEALTH CARE EDUCATION/TRAINING PROGRAM

## 2022-10-04 PROCEDURE — 3075F SYST BP GE 130 - 139MM HG: CPT | Mod: CPTII,S$GLB,, | Performed by: STUDENT IN AN ORGANIZED HEALTH CARE EDUCATION/TRAINING PROGRAM

## 2022-10-04 PROCEDURE — 99214 OFFICE O/P EST MOD 30 MIN: CPT | Mod: 25,S$GLB,, | Performed by: STUDENT IN AN ORGANIZED HEALTH CARE EDUCATION/TRAINING PROGRAM

## 2022-10-04 PROCEDURE — 3008F PR BODY MASS INDEX (BMI) DOCUMENTED: ICD-10-PCS | Mod: CPTII,S$GLB,, | Performed by: STUDENT IN AN ORGANIZED HEALTH CARE EDUCATION/TRAINING PROGRAM

## 2022-10-04 PROCEDURE — 3079F PR MOST RECENT DIASTOLIC BLOOD PRESSURE 80-89 MM HG: ICD-10-PCS | Mod: CPTII,S$GLB,, | Performed by: STUDENT IN AN ORGANIZED HEALTH CARE EDUCATION/TRAINING PROGRAM

## 2022-10-04 PROCEDURE — 3044F HG A1C LEVEL LT 7.0%: CPT | Mod: CPTII,S$GLB,, | Performed by: STUDENT IN AN ORGANIZED HEALTH CARE EDUCATION/TRAINING PROGRAM

## 2022-10-04 PROCEDURE — G0008 ADMIN INFLUENZA VIRUS VAC: HCPCS | Mod: S$GLB,,, | Performed by: STUDENT IN AN ORGANIZED HEALTH CARE EDUCATION/TRAINING PROGRAM

## 2022-10-04 RX ORDER — PROMETHAZINE HYDROCHLORIDE AND DEXTROMETHORPHAN HYDROBROMIDE 6.25; 15 MG/5ML; MG/5ML
5 SYRUP ORAL EVERY 6 HOURS PRN
Qty: 180 ML | Refills: 0 | Status: SHIPPED | OUTPATIENT
Start: 2022-10-04 | End: 2022-10-14

## 2022-10-04 RX ORDER — LORATADINE 10 MG/1
10 TABLET ORAL DAILY
Qty: 90 TABLET | Refills: 3 | Status: SHIPPED | OUTPATIENT
Start: 2022-10-04 | End: 2023-01-25 | Stop reason: SDUPTHER

## 2022-10-04 RX ORDER — NEOMYCIN SULFATE, POLYMYXIN B SULFATE AND HYDROCORTISONE 10; 3.5; 1 MG/ML; MG/ML; [USP'U]/ML
3 SUSPENSION/ DROPS AURICULAR (OTIC) 3 TIMES DAILY
Qty: 10 ML | Refills: 0 | Status: SHIPPED | OUTPATIENT
Start: 2022-10-04

## 2022-10-05 RX ORDER — GABAPENTIN 300 MG/1
300 CAPSULE ORAL 3 TIMES DAILY PRN
Qty: 270 CAPSULE | Refills: 3 | Status: SHIPPED | OUTPATIENT
Start: 2022-10-05 | End: 2023-06-29 | Stop reason: SDUPTHER

## 2022-10-06 ENCOUNTER — LAB VISIT (OUTPATIENT)
Dept: LAB | Facility: HOSPITAL | Age: 62
End: 2022-10-06
Attending: STUDENT IN AN ORGANIZED HEALTH CARE EDUCATION/TRAINING PROGRAM
Payer: MEDICARE

## 2022-10-06 DIAGNOSIS — Z13.6 SCREENING FOR CARDIOVASCULAR CONDITION: ICD-10-CM

## 2022-10-06 DIAGNOSIS — R73.9 HYPERGLYCEMIA: ICD-10-CM

## 2022-10-06 DIAGNOSIS — I10 ESSENTIAL HYPERTENSION: ICD-10-CM

## 2022-10-06 LAB
ALBUMIN SERPL BCP-MCNC: 3.9 G/DL (ref 3.5–5.2)
ALP SERPL-CCNC: 68 U/L (ref 38–126)
ALT SERPL W/O P-5'-P-CCNC: 19 U/L (ref 10–44)
ANION GAP SERPL CALC-SCNC: 8 MMOL/L (ref 8–16)
AST SERPL-CCNC: 17 U/L (ref 15–46)
BASOPHILS # BLD AUTO: 0.07 K/UL (ref 0–0.2)
BASOPHILS NFR BLD: 0.8 % (ref 0–1.9)
BILIRUB SERPL-MCNC: 1 MG/DL (ref 0.1–1)
CALCIUM SERPL-MCNC: 8.6 MG/DL (ref 8.7–10.5)
CHLORIDE SERPL-SCNC: 106 MMOL/L (ref 95–110)
CHOLEST SERPL-MCNC: 211 MG/DL (ref 120–199)
CHOLEST/HDLC SERPL: 4.5 {RATIO} (ref 2–5)
CO2 SERPL-SCNC: 28 MMOL/L (ref 23–29)
CREAT SERPL-MCNC: 0.61 MG/DL (ref 0.5–1.4)
DIFFERENTIAL METHOD: ABNORMAL
EOSINOPHIL # BLD AUTO: 0.1 K/UL (ref 0–0.5)
EOSINOPHIL NFR BLD: 1.6 % (ref 0–8)
ERYTHROCYTE [DISTWIDTH] IN BLOOD BY AUTOMATED COUNT: 14.1 % (ref 11.5–14.5)
EST. GFR  (NO RACE VARIABLE): >60 ML/MIN/1.73 M^2
ESTIMATED AVG GLUCOSE: 114 MG/DL (ref 68–131)
GLUCOSE SERPL-MCNC: 103 MG/DL (ref 70–110)
HBA1C MFR BLD: 5.6 % (ref 4–5.6)
HCT VFR BLD AUTO: 44.3 % (ref 37–48.5)
HDLC SERPL-MCNC: 47 MG/DL (ref 40–75)
HDLC SERPL: 22.3 % (ref 20–50)
HGB BLD-MCNC: 13.4 G/DL (ref 12–16)
IMM GRANULOCYTES # BLD AUTO: 0.03 K/UL (ref 0–0.04)
IMM GRANULOCYTES NFR BLD AUTO: 0.4 % (ref 0–0.5)
LDLC SERPL CALC-MCNC: 139 MG/DL (ref 63–159)
LYMPHOCYTES # BLD AUTO: 2.5 K/UL (ref 1–4.8)
LYMPHOCYTES NFR BLD: 29.4 % (ref 18–48)
MCH RBC QN AUTO: 26.5 PG (ref 27–31)
MCHC RBC AUTO-ENTMCNC: 30.2 G/DL (ref 32–36)
MCV RBC AUTO: 88 FL (ref 82–98)
MONOCYTES # BLD AUTO: 0.9 K/UL (ref 0.3–1)
MONOCYTES NFR BLD: 10.5 % (ref 4–15)
NEUTROPHILS # BLD AUTO: 4.8 K/UL (ref 1.8–7.7)
NEUTROPHILS NFR BLD: 57.3 % (ref 38–73)
NONHDLC SERPL-MCNC: 164 MG/DL
NRBC BLD-RTO: 0 /100 WBC
PLATELET # BLD AUTO: 235 K/UL (ref 150–450)
PMV BLD AUTO: 11.1 FL (ref 9.2–12.9)
POTASSIUM SERPL-SCNC: 4.4 MMOL/L (ref 3.5–5.1)
PROT SERPL-MCNC: 7.1 G/DL (ref 6–8.4)
RBC # BLD AUTO: 5.05 M/UL (ref 4–5.4)
SODIUM SERPL-SCNC: 142 MMOL/L (ref 136–145)
TRIGL SERPL-MCNC: 125 MG/DL (ref 30–150)
TSH SERPL DL<=0.005 MIU/L-ACNC: 1.23 UIU/ML (ref 0.4–4)
UUN UR-MCNC: 13 MG/DL (ref 7–17)
WBC # BLD AUTO: 8.36 K/UL (ref 3.9–12.7)

## 2022-10-06 PROCEDURE — 85025 COMPLETE CBC W/AUTO DIFF WBC: CPT | Mod: PO | Performed by: STUDENT IN AN ORGANIZED HEALTH CARE EDUCATION/TRAINING PROGRAM

## 2022-10-06 PROCEDURE — 36415 COLL VENOUS BLD VENIPUNCTURE: CPT | Mod: PO | Performed by: STUDENT IN AN ORGANIZED HEALTH CARE EDUCATION/TRAINING PROGRAM

## 2022-10-06 PROCEDURE — 83036 HEMOGLOBIN GLYCOSYLATED A1C: CPT | Performed by: STUDENT IN AN ORGANIZED HEALTH CARE EDUCATION/TRAINING PROGRAM

## 2022-10-06 PROCEDURE — 80061 LIPID PANEL: CPT | Performed by: STUDENT IN AN ORGANIZED HEALTH CARE EDUCATION/TRAINING PROGRAM

## 2022-10-06 PROCEDURE — 84443 ASSAY THYROID STIM HORMONE: CPT | Mod: PO | Performed by: STUDENT IN AN ORGANIZED HEALTH CARE EDUCATION/TRAINING PROGRAM

## 2022-10-06 PROCEDURE — 80053 COMPREHEN METABOLIC PANEL: CPT | Mod: PO | Performed by: STUDENT IN AN ORGANIZED HEALTH CARE EDUCATION/TRAINING PROGRAM

## 2022-10-09 NOTE — PROGRESS NOTES
Patient ID: Debbie Vo is a 62 y.o. female.     Chief Complaint: Follow-up    Cough  This is a chronic problem. The current episode started more than 1 month ago. The problem has been waxing and waning. The cough is Non-productive. Associated symptoms include nasal congestion and postnasal drip. Pertinent negatives include no chest pain, chills, ear pain, fever, headaches, hemoptysis, myalgias, rash, shortness of breath or sweats. Nothing aggravates the symptoms. She has tried OTC cough suppressant and prescription cough suppressant for the symptoms. The treatment provided mild relief. Her past medical history is significant for environmental allergies.        Review of Systems  Review of Systems   Constitutional:  Negative for chills and fever.   HENT:  Positive for postnasal drip. Negative for ear pain and sinus pain.    Eyes:  Negative for discharge.   Respiratory:  Positive for cough. Negative for hemoptysis and shortness of breath.    Cardiovascular:  Negative for chest pain and leg swelling.   Gastrointestinal:  Negative for diarrhea, nausea and vomiting.   Genitourinary:  Negative for urgency.   Musculoskeletal:  Negative for myalgias.   Skin:  Negative for rash.   Neurological:  Negative for weakness and headaches.   Endo/Heme/Allergies:  Positive for environmental allergies.   Psychiatric/Behavioral:  Negative for depression.    All other systems reviewed and are negative.    Currently Medications  Current Outpatient Medications on File Prior to Visit   Medication Sig Dispense Refill    amLODIPine (NORVASC) 5 MG tablet Take 1 tablet (5 mg total) by mouth once daily. 90 tablet 4    ammonium lactate (LAC-HYDRIN) 12 % lotion Apply to damp skin after bathing 225 g 11    benzonatate (TESSALON) 200 MG capsule take 1 BY MOUTH THREE TIMES DAILY AS NEEDED FOR COUGH 30 capsule 4    diclofenac (VOLTAREN) 75 MG EC tablet Take 1 tablet (75 mg total) by mouth 2 (two) times daily. 60 tablet 3     "fluticasone propionate (FLONASE) 50 mcg/actuation nasal spray 1 spray (50 mcg total) by Each Nostril route once daily. 10 mL 5    meclizine (ANTIVERT) 25 mg tablet Take 1 tablet (25 mg total) by mouth 3 (three) times daily as needed. 50 tablet 2    metoprolol succinate (TOPROL-XL) 100 MG 24 hr tablet Take 1 tablet (100 mg total) by mouth once daily. 90 tablet 4    triamcinolone acetonide 0.1% (KENALOG) 0.1 % cream Apply topically 2 (two) times daily as needed (dryness or itching). 80 g 3    valsartan (DIOVAN) 320 MG tablet Take 1 tablet (320 mg total) by mouth once daily. 90 tablet 3    FLUoxetine 20 MG capsule Take 1 capsule (20 mg total) by mouth once daily. 90 capsule 4    omeprazole (PRILOSEC) 20 MG capsule Take 1 capsule (20 mg total) by mouth once daily. 90 capsule 3    topiramate (TOPAMAX) 50 MG tablet Take 1 tablet (50 mg total) by mouth every evening. 90 tablet 4     No current facility-administered medications on file prior to visit.       Physical  Exam  Vitals:    10/04/22 1443   BP: 132/82   BP Location: Left arm   Patient Position: Sitting   Pulse: 90   Temp: 97.7 °F (36.5 °C)   SpO2: 96%   Weight: 130.6 kg (287 lb 13 oz)   Height: 5' 8" (1.727 m)      Body mass index is 43.76 kg/m².    Physical Exam  Vitals and nursing note reviewed.   Constitutional:       General: She is not in acute distress.     Appearance: She is not ill-appearing.   HENT:      Head: Normocephalic and atraumatic.      Right Ear: External ear normal.      Left Ear: External ear normal.      Nose: Nose normal.      Mouth/Throat:      Mouth: Mucous membranes are moist.   Eyes:      Extraocular Movements: Extraocular movements intact.      Conjunctiva/sclera: Conjunctivae normal.   Cardiovascular:      Rate and Rhythm: Normal rate and regular rhythm.      Pulses: Normal pulses.      Heart sounds: No murmur heard.  Pulmonary:      Effort: Pulmonary effort is normal. No respiratory distress.      Breath sounds: No wheezing. "   Abdominal:      General: There is no distension.      Palpations: Abdomen is soft. There is no mass.      Tenderness: There is no abdominal tenderness.   Musculoskeletal:         General: No swelling.      Cervical back: Normal range of motion.   Skin:     Coloration: Skin is not jaundiced.      Findings: No rash.   Neurological:      General: No focal deficit present.      Mental Status: She is alert and oriented to person, place, and time.   Psychiatric:         Mood and Affect: Mood normal.         Thought Content: Thought content normal.       Labs:    Complete Blood Count  Lab Results   Component Value Date    RBC 5.05 10/06/2022    HGB 13.4 10/06/2022    HCT 44.3 10/06/2022    MCV 88 10/06/2022    MCH 26.5 (L) 10/06/2022    MCHC 30.2 (L) 10/06/2022    RDW 14.1 10/06/2022     10/06/2022    MPV 11.1 10/06/2022    GRAN 4.8 10/06/2022    GRAN 57.3 10/06/2022    LYMPH 2.5 10/06/2022    LYMPH 29.4 10/06/2022    MONO 0.9 10/06/2022    MONO 10.5 10/06/2022    EOS 0.1 10/06/2022    BASO 0.07 10/06/2022    EOSINOPHIL 1.6 10/06/2022    BASOPHIL 0.8 10/06/2022    DIFFMETHOD Automated 10/06/2022       Comprehensive Metabolic Panel  Lab Results   Component Value Date     10/06/2022    BUN 13 10/06/2022    CREATININE 0.61 10/06/2022     10/06/2022    K 4.4 10/06/2022     10/06/2022    PROT 7.1 10/06/2022    ALBUMIN 3.9 10/06/2022    BILITOT 1.0 10/06/2022    AST 17 10/06/2022    ALKPHOS 68 10/06/2022    CO2 28 10/06/2022    ALT 19 10/06/2022    ANIONGAP 8 10/06/2022       TSH  Lab Results   Component Value Date    TSH 1.230 10/06/2022       Imaging:  X-Ray Knee 1 or 2 View Right  Narrative: EXAMINATION:  XR KNEE 1 OR 2 VIEW RIGHT    CLINICAL HISTORY:  Pain in unspecified knee    TECHNIQUE:  AP and lateral views of the right knee were performed.    COMPARISON:  Right knee radiograph 03/05/2017    FINDINGS:  Severe joint space narrowing at the lateral compartment.  Moderate medial compartment joint  space narrowing.  Moderate marginal osteophytosis.  Severe patellofemoral joint space narrowing and marginal osteophytosis.  Prominent intra-articular body at the anterior joint line.  No significant effusion.  No acute fracture or dislocation.  No destructive appearing osseous lesion.  Impression: Overall stable advanced arthrosis of the knee, as above.  No acute abnormality.    Electronically signed by: Win Irizarry  Date:    07/02/2022  Time:    12:22      Assessment/Plan:    Problem List Items Addressed This Visit          Neuro    Lumbar radiculopathy, chronic    Relevant Medications    gabapentin (NEURONTIN) 300 MG capsule       Cardiac/Vascular    Essential hypertension    Relevant Orders    CBC Auto Differential (Completed)    Comprehensive metabolic panel (Completed)    TSH (Completed)     Other Visit Diagnoses       Acute pain of right shoulder    -  Primary    Acute otitis externa of left ear, unspecified type        Relevant Medications    neomycin-polymyxin-hydrocortisone (CORTISPORIN) 3.5-10,000-0.5 mg/g-unit/g-% cream    neomycin-polymyxin-hydrocortisone (CORTISPORIN) 3.5-10,000-1 mg/mL-unit/mL-% otic suspension    Hyperglycemia        Relevant Orders    HEMOGLOBIN A1C (Completed)    Screening for cardiovascular condition        Relevant Orders    LIPID PANEL (Completed)    Screening mammogram for high-risk patient        Relevant Orders    Mammo Digital Screening Bilat w/ [facility]    Seasonal allergies        Relevant Medications    loratadine (CLARITIN) 10 mg tablet    promethazine-dextromethorphan (PROMETHAZINE-DM) 6.25-15 mg/5 mL Syrp    Cough, unspecified type        Relevant Medications    promethazine-dextromethorphan (PROMETHAZINE-DM) 6.25-15 mg/5 mL Syrp    Need for influenza vaccination        Relevant Orders    Influenza - Quadrivalent *Preferred* (6 months+) (PF) (Completed)             Discussed how to stay healthy including: diet, exercise, refraining from smoking and discussed screening  exams / tests needed for age, sex and family Hx.        Sergio Pitt MD

## 2022-10-18 ENCOUNTER — OFFICE VISIT (OUTPATIENT)
Dept: ORTHOPEDICS | Facility: CLINIC | Age: 62
End: 2022-10-18
Payer: MEDICARE

## 2022-10-18 VITALS
SYSTOLIC BLOOD PRESSURE: 161 MMHG | BODY MASS INDEX: 43.8 KG/M2 | HEIGHT: 68 IN | HEART RATE: 88 BPM | WEIGHT: 289 LBS | DIASTOLIC BLOOD PRESSURE: 108 MMHG

## 2022-10-18 DIAGNOSIS — M17.11 PRIMARY OSTEOARTHRITIS OF RIGHT KNEE: ICD-10-CM

## 2022-10-18 DIAGNOSIS — M25.511 ACUTE PAIN OF RIGHT SHOULDER: Primary | ICD-10-CM

## 2022-10-18 PROCEDURE — 20610 LARGE JOINT ASPIRATION/INJECTION: R KNEE: ICD-10-PCS | Mod: RT,S$GLB,, | Performed by: PHYSICIAN ASSISTANT

## 2022-10-18 PROCEDURE — 3044F PR MOST RECENT HEMOGLOBIN A1C LEVEL <7.0%: ICD-10-PCS | Mod: CPTII,S$GLB,, | Performed by: PHYSICIAN ASSISTANT

## 2022-10-18 PROCEDURE — 3077F SYST BP >= 140 MM HG: CPT | Mod: CPTII,S$GLB,, | Performed by: PHYSICIAN ASSISTANT

## 2022-10-18 PROCEDURE — 99499 UNLISTED E&M SERVICE: CPT | Mod: S$GLB,,, | Performed by: PHYSICIAN ASSISTANT

## 2022-10-18 PROCEDURE — 4010F PR ACE/ARB THEARPY RXD/TAKEN: ICD-10-PCS | Mod: CPTII,S$GLB,, | Performed by: PHYSICIAN ASSISTANT

## 2022-10-18 PROCEDURE — 99999 PR PBB SHADOW E&M-EST. PATIENT-LVL III: ICD-10-PCS | Mod: PBBFAC,,, | Performed by: PHYSICIAN ASSISTANT

## 2022-10-18 PROCEDURE — 3080F PR MOST RECENT DIASTOLIC BLOOD PRESSURE >= 90 MM HG: ICD-10-PCS | Mod: CPTII,S$GLB,, | Performed by: PHYSICIAN ASSISTANT

## 2022-10-18 PROCEDURE — 3080F DIAST BP >= 90 MM HG: CPT | Mod: CPTII,S$GLB,, | Performed by: PHYSICIAN ASSISTANT

## 2022-10-18 PROCEDURE — 4010F ACE/ARB THERAPY RXD/TAKEN: CPT | Mod: CPTII,S$GLB,, | Performed by: PHYSICIAN ASSISTANT

## 2022-10-18 PROCEDURE — 3044F HG A1C LEVEL LT 7.0%: CPT | Mod: CPTII,S$GLB,, | Performed by: PHYSICIAN ASSISTANT

## 2022-10-18 PROCEDURE — 1159F PR MEDICATION LIST DOCUMENTED IN MEDICAL RECORD: ICD-10-PCS | Mod: CPTII,S$GLB,, | Performed by: PHYSICIAN ASSISTANT

## 2022-10-18 PROCEDURE — 1159F MED LIST DOCD IN RCRD: CPT | Mod: CPTII,S$GLB,, | Performed by: PHYSICIAN ASSISTANT

## 2022-10-18 PROCEDURE — 20610 DRAIN/INJ JOINT/BURSA W/O US: CPT | Mod: RT,S$GLB,, | Performed by: PHYSICIAN ASSISTANT

## 2022-10-18 PROCEDURE — 3077F PR MOST RECENT SYSTOLIC BLOOD PRESSURE >= 140 MM HG: ICD-10-PCS | Mod: CPTII,S$GLB,, | Performed by: PHYSICIAN ASSISTANT

## 2022-10-18 PROCEDURE — 99999 PR PBB SHADOW E&M-EST. PATIENT-LVL III: CPT | Mod: PBBFAC,,, | Performed by: PHYSICIAN ASSISTANT

## 2022-10-18 PROCEDURE — 99499 NO LOS: ICD-10-PCS | Mod: S$GLB,,, | Performed by: PHYSICIAN ASSISTANT

## 2022-10-18 RX ORDER — TRIAMCINOLONE ACETONIDE 40 MG/ML
40 INJECTION, SUSPENSION INTRA-ARTICULAR; INTRAMUSCULAR
Status: DISCONTINUED | OUTPATIENT
Start: 2022-10-18 | End: 2022-10-18 | Stop reason: HOSPADM

## 2022-10-18 RX ADMIN — TRIAMCINOLONE ACETONIDE 40 MG: 40 INJECTION, SUSPENSION INTRA-ARTICULAR; INTRAMUSCULAR at 01:10

## 2022-10-18 NOTE — PROGRESS NOTES
Subjective:      Patient ID: Debbie Vo is a 62 y.o. female.    Chief Complaint: Pain of the Right Knee      62-year-old obese female follow-up right knee osteoarthritis.  She is here for right knee corticosteroid injection.  She has also complained of right shoulder pain that is not responding to p.o. medication.      Review of Systems   Constitutional: Negative for chills and fever.   Cardiovascular:  Negative for chest pain.   Respiratory:  Negative for cough.    Hematologic/Lymphatic: Does not bruise/bleed easily.   Skin:  Negative for poor wound healing and rash.   Musculoskeletal:  Positive for arthritis, joint pain, myalgias and stiffness.   Gastrointestinal:  Negative for abdominal pain.   Genitourinary:  Negative for bladder incontinence.   Neurological:  Negative for dizziness, loss of balance and weakness.   Psychiatric/Behavioral:  Negative for altered mental status.      Review of patient's allergies indicates:  No Known Allergies     Current Outpatient Medications   Medication Sig Dispense Refill    amLODIPine (NORVASC) 5 MG tablet Take 1 tablet (5 mg total) by mouth once daily. 90 tablet 4    ammonium lactate (LAC-HYDRIN) 12 % lotion Apply to damp skin after bathing 225 g 11    benzonatate (TESSALON) 200 MG capsule take 1 BY MOUTH THREE TIMES DAILY AS NEEDED FOR COUGH 30 capsule 4    diclofenac (VOLTAREN) 75 MG EC tablet Take 1 tablet (75 mg total) by mouth 2 (two) times daily. 60 tablet 3    FLUoxetine 20 MG capsule Take 1 capsule (20 mg total) by mouth once daily. 90 capsule 4    fluticasone propionate (FLONASE) 50 mcg/actuation nasal spray 1 spray (50 mcg total) by Each Nostril route once daily. 10 mL 5    gabapentin (NEURONTIN) 300 MG capsule Take 1 capsule (300 mg total) by mouth 3 (three) times daily as needed (pain). 270 capsule 3    loratadine (CLARITIN) 10 mg tablet Take 1 tablet (10 mg total) by mouth once daily. 90 tablet 3    meclizine (ANTIVERT) 25 mg tablet Take 1 tablet  "(25 mg total) by mouth 3 (three) times daily as needed. 50 tablet 2    metoprolol succinate (TOPROL-XL) 100 MG 24 hr tablet Take 1 tablet (100 mg total) by mouth once daily. 90 tablet 4    neomycin-polymyxin-hydrocortisone (CORTISPORIN) 3.5-10,000-0.5 mg/g-unit/g-% cream Apply topically 2 (two) times daily. 10 g 2    neomycin-polymyxin-hydrocortisone (CORTISPORIN) 3.5-10,000-1 mg/mL-unit/mL-% otic suspension Place 3 drops into both ears 3 (three) times daily. 10 mL 0    omeprazole (PRILOSEC) 20 MG capsule Take 1 capsule (20 mg total) by mouth once daily. 90 capsule 3    topiramate (TOPAMAX) 50 MG tablet Take 1 tablet (50 mg total) by mouth every evening. 90 tablet 4    triamcinolone acetonide 0.1% (KENALOG) 0.1 % cream Apply topically 2 (two) times daily as needed (dryness or itching). 80 g 3    valsartan (DIOVAN) 320 MG tablet Take 1 tablet (320 mg total) by mouth once daily. 90 tablet 3     No current facility-administered medications for this visit.        The patient's relevant past medical, surgical, and social history was reviewed in Epic.       Objective:      VITAL SIGNS: BP (!) 161/108   Pulse 88   Ht 5' 8" (1.727 m)   Wt 131.1 kg (289 lb 0.4 oz)   BMI 43.95 kg/m²     General    Nursing note and vitals reviewed.  Constitutional: She is oriented to person, place, and time. She appears well-developed and well-nourished.   Neurological: She is alert and oriented to person, place, and time.                  Assessment:       1. Acute pain of right shoulder    2. Primary osteoarthritis of right knee          Plan:         Debbie was seen today for pain.    Diagnoses and all orders for this visit:    Acute pain of right shoulder  -     X-ray Shoulder 2 or More Views Right; Future  -     Large Joint Aspiration/Injection: R knee    Primary osteoarthritis of right knee  -     Large Joint Aspiration/Injection: R knee  Right knee corticosteroid injection given today.    She will return next week for evaluation of " right shoulder pain and get x-rays prior to being seen.    Diagnoses and plan discussed with the patient, as well as the expected course and duration of his symptoms.  All questions and concerns were addressed prior to the end of the visit.   Instructed patient to call office if they have any future questions/concerns or to schedule apt. Patient will return to see me if symptoms worsen or fail to improve    Note dictated with voice recognition software, please excuse any grammatical errors.        Arely Gale PA-C   10/18/2022

## 2022-10-18 NOTE — PROCEDURES
Large Joint Aspiration/Injection: R knee    Date/Time: 10/18/2022 1:15 PM  Performed by: Arely Gale PA-C  Authorized by: Arely Gale PA-C     Consent Done?:  Yes (Verbal)  Indications:  Pain  Site marked: the procedure site was marked    Timeout: prior to procedure the correct patient, procedure, and site was verified    Prep: patient was prepped and draped in usual sterile fashion    Local anesthesia used?: No    Local anesthetic:  Topical anesthetic and lidocaine 1% without epinephrine    Details:  Needle Size:  22 G  Ultrasonic Guidance for needle placement?: No    Approach:  Anterolateral  Location:  Knee  Site:  R knee  Medications:  40 mg triamcinolone acetonide 40 mg/mL  Patient tolerance:  Patient tolerated the procedure well with no immediate complications

## 2022-10-27 ENCOUNTER — HOSPITAL ENCOUNTER (OUTPATIENT)
Dept: RADIOLOGY | Facility: HOSPITAL | Age: 62
Discharge: HOME | End: 2022-10-27
Attending: STUDENT IN AN ORGANIZED HEALTH CARE EDUCATION/TRAINING PROGRAM
Payer: MEDICARE

## 2022-10-27 DIAGNOSIS — Z12.31 SCREENING MAMMOGRAM FOR HIGH-RISK PATIENT: ICD-10-CM

## 2022-10-27 PROCEDURE — 77067 SCR MAMMO BI INCL CAD: CPT | Mod: TC,PO

## 2022-10-27 PROCEDURE — 77063 BREAST TOMOSYNTHESIS BI: CPT | Mod: TC,PO

## 2022-11-12 ENCOUNTER — TELEPHONE (OUTPATIENT)
Dept: FAMILY MEDICINE | Facility: CLINIC | Age: 62
End: 2022-11-12
Payer: MEDICARE

## 2022-11-12 NOTE — TELEPHONE ENCOUNTER
----- Message from Sergio Pitt MD sent at 10/7/2022  8:14 AM CDT -----  Cholesterol is slightly elevated. This can improved with diet and exercise. Otherwise, labs look great. Please see me back in 6 months.

## 2022-11-12 NOTE — TELEPHONE ENCOUNTER
Call no answer number not working.    Date: 5/12/2023 Status: Joshua   Appt Time: 8:20 AM Length: 20   Visit Type: EP - PRIMARY CARE ([facility]) [486] Copay: $0.00   Provider: Sergio Pitt MD Dept: [facility]

## 2022-12-27 ENCOUNTER — OFFICE VISIT (OUTPATIENT)
Dept: FAMILY MEDICINE | Facility: CLINIC | Age: 62
End: 2022-12-27
Payer: MEDICARE

## 2022-12-27 ENCOUNTER — TELEPHONE (OUTPATIENT)
Dept: FAMILY MEDICINE | Facility: CLINIC | Age: 62
End: 2022-12-27

## 2022-12-27 VITALS
SYSTOLIC BLOOD PRESSURE: 126 MMHG | HEART RATE: 114 BPM | HEIGHT: 69 IN | DIASTOLIC BLOOD PRESSURE: 82 MMHG | BODY MASS INDEX: 42.8 KG/M2 | OXYGEN SATURATION: 95 % | WEIGHT: 289 LBS

## 2022-12-27 DIAGNOSIS — R05.8 POST-VIRAL COUGH SYNDROME: ICD-10-CM

## 2022-12-27 DIAGNOSIS — M10.9 GOUTY ARTHRITIS OF RIGHT GREAT TOE: Primary | ICD-10-CM

## 2022-12-27 PROCEDURE — 3008F PR BODY MASS INDEX (BMI) DOCUMENTED: ICD-10-PCS | Mod: CPTII,S$GLB,, | Performed by: STUDENT IN AN ORGANIZED HEALTH CARE EDUCATION/TRAINING PROGRAM

## 2022-12-27 PROCEDURE — 99214 OFFICE O/P EST MOD 30 MIN: CPT | Mod: S$GLB,,, | Performed by: STUDENT IN AN ORGANIZED HEALTH CARE EDUCATION/TRAINING PROGRAM

## 2022-12-27 PROCEDURE — 3079F DIAST BP 80-89 MM HG: CPT | Mod: CPTII,S$GLB,, | Performed by: STUDENT IN AN ORGANIZED HEALTH CARE EDUCATION/TRAINING PROGRAM

## 2022-12-27 PROCEDURE — 3074F SYST BP LT 130 MM HG: CPT | Mod: CPTII,S$GLB,, | Performed by: STUDENT IN AN ORGANIZED HEALTH CARE EDUCATION/TRAINING PROGRAM

## 2022-12-27 PROCEDURE — 3074F PR MOST RECENT SYSTOLIC BLOOD PRESSURE < 130 MM HG: ICD-10-PCS | Mod: CPTII,S$GLB,, | Performed by: STUDENT IN AN ORGANIZED HEALTH CARE EDUCATION/TRAINING PROGRAM

## 2022-12-27 PROCEDURE — 1160F PR REVIEW ALL MEDS BY PRESCRIBER/CLIN PHARMACIST DOCUMENTED: ICD-10-PCS | Mod: CPTII,S$GLB,, | Performed by: STUDENT IN AN ORGANIZED HEALTH CARE EDUCATION/TRAINING PROGRAM

## 2022-12-27 PROCEDURE — 4010F ACE/ARB THERAPY RXD/TAKEN: CPT | Mod: CPTII,S$GLB,, | Performed by: STUDENT IN AN ORGANIZED HEALTH CARE EDUCATION/TRAINING PROGRAM

## 2022-12-27 PROCEDURE — 3044F PR MOST RECENT HEMOGLOBIN A1C LEVEL <7.0%: ICD-10-PCS | Mod: CPTII,S$GLB,, | Performed by: STUDENT IN AN ORGANIZED HEALTH CARE EDUCATION/TRAINING PROGRAM

## 2022-12-27 PROCEDURE — 1159F PR MEDICATION LIST DOCUMENTED IN MEDICAL RECORD: ICD-10-PCS | Mod: CPTII,S$GLB,, | Performed by: STUDENT IN AN ORGANIZED HEALTH CARE EDUCATION/TRAINING PROGRAM

## 2022-12-27 PROCEDURE — 99214 PR OFFICE/OUTPT VISIT, EST, LEVL IV, 30-39 MIN: ICD-10-PCS | Mod: S$GLB,,, | Performed by: STUDENT IN AN ORGANIZED HEALTH CARE EDUCATION/TRAINING PROGRAM

## 2022-12-27 PROCEDURE — 3079F PR MOST RECENT DIASTOLIC BLOOD PRESSURE 80-89 MM HG: ICD-10-PCS | Mod: CPTII,S$GLB,, | Performed by: STUDENT IN AN ORGANIZED HEALTH CARE EDUCATION/TRAINING PROGRAM

## 2022-12-27 PROCEDURE — 4010F PR ACE/ARB THEARPY RXD/TAKEN: ICD-10-PCS | Mod: CPTII,S$GLB,, | Performed by: STUDENT IN AN ORGANIZED HEALTH CARE EDUCATION/TRAINING PROGRAM

## 2022-12-27 PROCEDURE — 1160F RVW MEDS BY RX/DR IN RCRD: CPT | Mod: CPTII,S$GLB,, | Performed by: STUDENT IN AN ORGANIZED HEALTH CARE EDUCATION/TRAINING PROGRAM

## 2022-12-27 PROCEDURE — 1159F MED LIST DOCD IN RCRD: CPT | Mod: CPTII,S$GLB,, | Performed by: STUDENT IN AN ORGANIZED HEALTH CARE EDUCATION/TRAINING PROGRAM

## 2022-12-27 PROCEDURE — 3044F HG A1C LEVEL LT 7.0%: CPT | Mod: CPTII,S$GLB,, | Performed by: STUDENT IN AN ORGANIZED HEALTH CARE EDUCATION/TRAINING PROGRAM

## 2022-12-27 PROCEDURE — 3008F BODY MASS INDEX DOCD: CPT | Mod: CPTII,S$GLB,, | Performed by: STUDENT IN AN ORGANIZED HEALTH CARE EDUCATION/TRAINING PROGRAM

## 2022-12-27 RX ORDER — PROBENECID AND COLCHICINE .5; 5 MG/1; MG/1
1 TABLET ORAL DAILY
Qty: 7 TABLET | Refills: 0 | Status: SHIPPED | OUTPATIENT
Start: 2022-12-27 | End: 2022-12-27

## 2022-12-27 RX ORDER — METHYLPREDNISOLONE 4 MG/1
TABLET ORAL
Qty: 21 EACH | Refills: 0 | Status: SHIPPED | OUTPATIENT
Start: 2022-12-27 | End: 2023-01-17

## 2022-12-27 RX ORDER — COLCHICINE 0.6 MG/1
0.6 TABLET ORAL DAILY
Qty: 7 TABLET | Refills: 0 | Status: SHIPPED | OUTPATIENT
Start: 2022-12-27

## 2022-12-27 NOTE — PROGRESS NOTES
Patient ID: Debbie Vo is a 62 y.o. female.     Chief Complaint: Gout    Toe Pain   There was no injury mechanism. Pain location: left great toe. The quality of the pain is described as aching. The pain is at a severity of 6/10. The pain is moderate. The pain has been Constant since onset. Pertinent negatives include no inability to bear weight, loss of motion, loss of sensation, muscle weakness, numbness or tingling. The symptoms are aggravated by movement and weight bearing. She has tried NSAIDs for the symptoms. The treatment provided no relief.   Cough  This is a new problem. Episode onset: 2 weeks ago. The problem has been unchanged. The problem occurs every few minutes. The cough is Non-productive. Pertinent negatives include no chest pain, ear pain, fever, headaches, hemoptysis, myalgias, nasal congestion, rash, rhinorrhea, sore throat, shortness of breath or sweats. Nothing aggravates the symptoms. Treatments tried: antibiotics given by urgent care. The treatment provided no relief. had URI 2 weeks ago        Review of Systems  Review of Systems   Constitutional:  Negative for fever.   HENT:  Negative for ear pain, rhinorrhea, sinus pain and sore throat.    Eyes:  Negative for discharge.   Respiratory:  Positive for cough. Negative for hemoptysis and shortness of breath.    Cardiovascular:  Negative for chest pain and leg swelling.   Gastrointestinal:  Negative for diarrhea, nausea and vomiting.   Genitourinary:  Negative for urgency.   Musculoskeletal:  Negative for myalgias.   Skin:  Negative for rash.   Neurological:  Negative for tingling, weakness, numbness and headaches.   Psychiatric/Behavioral:  Negative for depression.    All other systems reviewed and are negative.    Currently Medications  Current Outpatient Medications on File Prior to Visit   Medication Sig Dispense Refill    ammonium lactate (LAC-HYDRIN) 12 % lotion Apply to damp skin after bathing 225 g 11    benzonatate  (TESSALON) 200 MG capsule take 1 BY MOUTH THREE TIMES DAILY AS NEEDED FOR COUGH 30 capsule 4    diclofenac (VOLTAREN) 75 MG EC tablet Take 1 tablet (75 mg total) by mouth 2 (two) times daily. 60 tablet 3    fluticasone propionate (FLONASE) 50 mcg/actuation nasal spray 1 spray (50 mcg total) by Each Nostril route once daily. 10 mL 5    gabapentin (NEURONTIN) 300 MG capsule Take 1 capsule (300 mg total) by mouth 3 (three) times daily as needed (pain). 270 capsule 3    loratadine (CLARITIN) 10 mg tablet Take 1 tablet (10 mg total) by mouth once daily. 90 tablet 3    meclizine (ANTIVERT) 25 mg tablet Take 1 tablet (25 mg total) by mouth 3 (three) times daily as needed. 50 tablet 2    metoprolol succinate (TOPROL-XL) 100 MG 24 hr tablet Take 1 tablet (100 mg total) by mouth once daily. 90 tablet 4    neomycin-polymyxin-hydrocortisone (CORTISPORIN) 3.5-10,000-0.5 mg/g-unit/g-% cream Apply topically 2 (two) times daily. 10 g 2    neomycin-polymyxin-hydrocortisone (CORTISPORIN) 3.5-10,000-1 mg/mL-unit/mL-% otic suspension Place 3 drops into both ears 3 (three) times daily. 10 mL 0    triamcinolone acetonide 0.1% (KENALOG) 0.1 % cream Apply topically 2 (two) times daily as needed (dryness or itching). 80 g 3    valsartan (DIOVAN) 320 MG tablet Take 1 tablet (320 mg total) by mouth once daily. 90 tablet 3    amLODIPine (NORVASC) 5 MG tablet Take 1 tablet (5 mg total) by mouth once daily. 90 tablet 4    FLUoxetine 20 MG capsule Take 1 capsule (20 mg total) by mouth once daily. 90 capsule 4    omeprazole (PRILOSEC) 20 MG capsule Take 1 capsule (20 mg total) by mouth once daily. 90 capsule 3    topiramate (TOPAMAX) 50 MG tablet Take 1 tablet (50 mg total) by mouth every evening. 90 tablet 4     No current facility-administered medications on file prior to visit.       Physical  Exam  Vitals:    12/27/22 1541   BP: 126/82   BP Location: Left arm   Patient Position: Sitting   Pulse: (!) 114   SpO2: 95%   Weight: 131.1 kg (289 lb  "0.4 oz)   Height: 5' 9" (1.753 m)      Body mass index is 42.68 kg/m².    Physical Exam  Vitals and nursing note reviewed.   Constitutional:       General: She is not in acute distress.     Appearance: She is not ill-appearing.   HENT:      Head: Normocephalic and atraumatic.      Right Ear: External ear normal.      Left Ear: External ear normal.      Nose: Nose normal.      Mouth/Throat:      Mouth: Mucous membranes are moist.   Eyes:      Extraocular Movements: Extraocular movements intact.      Conjunctiva/sclera: Conjunctivae normal.   Cardiovascular:      Rate and Rhythm: Normal rate and regular rhythm.      Pulses: Normal pulses.      Heart sounds: No murmur heard.  Pulmonary:      Effort: Pulmonary effort is normal. No respiratory distress.      Breath sounds: No wheezing.   Abdominal:      General: There is no distension.      Palpations: Abdomen is soft. There is no mass.      Tenderness: There is no abdominal tenderness.   Musculoskeletal:         General: No swelling.      Cervical back: Normal range of motion.   Feet:      Comments: Tenderness to palpation and swelling of left great toe  Skin:     Coloration: Skin is not jaundiced.      Findings: No rash.   Neurological:      General: No focal deficit present.      Mental Status: She is alert and oriented to person, place, and time.   Psychiatric:         Mood and Affect: Mood normal.         Thought Content: Thought content normal.       Labs:    Complete Blood Count  Lab Results   Component Value Date    RBC 5.05 10/06/2022    HGB 13.4 10/06/2022    HCT 44.3 10/06/2022    MCV 88 10/06/2022    MCH 26.5 (L) 10/06/2022    MCHC 30.2 (L) 10/06/2022    RDW 14.1 10/06/2022     10/06/2022    MPV 11.1 10/06/2022    GRAN 4.8 10/06/2022    GRAN 57.3 10/06/2022    LYMPH 2.5 10/06/2022    LYMPH 29.4 10/06/2022    MONO 0.9 10/06/2022    MONO 10.5 10/06/2022    EOS 0.1 10/06/2022    BASO 0.07 10/06/2022    EOSINOPHIL 1.6 10/06/2022    BASOPHIL 0.8 10/06/2022 "    DIFFMETHOD Automated 10/06/2022       Comprehensive Metabolic Panel  Lab Results   Component Value Date     10/06/2022    BUN 13 10/06/2022    CREATININE 0.61 10/06/2022     10/06/2022    K 4.4 10/06/2022     10/06/2022    PROT 7.1 10/06/2022    ALBUMIN 3.9 10/06/2022    BILITOT 1.0 10/06/2022    AST 17 10/06/2022    ALKPHOS 68 10/06/2022    CO2 28 10/06/2022    ALT 19 10/06/2022    ANIONGAP 8 10/06/2022       TSH  Lab Results   Component Value Date    TSH 1.230 10/06/2022       Imaging:  Mammo Digital Screening Bilat w/ Tahir  Narrative: Result:   Mammo Digital Screening Bilat w/ Tahir     History:  Patient is 62 y.o. and is seen for a screening mammogram.    Films Compared:  Prior images (if available) were compared.     Findings:  This procedure was performed using tomosynthesis. Computer-aided detection   was utilized in the interpretation of this examination.  The breasts are almost entirely fatty. There is no evidence of suspicious   masses, calcifications, or other abnormal findings. There are   benign-appearing calcifications scattered throughout both breasts.   Impression: Bilateral  There is no mammographic evidence of malignancy.    BI-RADS Category:   Overall: 2 - Benign       Recommendation:  Routine screening mammogram in 1 year is recommended.    Your estimated lifetime risk of breast cancer (to age 85) based on   Tyrer-Cuzick risk assessment model is Tyrer-Cuzick: 15.86 %. According to   the [employer] Cancer Society, patients with a lifetime breast cancer risk   of 20% or higher might benefit from supplemental screening tests.      Assessment/Plan:    1. Gouty arthritis of right great toe  -     colchicine-probenecid 0.5-500 mg (CO-BENEMID) 500-0.5 mg Tab; Take 1 tablet by mouth once daily.  Dispense: 7 tablet; Refill: 0    2. Post-viral cough syndrome  -     methylPREDNISolone (MEDROL DOSEPACK) 4 mg tablet; use as directed  Dispense: 21 each; Refill: 0         Discussed how to  stay healthy including: diet, exercise, refraining from smoking and discussed screening exams / tests needed for age, sex and family Hx.      Sergio Pitt MD

## 2022-12-27 NOTE — TELEPHONE ENCOUNTER
----- Message from Mira Clemons sent at 12/27/2022  4:07 PM CST -----  Contact: PHARMACY  Type:  Pharmacy Calling to Clarify an RX    Name of Caller:  Pharmacy Name:  colchicine-probenecid 0.5-500 mg (CO-BENEMID) 500-0.5 mg Tab 7 tablet 0 12/27/2022  No  Sig - Route: Take 1 tablet by mouth once daily. - Oral  Sent to pharmacy as: colchicine-probenecid 0.5-500 mg (CO-BENEMID) 500-0.5 mg Tab  Class: Normal  Order: [id number]  Date/Time Signed: 12/27/2022 15:51      E-Prescribing Status: Receipt confirmed by pharmacy (12/27/2022  4:01 PM CST)    Pharmacy      [facility] - [address]   Associated Diagnoses      Gouty arthritis of right great toe  - Primary           Prescription Name:  What do they need to clarify?: HAS BEEN DISCONTINUED. ANYTHING ELSE ?  Best Call Back Number:  Additional Information:

## 2022-12-27 NOTE — TELEPHONE ENCOUNTER
Spoke to Celestine with the pharmacy. The medication colchicine-probenecid 0.5-500 mg (CO-BENEMID) 500-0.5 mg Tab has been off of the market for few years and the pharmacy is unable to order it.    Please send a different Rx to pharmacy.

## 2023-01-25 ENCOUNTER — OFFICE VISIT (OUTPATIENT)
Dept: FAMILY MEDICINE | Facility: CLINIC | Age: 63
End: 2023-01-25
Payer: MEDICARE

## 2023-01-25 VITALS
DIASTOLIC BLOOD PRESSURE: 76 MMHG | BODY MASS INDEX: 42.88 KG/M2 | WEIGHT: 282.94 LBS | HEIGHT: 68 IN | SYSTOLIC BLOOD PRESSURE: 128 MMHG | OXYGEN SATURATION: 96 % | HEART RATE: 102 BPM | TEMPERATURE: 98 F

## 2023-01-25 DIAGNOSIS — J06.9 ACUTE UPPER RESPIRATORY INFECTION, UNSPECIFIED: ICD-10-CM

## 2023-01-25 DIAGNOSIS — J40 BRONCHITIS: ICD-10-CM

## 2023-01-25 DIAGNOSIS — J30.2 SEASONAL ALLERGIES: ICD-10-CM

## 2023-01-25 DIAGNOSIS — J06.9 URI, ACUTE: Primary | ICD-10-CM

## 2023-01-25 PROCEDURE — 99499 UNLISTED E&M SERVICE: CPT | Mod: S$GLB,,, | Performed by: FAMILY MEDICINE

## 2023-01-25 PROCEDURE — 1159F MED LIST DOCD IN RCRD: CPT | Mod: CPTII,S$GLB,, | Performed by: FAMILY MEDICINE

## 2023-01-25 PROCEDURE — 3078F PR MOST RECENT DIASTOLIC BLOOD PRESSURE < 80 MM HG: ICD-10-PCS | Mod: CPTII,S$GLB,, | Performed by: FAMILY MEDICINE

## 2023-01-25 PROCEDURE — 3008F BODY MASS INDEX DOCD: CPT | Mod: CPTII,S$GLB,, | Performed by: FAMILY MEDICINE

## 2023-01-25 PROCEDURE — 1160F PR REVIEW ALL MEDS BY PRESCRIBER/CLIN PHARMACIST DOCUMENTED: ICD-10-PCS | Mod: CPTII,S$GLB,, | Performed by: FAMILY MEDICINE

## 2023-01-25 PROCEDURE — 1160F RVW MEDS BY RX/DR IN RCRD: CPT | Mod: CPTII,S$GLB,, | Performed by: FAMILY MEDICINE

## 2023-01-25 PROCEDURE — 3074F SYST BP LT 130 MM HG: CPT | Mod: CPTII,S$GLB,, | Performed by: FAMILY MEDICINE

## 2023-01-25 PROCEDURE — 1159F PR MEDICATION LIST DOCUMENTED IN MEDICAL RECORD: ICD-10-PCS | Mod: CPTII,S$GLB,, | Performed by: FAMILY MEDICINE

## 2023-01-25 PROCEDURE — 99213 PR OFFICE/OUTPT VISIT, EST, LEVL III, 20-29 MIN: ICD-10-PCS | Mod: S$GLB,,, | Performed by: FAMILY MEDICINE

## 2023-01-25 PROCEDURE — 99499 RISK ADDL DX/OHS AUDIT: ICD-10-PCS | Mod: S$GLB,,, | Performed by: FAMILY MEDICINE

## 2023-01-25 PROCEDURE — 3078F DIAST BP <80 MM HG: CPT | Mod: CPTII,S$GLB,, | Performed by: FAMILY MEDICINE

## 2023-01-25 PROCEDURE — 99213 OFFICE O/P EST LOW 20 MIN: CPT | Mod: S$GLB,,, | Performed by: FAMILY MEDICINE

## 2023-01-25 PROCEDURE — 3008F PR BODY MASS INDEX (BMI) DOCUMENTED: ICD-10-PCS | Mod: CPTII,S$GLB,, | Performed by: FAMILY MEDICINE

## 2023-01-25 PROCEDURE — 3074F PR MOST RECENT SYSTOLIC BLOOD PRESSURE < 130 MM HG: ICD-10-PCS | Mod: CPTII,S$GLB,, | Performed by: FAMILY MEDICINE

## 2023-01-25 RX ORDER — BENZONATATE 200 MG/1
CAPSULE ORAL
Qty: 30 CAPSULE | Refills: 4 | Status: SHIPPED | OUTPATIENT
Start: 2023-01-25

## 2023-01-25 RX ORDER — ALBUTEROL SULFATE 90 UG/1
2 AEROSOL, METERED RESPIRATORY (INHALATION) EVERY 6 HOURS PRN
Qty: 18 G | Refills: 3 | Status: SHIPPED | OUTPATIENT
Start: 2023-01-25

## 2023-01-25 RX ORDER — LORATADINE 10 MG/1
10 TABLET ORAL DAILY
Qty: 90 TABLET | Refills: 3 | Status: SHIPPED | OUTPATIENT
Start: 2023-01-25 | End: 2023-07-13 | Stop reason: SDUPTHER

## 2023-01-25 NOTE — PROGRESS NOTES
Patient, Debbie Vo (MRN #[id number]), presented with a recorded BMI of 43.02 kg/m^2 consistent with the definition of morbid obesity (ICD-10 E66.01). The patient's morbid obesity was monitored, evaluated, addressed and/or treated. This addendum to the medical record is made on 01/25/2023.

## 2023-01-25 NOTE — PROGRESS NOTES
Subjective:       Patient ID: Debbie Vo is a 62 y.o. female.    Chief Complaint: Cough    63 y/o female  with c/o cough for over a week, denies fever or chills, tested negative for covid on home test  Occasional wheezing at night    Vaccinated       Review of Systems    Objective:      Physical Exam  Vitals and nursing note reviewed.   Constitutional:       General: She is not in acute distress.     Appearance: Normal appearance. She is well-developed. She is not ill-appearing, toxic-appearing or diaphoretic.   HENT:      Head: Normocephalic and atraumatic.      Jaw: No trismus.      Right Ear: Hearing, tympanic membrane, ear canal and external ear normal.      Left Ear: Hearing, tympanic membrane, ear canal and external ear normal.      Nose: Nose normal. No nasal deformity, mucosal edema or rhinorrhea.      Right Sinus: No maxillary sinus tenderness or frontal sinus tenderness.      Left Sinus: No maxillary sinus tenderness or frontal sinus tenderness.      Mouth/Throat:      Mouth: Mucous membranes are moist.      Dentition: Normal dentition.      Pharynx: Oropharynx is clear. Uvula midline. No oropharyngeal exudate or uvula swelling.   Eyes:      General: Lids are normal. No scleral icterus.        Right eye: No discharge.         Left eye: No discharge.      Extraocular Movements: Extraocular movements intact.      Conjunctiva/sclera: Conjunctivae normal.      Pupils: Pupils are equal, round, and reactive to light.      Comments: Sclera clear bilat   Neck:      Trachea: Trachea and phonation normal.   Cardiovascular:      Rate and Rhythm: Normal rate and regular rhythm.      Pulses: Normal pulses.      Heart sounds: Normal heart sounds.   Pulmonary:      Effort: Pulmonary effort is normal. No respiratory distress.      Breath sounds: Normal breath sounds.   Abdominal:      General: Bowel sounds are normal. There is no distension.      Palpations: Abdomen is soft. There is no mass or pulsatile  mass.      Tenderness: There is no abdominal tenderness.   Musculoskeletal:         General: No deformity. Normal range of motion.      Cervical back: Full passive range of motion without pain, normal range of motion and neck supple.   Skin:     General: Skin is warm and dry.      Coloration: Skin is not pale.   Neurological:      Mental Status: She is alert and oriented to person, place, and time.      Motor: No abnormal muscle tone.      Coordination: Coordination normal.   Psychiatric:         Speech: Speech normal.         Behavior: Behavior normal. Behavior is cooperative.         Thought Content: Thought content normal.         Judgment: Judgment normal.       Assessment:       No diagnosis found.    Plan:        Debbie was seen today for cough.    Diagnoses and all orders for this visit:    URI, acute    Bronchitis    Seasonal allergies  -     loratadine (CLARITIN) 10 mg tablet; Take 1 tablet (10 mg total) by mouth once daily.    Acute upper respiratory infection, unspecified  -     benzonatate (TESSALON) 200 MG capsule; take 1 BY MOUTH THREE TIMES DAILY AS NEEDED FOR COUGH    Other orders  -     albuterol (VENTOLIN HFA) 90 mcg/actuation inhaler; Inhale 2 puffs into the lungs every 6 (six) hours as needed for Wheezing. Rescue

## 2023-02-09 ENCOUNTER — TELEPHONE (OUTPATIENT)
Dept: ORTHOPEDICS | Facility: CLINIC | Age: 63
End: 2023-02-09
Payer: MEDICARE

## 2023-02-09 NOTE — TELEPHONE ENCOUNTER
----- Message from Margaret Manuel sent at 2/9/2023 10:55 AM CST -----  PT REQUESTING TO SCHEDULE AN APPT     PT is requesting to schedule an appointment that our scheduling dept cannot schedule.    Who called: pt  Best call back #: [phone]  When pt wants appt: tomorrow for 11am or 1pm; if not then any day and any time  Reason for appt: knee injection  Additional notes:

## 2023-02-13 ENCOUNTER — OFFICE VISIT (OUTPATIENT)
Dept: ORTHOPEDICS | Facility: CLINIC | Age: 63
End: 2023-02-13
Payer: MEDICARE

## 2023-02-13 VITALS
HEIGHT: 68 IN | DIASTOLIC BLOOD PRESSURE: 101 MMHG | WEIGHT: 293 LBS | BODY MASS INDEX: 44.41 KG/M2 | SYSTOLIC BLOOD PRESSURE: 153 MMHG | HEART RATE: 144 BPM

## 2023-02-13 DIAGNOSIS — M17.11 PRIMARY OSTEOARTHRITIS OF RIGHT KNEE: Primary | ICD-10-CM

## 2023-02-13 PROCEDURE — 3008F BODY MASS INDEX DOCD: CPT | Mod: CPTII,S$GLB,, | Performed by: PHYSICIAN ASSISTANT

## 2023-02-13 PROCEDURE — 3077F SYST BP >= 140 MM HG: CPT | Mod: CPTII,S$GLB,, | Performed by: PHYSICIAN ASSISTANT

## 2023-02-13 PROCEDURE — 3008F PR BODY MASS INDEX (BMI) DOCUMENTED: ICD-10-PCS | Mod: CPTII,S$GLB,, | Performed by: PHYSICIAN ASSISTANT

## 2023-02-13 PROCEDURE — 99499 NO LOS: ICD-10-PCS | Mod: S$GLB,,, | Performed by: PHYSICIAN ASSISTANT

## 2023-02-13 PROCEDURE — 20610 LARGE JOINT ASPIRATION/INJECTION: R KNEE: ICD-10-PCS | Mod: RT,S$GLB,, | Performed by: PHYSICIAN ASSISTANT

## 2023-02-13 PROCEDURE — 1160F PR REVIEW ALL MEDS BY PRESCRIBER/CLIN PHARMACIST DOCUMENTED: ICD-10-PCS | Mod: CPTII,S$GLB,, | Performed by: PHYSICIAN ASSISTANT

## 2023-02-13 PROCEDURE — 20610 DRAIN/INJ JOINT/BURSA W/O US: CPT | Mod: RT,S$GLB,, | Performed by: PHYSICIAN ASSISTANT

## 2023-02-13 PROCEDURE — 1160F RVW MEDS BY RX/DR IN RCRD: CPT | Mod: CPTII,S$GLB,, | Performed by: PHYSICIAN ASSISTANT

## 2023-02-13 PROCEDURE — 1159F MED LIST DOCD IN RCRD: CPT | Mod: CPTII,S$GLB,, | Performed by: PHYSICIAN ASSISTANT

## 2023-02-13 PROCEDURE — 99999 PR PBB SHADOW E&M-EST. PATIENT-LVL III: ICD-10-PCS | Mod: PBBFAC,,, | Performed by: PHYSICIAN ASSISTANT

## 2023-02-13 PROCEDURE — 99999 PR PBB SHADOW E&M-EST. PATIENT-LVL III: CPT | Mod: PBBFAC,,, | Performed by: PHYSICIAN ASSISTANT

## 2023-02-13 PROCEDURE — 3080F DIAST BP >= 90 MM HG: CPT | Mod: CPTII,S$GLB,, | Performed by: PHYSICIAN ASSISTANT

## 2023-02-13 PROCEDURE — 3077F PR MOST RECENT SYSTOLIC BLOOD PRESSURE >= 140 MM HG: ICD-10-PCS | Mod: CPTII,S$GLB,, | Performed by: PHYSICIAN ASSISTANT

## 2023-02-13 PROCEDURE — 1159F PR MEDICATION LIST DOCUMENTED IN MEDICAL RECORD: ICD-10-PCS | Mod: CPTII,S$GLB,, | Performed by: PHYSICIAN ASSISTANT

## 2023-02-13 PROCEDURE — 3080F PR MOST RECENT DIASTOLIC BLOOD PRESSURE >= 90 MM HG: ICD-10-PCS | Mod: CPTII,S$GLB,, | Performed by: PHYSICIAN ASSISTANT

## 2023-02-13 PROCEDURE — 99499 UNLISTED E&M SERVICE: CPT | Mod: S$GLB,,, | Performed by: PHYSICIAN ASSISTANT

## 2023-02-13 RX ORDER — TRIAMCINOLONE ACETONIDE 40 MG/ML
40 INJECTION, SUSPENSION INTRA-ARTICULAR; INTRAMUSCULAR
Status: DISCONTINUED | OUTPATIENT
Start: 2023-02-13 | End: 2023-02-13 | Stop reason: HOSPADM

## 2023-02-13 RX ADMIN — TRIAMCINOLONE ACETONIDE 40 MG: 40 INJECTION, SUSPENSION INTRA-ARTICULAR; INTRAMUSCULAR at 02:02

## 2023-02-13 NOTE — PROCEDURES
Large Joint Aspiration/Injection: R knee    Date/Time: 2/13/2023 2:45 PM  Performed by: Arely Gale PA-C  Authorized by: Arely Gale PA-C     Consent Done?:  Yes (Verbal)  Indications:  Pain  Site marked: the procedure site was marked    Timeout: prior to procedure the correct patient, procedure, and site was verified    Prep: patient was prepped and draped in usual sterile fashion    Local anesthesia used?: No    Local anesthetic:  Topical anesthetic and lidocaine 1% without epinephrine    Details:  Needle Size:  22 G  Ultrasonic Guidance for needle placement?: No    Approach:  Anterolateral  Location:  Knee  Site:  R knee  Medications:  40 mg triamcinolone acetonide 40 mg/mL  Patient tolerance:  Patient tolerated the procedure well with no immediate complications

## 2023-02-13 NOTE — PROGRESS NOTES
Subjective:      Patient ID: Debbie Vo is a 62 y.o. female.    Chief Complaint: Pain of the Right Knee      62-year-old female follow-up severe right knee osteoarthritis.  She gets about 3 month relief from corticosteroid injection.  Minimal relief from Toradol.  She is requesting a repeat corticosteroid injection today.      Review of Systems   Constitutional: Negative for chills and fever.   Cardiovascular:  Negative for chest pain.   Respiratory:  Negative for cough.    Hematologic/Lymphatic: Does not bruise/bleed easily.   Skin:  Negative for poor wound healing and rash.   Musculoskeletal:  Positive for joint pain, myalgias and stiffness.   Gastrointestinal:  Negative for abdominal pain.   Genitourinary:  Negative for bladder incontinence.   Neurological:  Negative for dizziness, loss of balance and weakness.   Psychiatric/Behavioral:  Negative for altered mental status.      Review of patient's allergies indicates:  No Known Allergies     Current Outpatient Medications   Medication Sig Dispense Refill    albuterol (VENTOLIN HFA) 90 mcg/actuation inhaler Inhale 2 puffs into the lungs every 6 (six) hours as needed for Wheezing. Rescue 18 g 3    amLODIPine (NORVASC) 5 MG tablet Take 1 tablet (5 mg total) by mouth once daily. 90 tablet 4    ammonium lactate (LAC-HYDRIN) 12 % lotion Apply to damp skin after bathing 225 g 11    benzonatate (TESSALON) 200 MG capsule take 1 BY MOUTH THREE TIMES DAILY AS NEEDED FOR COUGH 30 capsule 4    colchicine (COLCRYS) 0.6 mg tablet Take 1 tablet (0.6 mg total) by mouth once daily. 7 tablet 0    diclofenac (VOLTAREN) 75 MG EC tablet Take 1 tablet (75 mg total) by mouth 2 (two) times daily. 60 tablet 3    FLUoxetine 20 MG capsule Take 1 capsule (20 mg total) by mouth once daily. 90 capsule 4    fluticasone propionate (FLONASE) 50 mcg/actuation nasal spray 1 spray (50 mcg total) by Each Nostril route once daily. 10 mL 5    gabapentin (NEURONTIN) 300 MG capsule Take 1  "capsule (300 mg total) by mouth 3 (three) times daily as needed (pain). 270 capsule 3    loratadine (CLARITIN) 10 mg tablet Take 1 tablet (10 mg total) by mouth once daily. 90 tablet 3    meclizine (ANTIVERT) 25 mg tablet Take 1 tablet (25 mg total) by mouth 3 (three) times daily as needed. 50 tablet 2    metoprolol succinate (TOPROL-XL) 100 MG 24 hr tablet Take 1 tablet (100 mg total) by mouth once daily. 90 tablet 4    neomycin-polymyxin-hydrocortisone (CORTISPORIN) 3.5-10,000-0.5 mg/g-unit/g-% cream Apply topically 2 (two) times daily. 10 g 2    neomycin-polymyxin-hydrocortisone (CORTISPORIN) 3.5-10,000-1 mg/mL-unit/mL-% otic suspension Place 3 drops into both ears 3 (three) times daily. 10 mL 0    omeprazole (PRILOSEC) 20 MG capsule Take 1 capsule (20 mg total) by mouth once daily. 90 capsule 3    topiramate (TOPAMAX) 50 MG tablet Take 1 tablet (50 mg total) by mouth every evening. 90 tablet 4    triamcinolone acetonide 0.1% (KENALOG) 0.1 % cream Apply topically 2 (two) times daily as needed (dryness or itching). 80 g 3    valsartan (DIOVAN) 320 MG tablet Take 1 tablet (320 mg total) by mouth once daily. 90 tablet 3     No current facility-administered medications for this visit.        The patient's relevant past medical, surgical, and social history was reviewed in Epic.       Objective:      VITAL SIGNS: BP (!) 153/101   Pulse (!) 144   Ht 5' 8" (1.727 m)   Wt 133.1 kg (293 lb 6.9 oz)   BMI 44.62 kg/m²     Ortho/SPM Exam         Assessment:       1. Primary osteoarthritis of right knee          Plan:         Debbie was seen today for pain.    Diagnoses and all orders for this visit:    Primary osteoarthritis of right knee  -     Large Joint Aspiration/Injection: R knee    Repeat right knee corticosteroid injection given today.  Gave her information about Zilretta that she may request in the future if needed.    Diagnoses and plan discussed with the patient, as well as the expected course and duration of " his symptoms.  All questions and concerns were addressed prior to the end of the visit.   Instructed patient to call office if they have any future questions/concerns or to schedule apt. Patient will return to see me if symptoms worsen or fail to improve    Note dictated with voice recognition software, please excuse any grammatical errors.        Arely Gale PA-C   02/13/2023

## 2023-02-27 ENCOUNTER — TELEPHONE (OUTPATIENT)
Dept: FAMILY MEDICINE | Facility: CLINIC | Age: 63
End: 2023-02-27
Payer: MEDICARE

## 2023-02-27 NOTE — TELEPHONE ENCOUNTER
Spoke to pt. Pt stated that she went to UC and is currently awaiting to be seen as she is in a lot of pain.

## 2023-02-27 NOTE — TELEPHONE ENCOUNTER
----- Message from Margaret Manuel sent at 2/27/2023  9:35 AM CST -----  Pt Requesting Call Back    Who called: pt  Who called for pt:  Best call back #: [phone]  Add notes: pt says it's concerning gout

## 2023-05-16 DIAGNOSIS — M17.11 PRIMARY OSTEOARTHRITIS OF RIGHT KNEE: Primary | ICD-10-CM

## 2023-05-23 ENCOUNTER — OFFICE VISIT (OUTPATIENT)
Dept: ORTHOPEDICS | Facility: CLINIC | Age: 63
End: 2023-05-23
Payer: MEDICARE

## 2023-05-23 VITALS
HEART RATE: 86 BPM | SYSTOLIC BLOOD PRESSURE: 147 MMHG | DIASTOLIC BLOOD PRESSURE: 90 MMHG | BODY MASS INDEX: 44.1 KG/M2 | HEIGHT: 68 IN | WEIGHT: 291 LBS

## 2023-05-23 DIAGNOSIS — M17.11 PRIMARY OSTEOARTHRITIS OF RIGHT KNEE: Primary | ICD-10-CM

## 2023-05-23 PROCEDURE — 20610 DRAIN/INJ JOINT/BURSA W/O US: CPT | Mod: RT,,, | Performed by: PHYSICIAN ASSISTANT

## 2023-05-23 PROCEDURE — 99499 UNLISTED E&M SERVICE: CPT | Mod: ,,, | Performed by: PHYSICIAN ASSISTANT

## 2023-05-23 PROCEDURE — 99213 OFFICE O/P EST LOW 20 MIN: CPT | Mod: PN | Performed by: PHYSICIAN ASSISTANT

## 2023-05-23 PROCEDURE — 99999 PR PBB SHADOW E&M-EST. PATIENT-LVL III: CPT | Mod: PBBFAC,,, | Performed by: PHYSICIAN ASSISTANT

## 2023-05-23 PROCEDURE — 20610 LARGE JOINT ASPIRATION/INJECTION: R KNEE: ICD-10-PCS | Mod: RT,,, | Performed by: PHYSICIAN ASSISTANT

## 2023-05-23 PROCEDURE — 99999 PR PBB SHADOW E&M-EST. PATIENT-LVL III: ICD-10-PCS | Mod: PBBFAC,,, | Performed by: PHYSICIAN ASSISTANT

## 2023-05-23 PROCEDURE — 99499 NO LOS: ICD-10-PCS | Mod: ,,, | Performed by: PHYSICIAN ASSISTANT

## 2023-05-23 NOTE — PROGRESS NOTES
Subjective:      Patient ID: Debbie Vo is a 62 y.o. female.    Chief Complaint: Pain of the Right Knee      Patient is here for First Zilretta  injection(s) for right knee osteoarthritis. Patient tolerated last injection well.Min relief from short-acting steroid and toradol. No new complaints today.         Review of Systems   Constitutional: Negative for chills and fever.   Cardiovascular:  Negative for chest pain.   Respiratory:  Negative for cough.    Hematologic/Lymphatic: Does not bruise/bleed easily.   Skin:  Negative for poor wound healing and rash.   Musculoskeletal:  Positive for joint pain, myalgias and stiffness.   Gastrointestinal:  Negative for abdominal pain.   Genitourinary:  Negative for bladder incontinence.   Neurological:  Negative for dizziness, loss of balance and weakness.   Psychiatric/Behavioral:  Negative for altered mental status.      Review of patient's allergies indicates:  No Known Allergies     Current Outpatient Medications   Medication Sig Dispense Refill    albuterol (VENTOLIN HFA) 90 mcg/actuation inhaler Inhale 2 puffs into the lungs every 6 (six) hours as needed for Wheezing. Rescue 18 g 3    amLODIPine (NORVASC) 5 MG tablet Take 1 tablet (5 mg total) by mouth once daily. 90 tablet 4    ammonium lactate (LAC-HYDRIN) 12 % lotion Apply to damp skin after bathing 225 g 11    benzonatate (TESSALON) 200 MG capsule take 1 BY MOUTH THREE TIMES DAILY AS NEEDED FOR COUGH 30 capsule 4    colchicine (COLCRYS) 0.6 mg tablet Take 1 tablet (0.6 mg total) by mouth once daily. 7 tablet 0    diclofenac (VOLTAREN) 75 MG EC tablet Take 1 tablet (75 mg total) by mouth 2 (two) times daily. 60 tablet 3    FLUoxetine 20 MG capsule Take 1 capsule (20 mg total) by mouth once daily. 90 capsule 4    fluticasone propionate (FLONASE) 50 mcg/actuation nasal spray 1 spray (50 mcg total) by Each Nostril route once daily. 10 mL 5    gabapentin (NEURONTIN) 300 MG capsule Take 1 capsule (300 mg  "total) by mouth 3 (three) times daily as needed (pain). 270 capsule 3    loratadine (CLARITIN) 10 mg tablet Take 1 tablet (10 mg total) by mouth once daily. 90 tablet 3    meclizine (ANTIVERT) 25 mg tablet Take 1 tablet (25 mg total) by mouth 3 (three) times daily as needed. 50 tablet 2    neomycin-polymyxin-hydrocortisone (CORTISPORIN) 3.5-10,000-0.5 mg/g-unit/g-% cream Apply topically 2 (two) times daily. 10 g 2    neomycin-polymyxin-hydrocortisone (CORTISPORIN) 3.5-10,000-1 mg/mL-unit/mL-% otic suspension Place 3 drops into both ears 3 (three) times daily. 10 mL 0    omeprazole (PRILOSEC) 20 MG capsule Take 1 capsule (20 mg total) by mouth once daily. 90 capsule 3    topiramate (TOPAMAX) 50 MG tablet Take 1 tablet (50 mg total) by mouth every evening. 90 tablet 4    triamcinolone acetonide 0.1% (KENALOG) 0.1 % cream Apply topically 2 (two) times daily as needed (dryness or itching). 80 g 3    metoprolol succinate (TOPROL-XL) 100 MG 24 hr tablet Take 1 tablet (100 mg total) by mouth once daily. 90 tablet 4    valsartan (DIOVAN) 320 MG tablet Take 1 tablet (320 mg total) by mouth once daily. 90 tablet 3     No current facility-administered medications for this visit.        The patient's relevant past medical, surgical, and social history was reviewed in Epic.       Objective:      VITAL SIGNS: BP (!) 147/90   Pulse 86   Ht 5' 8" (1.727 m)   Wt 132 kg (291 lb 0.1 oz)   BMI 44.25 kg/m²     Ortho/SPM Exam         Assessment:       1. Primary osteoarthritis of right knee          Plan:         Debbie was seen today for pain.    Diagnoses and all orders for this visit:    Primary osteoarthritis of right knee  -     Large Joint Aspiration/Injection: R knee    I injected the right knee with one dose of Zilretta  today.  We will see the patient back in 6 mos or as needed.  Consider gel injection or Iovera next if no relief.     Diagnoses and plan discussed with the patient, as well as the expected course and duration " of his symptoms.  All questions and concerns were addressed prior to the end of the visit.   Instructed patient to call office if they have any future questions/concerns or to schedule apt. Patient will return to see me if symptoms worsen or fail to improve    Note dictated with voice recognition software, please excuse any grammatical errors.        Arely Gale PA-C   05/23/2023

## 2023-05-23 NOTE — PROCEDURES
Large Joint Aspiration/Injection: R knee    Date/Time: 5/23/2023 9:45 AM  Performed by: Arely Gale PA-C  Authorized by: Arely Gale PA-C     Consent Done?:  Yes (Verbal)  Indications:  Arthritis  Site marked: the procedure site was marked    Timeout: prior to procedure the correct patient, procedure, and site was verified    Prep: patient was prepped and draped in usual sterile fashion      Local anesthesia used?: Yes    Local anesthetic:  Topical anesthetic    Details:  Needle Size:  22 G  Ultrasonic Guidance for needle placement?: No    Approach:  Anterolateral  Location:  Knee  Site:  R knee  Medications:  32 mg triamcinolone acetonide 32 mg  Patient tolerance:  Patient tolerated the procedure well with no immediate complications

## 2023-06-07 DIAGNOSIS — I10 ESSENTIAL HYPERTENSION: ICD-10-CM

## 2023-06-07 NOTE — TELEPHONE ENCOUNTER
----- Message from Margaret Manuel sent at 6/7/2023  9:52 AM CDT -----  Type:  Sooner Apoointment Request    Caller is requesting a sooner appointment.  Caller declined first available appointment listed below.  Caller will not accept being placed on the waitlist and is requesting a message be sent to doctor.  Name of Caller: pt  When is the first available appointment? 6/29  Symptoms: gout; refill on blood pressure med  Would the patient rather a call back or a response via [facility]? call  Best Call Back Number: [phone]  Additional Information:

## 2023-06-07 NOTE — TELEPHONE ENCOUNTER
Care Due:                  Date            Visit Type   Department     Provider  --------------------------------------------------------------------------------                                EP -                              PRIMARY      [facility] FAMILY  Last Visit: 12-      CARE ([facility])   MEDICINE       Sergio Pitt                              EP -                              PRIMARY      [facility] FAM[facility]  Next Visit: 06-      CARE ([facility])   MEDICINE       Sergio Pitt                                                            Last  Test          Frequency    Reason                     Performed    Due Date  --------------------------------------------------------------------------------    Uric Acid...  12 months..  colchicine...............  Not Found    Overdue    [employer] Embedded Care Due Messages. Reference number: [id number].   6/07/2023 12:33:00 PM CDT

## 2023-06-07 NOTE — TELEPHONE ENCOUNTER
Spoke to pt. Pt stated that her gout flare up started on Monday and it has just been getting worse. I scheduled the pt for the next available appt, 06/29. Pt is asking for a refill on diclofenac and BP meds.     Pt is also asking for medications to help with her current gout attack.

## 2023-06-09 RX ORDER — AMLODIPINE BESYLATE 5 MG/1
5 TABLET ORAL DAILY
Qty: 90 TABLET | Refills: 4 | Status: SHIPPED | OUTPATIENT
Start: 2023-06-09 | End: 2023-06-29 | Stop reason: SDUPTHER

## 2023-06-09 RX ORDER — DICLOFENAC SODIUM 75 MG/1
75 TABLET, DELAYED RELEASE ORAL 2 TIMES DAILY
Qty: 60 TABLET | Refills: 3 | Status: SHIPPED | OUTPATIENT
Start: 2023-06-09 | End: 2023-06-29 | Stop reason: SDUPTHER

## 2023-06-09 RX ORDER — VALSARTAN 320 MG/1
320 TABLET ORAL DAILY
Qty: 90 TABLET | Refills: 3 | Status: SHIPPED | OUTPATIENT
Start: 2023-06-09 | End: 2023-06-29 | Stop reason: SDUPTHER

## 2023-06-29 ENCOUNTER — OFFICE VISIT (OUTPATIENT)
Dept: FAMILY MEDICINE | Facility: CLINIC | Age: 63
End: 2023-06-29
Payer: MEDICARE

## 2023-06-29 VITALS
OXYGEN SATURATION: 94 % | TEMPERATURE: 98 F | HEIGHT: 68 IN | BODY MASS INDEX: 43.53 KG/M2 | SYSTOLIC BLOOD PRESSURE: 122 MMHG | HEART RATE: 119 BPM | DIASTOLIC BLOOD PRESSURE: 88 MMHG | WEIGHT: 287.25 LBS

## 2023-06-29 DIAGNOSIS — M54.16 LUMBAR RADICULOPATHY, CHRONIC: ICD-10-CM

## 2023-06-29 DIAGNOSIS — R73.9 HYPERGLYCEMIA: ICD-10-CM

## 2023-06-29 DIAGNOSIS — R23.2 HOT FLASHES: ICD-10-CM

## 2023-06-29 DIAGNOSIS — D69.2 SENILE PURPURA: ICD-10-CM

## 2023-06-29 DIAGNOSIS — I10 ESSENTIAL HYPERTENSION: ICD-10-CM

## 2023-06-29 DIAGNOSIS — M10.9 ACUTE GOUT INVOLVING TOE OF RIGHT FOOT, UNSPECIFIED CAUSE: Primary | ICD-10-CM

## 2023-06-29 DIAGNOSIS — F33.42 RECURRENT MAJOR DEPRESSIVE DISORDER, IN FULL REMISSION: ICD-10-CM

## 2023-06-29 DIAGNOSIS — K21.9 GASTROESOPHAGEAL REFLUX DISEASE WITHOUT ESOPHAGITIS: ICD-10-CM

## 2023-06-29 DIAGNOSIS — B02.29 POST HERPETIC NEURALGIA: ICD-10-CM

## 2023-06-29 DIAGNOSIS — Z13.6 SCREENING FOR CARDIOVASCULAR CONDITION: ICD-10-CM

## 2023-06-29 PROCEDURE — 4010F ACE/ARB THERAPY RXD/TAKEN: CPT | Mod: CPTII,S$GLB,, | Performed by: STUDENT IN AN ORGANIZED HEALTH CARE EDUCATION/TRAINING PROGRAM

## 2023-06-29 PROCEDURE — 1159F MED LIST DOCD IN RCRD: CPT | Mod: CPTII,S$GLB,, | Performed by: STUDENT IN AN ORGANIZED HEALTH CARE EDUCATION/TRAINING PROGRAM

## 2023-06-29 PROCEDURE — 1160F RVW MEDS BY RX/DR IN RCRD: CPT | Mod: CPTII,S$GLB,, | Performed by: STUDENT IN AN ORGANIZED HEALTH CARE EDUCATION/TRAINING PROGRAM

## 2023-06-29 PROCEDURE — 1160F PR REVIEW ALL MEDS BY PRESCRIBER/CLIN PHARMACIST DOCUMENTED: ICD-10-PCS | Mod: CPTII,S$GLB,, | Performed by: STUDENT IN AN ORGANIZED HEALTH CARE EDUCATION/TRAINING PROGRAM

## 2023-06-29 PROCEDURE — 3008F BODY MASS INDEX DOCD: CPT | Mod: CPTII,S$GLB,, | Performed by: STUDENT IN AN ORGANIZED HEALTH CARE EDUCATION/TRAINING PROGRAM

## 2023-06-29 PROCEDURE — 3074F SYST BP LT 130 MM HG: CPT | Mod: CPTII,S$GLB,, | Performed by: STUDENT IN AN ORGANIZED HEALTH CARE EDUCATION/TRAINING PROGRAM

## 2023-06-29 PROCEDURE — 3008F PR BODY MASS INDEX (BMI) DOCUMENTED: ICD-10-PCS | Mod: CPTII,S$GLB,, | Performed by: STUDENT IN AN ORGANIZED HEALTH CARE EDUCATION/TRAINING PROGRAM

## 2023-06-29 PROCEDURE — 99214 OFFICE O/P EST MOD 30 MIN: CPT | Mod: S$GLB,,, | Performed by: STUDENT IN AN ORGANIZED HEALTH CARE EDUCATION/TRAINING PROGRAM

## 2023-06-29 PROCEDURE — 1159F PR MEDICATION LIST DOCUMENTED IN MEDICAL RECORD: ICD-10-PCS | Mod: CPTII,S$GLB,, | Performed by: STUDENT IN AN ORGANIZED HEALTH CARE EDUCATION/TRAINING PROGRAM

## 2023-06-29 PROCEDURE — 99214 PR OFFICE/OUTPT VISIT, EST, LEVL IV, 30-39 MIN: ICD-10-PCS | Mod: S$GLB,,, | Performed by: STUDENT IN AN ORGANIZED HEALTH CARE EDUCATION/TRAINING PROGRAM

## 2023-06-29 PROCEDURE — 3074F PR MOST RECENT SYSTOLIC BLOOD PRESSURE < 130 MM HG: ICD-10-PCS | Mod: CPTII,S$GLB,, | Performed by: STUDENT IN AN ORGANIZED HEALTH CARE EDUCATION/TRAINING PROGRAM

## 2023-06-29 PROCEDURE — 3079F PR MOST RECENT DIASTOLIC BLOOD PRESSURE 80-89 MM HG: ICD-10-PCS | Mod: CPTII,S$GLB,, | Performed by: STUDENT IN AN ORGANIZED HEALTH CARE EDUCATION/TRAINING PROGRAM

## 2023-06-29 PROCEDURE — 4010F PR ACE/ARB THEARPY RXD/TAKEN: ICD-10-PCS | Mod: CPTII,S$GLB,, | Performed by: STUDENT IN AN ORGANIZED HEALTH CARE EDUCATION/TRAINING PROGRAM

## 2023-06-29 PROCEDURE — 3079F DIAST BP 80-89 MM HG: CPT | Mod: CPTII,S$GLB,, | Performed by: STUDENT IN AN ORGANIZED HEALTH CARE EDUCATION/TRAINING PROGRAM

## 2023-06-29 RX ORDER — AMLODIPINE BESYLATE 5 MG/1
5 TABLET ORAL DAILY
Qty: 90 TABLET | Refills: 4 | Status: SHIPPED | OUTPATIENT
Start: 2023-06-29 | End: 2024-03-20 | Stop reason: SDUPTHER

## 2023-06-29 RX ORDER — DICLOFENAC SODIUM 75 MG/1
75 TABLET, DELAYED RELEASE ORAL 2 TIMES DAILY
Qty: 60 TABLET | Refills: 3 | Status: SHIPPED | OUTPATIENT
Start: 2023-06-29 | End: 2023-09-07

## 2023-06-29 RX ORDER — GABAPENTIN 300 MG/1
300 CAPSULE ORAL 3 TIMES DAILY PRN
Qty: 270 CAPSULE | Refills: 3 | Status: SHIPPED | OUTPATIENT
Start: 2023-06-29 | End: 2024-03-20 | Stop reason: SDUPTHER

## 2023-06-29 RX ORDER — METOPROLOL SUCCINATE 100 MG/1
100 TABLET, EXTENDED RELEASE ORAL DAILY
Qty: 90 TABLET | Refills: 4 | Status: SHIPPED | OUTPATIENT
Start: 2023-06-29 | End: 2024-03-20 | Stop reason: SDUPTHER

## 2023-06-29 RX ORDER — PROBENECID AND COLCHICINE .5; 5 MG/1; MG/1
1 TABLET ORAL DAILY
Qty: 7 TABLET | Refills: 2 | Status: SHIPPED | OUTPATIENT
Start: 2023-06-29 | End: 2023-06-30 | Stop reason: SDUPTHER

## 2023-06-29 RX ORDER — VALSARTAN 320 MG/1
320 TABLET ORAL DAILY
Qty: 90 TABLET | Refills: 3 | Status: SHIPPED | OUTPATIENT
Start: 2023-06-29 | End: 2024-03-20 | Stop reason: SDUPTHER

## 2023-06-29 RX ORDER — FLUOXETINE HYDROCHLORIDE 20 MG/1
20 CAPSULE ORAL DAILY
Qty: 90 CAPSULE | Refills: 4 | Status: SHIPPED | OUTPATIENT
Start: 2023-06-29 | End: 2024-03-20 | Stop reason: SDUPTHER

## 2023-06-29 RX ORDER — TOPIRAMATE 50 MG/1
50 TABLET, FILM COATED ORAL NIGHTLY
Qty: 90 TABLET | Refills: 4 | Status: SHIPPED | OUTPATIENT
Start: 2023-06-29 | End: 2024-03-20 | Stop reason: SDUPTHER

## 2023-06-29 RX ORDER — ALLOPURINOL 300 MG/1
300 TABLET ORAL DAILY
Qty: 90 TABLET | Refills: 3 | Status: SHIPPED | OUTPATIENT
Start: 2023-06-29 | End: 2024-03-20 | Stop reason: SDUPTHER

## 2023-06-29 RX ORDER — OMEPRAZOLE 20 MG/1
20 CAPSULE, DELAYED RELEASE ORAL DAILY
Qty: 90 CAPSULE | Refills: 3 | Status: SHIPPED | OUTPATIENT
Start: 2023-06-29 | End: 2024-03-20 | Stop reason: SDUPTHER

## 2023-06-30 ENCOUNTER — TELEPHONE (OUTPATIENT)
Dept: FAMILY MEDICINE | Facility: CLINIC | Age: 63
End: 2023-06-30
Payer: MEDICARE

## 2023-06-30 RX ORDER — PROBENECID AND COLCHICINE .5; 5 MG/1; MG/1
1 TABLET ORAL DAILY
Qty: 7 TABLET | Refills: 2 | Status: SHIPPED | OUTPATIENT
Start: 2023-06-30 | End: 2024-02-09 | Stop reason: SDUPTHER

## 2023-06-30 NOTE — TELEPHONE ENCOUNTER
Prescription has been sent to: [facility]  [place], [place] - [address] per patient's request.

## 2023-06-30 NOTE — TELEPHONE ENCOUNTER
Patient is requesting to have Co-Benemid sent to new pharmacy below. Previous pharmacy said they do not carry the medication anymore.   -------------------------------------------------------------------------------    ----- Message from Mulu Giron sent at 6/30/2023  1:50 PM CDT -----  Regarding: pharmacy change  Contact: [phone]    Patient is requesting a call to get colchicine-probenecid 0.5-500 mg (CO-BENEMID) 500-0.5 mg Tab sent to different pharmacy     Symptoms/issue: previous pharmacy does not carry rx    Pharmacy: [facility] [place] [id number] [[id number]]  Call back number: [phone]      Additional Information: pt would also like a call to discuss medication

## 2023-07-01 PROBLEM — M46.1 SACROILIITIS: Status: RESOLVED | Noted: 2021-08-25 | Resolved: 2023-07-01

## 2023-07-01 NOTE — PROGRESS NOTES
Patient ID: Debbie Vo is a 63 y.o. female.     Chief Complaint: Annual Exam    Toe Pain   There was no injury mechanism. Pain location: right great toe. The quality of the pain is described as aching. The pain is moderate. The pain has been Constant since onset. Pertinent negatives include no inability to bear weight, loss of motion or numbness. The symptoms are aggravated by movement and weight bearing. She has tried acetaminophen for the symptoms. The treatment provided no relief.        Review of Systems  Review of Systems   Constitutional:  Negative for fever.   HENT:  Negative for ear pain and sinus pain.    Eyes:  Negative for discharge.   Respiratory:  Negative for cough and shortness of breath.    Cardiovascular:  Negative for chest pain and leg swelling.   Gastrointestinal:  Negative for diarrhea, nausea and vomiting.   Genitourinary:  Negative for urgency.   Musculoskeletal:  Negative for myalgias.   Skin:  Negative for rash.   Neurological:  Negative for weakness, numbness and headaches.   Psychiatric/Behavioral:  Negative for depression.    All other systems reviewed and are negative.    Currently Medications  Current Outpatient Medications on File Prior to Visit   Medication Sig Dispense Refill    albuterol (VENTOLIN HFA) 90 mcg/actuation inhaler Inhale 2 puffs into the lungs every 6 (six) hours as needed for Wheezing. Rescue 18 g 3    ammonium lactate (LAC-HYDRIN) 12 % lotion Apply to damp skin after bathing 225 g 11    benzonatate (TESSALON) 200 MG capsule take 1 BY MOUTH THREE TIMES DAILY AS NEEDED FOR COUGH 30 capsule 4    colchicine (COLCRYS) 0.6 mg tablet Take 1 tablet (0.6 mg total) by mouth once daily. 7 tablet 0    fluticasone propionate (FLONASE) 50 mcg/actuation nasal spray 1 spray (50 mcg total) by Each Nostril route once daily. 10 mL 5    loratadine (CLARITIN) 10 mg tablet Take 1 tablet (10 mg total) by mouth once daily. 90 tablet 3    meclizine (ANTIVERT) 25 mg tablet Take  "1 tablet (25 mg total) by mouth 3 (three) times daily as needed. 50 tablet 2    neomycin-polymyxin-hydrocortisone (CORTISPORIN) 3.5-10,000-0.5 mg/g-unit/g-% cream Apply topically 2 (two) times daily. 10 g 2    neomycin-polymyxin-hydrocortisone (CORTISPORIN) 3.5-10,000-1 mg/mL-unit/mL-% otic suspension Place 3 drops into both ears 3 (three) times daily. 10 mL 0    triamcinolone acetonide 0.1% (KENALOG) 0.1 % cream Apply topically 2 (two) times daily as needed (dryness or itching). 80 g 3     No current facility-administered medications on file prior to visit.       Physical  Exam  Vitals:    06/29/23 1518   BP: 122/88   BP Location: Right arm   Patient Position: Sitting   Pulse: (!) 119   Temp: 97.9 °F (36.6 °C)   SpO2: (!) 94%   Weight: 130.3 kg (287 lb 4.2 oz)   Height: 5' 8" (1.727 m)      Body mass index is 43.68 kg/m².  Wt Readings from Last 3 Encounters:   06/29/23 130.3 kg (287 lb 4.2 oz)   05/23/23 132 kg (291 lb 0.1 oz)   02/13/23 133.1 kg (293 lb 6.9 oz)       Physical Exam  Vitals and nursing note reviewed.   Constitutional:       General: She is not in acute distress.     Appearance: She is not ill-appearing.   HENT:      Head: Normocephalic and atraumatic.      Right Ear: External ear normal.      Left Ear: External ear normal.      Nose: Nose normal.      Mouth/Throat:      Mouth: Mucous membranes are moist.   Eyes:      Extraocular Movements: Extraocular movements intact.      Conjunctiva/sclera: Conjunctivae normal.   Cardiovascular:      Rate and Rhythm: Normal rate and regular rhythm.      Pulses: Normal pulses.      Heart sounds: No murmur heard.  Pulmonary:      Effort: Pulmonary effort is normal. No respiratory distress.      Breath sounds: No wheezing.   Abdominal:      General: There is no distension.      Palpations: Abdomen is soft. There is no mass.      Tenderness: There is no abdominal tenderness.   Musculoskeletal:         General: No swelling.      Cervical back: Normal range of motion. "      Comments: Right great to is red, swollen, and tender to palpation   Skin:     Coloration: Skin is not jaundiced.      Findings: No rash.   Neurological:      General: No focal deficit present.      Mental Status: She is alert and oriented to person, place, and time.   Psychiatric:         Mood and Affect: Mood normal.         Thought Content: Thought content normal.       Labs:    Complete Blood Count  Lab Results   Component Value Date    RBC 5.05 10/06/2022    HGB 13.4 10/06/2022    HCT 44.3 10/06/2022    MCV 88 10/06/2022    MCH 26.5 (L) 10/06/2022    MCHC 30.2 (L) 10/06/2022    RDW 14.1 10/06/2022     10/06/2022    MPV 11.1 10/06/2022    GRAN 4.8 10/06/2022    GRAN 57.3 10/06/2022    LYMPH 2.5 10/06/2022    LYMPH 29.4 10/06/2022    MONO 0.9 10/06/2022    MONO 10.5 10/06/2022    EOS 0.1 10/06/2022    BASO 0.07 10/06/2022    EOSINOPHIL 1.6 10/06/2022    BASOPHIL 0.8 10/06/2022    DIFFMETHOD Automated 10/06/2022       Comprehensive Metabolic Panel  No results found for: GLU, BUN, CREATININE, NA, K, CL, PROT, ALBUMIN, BILITOT, AST, ALKPHOS, CO2, ALT, ANIONGAP, EGFRNONAA, ESTGFRAFRICA    TSH  No results found for: TSH    Imaging:  Mammo Digital Screening Bilat w/ Tahir  Narrative: Result:   Mammo Digital Screening Bilat w/ Tahir     History:  Patient is 62 y.o. and is seen for a screening mammogram.    Films Compared:  Prior images (if available) were compared.     Findings:  This procedure was performed using tomosynthesis. Computer-aided detection   was utilized in the interpretation of this examination.  The breasts are almost entirely fatty. There is no evidence of suspicious   masses, calcifications, or other abnormal findings. There are   benign-appearing calcifications scattered throughout both breasts.   Impression: Bilateral  There is no mammographic evidence of malignancy.    BI-RADS Category:   Overall: 2 - Benign       Recommendation:  Routine screening mammogram in 1 year is  recommended.    Your estimated lifetime risk of breast cancer (to age 85) based on   Tyrer-Cuzick risk assessment model is Tyrer-Cuzick: 15.86 %. According to   the American Cancer Society, patients with a lifetime breast cancer risk   of 20% or higher might benefit from supplemental screening tests.      Assessment/Plan:    1. Acute gout involving toe of right foot, unspecified cause  -     allopurinoL (ZYLOPRIM) 300 MG tablet; Take 1 tablet (300 mg total) by mouth once daily.  Dispense: 90 tablet; Refill: 3    2. Essential hypertension  -     amLODIPine (NORVASC) 5 MG tablet; Take 1 tablet (5 mg total) by mouth once daily.  Dispense: 90 tablet; Refill: 4  -     metoprolol succinate (TOPROL-XL) 100 MG 24 hr tablet; Take 1 tablet (100 mg total) by mouth once daily.  Dispense: 90 tablet; Refill: 4  -     valsartan (DIOVAN) 320 MG tablet; Take 1 tablet (320 mg total) by mouth once daily.  Dispense: 90 tablet; Refill: 3  -     CBC Auto Differential; Future  -     Comprehensive metabolic panel; Future; Expected date: 06/29/2023  -     TSH; Future; Expected date: 06/29/2023    3. Hot flashes  -     FLUoxetine 20 MG capsule; Take 1 capsule (20 mg total) by mouth once daily.  Dispense: 90 capsule; Refill: 4    4. Lumbar radiculopathy, chronic  -     diclofenac (VOLTAREN) 75 MG EC tablet; Take 1 tablet (75 mg total) by mouth 2 (two) times daily.  Dispense: 60 tablet; Refill: 3  -     gabapentin (NEURONTIN) 300 MG capsule; Take 1 capsule (300 mg total) by mouth 3 (three) times daily as needed (pain).  Dispense: 270 capsule; Refill: 3    5. Gastroesophageal reflux disease without esophagitis  -     omeprazole (PRILOSEC) 20 MG capsule; Take 1 capsule (20 mg total) by mouth once daily.  Dispense: 90 capsule; Refill: 3    6. Post herpetic neuralgia  -     topiramate (TOPAMAX) 50 MG tablet; Take 1 tablet (50 mg total) by mouth every evening.  Dispense: 90 tablet; Refill: 4    7. Hyperglycemia  -     HEMOGLOBIN A1C; Future;  Expected date: 06/29/2023    8. Screening for cardiovascular condition  -     LIPID PANEL; Future; Expected date: 06/29/2023    9. Senile purpura  Assessment & Plan:  - stable  - continue current meds      10. Recurrent major depressive disorder, in full remission  Assessment & Plan:  - stable  - doing well  - continue current meds      Other orders  -     Discontinue: colchicine-probenecid 0.5-500 mg (CO-BENEMID) 500-0.5 mg Tab; Take 1 tablet by mouth once daily.  Dispense: 7 tablet; Refill: 2         Discussed how to stay healthy including: diet, exercise, refraining from smoking and discussed screening exams / tests needed for age, sex and family Hx.        Sergio Pitt MD

## 2023-07-05 ENCOUNTER — LAB VISIT (OUTPATIENT)
Dept: LAB | Facility: HOSPITAL | Age: 63
End: 2023-07-05
Attending: STUDENT IN AN ORGANIZED HEALTH CARE EDUCATION/TRAINING PROGRAM
Payer: MEDICARE

## 2023-07-05 DIAGNOSIS — I10 ESSENTIAL HYPERTENSION: ICD-10-CM

## 2023-07-05 DIAGNOSIS — Z13.6 SCREENING FOR CARDIOVASCULAR CONDITION: ICD-10-CM

## 2023-07-05 DIAGNOSIS — R73.9 HYPERGLYCEMIA: ICD-10-CM

## 2023-07-05 LAB
ALBUMIN SERPL BCP-MCNC: 4.1 G/DL (ref 3.5–5.2)
ALP SERPL-CCNC: 61 U/L (ref 38–126)
ALT SERPL W/O P-5'-P-CCNC: 17 U/L (ref 10–44)
ANION GAP SERPL CALC-SCNC: 9 MMOL/L (ref 8–16)
AST SERPL-CCNC: 16 U/L (ref 15–46)
BASOPHILS # BLD AUTO: 0.04 K/UL (ref 0–0.2)
BASOPHILS NFR BLD: 0.4 % (ref 0–1.9)
BILIRUB SERPL-MCNC: 1 MG/DL (ref 0.1–1)
CALCIUM SERPL-MCNC: 9.5 MG/DL (ref 8.7–10.5)
CHLORIDE SERPL-SCNC: 104 MMOL/L (ref 95–110)
CHOLEST SERPL-MCNC: 220 MG/DL (ref 120–199)
CHOLEST/HDLC SERPL: 4.6 {RATIO} (ref 2–5)
CO2 SERPL-SCNC: 29 MMOL/L (ref 23–29)
CREAT SERPL-MCNC: 0.7 MG/DL (ref 0.5–1.4)
DIFFERENTIAL METHOD: ABNORMAL
EOSINOPHIL # BLD AUTO: 0.1 K/UL (ref 0–0.5)
EOSINOPHIL NFR BLD: 1.2 % (ref 0–8)
ERYTHROCYTE [DISTWIDTH] IN BLOOD BY AUTOMATED COUNT: 13.7 % (ref 11.5–14.5)
EST. GFR  (NO RACE VARIABLE): >60 ML/MIN/1.73 M^2
ESTIMATED AVG GLUCOSE: 111 MG/DL (ref 68–131)
GLUCOSE SERPL-MCNC: 102 MG/DL (ref 70–110)
HBA1C MFR BLD: 5.5 % (ref 4–5.6)
HCT VFR BLD AUTO: 45.1 % (ref 37–48.5)
HDLC SERPL-MCNC: 48 MG/DL (ref 40–75)
HDLC SERPL: 21.8 % (ref 20–50)
HGB BLD-MCNC: 13.9 G/DL (ref 12–16)
IMM GRANULOCYTES # BLD AUTO: 0.04 K/UL (ref 0–0.04)
IMM GRANULOCYTES NFR BLD AUTO: 0.4 % (ref 0–0.5)
LDLC SERPL CALC-MCNC: 149.4 MG/DL (ref 63–159)
LYMPHOCYTES # BLD AUTO: 2.7 K/UL (ref 1–4.8)
LYMPHOCYTES NFR BLD: 30.1 % (ref 18–48)
MCH RBC QN AUTO: 27 PG (ref 27–31)
MCHC RBC AUTO-ENTMCNC: 30.8 G/DL (ref 32–36)
MCV RBC AUTO: 88 FL (ref 82–98)
MONOCYTES # BLD AUTO: 0.8 K/UL (ref 0.3–1)
MONOCYTES NFR BLD: 8.5 % (ref 4–15)
NEUTROPHILS # BLD AUTO: 5.3 K/UL (ref 1.8–7.7)
NEUTROPHILS NFR BLD: 59.4 % (ref 38–73)
NONHDLC SERPL-MCNC: 172 MG/DL
NRBC BLD-RTO: 0 /100 WBC
PLATELET # BLD AUTO: 261 K/UL (ref 150–450)
PMV BLD AUTO: 10.5 FL (ref 9.2–12.9)
POTASSIUM SERPL-SCNC: 4.8 MMOL/L (ref 3.5–5.1)
PROT SERPL-MCNC: 7.6 G/DL (ref 6–8.4)
RBC # BLD AUTO: 5.15 M/UL (ref 4–5.4)
SODIUM SERPL-SCNC: 142 MMOL/L (ref 136–145)
TRIGL SERPL-MCNC: 113 MG/DL (ref 30–150)
TSH SERPL DL<=0.005 MIU/L-ACNC: 0.57 UIU/ML (ref 0.4–4)
UUN UR-MCNC: 11 MG/DL (ref 7–17)
WBC # BLD AUTO: 8.89 K/UL (ref 3.9–12.7)

## 2023-07-05 PROCEDURE — 36415 COLL VENOUS BLD VENIPUNCTURE: CPT | Mod: PO | Performed by: STUDENT IN AN ORGANIZED HEALTH CARE EDUCATION/TRAINING PROGRAM

## 2023-07-05 PROCEDURE — 84443 ASSAY THYROID STIM HORMONE: CPT | Mod: PO | Performed by: STUDENT IN AN ORGANIZED HEALTH CARE EDUCATION/TRAINING PROGRAM

## 2023-07-05 PROCEDURE — 80061 LIPID PANEL: CPT | Performed by: STUDENT IN AN ORGANIZED HEALTH CARE EDUCATION/TRAINING PROGRAM

## 2023-07-05 PROCEDURE — 83036 HEMOGLOBIN GLYCOSYLATED A1C: CPT | Performed by: STUDENT IN AN ORGANIZED HEALTH CARE EDUCATION/TRAINING PROGRAM

## 2023-07-05 PROCEDURE — 80053 COMPREHEN METABOLIC PANEL: CPT | Mod: PO | Performed by: STUDENT IN AN ORGANIZED HEALTH CARE EDUCATION/TRAINING PROGRAM

## 2023-07-05 PROCEDURE — 85025 COMPLETE CBC W/AUTO DIFF WBC: CPT | Mod: PO | Performed by: STUDENT IN AN ORGANIZED HEALTH CARE EDUCATION/TRAINING PROGRAM

## 2023-07-13 DIAGNOSIS — J30.2 SEASONAL ALLERGIES: ICD-10-CM

## 2023-07-13 DIAGNOSIS — R09.82 POST-NASAL DRIP: ICD-10-CM

## 2023-07-13 RX ORDER — FLUTICASONE PROPIONATE 50 MCG
1 SPRAY, SUSPENSION (ML) NASAL DAILY
Qty: 10 ML | Refills: 5 | Status: SHIPPED | OUTPATIENT
Start: 2023-07-13

## 2023-07-13 RX ORDER — LORATADINE 10 MG/1
10 TABLET ORAL DAILY
Qty: 90 TABLET | Refills: 3 | Status: SHIPPED | OUTPATIENT
Start: 2023-07-13

## 2023-08-24 ENCOUNTER — OFFICE VISIT (OUTPATIENT)
Dept: ORTHOPEDICS | Facility: CLINIC | Age: 63
End: 2023-08-24
Payer: MEDICARE

## 2023-08-24 ENCOUNTER — HOSPITAL ENCOUNTER (OUTPATIENT)
Dept: RADIOLOGY | Facility: HOSPITAL | Age: 63
Discharge: HOME | End: 2023-08-24
Attending: PHYSICIAN ASSISTANT
Payer: MEDICARE

## 2023-08-24 VITALS
WEIGHT: 280.44 LBS | HEIGHT: 68 IN | BODY MASS INDEX: 42.5 KG/M2 | DIASTOLIC BLOOD PRESSURE: 92 MMHG | SYSTOLIC BLOOD PRESSURE: 143 MMHG | HEART RATE: 114 BPM

## 2023-08-24 DIAGNOSIS — M25.562 ACUTE PAIN OF LEFT KNEE: ICD-10-CM

## 2023-08-24 DIAGNOSIS — M25.561 ACUTE PAIN OF RIGHT KNEE: ICD-10-CM

## 2023-08-24 DIAGNOSIS — M17.12 PRIMARY OSTEOARTHRITIS OF LEFT KNEE: ICD-10-CM

## 2023-08-24 DIAGNOSIS — M17.11 PRIMARY OSTEOARTHRITIS OF RIGHT KNEE: ICD-10-CM

## 2023-08-24 DIAGNOSIS — M17.11 PRIMARY OSTEOARTHRITIS OF RIGHT KNEE: Primary | ICD-10-CM

## 2023-08-24 PROCEDURE — 3080F PR MOST RECENT DIASTOLIC BLOOD PRESSURE >= 90 MM HG: ICD-10-PCS | Mod: CPTII,S$GLB,, | Performed by: PHYSICIAN ASSISTANT

## 2023-08-24 PROCEDURE — 20610 DRAIN/INJ JOINT/BURSA W/O US: CPT | Mod: 50,S$GLB,, | Performed by: PHYSICIAN ASSISTANT

## 2023-08-24 PROCEDURE — 3077F PR MOST RECENT SYSTOLIC BLOOD PRESSURE >= 140 MM HG: ICD-10-PCS | Mod: CPTII,S$GLB,, | Performed by: PHYSICIAN ASSISTANT

## 2023-08-24 PROCEDURE — 4010F ACE/ARB THERAPY RXD/TAKEN: CPT | Mod: CPTII,S$GLB,, | Performed by: PHYSICIAN ASSISTANT

## 2023-08-24 PROCEDURE — 3044F PR MOST RECENT HEMOGLOBIN A1C LEVEL <7.0%: ICD-10-PCS | Mod: CPTII,S$GLB,, | Performed by: PHYSICIAN ASSISTANT

## 2023-08-24 PROCEDURE — 3044F HG A1C LEVEL LT 7.0%: CPT | Mod: CPTII,S$GLB,, | Performed by: PHYSICIAN ASSISTANT

## 2023-08-24 PROCEDURE — 3008F PR BODY MASS INDEX (BMI) DOCUMENTED: ICD-10-PCS | Mod: CPTII,S$GLB,, | Performed by: PHYSICIAN ASSISTANT

## 2023-08-24 PROCEDURE — 3008F BODY MASS INDEX DOCD: CPT | Mod: CPTII,S$GLB,, | Performed by: PHYSICIAN ASSISTANT

## 2023-08-24 PROCEDURE — 99213 PR OFFICE/OUTPT VISIT, EST, LEVL III, 20-29 MIN: ICD-10-PCS | Mod: 25,S$GLB,, | Performed by: PHYSICIAN ASSISTANT

## 2023-08-24 PROCEDURE — 4010F PR ACE/ARB THEARPY RXD/TAKEN: ICD-10-PCS | Mod: CPTII,S$GLB,, | Performed by: PHYSICIAN ASSISTANT

## 2023-08-24 PROCEDURE — 73562 XR KNEE ORTHO BILAT: ICD-10-PCS | Mod: 26,50,, | Performed by: RADIOLOGY

## 2023-08-24 PROCEDURE — 73562 X-RAY EXAM OF KNEE 3: CPT | Mod: TC,50,PN

## 2023-08-24 PROCEDURE — 99213 OFFICE O/P EST LOW 20 MIN: CPT | Mod: 25,S$GLB,, | Performed by: PHYSICIAN ASSISTANT

## 2023-08-24 PROCEDURE — 3080F DIAST BP >= 90 MM HG: CPT | Mod: CPTII,S$GLB,, | Performed by: PHYSICIAN ASSISTANT

## 2023-08-24 PROCEDURE — 73562 X-RAY EXAM OF KNEE 3: CPT | Mod: 26,50,, | Performed by: RADIOLOGY

## 2023-08-24 PROCEDURE — 99999 PR PBB SHADOW E&M-EST. PATIENT-LVL III: CPT | Mod: PBBFAC,,, | Performed by: PHYSICIAN ASSISTANT

## 2023-08-24 PROCEDURE — 20610 LARGE JOINT ASPIRATION/INJECTION: BILATERAL KNEE: ICD-10-PCS | Mod: 50,S$GLB,, | Performed by: PHYSICIAN ASSISTANT

## 2023-08-24 PROCEDURE — 3077F SYST BP >= 140 MM HG: CPT | Mod: CPTII,S$GLB,, | Performed by: PHYSICIAN ASSISTANT

## 2023-08-24 PROCEDURE — 99999 PR PBB SHADOW E&M-EST. PATIENT-LVL III: ICD-10-PCS | Mod: PBBFAC,,, | Performed by: PHYSICIAN ASSISTANT

## 2023-08-24 RX ORDER — KETOROLAC TROMETHAMINE 30 MG/ML
30 INJECTION, SOLUTION INTRAMUSCULAR; INTRAVENOUS
Status: DISCONTINUED | OUTPATIENT
Start: 2023-08-24 | End: 2023-08-24 | Stop reason: HOSPADM

## 2023-08-24 RX ADMIN — KETOROLAC TROMETHAMINE 30 MG: 30 INJECTION, SOLUTION INTRAMUSCULAR; INTRAVENOUS at 01:08

## 2023-09-07 ENCOUNTER — OFFICE VISIT (OUTPATIENT)
Dept: ORTHOPEDICS | Facility: CLINIC | Age: 63
End: 2023-09-07
Payer: MEDICARE

## 2023-09-07 VITALS
DIASTOLIC BLOOD PRESSURE: 91 MMHG | SYSTOLIC BLOOD PRESSURE: 164 MMHG | HEIGHT: 68 IN | HEART RATE: 97 BPM | BODY MASS INDEX: 43.2 KG/M2 | WEIGHT: 285.06 LBS

## 2023-09-07 DIAGNOSIS — M17.12 PRIMARY OSTEOARTHRITIS OF LEFT KNEE: ICD-10-CM

## 2023-09-07 DIAGNOSIS — M17.11 PRIMARY OSTEOARTHRITIS OF RIGHT KNEE: Primary | ICD-10-CM

## 2023-09-07 PROCEDURE — 20610 LARGE JOINT ASPIRATION/INJECTION: BILATERAL KNEE: ICD-10-PCS | Mod: 50,S$GLB,, | Performed by: PHYSICIAN ASSISTANT

## 2023-09-07 PROCEDURE — 99499 UNLISTED E&M SERVICE: CPT | Mod: S$GLB,,, | Performed by: PHYSICIAN ASSISTANT

## 2023-09-07 PROCEDURE — 20610 DRAIN/INJ JOINT/BURSA W/O US: CPT | Mod: 50,S$GLB,, | Performed by: PHYSICIAN ASSISTANT

## 2023-09-07 PROCEDURE — 99999 PR PBB SHADOW E&M-EST. PATIENT-LVL IV: ICD-10-PCS | Mod: PBBFAC,,, | Performed by: PHYSICIAN ASSISTANT

## 2023-09-07 PROCEDURE — 99499 NO LOS: ICD-10-PCS | Mod: S$GLB,,, | Performed by: PHYSICIAN ASSISTANT

## 2023-09-07 PROCEDURE — 99999 PR PBB SHADOW E&M-EST. PATIENT-LVL IV: CPT | Mod: PBBFAC,,, | Performed by: PHYSICIAN ASSISTANT

## 2023-09-07 RX ORDER — CELECOXIB 200 MG/1
200 CAPSULE ORAL 2 TIMES DAILY
Qty: 60 CAPSULE | Refills: 0 | Status: SHIPPED | OUTPATIENT
Start: 2023-09-07 | End: 2023-10-07

## 2023-09-07 NOTE — PROGRESS NOTES
Subjective:      Patient ID: Debbie Vo is a 63 y.o. female.    Chief Complaint: Pain and Injections of the Left Knee and Pain and Injections of the Right Knee      Patient is here for First Gelsyn3 injection(s) for bilateral knee osteoarthritis. Patient tolerated last injection well. No new complaints today.          Review of Systems   Constitutional: Negative for chills and fever.   Cardiovascular:  Negative for chest pain.   Respiratory:  Negative for cough.    Hematologic/Lymphatic: Does not bruise/bleed easily.   Skin:  Negative for poor wound healing and rash.   Musculoskeletal:  Positive for joint pain, myalgias and stiffness.   Gastrointestinal:  Negative for abdominal pain.   Genitourinary:  Negative for bladder incontinence.   Neurological:  Negative for dizziness, loss of balance and weakness.   Psychiatric/Behavioral:  Negative for altered mental status.        Review of patient's allergies indicates:  No Known Allergies     Current Outpatient Medications   Medication Sig Dispense Refill    albuterol (VENTOLIN HFA) 90 mcg/actuation inhaler Inhale 2 puffs into the lungs every 6 (six) hours as needed for Wheezing. Rescue 18 g 3    allopurinoL (ZYLOPRIM) 300 MG tablet Take 1 tablet (300 mg total) by mouth once daily. 90 tablet 3    amLODIPine (NORVASC) 5 MG tablet Take 1 tablet (5 mg total) by mouth once daily. 90 tablet 4    ammonium lactate (LAC-HYDRIN) 12 % lotion Apply to damp skin after bathing 225 g 11    benzonatate (TESSALON) 200 MG capsule take 1 BY MOUTH THREE TIMES DAILY AS NEEDED FOR COUGH 30 capsule 4    colchicine (COLCRYS) 0.6 mg tablet Take 1 tablet (0.6 mg total) by mouth once daily. 7 tablet 0    colchicine-probenecid 0.5-500 mg (CO-BENEMID) 500-0.5 mg Tab Take 1 tablet by mouth once daily. 7 tablet 2    FLUoxetine 20 MG capsule Take 1 capsule (20 mg total) by mouth once daily. 90 capsule 4    fluticasone propionate (FLONASE) 50 mcg/actuation nasal spray 1 spray (50 mcg  "total) by Each Nostril route once daily. 10 mL 5    gabapentin (NEURONTIN) 300 MG capsule Take 1 capsule (300 mg total) by mouth 3 (three) times daily as needed (pain). 270 capsule 3    loratadine (CLARITIN) 10 mg tablet Take 1 tablet (10 mg total) by mouth once daily. 90 tablet 3    meclizine (ANTIVERT) 25 mg tablet Take 1 tablet (25 mg total) by mouth 3 (three) times daily as needed. 50 tablet 2    metoprolol succinate (TOPROL-XL) 100 MG 24 hr tablet Take 1 tablet (100 mg total) by mouth once daily. 90 tablet 4    neomycin-polymyxin-hydrocortisone (CORTISPORIN) 3.5-10,000-0.5 mg/g-unit/g-% cream Apply topically 2 (two) times daily. 10 g 2    neomycin-polymyxin-hydrocortisone (CORTISPORIN) 3.5-10,000-1 mg/mL-unit/mL-% otic suspension Place 3 drops into both ears 3 (three) times daily. 10 mL 0    omeprazole (PRILOSEC) 20 MG capsule Take 1 capsule (20 mg total) by mouth once daily. 90 capsule 3    topiramate (TOPAMAX) 50 MG tablet Take 1 tablet (50 mg total) by mouth every evening. 90 tablet 4    triamcinolone acetonide 0.1% (KENALOG) 0.1 % cream Apply topically 2 (two) times daily as needed (dryness or itching). 80 g 3    valsartan (DIOVAN) 320 MG tablet Take 1 tablet (320 mg total) by mouth once daily. 90 tablet 3    celecoxib (CELEBREX) 200 MG capsule Take 1 capsule (200 mg total) by mouth 2 (two) times daily. 60 capsule 0     No current facility-administered medications for this visit.        The patient's relevant past medical, surgical, and social history was reviewed in Epic.       Objective:      VITAL SIGNS: BP (!) 164/91   Pulse 97   Ht 5' 8" (1.727 m)   Wt 129.3 kg (285 lb 0.9 oz)   BMI 43.34 kg/m²     Ortho/SPM Exam         Assessment:       1. Primary osteoarthritis of right knee    2. Primary osteoarthritis of left knee          Plan:         Debbie was seen today for pain, injections, pain and injections.    Diagnoses and all orders for this visit:    Primary osteoarthritis of right knee  -     " celecoxib (CELEBREX) 200 MG capsule; Take 1 capsule (200 mg total) by mouth 2 (two) times daily.  -     Large Joint Aspiration/Injection: bilateral knee    Primary osteoarthritis of left knee  -     celecoxib (CELEBREX) 200 MG capsule; Take 1 capsule (200 mg total) by mouth 2 (two) times daily.  -     Large Joint Aspiration/Injection: bilateral knee    I injected the bilateral knee with one dose of Gelsyn3 today.  We will see the patient back next week or as needed.  Celebrex sent in for pain.       Diagnoses and plan discussed with the patient, as well as the expected course and duration of his symptoms.  All questions and concerns were addressed prior to the end of the visit.   Instructed patient to call office if they have any future questions/concerns or to schedule apt. Patient will return to see me if symptoms worsen or fail to improve    Note dictated with voice recognition software, please excuse any grammatical errors.        Arely Gale PA-C   09/07/2023

## 2023-09-14 ENCOUNTER — TELEPHONE (OUTPATIENT)
Dept: FAMILY MEDICINE | Facility: CLINIC | Age: 63
End: 2023-09-14
Payer: MEDICARE

## 2023-09-14 ENCOUNTER — OFFICE VISIT (OUTPATIENT)
Dept: ORTHOPEDICS | Facility: CLINIC | Age: 63
End: 2023-09-14
Payer: MEDICARE

## 2023-09-14 VITALS
HEIGHT: 68 IN | SYSTOLIC BLOOD PRESSURE: 141 MMHG | HEART RATE: 102 BPM | DIASTOLIC BLOOD PRESSURE: 90 MMHG | WEIGHT: 278.44 LBS | BODY MASS INDEX: 42.2 KG/M2

## 2023-09-14 DIAGNOSIS — M17.11 PRIMARY OSTEOARTHRITIS OF RIGHT KNEE: ICD-10-CM

## 2023-09-14 DIAGNOSIS — M17.12 PRIMARY OSTEOARTHRITIS OF LEFT KNEE: Primary | ICD-10-CM

## 2023-09-14 PROCEDURE — 99999 PR PBB SHADOW E&M-EST. PATIENT-LVL IV: ICD-10-PCS | Mod: PBBFAC,,, | Performed by: PHYSICIAN ASSISTANT

## 2023-09-14 PROCEDURE — 99499 NO LOS: ICD-10-PCS | Mod: S$GLB,,, | Performed by: PHYSICIAN ASSISTANT

## 2023-09-14 PROCEDURE — 99999 PR PBB SHADOW E&M-EST. PATIENT-LVL IV: CPT | Mod: PBBFAC,,, | Performed by: PHYSICIAN ASSISTANT

## 2023-09-14 PROCEDURE — 20610 LARGE JOINT ASPIRATION/INJECTION: BILATERAL KNEE: ICD-10-PCS | Mod: 50,S$GLB,, | Performed by: PHYSICIAN ASSISTANT

## 2023-09-14 PROCEDURE — 99499 UNLISTED E&M SERVICE: CPT | Mod: S$GLB,,, | Performed by: PHYSICIAN ASSISTANT

## 2023-09-14 PROCEDURE — 20610 DRAIN/INJ JOINT/BURSA W/O US: CPT | Mod: 50,S$GLB,, | Performed by: PHYSICIAN ASSISTANT

## 2023-09-14 RX ORDER — VALACYCLOVIR HYDROCHLORIDE 1 G/1
1000 TABLET, FILM COATED ORAL 3 TIMES DAILY
Qty: 15 TABLET | Refills: 0 | Status: SHIPPED | OUTPATIENT
Start: 2023-09-14

## 2023-09-14 NOTE — PROCEDURES
Large Joint Aspiration/Injection: bilateral knee    Date/Time: 9/14/2023 1:15 PM    Performed by: Arely Gale PA-C  Authorized by: Arely Gale PA-C    Consent Done?:  Yes (Verbal)  Indications:  Pain  Site marked: the procedure site was marked    Timeout: prior to procedure the correct patient, procedure, and site was verified    Prep: patient was prepped and draped in usual sterile fashion    Local anesthesia used?: No    Local anesthetic:  Topical anesthetic    Details:  Needle Size:  22 G  Ultrasonic Guidance for needle placement?: No    Approach:  Anterolateral  Location:  Knee  Laterality:  Bilateral  Site:  Bilateral knee  Medications (Right):  16.8 mg sodium hyaluronate 16.8 mg/2 mL  Medications (Left):  16.8 mg sodium hyaluronate 16.8 mg/2 mL  Patient tolerance:  Patient tolerated the procedure well with no immediate complications

## 2023-09-14 NOTE — PROGRESS NOTES
Subjective:      Patient ID: Debbie Vo is a 63 y.o. female.    Chief Complaint: Pain and Injections of the Left Knee and Pain and Injections of the Right Knee      Patient is here for Second Gelsyn3 injection(s) for bilateral knee osteoarthritis. Patient tolerated last injection well. No new complaints today.           Review of Systems   Constitutional: Negative for chills and fever.   Cardiovascular:  Negative for chest pain.   Respiratory:  Negative for cough.    Hematologic/Lymphatic: Does not bruise/bleed easily.   Skin:  Negative for poor wound healing and rash.   Musculoskeletal:  Positive for arthritis, joint pain, myalgias and stiffness.   Gastrointestinal:  Negative for abdominal pain.   Genitourinary:  Negative for bladder incontinence.   Neurological:  Negative for dizziness, loss of balance and weakness.   Psychiatric/Behavioral:  Negative for altered mental status.        Review of patient's allergies indicates:  No Known Allergies     Current Outpatient Medications   Medication Sig Dispense Refill    albuterol (VENTOLIN HFA) 90 mcg/actuation inhaler Inhale 2 puffs into the lungs every 6 (six) hours as needed for Wheezing. Rescue 18 g 3    allopurinoL (ZYLOPRIM) 300 MG tablet Take 1 tablet (300 mg total) by mouth once daily. 90 tablet 3    amLODIPine (NORVASC) 5 MG tablet Take 1 tablet (5 mg total) by mouth once daily. 90 tablet 4    ammonium lactate (LAC-HYDRIN) 12 % lotion Apply to damp skin after bathing 225 g 11    benzonatate (TESSALON) 200 MG capsule take 1 BY MOUTH THREE TIMES DAILY AS NEEDED FOR COUGH 30 capsule 4    celecoxib (CELEBREX) 200 MG capsule Take 1 capsule (200 mg total) by mouth 2 (two) times daily. 60 capsule 0    colchicine (COLCRYS) 0.6 mg tablet Take 1 tablet (0.6 mg total) by mouth once daily. 7 tablet 0    colchicine-probenecid 0.5-500 mg (CO-BENEMID) 500-0.5 mg Tab Take 1 tablet by mouth once daily. 7 tablet 2    FLUoxetine 20 MG capsule Take 1 capsule (20 mg  "total) by mouth once daily. 90 capsule 4    fluticasone propionate (FLONASE) 50 mcg/actuation nasal spray 1 spray (50 mcg total) by Each Nostril route once daily. 10 mL 5    gabapentin (NEURONTIN) 300 MG capsule Take 1 capsule (300 mg total) by mouth 3 (three) times daily as needed (pain). 270 capsule 3    loratadine (CLARITIN) 10 mg tablet Take 1 tablet (10 mg total) by mouth once daily. 90 tablet 3    meclizine (ANTIVERT) 25 mg tablet Take 1 tablet (25 mg total) by mouth 3 (three) times daily as needed. 50 tablet 2    metoprolol succinate (TOPROL-XL) 100 MG 24 hr tablet Take 1 tablet (100 mg total) by mouth once daily. 90 tablet 4    neomycin-polymyxin-hydrocortisone (CORTISPORIN) 3.5-10,000-0.5 mg/g-unit/g-% cream Apply topically 2 (two) times daily. 10 g 2    neomycin-polymyxin-hydrocortisone (CORTISPORIN) 3.5-10,000-1 mg/mL-unit/mL-% otic suspension Place 3 drops into both ears 3 (three) times daily. 10 mL 0    omeprazole (PRILOSEC) 20 MG capsule Take 1 capsule (20 mg total) by mouth once daily. 90 capsule 3    topiramate (TOPAMAX) 50 MG tablet Take 1 tablet (50 mg total) by mouth every evening. 90 tablet 4    triamcinolone acetonide 0.1% (KENALOG) 0.1 % cream Apply topically 2 (two) times daily as needed (dryness or itching). 80 g 3    valACYclovir (VALTREX) 1000 MG tablet Take 1 tablet (1,000 mg total) by mouth 3 (three) times daily. for 5 days 15 tablet 0    valsartan (DIOVAN) 320 MG tablet Take 1 tablet (320 mg total) by mouth once daily. 90 tablet 3     No current facility-administered medications for this visit.        The patient's relevant past medical, surgical, and social history was reviewed in Epic.       Objective:      VITAL SIGNS: BP (!) 141/90   Pulse 102   Ht 5' 8" (1.727 m)   Wt 126.3 kg (278 lb 7.1 oz)   BMI 42.34 kg/m²     Ortho/SPM Exam         Assessment:       1. Primary osteoarthritis of left knee    2. Primary osteoarthritis of right knee          Plan:         Debbie was seen today " for pain, injections, pain and injections.    Diagnoses and all orders for this visit:    Primary osteoarthritis of left knee  -     Large Joint Aspiration/Injection: bilateral knee    Primary osteoarthritis of right knee  -     Large Joint Aspiration/Injection: bilateral knee    I injected the bilateral knee with one dose of Gelsyn3 today.  We will see the patient back next week or as needed.      Diagnoses and plan discussed with the patient, as well as the expected course and duration of his symptoms.  All questions and concerns were addressed prior to the end of the visit.   Instructed patient to call office if they have any future questions/concerns or to schedule apt. Patient will return to see me if symptoms worsen or fail to improve    Note dictated with voice recognition software, please excuse any grammatical errors.        Arely Gale PA-C   09/14/2023

## 2023-09-14 NOTE — TELEPHONE ENCOUNTER
Spoke to pt she is c/o fever blister in her nose and nasal congestion. For the fever blister pt has applied carmax. Pt has also took claritin, flonase and eye drops. Pt went to  on 9/12/23. They did not check flu or covid. Pt was prescribed acyclovir to take 5 pills for 5 days. Please advise. [facility]    ----- Message from Donna Lopez sent at 9/14/2023  9:29 AM CDT -----  Type:  Needs Medical Advice    Who Called: pt  Symptoms (please be specific): fever blister in nose   How long has patient had these symptoms:  2 days  Would the patient rather a call back or a response via [facility]? call  Best Call Back Number: [phone]  Additional Information:

## 2023-09-21 ENCOUNTER — OFFICE VISIT (OUTPATIENT)
Dept: ORTHOPEDICS | Facility: CLINIC | Age: 63
End: 2023-09-21
Payer: MEDICARE

## 2023-09-21 VITALS
SYSTOLIC BLOOD PRESSURE: 129 MMHG | BODY MASS INDEX: 42.03 KG/M2 | WEIGHT: 277.31 LBS | HEART RATE: 96 BPM | HEIGHT: 68 IN | DIASTOLIC BLOOD PRESSURE: 85 MMHG

## 2023-09-21 DIAGNOSIS — M17.11 PRIMARY OSTEOARTHRITIS OF RIGHT KNEE: Primary | ICD-10-CM

## 2023-09-21 DIAGNOSIS — M17.12 PRIMARY OSTEOARTHRITIS OF LEFT KNEE: ICD-10-CM

## 2023-09-21 PROCEDURE — 20610 DRAIN/INJ JOINT/BURSA W/O US: CPT | Mod: 50,S$GLB,, | Performed by: PHYSICIAN ASSISTANT

## 2023-09-21 PROCEDURE — 99499 NO LOS: ICD-10-PCS | Mod: S$GLB,,, | Performed by: PHYSICIAN ASSISTANT

## 2023-09-21 PROCEDURE — 99499 UNLISTED E&M SERVICE: CPT | Mod: S$GLB,,, | Performed by: PHYSICIAN ASSISTANT

## 2023-09-21 PROCEDURE — 99999 PR PBB SHADOW E&M-EST. PATIENT-LVL III: CPT | Mod: PBBFAC,,, | Performed by: PHYSICIAN ASSISTANT

## 2023-09-21 PROCEDURE — 20610 LARGE JOINT ASPIRATION/INJECTION: BILATERAL KNEE: ICD-10-PCS | Mod: 50,S$GLB,, | Performed by: PHYSICIAN ASSISTANT

## 2023-09-21 PROCEDURE — 99999 PR PBB SHADOW E&M-EST. PATIENT-LVL III: ICD-10-PCS | Mod: PBBFAC,,, | Performed by: PHYSICIAN ASSISTANT

## 2023-09-21 NOTE — PROGRESS NOTES
Subjective:      Patient ID: Debbie Vo is a 63 y.o. female.    Chief Complaint: Pain and Injections of the Left Knee and Pain and Injections of the Right Knee      Patient is here for Third Gelsyn3 injection(s) for bilateral knee osteoarthritis. Patient tolerated last injection well. No new complaints today.           Review of Systems   Constitutional: Negative for chills and fever.   Cardiovascular:  Negative for chest pain.   Respiratory:  Negative for cough.    Hematologic/Lymphatic: Does not bruise/bleed easily.   Skin:  Negative for poor wound healing and rash.   Musculoskeletal:  Positive for joint pain, myalgias and stiffness.   Gastrointestinal:  Negative for abdominal pain.   Genitourinary:  Negative for bladder incontinence.   Neurological:  Negative for dizziness, loss of balance and weakness.   Psychiatric/Behavioral:  Negative for altered mental status.        Review of patient's allergies indicates:  No Known Allergies     Current Outpatient Medications   Medication Sig Dispense Refill    albuterol (VENTOLIN HFA) 90 mcg/actuation inhaler Inhale 2 puffs into the lungs every 6 (six) hours as needed for Wheezing. Rescue 18 g 3    allopurinoL (ZYLOPRIM) 300 MG tablet Take 1 tablet (300 mg total) by mouth once daily. 90 tablet 3    amLODIPine (NORVASC) 5 MG tablet Take 1 tablet (5 mg total) by mouth once daily. 90 tablet 4    ammonium lactate (LAC-HYDRIN) 12 % lotion Apply to damp skin after bathing 225 g 11    benzonatate (TESSALON) 200 MG capsule take 1 BY MOUTH THREE TIMES DAILY AS NEEDED FOR COUGH 30 capsule 4    celecoxib (CELEBREX) 200 MG capsule Take 1 capsule (200 mg total) by mouth 2 (two) times daily. 60 capsule 0    colchicine (COLCRYS) 0.6 mg tablet Take 1 tablet (0.6 mg total) by mouth once daily. 7 tablet 0    colchicine-probenecid 0.5-500 mg (CO-BENEMID) 500-0.5 mg Tab Take 1 tablet by mouth once daily. 7 tablet 2    FLUoxetine 20 MG capsule Take 1 capsule (20 mg total) by  "mouth once daily. 90 capsule 4    fluticasone propionate (FLONASE) 50 mcg/actuation nasal spray 1 spray (50 mcg total) by Each Nostril route once daily. 10 mL 5    gabapentin (NEURONTIN) 300 MG capsule Take 1 capsule (300 mg total) by mouth 3 (three) times daily as needed (pain). 270 capsule 3    loratadine (CLARITIN) 10 mg tablet Take 1 tablet (10 mg total) by mouth once daily. 90 tablet 3    meclizine (ANTIVERT) 25 mg tablet Take 1 tablet (25 mg total) by mouth 3 (three) times daily as needed. 50 tablet 2    metoprolol succinate (TOPROL-XL) 100 MG 24 hr tablet Take 1 tablet (100 mg total) by mouth once daily. 90 tablet 4    neomycin-polymyxin-hydrocortisone (CORTISPORIN) 3.5-10,000-0.5 mg/g-unit/g-% cream Apply topically 2 (two) times daily. 10 g 2    neomycin-polymyxin-hydrocortisone (CORTISPORIN) 3.5-10,000-1 mg/mL-unit/mL-% otic suspension Place 3 drops into both ears 3 (three) times daily. 10 mL 0    omeprazole (PRILOSEC) 20 MG capsule Take 1 capsule (20 mg total) by mouth once daily. 90 capsule 3    topiramate (TOPAMAX) 50 MG tablet Take 1 tablet (50 mg total) by mouth every evening. 90 tablet 4    triamcinolone acetonide 0.1% (KENALOG) 0.1 % cream Apply topically 2 (two) times daily as needed (dryness or itching). 80 g 3    valsartan (DIOVAN) 320 MG tablet Take 1 tablet (320 mg total) by mouth once daily. 90 tablet 3    valACYclovir (VALTREX) 1000 MG tablet Take 1 tablet (1,000 mg total) by mouth 3 (three) times daily. for 5 days 15 tablet 0     No current facility-administered medications for this visit.        The patient's relevant past medical, surgical, and social history was reviewed in Epic.       Objective:      VITAL SIGNS: Ht 5' 8" (1.727 m)   BMI 42.34 kg/m²     Ortho/SPM Exam         Assessment:       1. Primary osteoarthritis of right knee    2. Primary osteoarthritis of left knee          Plan:         Debbie was seen today for pain, injections, pain and injections.    Diagnoses and all orders " for this visit:    Primary osteoarthritis of right knee  -     Large Joint Aspiration/Injection: bilateral knee    Primary osteoarthritis of left knee  -     Large Joint Aspiration/Injection: bilateral knee    I injected the bilateral knee with one dose of Gelsyn3 today.  We will see the patient back in 6 mos or as needed.      Diagnoses and plan discussed with the patient, as well as the expected course and duration of his symptoms.  All questions and concerns were addressed prior to the end of the visit.   Instructed patient to call office if they have any future questions/concerns or to schedule apt. Patient will return to see me if symptoms worsen or fail to improve    Note dictated with voice recognition software, please excuse any grammatical errors.        Arely Gale PA-C   09/21/2023

## 2023-09-21 NOTE — PROCEDURES
Large Joint Aspiration/Injection: bilateral knee    Date/Time: 9/21/2023 1:30 PM    Performed by: Arely Gale PA-C  Authorized by: Arely Gale PA-C    Consent Done?:  Yes (Verbal)  Indications:  Pain  Site marked: the procedure site was marked    Timeout: prior to procedure the correct patient, procedure, and site was verified    Prep: patient was prepped and draped in usual sterile fashion    Local anesthesia used?: No    Local anesthetic:  Topical anesthetic    Details:  Needle Size:  22 G  Ultrasonic Guidance for needle placement?: No    Approach:  Anterolateral  Location:  Knee  Laterality:  Bilateral  Site:  Bilateral knee  Medications (Right):  16.8 mg sodium hyaluronate 16.8 mg/2 mL  Medications (Left):  16.8 mg sodium hyaluronate 16.8 mg/2 mL  Patient tolerance:  Patient tolerated the procedure well with no immediate complications

## 2023-10-23 ENCOUNTER — TELEPHONE (OUTPATIENT)
Dept: FAMILY MEDICINE | Facility: CLINIC | Age: 63
End: 2023-10-23
Payer: MEDICARE

## 2023-10-23 NOTE — TELEPHONE ENCOUNTER
----- Message from Ellie Lexii sent at 10/23/2023  8:36 AM CDT -----  Type:  Sooner Appointment Request    Caller is requesting a sooner appointment.  Caller declined first available appointment listed below.  Caller will not accept being placed on the waitlist and is requesting a message be sent to doctor.  Name of Caller:Pt   When is the first available appointment?Books were closed   Symptoms: Right knee pain   Would the patient rather a call back or a response via [facility]? Call back   Best Call Back Number: [phone]  Additional Information: Please be advised, also offered pt other locations, but prefers to be seen by pcp       Pt states she is seeing an ortho provider for her knee but was told the provider does not prescribe pain medicine and instructed to contact PCP.     Pain started Friday.     Pt states she is ok with having Rx sent to pharmacy.

## 2023-10-23 NOTE — TELEPHONE ENCOUNTER
If she is seeing ortho for knee pain, then they should prescribe medication. I will not prescribe pain medication for an issue that is being treated by another physician.

## 2023-10-25 ENCOUNTER — OFFICE VISIT (OUTPATIENT)
Dept: ORTHOPEDICS | Facility: CLINIC | Age: 63
End: 2023-10-25
Payer: MEDICARE

## 2023-10-25 VITALS
DIASTOLIC BLOOD PRESSURE: 91 MMHG | HEIGHT: 68 IN | BODY MASS INDEX: 42.66 KG/M2 | HEART RATE: 82 BPM | SYSTOLIC BLOOD PRESSURE: 148 MMHG | WEIGHT: 281.5 LBS

## 2023-10-25 DIAGNOSIS — M17.11 PRIMARY OSTEOARTHRITIS OF RIGHT KNEE: Primary | ICD-10-CM

## 2023-10-25 PROCEDURE — 3044F PR MOST RECENT HEMOGLOBIN A1C LEVEL <7.0%: ICD-10-PCS | Mod: CPTII,S$GLB,, | Performed by: PHYSICIAN ASSISTANT

## 2023-10-25 PROCEDURE — 20610 LARGE JOINT ASPIRATION/INJECTION: R KNEE: ICD-10-PCS | Mod: RT,S$GLB,, | Performed by: PHYSICIAN ASSISTANT

## 2023-10-25 PROCEDURE — 1159F MED LIST DOCD IN RCRD: CPT | Mod: CPTII,S$GLB,, | Performed by: PHYSICIAN ASSISTANT

## 2023-10-25 PROCEDURE — 3077F SYST BP >= 140 MM HG: CPT | Mod: CPTII,S$GLB,, | Performed by: PHYSICIAN ASSISTANT

## 2023-10-25 PROCEDURE — 99999 PR PBB SHADOW E&M-EST. PATIENT-LVL IV: CPT | Mod: PBBFAC,,, | Performed by: PHYSICIAN ASSISTANT

## 2023-10-25 PROCEDURE — 3077F PR MOST RECENT SYSTOLIC BLOOD PRESSURE >= 140 MM HG: ICD-10-PCS | Mod: CPTII,S$GLB,, | Performed by: PHYSICIAN ASSISTANT

## 2023-10-25 PROCEDURE — 1160F RVW MEDS BY RX/DR IN RCRD: CPT | Mod: CPTII,S$GLB,, | Performed by: PHYSICIAN ASSISTANT

## 2023-10-25 PROCEDURE — 99213 OFFICE O/P EST LOW 20 MIN: CPT | Mod: 25,S$GLB,, | Performed by: PHYSICIAN ASSISTANT

## 2023-10-25 PROCEDURE — 3080F PR MOST RECENT DIASTOLIC BLOOD PRESSURE >= 90 MM HG: ICD-10-PCS | Mod: CPTII,S$GLB,, | Performed by: PHYSICIAN ASSISTANT

## 2023-10-25 PROCEDURE — 3080F DIAST BP >= 90 MM HG: CPT | Mod: CPTII,S$GLB,, | Performed by: PHYSICIAN ASSISTANT

## 2023-10-25 PROCEDURE — 4010F ACE/ARB THERAPY RXD/TAKEN: CPT | Mod: CPTII,S$GLB,, | Performed by: PHYSICIAN ASSISTANT

## 2023-10-25 PROCEDURE — 1160F PR REVIEW ALL MEDS BY PRESCRIBER/CLIN PHARMACIST DOCUMENTED: ICD-10-PCS | Mod: CPTII,S$GLB,, | Performed by: PHYSICIAN ASSISTANT

## 2023-10-25 PROCEDURE — 3008F BODY MASS INDEX DOCD: CPT | Mod: CPTII,S$GLB,, | Performed by: PHYSICIAN ASSISTANT

## 2023-10-25 PROCEDURE — 3008F PR BODY MASS INDEX (BMI) DOCUMENTED: ICD-10-PCS | Mod: CPTII,S$GLB,, | Performed by: PHYSICIAN ASSISTANT

## 2023-10-25 PROCEDURE — 4010F PR ACE/ARB THEARPY RXD/TAKEN: ICD-10-PCS | Mod: CPTII,S$GLB,, | Performed by: PHYSICIAN ASSISTANT

## 2023-10-25 PROCEDURE — 99213 PR OFFICE/OUTPT VISIT, EST, LEVL III, 20-29 MIN: ICD-10-PCS | Mod: 25,S$GLB,, | Performed by: PHYSICIAN ASSISTANT

## 2023-10-25 PROCEDURE — 3044F HG A1C LEVEL LT 7.0%: CPT | Mod: CPTII,S$GLB,, | Performed by: PHYSICIAN ASSISTANT

## 2023-10-25 PROCEDURE — 1159F PR MEDICATION LIST DOCUMENTED IN MEDICAL RECORD: ICD-10-PCS | Mod: CPTII,S$GLB,, | Performed by: PHYSICIAN ASSISTANT

## 2023-10-25 PROCEDURE — 99999 PR PBB SHADOW E&M-EST. PATIENT-LVL IV: ICD-10-PCS | Mod: PBBFAC,,, | Performed by: PHYSICIAN ASSISTANT

## 2023-10-25 PROCEDURE — 20610 DRAIN/INJ JOINT/BURSA W/O US: CPT | Mod: RT,S$GLB,, | Performed by: PHYSICIAN ASSISTANT

## 2023-10-25 RX ORDER — KETOROLAC TROMETHAMINE 30 MG/ML
30 INJECTION, SOLUTION INTRAMUSCULAR; INTRAVENOUS
Status: DISCONTINUED | OUTPATIENT
Start: 2023-10-25 | End: 2023-10-25 | Stop reason: HOSPADM

## 2023-10-25 RX ADMIN — KETOROLAC TROMETHAMINE 30 MG: 30 INJECTION, SOLUTION INTRAMUSCULAR; INTRAVENOUS at 10:10

## 2023-10-25 NOTE — LETTER
I certify that (Name) Debbie Vo meets the requirements as outlined in # 6 (shown on reverse side) and qualifies for a mobility impaired license plate/hang-tag. I further understand that willful and false certification shall subject me to fines/imprisonment as outlined in R.S. 47:463.4 (G) (4). The applicant's information is as follows:    YOB: 1960            Race:Black or         Gender:Female    Address:  [place] [address]    City:[place]                               State:[place]     Zip Code:[address]     []Permanently Impaired - Applicant has a total or lifelong condition of mobility impairment from which little or no improvement or recovery can reasonably be expected. A medical examiners certification is required on initial application only.      [x] Temporarily Impaired - Applicant has a temporary condition of mobility impairment of which improvement or recovery can reasonably be expected. Applicant is entitled to a hangtag, which will be valid for one (1) year. A medical examiners certification is required for the renewal of the hangtag      [] Unable to appear in person at the Office of Motor Vehicles - Applicant must bring facial photo        Medical Examiner's Signature________________________________________ Date:10/25/23_________________________    Printed Name:_______Arely Gale PA-C____________________    State License #____[id number]__________    [facility] [place] [address]                             Phone Number: [phone]                                                                                                                                                                                      City: [place]__________________________________ State: [place] __Zip Code: [address] _______________    TO BE COMPLETED BY MOTOR VEHICLE ANALYST ONLY    [place]   Lic. Plate #      Hangtag Control #   Hangtag ID #      Date Issued:    #:   Office #:

## 2023-10-25 NOTE — PROGRESS NOTES
Subjective:      Patient ID: Debbie Vo is a 63 y.o. female.    Chief Complaint: Pain of the Right Knee      64yo female follow up right knee bone on bone osteoarthritis. States previous gelsyn injection did not provide relief on the right. Still having relief on the left. Is interested in TKA soon.         Review of Systems   Constitutional: Negative for chills and fever.   Cardiovascular:  Negative for chest pain.   Respiratory:  Negative for cough.    Hematologic/Lymphatic: Does not bruise/bleed easily.   Skin:  Negative for poor wound healing and rash.   Musculoskeletal:  Positive for arthritis, joint pain, myalgias and stiffness.   Gastrointestinal:  Negative for abdominal pain.   Genitourinary:  Negative for bladder incontinence.   Neurological:  Negative for dizziness, loss of balance and weakness.   Psychiatric/Behavioral:  Negative for altered mental status.        Review of patient's allergies indicates:  No Known Allergies     Current Outpatient Medications   Medication Sig Dispense Refill    albuterol (VENTOLIN HFA) 90 mcg/actuation inhaler Inhale 2 puffs into the lungs every 6 (six) hours as needed for Wheezing. Rescue 18 g 3    allopurinoL (ZYLOPRIM) 300 MG tablet Take 1 tablet (300 mg total) by mouth once daily. 90 tablet 3    amLODIPine (NORVASC) 5 MG tablet Take 1 tablet (5 mg total) by mouth once daily. 90 tablet 4    ammonium lactate (LAC-HYDRIN) 12 % lotion Apply to damp skin after bathing 225 g 11    benzonatate (TESSALON) 200 MG capsule take 1 BY MOUTH THREE TIMES DAILY AS NEEDED FOR COUGH 30 capsule 4    colchicine (COLCRYS) 0.6 mg tablet Take 1 tablet (0.6 mg total) by mouth once daily. 7 tablet 0    colchicine-probenecid 0.5-500 mg (CO-BENEMID) 500-0.5 mg Tab Take 1 tablet by mouth once daily. 7 tablet 2    FLUoxetine 20 MG capsule Take 1 capsule (20 mg total) by mouth once daily. 90 capsule 4    fluticasone propionate (FLONASE) 50 mcg/actuation nasal spray 1 spray (50 mcg  "total) by Each Nostril route once daily. 10 mL 5    gabapentin (NEURONTIN) 300 MG capsule Take 1 capsule (300 mg total) by mouth 3 (three) times daily as needed (pain). 270 capsule 3    loratadine (CLARITIN) 10 mg tablet Take 1 tablet (10 mg total) by mouth once daily. 90 tablet 3    meclizine (ANTIVERT) 25 mg tablet Take 1 tablet (25 mg total) by mouth 3 (three) times daily as needed. 50 tablet 2    metoprolol succinate (TOPROL-XL) 100 MG 24 hr tablet Take 1 tablet (100 mg total) by mouth once daily. 90 tablet 4    neomycin-polymyxin-hydrocortisone (CORTISPORIN) 3.5-10,000-0.5 mg/g-unit/g-% cream Apply topically 2 (two) times daily. 10 g 2    neomycin-polymyxin-hydrocortisone (CORTISPORIN) 3.5-10,000-1 mg/mL-unit/mL-% otic suspension Place 3 drops into both ears 3 (three) times daily. 10 mL 0    omeprazole (PRILOSEC) 20 MG capsule Take 1 capsule (20 mg total) by mouth once daily. 90 capsule 3    topiramate (TOPAMAX) 50 MG tablet Take 1 tablet (50 mg total) by mouth every evening. 90 tablet 4    triamcinolone acetonide 0.1% (KENALOG) 0.1 % cream Apply topically 2 (two) times daily as needed (dryness or itching). 80 g 3    valsartan (DIOVAN) 320 MG tablet Take 1 tablet (320 mg total) by mouth once daily. 90 tablet 3    valACYclovir (VALTREX) 1000 MG tablet Take 1 tablet (1,000 mg total) by mouth 3 (three) times daily. for 5 days 15 tablet 0     No current facility-administered medications for this visit.        The patient's relevant past medical, surgical, and social history was reviewed in Epic.       Objective:      VITAL SIGNS: BP (!) 148/91   Pulse 82   Ht 5' 8" (1.727 m)   Wt 127.7 kg (281 lb 8.4 oz)   BMI 42.81 kg/m²     General    Nursing note and vitals reviewed.  Constitutional: She is oriented to person, place, and time. She appears well-developed and well-nourished.   Neurological: She is alert and oriented to person, place, and time.                    Assessment:       1. Primary osteoarthritis of " right knee          Plan:         Debbie was seen today for pain.    Diagnoses and all orders for this visit:    Primary osteoarthritis of right knee  -     Prior authorization Order  -     Large Joint Aspiration/Injection: R knee    Right knee bone on bone arthritis. Gave patient the option of trying Zilretta again or Iovera procedure. She would like to try Zilretta again. Will get authorization for this. If not approve, will try iovera. Also will get her appointment with Dr. Reid to discuss TKA.    Diagnoses and plan discussed with the patient, as well as the expected course and duration of his symptoms.  All questions and concerns were addressed prior to the end of the visit.   Instructed patient to call office if they have any future questions/concerns or to schedule apt. Patient will return to see me if symptoms worsen or fail to improve    Note dictated with voice recognition software, please excuse any grammatical errors.        Arely Gale PA-C   10/25/2023

## 2023-10-25 NOTE — PROCEDURES
Large Joint Aspiration/Injection: R knee    Date/Time: 10/25/2023 10:30 AM    Performed by: Arely Gale PA-C  Authorized by: Arely Gale PA-C    Consent Done?:  Yes (Verbal)  Indications:  Arthritis  Site marked: the procedure site was marked    Timeout: prior to procedure the correct patient, procedure, and site was verified    Prep: patient was prepped and draped in usual sterile fashion      Local anesthesia used?: Yes    Local anesthetic:  Topical anesthetic    Details:  Needle Size:  22 G  Ultrasonic Guidance for needle placement?: No    Approach:  Anterolateral  Location:  Knee  Site:  R knee  Medications:  30 mg ketorolac 30 mg/mL (1 mL)  Patient tolerance:  Patient tolerated the procedure well with no immediate complications

## 2023-11-02 ENCOUNTER — OFFICE VISIT (OUTPATIENT)
Dept: ORTHOPEDICS | Facility: CLINIC | Age: 63
End: 2023-11-02
Payer: MEDICARE

## 2023-11-02 VITALS
HEART RATE: 86 BPM | WEIGHT: 281.5 LBS | DIASTOLIC BLOOD PRESSURE: 98 MMHG | HEIGHT: 68 IN | SYSTOLIC BLOOD PRESSURE: 157 MMHG | BODY MASS INDEX: 42.66 KG/M2

## 2023-11-02 DIAGNOSIS — G89.29 CHRONIC PAIN OF RIGHT KNEE: Primary | ICD-10-CM

## 2023-11-02 DIAGNOSIS — M17.11 PRIMARY OSTEOARTHRITIS OF RIGHT KNEE: ICD-10-CM

## 2023-11-02 DIAGNOSIS — M25.561 CHRONIC PAIN OF RIGHT KNEE: Primary | ICD-10-CM

## 2023-11-02 PROCEDURE — 3080F DIAST BP >= 90 MM HG: CPT | Mod: CPTII,S$GLB,, | Performed by: ORTHOPAEDIC SURGERY

## 2023-11-02 PROCEDURE — 3044F PR MOST RECENT HEMOGLOBIN A1C LEVEL <7.0%: ICD-10-PCS | Mod: CPTII,S$GLB,, | Performed by: ORTHOPAEDIC SURGERY

## 2023-11-02 PROCEDURE — 3077F PR MOST RECENT SYSTOLIC BLOOD PRESSURE >= 140 MM HG: ICD-10-PCS | Mod: CPTII,S$GLB,, | Performed by: ORTHOPAEDIC SURGERY

## 2023-11-02 PROCEDURE — 3008F PR BODY MASS INDEX (BMI) DOCUMENTED: ICD-10-PCS | Mod: CPTII,S$GLB,, | Performed by: ORTHOPAEDIC SURGERY

## 2023-11-02 PROCEDURE — 4010F ACE/ARB THERAPY RXD/TAKEN: CPT | Mod: CPTII,S$GLB,, | Performed by: ORTHOPAEDIC SURGERY

## 2023-11-02 PROCEDURE — 3008F BODY MASS INDEX DOCD: CPT | Mod: CPTII,S$GLB,, | Performed by: ORTHOPAEDIC SURGERY

## 2023-11-02 PROCEDURE — 99999 PR PBB SHADOW E&M-EST. PATIENT-LVL V: CPT | Mod: PBBFAC,,, | Performed by: ORTHOPAEDIC SURGERY

## 2023-11-02 PROCEDURE — 4010F PR ACE/ARB THEARPY RXD/TAKEN: ICD-10-PCS | Mod: CPTII,S$GLB,, | Performed by: ORTHOPAEDIC SURGERY

## 2023-11-02 PROCEDURE — 3044F HG A1C LEVEL LT 7.0%: CPT | Mod: CPTII,S$GLB,, | Performed by: ORTHOPAEDIC SURGERY

## 2023-11-02 PROCEDURE — 1159F MED LIST DOCD IN RCRD: CPT | Mod: CPTII,S$GLB,, | Performed by: ORTHOPAEDIC SURGERY

## 2023-11-02 PROCEDURE — 3080F PR MOST RECENT DIASTOLIC BLOOD PRESSURE >= 90 MM HG: ICD-10-PCS | Mod: CPTII,S$GLB,, | Performed by: ORTHOPAEDIC SURGERY

## 2023-11-02 PROCEDURE — 99213 OFFICE O/P EST LOW 20 MIN: CPT | Mod: S$GLB,,, | Performed by: ORTHOPAEDIC SURGERY

## 2023-11-02 PROCEDURE — 1159F PR MEDICATION LIST DOCUMENTED IN MEDICAL RECORD: ICD-10-PCS | Mod: CPTII,S$GLB,, | Performed by: ORTHOPAEDIC SURGERY

## 2023-11-02 PROCEDURE — 99999 PR PBB SHADOW E&M-EST. PATIENT-LVL V: ICD-10-PCS | Mod: PBBFAC,,, | Performed by: ORTHOPAEDIC SURGERY

## 2023-11-02 PROCEDURE — 3077F SYST BP >= 140 MM HG: CPT | Mod: CPTII,S$GLB,, | Performed by: ORTHOPAEDIC SURGERY

## 2023-11-02 PROCEDURE — 99213 PR OFFICE/OUTPT VISIT, EST, LEVL III, 20-29 MIN: ICD-10-PCS | Mod: S$GLB,,, | Performed by: ORTHOPAEDIC SURGERY

## 2023-11-02 NOTE — PROGRESS NOTES
Subjective:      Patient ID: Debbie Vo is a 63 y.o. female.    Chief Complaint: Pain of the Right Knee      Patient ID: Debbie Vo is a 63 y.o. female.    Chief Complaint: Pain of the Right Knee      KNEE PAIN: Complains of pain to the rightknee.   PAIN LOCATED: anterior and lateral  ONSET: 10 years ago.  QUALITY:  Patient states the pain is worsening  HISTORY: Previous knee injury/surgery: Yes  Hx: scope  ASSOCIATED SYMPTOM AND TRIGGERS:Standing/Weightbearing, walking, trouble w stairs, stiffness, swelling, limping, stiffness w sitting   USES ASSISTIVE DEVICE: none  RELIEVED BY:rest, heat, ice, medication: Aleve, Tylenol used but not effective, cortisone injection, Synvisc injection  PATIENT DENIES: bruising, redness, deformity              Social History     Occupational History    Not on file   Tobacco Use    Smoking status: Never    Smokeless tobacco: Never   Substance and Sexual Activity    Alcohol use: No    Drug use: No    Sexual activity: Yes     Partners: Male      Review of Systems   Constitutional: Negative for diaphoresis.   HENT:  Negative for ear discharge, nosebleeds and stridor.    Eyes:  Negative for photophobia.   Cardiovascular:  Negative for syncope.   Respiratory:  Negative for hemoptysis, shortness of breath and wheezing.    Neurological:  Negative for tremors.   Psychiatric/Behavioral: Negative.           Objective:    General    Constitutional: She is oriented to person, place, and time. She appears well-developed.   HENT:   Head: Normocephalic and atraumatic.   Right Ear: External ear normal.   Left Ear: External ear normal.   Eyes: EOM are normal.   Cardiovascular:  Intact distal pulses.            Neurological: She is alert and oriented to person, place, and time.   Psychiatric: She has a normal mood and affect. Her behavior is normal. Judgment and thought content normal.     General Musculoskeletal Exam   Gait: antalgic and abnormal       Right Knee Exam      Inspection   Swelling: present  Effusion: present    Tenderness   The patient is tender to palpation of the medial joint line.    Crepitus   The patient has crepitus of the patella.    Range of Motion   Extension:  15   Flexion:  80 abnormal     Tests   Ligament Examination   MCL - Valgus: normal (0 to 2mm)  LCL - Varus: normal    Other   Sensation: normal    Muscle Strength   Right Lower Extremity   Quadriceps:  4/5   Hamstrin/5          Assessment:       1. Chronic pain of right knee    2. Primary osteoarthritis of right knee          Plan:       Right bone on bone knee oa  Plan for tka next summer

## 2023-11-03 ENCOUNTER — OFFICE VISIT (OUTPATIENT)
Dept: ORTHOPEDICS | Facility: CLINIC | Age: 63
End: 2023-11-03
Payer: MEDICARE

## 2023-11-03 VITALS
DIASTOLIC BLOOD PRESSURE: 89 MMHG | SYSTOLIC BLOOD PRESSURE: 153 MMHG | BODY MASS INDEX: 42.97 KG/M2 | HEIGHT: 68 IN | WEIGHT: 283.5 LBS | HEART RATE: 81 BPM

## 2023-11-03 DIAGNOSIS — M17.11 PRIMARY OSTEOARTHRITIS OF RIGHT KNEE: Primary | ICD-10-CM

## 2023-11-03 PROCEDURE — 99999 PR PBB SHADOW E&M-EST. PATIENT-LVL III: ICD-10-PCS | Mod: PBBFAC,,, | Performed by: PHYSICIAN ASSISTANT

## 2023-11-03 PROCEDURE — 20610 DRAIN/INJ JOINT/BURSA W/O US: CPT | Mod: RT,S$GLB,, | Performed by: PHYSICIAN ASSISTANT

## 2023-11-03 PROCEDURE — 99499 UNLISTED E&M SERVICE: CPT | Mod: S$GLB,,, | Performed by: PHYSICIAN ASSISTANT

## 2023-11-03 PROCEDURE — 99999 PR PBB SHADOW E&M-EST. PATIENT-LVL III: CPT | Mod: PBBFAC,,, | Performed by: PHYSICIAN ASSISTANT

## 2023-11-03 PROCEDURE — 99499 NO LOS: ICD-10-PCS | Mod: S$GLB,,, | Performed by: PHYSICIAN ASSISTANT

## 2023-11-03 PROCEDURE — 20610 LARGE JOINT ASPIRATION/INJECTION: R KNEE: ICD-10-PCS | Mod: RT,S$GLB,, | Performed by: PHYSICIAN ASSISTANT

## 2023-11-03 NOTE — PROCEDURES
Large Joint Aspiration/Injection: R knee    Date/Time: 11/3/2023 10:30 AM    Performed by: Arely Gale PA-C  Authorized by: Arely Gale PA-C    Consent Done?:  Yes (Verbal)  Indications:  Arthritis  Site marked: the procedure site was marked    Timeout: prior to procedure the correct patient, procedure, and site was verified    Prep: patient was prepped and draped in usual sterile fashion      Local anesthesia used?: Yes    Local anesthetic:  Topical anesthetic    Details:  Needle Size:  22 G  Ultrasonic Guidance for needle placement?: No    Approach:  Anterolateral  Location:  Knee  Site:  R knee  Medications:  32 mg triamcinolone acetonide 32 mg  Patient tolerance:  Patient tolerated the procedure well with no immediate complications

## 2023-11-03 NOTE — PROGRESS NOTES
Subjective:      Patient ID: Debbie Vo is a 63 y.o. female.    Chief Complaint: Pain and Injections of the Right Knee      Patient is here for  Zilretta  injection(s) for right knee osteoarthritis. Patient tolerated last injection well. No new complaints today.           Review of Systems   Constitutional: Negative for chills and fever.   Cardiovascular:  Negative for chest pain.   Respiratory:  Negative for cough.    Hematologic/Lymphatic: Does not bruise/bleed easily.   Skin:  Negative for poor wound healing and rash.   Musculoskeletal:  Positive for arthritis, joint pain, myalgias and stiffness.   Gastrointestinal:  Negative for abdominal pain.   Genitourinary:  Negative for bladder incontinence.   Neurological:  Negative for dizziness, loss of balance and weakness.   Psychiatric/Behavioral:  Negative for altered mental status.        Review of patient's allergies indicates:  No Known Allergies     Current Outpatient Medications   Medication Sig Dispense Refill    albuterol (VENTOLIN HFA) 90 mcg/actuation inhaler Inhale 2 puffs into the lungs every 6 (six) hours as needed for Wheezing. Rescue 18 g 3    allopurinoL (ZYLOPRIM) 300 MG tablet Take 1 tablet (300 mg total) by mouth once daily. 90 tablet 3    amLODIPine (NORVASC) 5 MG tablet Take 1 tablet (5 mg total) by mouth once daily. 90 tablet 4    ammonium lactate (LAC-HYDRIN) 12 % lotion Apply to damp skin after bathing 225 g 11    benzonatate (TESSALON) 200 MG capsule take 1 BY MOUTH THREE TIMES DAILY AS NEEDED FOR COUGH 30 capsule 4    colchicine (COLCRYS) 0.6 mg tablet Take 1 tablet (0.6 mg total) by mouth once daily. 7 tablet 0    colchicine-probenecid 0.5-500 mg (CO-BENEMID) 500-0.5 mg Tab Take 1 tablet by mouth once daily. 7 tablet 2    FLUoxetine 20 MG capsule Take 1 capsule (20 mg total) by mouth once daily. 90 capsule 4    fluticasone propionate (FLONASE) 50 mcg/actuation nasal spray 1 spray (50 mcg total) by Each Nostril route once  "daily. 10 mL 5    gabapentin (NEURONTIN) 300 MG capsule Take 1 capsule (300 mg total) by mouth 3 (three) times daily as needed (pain). 270 capsule 3    loratadine (CLARITIN) 10 mg tablet Take 1 tablet (10 mg total) by mouth once daily. 90 tablet 3    meclizine (ANTIVERT) 25 mg tablet Take 1 tablet (25 mg total) by mouth 3 (three) times daily as needed. 50 tablet 2    metoprolol succinate (TOPROL-XL) 100 MG 24 hr tablet Take 1 tablet (100 mg total) by mouth once daily. 90 tablet 4    neomycin-polymyxin-hydrocortisone (CORTISPORIN) 3.5-10,000-0.5 mg/g-unit/g-% cream Apply topically 2 (two) times daily. 10 g 2    neomycin-polymyxin-hydrocortisone (CORTISPORIN) 3.5-10,000-1 mg/mL-unit/mL-% otic suspension Place 3 drops into both ears 3 (three) times daily. 10 mL 0    omeprazole (PRILOSEC) 20 MG capsule Take 1 capsule (20 mg total) by mouth once daily. 90 capsule 3    topiramate (TOPAMAX) 50 MG tablet Take 1 tablet (50 mg total) by mouth every evening. 90 tablet 4    triamcinolone acetonide 0.1% (KENALOG) 0.1 % cream Apply topically 2 (two) times daily as needed (dryness or itching). 80 g 3    valsartan (DIOVAN) 320 MG tablet Take 1 tablet (320 mg total) by mouth once daily. 90 tablet 3    valACYclovir (VALTREX) 1000 MG tablet Take 1 tablet (1,000 mg total) by mouth 3 (three) times daily. for 5 days 15 tablet 0     No current facility-administered medications for this visit.        The patient's relevant past medical, surgical, and social history was reviewed in Epic.       Objective:      VITAL SIGNS: Ht 5' 8" (1.727 m)   BMI 42.81 kg/m²     Ortho/SPM Exam         Assessment:       1. Primary osteoarthritis of right knee          Plan:         Debbie was seen today for pain and injections.    Diagnoses and all orders for this visit:    Primary osteoarthritis of right knee  -     Large Joint Aspiration/Injection: R knee    I injected the right knee with one dose of Zilretta  today.  We will see the patient back in 6 mos " or as needed. Consider Iovera next.       Diagnoses and plan discussed with the patient, as well as the expected course and duration of his symptoms.  All questions and concerns were addressed prior to the end of the visit.   Instructed patient to call office if they have any future questions/concerns or to schedule apt. Patient will return to see me if symptoms worsen or fail to improve    Note dictated with voice recognition software, please excuse any grammatical errors.        Arely Gale PA-C   11/03/2023

## 2023-11-13 DIAGNOSIS — R42 VERTIGO: ICD-10-CM

## 2023-11-13 NOTE — TELEPHONE ENCOUNTER
Care Due:                  Date            Visit Type   Department     Provider  --------------------------------------------------------------------------------                                EP -                              PRIMARY      [facility] FAMILY  Last Visit: 06-      CARE ([facility])   MEDICINE       Sergio Pitt  Next Visit: None Scheduled  None         None Found                                                            Last  Test          Frequency    Reason                     Performed    Due Date  --------------------------------------------------------------------------------    Uric Acid...  12 months..  allopurinoL, colchicine,   Not Found    Overdue                             colchicine-probenecid....    [employer] Embedded Care Due Messages. Reference number: [id number].   11/13/2023 1:46:36 PM CST

## 2023-11-13 NOTE — TELEPHONE ENCOUNTER
Refill request    ----- Message from Katelynn Caballero sent at 11/13/2023  1:42 PM CST -----  .Type:  RX Refill Request    Who Called: pt  Refill or New Rx:refill  RX Name and Strength:meclizine (ANTIVERT) 25 mg tablet  How is the patient currently taking it? (ex. 1XDay):Take 1 tablet (25 mg total) by mouth 3 (three) times daily as needed  Is this a 30 day or 90 day RX:50 tablet  Preferred Pharmacy with phone number:[facility] [address] [facility]  Local or Mail Order:local  Ordering Provider:Sweetie  Would the patient rather a call back or a response via [facility]? Call back  Best Call Back Number:[phone]  Additional Information:

## 2023-11-14 RX ORDER — MECLIZINE HYDROCHLORIDE 25 MG/1
25 TABLET ORAL 3 TIMES DAILY PRN
Qty: 50 TABLET | Refills: 2 | Status: SHIPPED | OUTPATIENT
Start: 2023-11-14

## 2023-11-15 ENCOUNTER — TELEPHONE (OUTPATIENT)
Dept: FAMILY MEDICINE | Facility: CLINIC | Age: 63
End: 2023-11-15
Payer: MEDICARE

## 2024-01-30 ENCOUNTER — TELEPHONE (OUTPATIENT)
Dept: ORTHOPEDICS | Facility: CLINIC | Age: 64
End: 2024-01-30
Payer: MEDICARE

## 2024-01-30 NOTE — TELEPHONE ENCOUNTER
----- Message from Arely Gale PA-C sent at 1/30/2024  3:30 PM CST -----  Contact: pt  Doesn't look like she has any active insurance. Can we get that figured out first before I put in new orders for anything   ----- Message -----  From: Brigida Blank RN  Sent: 1/30/2024   3:25 PM CST  To: Arely Gale PA-C    Wants ZilrBurton again.  Just had in October.  ----- Message -----  From: Lesley Little  Sent: 1/30/2024   2:54 PM CST  To: Baljinder Mcgee Staff    Type:  Call Back     Who Called:pt    Does the patient know what this is regarding?:Shot   Would the patient rather a call back or a response via [facility]? Call   Best Call Back Number: [phone]  Additional Information:     Pt stated she needs a shot in her knee and scheduled a appointment 02/01

## 2024-02-02 ENCOUNTER — TELEPHONE (OUTPATIENT)
Dept: ORTHOPEDICS | Facility: CLINIC | Age: 64
End: 2024-02-02
Payer: MEDICARE

## 2024-02-02 ENCOUNTER — HOSPITAL ENCOUNTER (EMERGENCY)
Facility: HOSPITAL | Age: 64
Discharge: HOME | End: 2024-02-02
Attending: STUDENT IN AN ORGANIZED HEALTH CARE EDUCATION/TRAINING PROGRAM
Payer: MEDICARE

## 2024-02-02 VITALS
SYSTOLIC BLOOD PRESSURE: 164 MMHG | OXYGEN SATURATION: 96 % | BODY MASS INDEX: 39.4 KG/M2 | DIASTOLIC BLOOD PRESSURE: 95 MMHG | TEMPERATURE: 98 F | RESPIRATION RATE: 20 BRPM | WEIGHT: 260 LBS | HEART RATE: 98 BPM | HEIGHT: 68 IN

## 2024-02-02 DIAGNOSIS — G89.29 CHRONIC PAIN OF RIGHT KNEE: Primary | ICD-10-CM

## 2024-02-02 DIAGNOSIS — M25.561 CHRONIC PAIN OF RIGHT KNEE: Primary | ICD-10-CM

## 2024-02-02 PROCEDURE — 96372 THER/PROPH/DIAG INJ SC/IM: CPT | Performed by: STUDENT IN AN ORGANIZED HEALTH CARE EDUCATION/TRAINING PROGRAM

## 2024-02-02 PROCEDURE — 99284 EMERGENCY DEPT VISIT MOD MDM: CPT | Mod: ER

## 2024-02-02 PROCEDURE — 63600175 PHARM REV CODE 636 W HCPCS: Mod: ER | Performed by: STUDENT IN AN ORGANIZED HEALTH CARE EDUCATION/TRAINING PROGRAM

## 2024-02-02 PROCEDURE — 25000003 PHARM REV CODE 250: Mod: ER | Performed by: STUDENT IN AN ORGANIZED HEALTH CARE EDUCATION/TRAINING PROGRAM

## 2024-02-02 RX ORDER — DICLOFENAC SODIUM 10 MG/G
2 GEL TOPICAL 4 TIMES DAILY
Qty: 150 G | Refills: 0 | Status: SHIPPED | OUTPATIENT
Start: 2024-02-02

## 2024-02-02 RX ORDER — OXYCODONE AND ACETAMINOPHEN 5; 325 MG/1; MG/1
1 TABLET ORAL
Status: COMPLETED | OUTPATIENT
Start: 2024-02-02 | End: 2024-02-02

## 2024-02-02 RX ORDER — OXYCODONE AND ACETAMINOPHEN 5; 325 MG/1; MG/1
1 TABLET ORAL EVERY 6 HOURS PRN
Qty: 10 TABLET | Refills: 0 | Status: SHIPPED | OUTPATIENT
Start: 2024-02-02

## 2024-02-02 RX ORDER — KETOROLAC TROMETHAMINE 30 MG/ML
30 INJECTION, SOLUTION INTRAMUSCULAR; INTRAVENOUS
Status: COMPLETED | OUTPATIENT
Start: 2024-02-02 | End: 2024-02-02

## 2024-02-02 RX ORDER — DEXAMETHASONE SODIUM PHOSPHATE 4 MG/ML
10 INJECTION, SOLUTION INTRA-ARTICULAR; INTRALESIONAL; INTRAMUSCULAR; INTRAVENOUS; SOFT TISSUE
Status: COMPLETED | OUTPATIENT
Start: 2024-02-02 | End: 2024-02-02

## 2024-02-02 RX ORDER — KETOROLAC TROMETHAMINE 10 MG/1
10 TABLET, FILM COATED ORAL EVERY 8 HOURS
Qty: 15 TABLET | Refills: 0 | Status: SHIPPED | OUTPATIENT
Start: 2024-02-02 | End: 2024-02-07

## 2024-02-02 RX ADMIN — KETOROLAC TROMETHAMINE 30 MG: 30 INJECTION, SOLUTION INTRAMUSCULAR; INTRAVENOUS at 08:02

## 2024-02-02 RX ADMIN — OXYCODONE HYDROCHLORIDE AND ACETAMINOPHEN 1 TABLET: 5; 325 TABLET ORAL at 08:02

## 2024-02-02 RX ADMIN — DEXAMETHASONE SODIUM PHOSPHATE 10 MG: 4 INJECTION, SOLUTION INTRA-ARTICULAR; INTRALESIONAL; INTRAMUSCULAR; INTRAVENOUS; SOFT TISSUE at 08:02

## 2024-02-02 NOTE — TELEPHONE ENCOUNTER
Patient showed up for appointment today at 3:15 p.m. Appt was yesterday at 3:30 p.m. Was told she needs to reschedule.

## 2024-02-03 NOTE — ED PROVIDER NOTES
ED Provider Note - 2/2/2024    History     Chief Complaint   Patient presents with    Knee Pain     Pt c/o chronic right knee , reports had appt for injection today and it was cancelled. Denies any new injury falls or traumas. Pt  denies taking medication for pain today       HPI     Debbie Vo is a 63 y.o. year old female with past medical and surgical history as seen below, presenting with chief complaint of knee pain.  Chronic issue for patient.  No acute change with gradual worsening of condition.  No injuries or trauma.  Went to orthopedic clinic today for knee injection however her appointment was for the day prior so was unable to be seen.  Came to emergency department following this for further evaluation.  No fever or chills.  No new swelling.  Still ambulatory but slow to get around.    Past Medical History:   Diagnosis Date    Benign essential hypertension     Colon cancer     Depression     Gout, unspecified     Osteoarthritis      Past Surgical History:   Procedure Laterality Date    CHOLECYSTECTOMY      COLON SURGERY  2002    colon resection    COLONOSCOPY N/A 03/24/2016    Procedure: COLONOSCOPY;  Surgeon: Ernst Bobby MD;  Location: [facility] ENDO;  Service: Endoscopy;  Laterality: N/A;  Needs Flex sigmoidoscopy    EPIDURAL STEROID INJECTION INTO LUMBAR SPINE N/A 06/21/2018    Procedure: Injection-steroid-epidural-lumbar;  Surgeon: Avi Morales Jr., MD;  Location: [facility] PAIN [facility]GT;  Service: Pain Management;  Laterality: N/A;  ORAL SEDATION    HYSTERECTOMY      INJECTION OF ANESTHETIC AGENT INTO SACROILIAC JOINT Bilateral 08/25/2021    Procedure: BLOCK, SACROILIAC JOINT*-- bilateral;  Surgeon: Lucia Carroll MD;  Location: [facility]GT;  Service: Pain Management;  Laterality: Bilateral;    PARTIAL HYSTERECTOMY           Family History   Problem Relation Age of Onset    Heart disease Mother     Cancer Mother         colon cancer    Diabetes Mother     Colon cancer Mother   "   Cancer Father         Liver cancer    Kidney disease Father     Liver cancer Father     Cancer Sister         breast cancer    BRCA 1/2 Sister     Breast cancer Sister     Cancer Brother         Liver cancer    Liver cancer Brother     Cancer Brother         colon cancer    Colon cancer Brother     Cancer Brother         thoat cancer    Throat cancer Brother     No Known Problems Brother     No Known Problems Son      Social History     Tobacco Use    Smoking status: Never    Smokeless tobacco: Never   Substance Use Topics    Alcohol use: No    Drug use: No     Social Determinants of Health with Concerns     Physical Activity: Inactive (7/8/2022)    Exercise Vital Sign     Days of Exercise per Week: 0 days     Minutes of Exercise per Session: 0 min      Review of patient's allergies indicates:  No Known Allergies    Review of Systems     A full Review of Systems (ROS) was performed and was negative unless otherwise stated in the HPI.      Physical Exam     Vitals:    02/02/24 1843   BP: (!) 164/95   BP Location: Left arm   Patient Position: Sitting   Pulse: 98   Resp: 18   Temp: 98.2 °F (36.8 °C)   TempSrc: Oral   SpO2: 96%   Weight: 117.9 kg (260 lb)   Height: 5' 8" (1.727 m)        Physical Exam    Nursing note and vitals reviewed.  Constitutional: She appears well-developed and well-nourished.   HENT:   Head: Normocephalic and atraumatic.   Right Ear: External ear normal.   Left Ear: External ear normal.   Nose: Nose normal.   Eyes: Conjunctivae and EOM are normal. Pupils are equal, round, and reactive to light.   Neck: No tracheal deviation present.   Normal range of motion.  Cardiovascular:  Normal rate and regular rhythm.           Pulmonary/Chest: No respiratory distress. She has no wheezes.   Musculoskeletal:         General: No edema. Normal range of motion.      Cervical back: Normal range of motion.      Right knee: No deformity or effusion. Normal range of motion.     Neurological: She is alert and " oriented to person, place, and time. She has normal strength. No cranial nerve deficit or sensory deficit. Gait normal.   Skin: Skin is warm and dry.   Psychiatric: She has a normal mood and affect. Her behavior is normal. Thought content normal.         Lab Results- Independently reviewed by myself      Labs Reviewed - No data to display        Imaging     Imaging Results    None                    ED Course         Procedures         Orders Placed This Encounter    ketorolac injection 30 mg    dexAMETHasone injection 10 mg    oxyCODONE-acetaminophen 5-325 mg per tablet 1 tablet    oxyCODONE-acetaminophen (PERCOCET) 5-325 mg per tablet    ketorolac (TORADOL) 10 mg tablet    diclofenac sodium (VOLTAREN) 1 % Gel                      Medical Decision Making       The patient's list of active medical problems, social history, medications, and allergies as documented per RN staff has been reviewed.           Medical Decision Making  This patient presents with exacerbation of chronic knee pain. Good ROM.  Considered x-ray of knee but without acute injury or trauma felt utility of this would be limited.    Differential includes Muscular strain, contusion, fracture, dislocation, ligamental, or tendon injuries.    Pain treated in the ED    Advised RICE therapy    Advised scheduling follow up appointment with soon as available orthopedic for evaluation of nonemergent knee injection.    Told to return to ED if symptoms worsen, return, or change in character.     Amount and/or Complexity of Data Reviewed  External Data Reviewed: notes.     Details: Reviewed orthopedic records    Risk  Prescription drug management.                  ED Prescriptions       Medication Sig Dispense Start Date End Date Auth. Provider    oxyCODONE-acetaminophen (PERCOCET) 5-325 mg per tablet Take 1 tablet by mouth every 6 (six) hours as needed for Pain. 10 tablet 2/2/2024 -- Mook Vidal MD    ketorolac (TORADOL) 10 mg tablet Take 1 tablet (10 mg  total) by mouth every 8 (eight) hours. for 5 days 15 tablet 2/2/2024 2/7/2024 Mook Vidal MD    diclofenac sodium (VOLTAREN) 1 % Gel Apply 2 g topically 4 (four) times daily. 150 g 2/2/2024 -- Mook Vidal MD              Clinical Impression       Follow-up Information       Follow up With Specialties Details Why Contact Info Additional Information    [facility] Orth[facility]ics Schedule an appointment as soon as possible for a visit in 5 days For follow-up on today's visit. [address]  [address]  [place] [address] [phone] Please park in [address] or D and use Denita galaviz. Take [address] elevators.    [facility] - Emergency Dept Emergency Medicine Go to  As needed, If symptoms worsen [address] [facility] [address] [phone]             Referrals:  No orders of the defined types were placed in this encounter.      Disposition   ED Disposition Condition    Discharge Stable            Diagnosis    ICD-10-CM ICD-9-CM   1. Chronic pain of right knee  M25.561 719.46    G89.29 338.29           Mook Vidal MD        02/02/2024          DISCLAIMER: This note was prepared with M*[employer] voice recognition transcription software. Garbled syntax, mangled pronouns, and other bizarre constructions may be attributed to that software system.       Mook Vidal MD  02/02/24 1943

## 2024-02-05 ENCOUNTER — OFFICE VISIT (OUTPATIENT)
Dept: ORTHOPEDICS | Facility: CLINIC | Age: 64
End: 2024-02-05
Payer: MEDICARE

## 2024-02-05 ENCOUNTER — TELEPHONE (OUTPATIENT)
Dept: ORTHOPEDICS | Facility: CLINIC | Age: 64
End: 2024-02-05
Payer: MEDICARE

## 2024-02-05 VITALS
WEIGHT: 293 LBS | HEART RATE: 89 BPM | HEIGHT: 68 IN | DIASTOLIC BLOOD PRESSURE: 89 MMHG | BODY MASS INDEX: 44.41 KG/M2 | SYSTOLIC BLOOD PRESSURE: 158 MMHG

## 2024-02-05 DIAGNOSIS — M25.561 CHRONIC PAIN OF RIGHT KNEE: ICD-10-CM

## 2024-02-05 DIAGNOSIS — G89.29 CHRONIC PAIN OF RIGHT KNEE: ICD-10-CM

## 2024-02-05 DIAGNOSIS — M17.11 PRIMARY OSTEOARTHRITIS OF RIGHT KNEE: Primary | ICD-10-CM

## 2024-02-05 PROCEDURE — 20610 DRAIN/INJ JOINT/BURSA W/O US: CPT | Mod: PBBFAC,PN,RT | Performed by: PHYSICIAN ASSISTANT

## 2024-02-05 PROCEDURE — 99213 OFFICE O/P EST LOW 20 MIN: CPT | Mod: 25,S$GLB,, | Performed by: PHYSICIAN ASSISTANT

## 2024-02-05 PROCEDURE — 99999 PR PBB SHADOW E&M-EST. PATIENT-LVL IV: CPT | Mod: PBBFAC,,, | Performed by: PHYSICIAN ASSISTANT

## 2024-02-05 PROCEDURE — 20610 DRAIN/INJ JOINT/BURSA W/O US: CPT | Mod: RT,S$GLB,, | Performed by: PHYSICIAN ASSISTANT

## 2024-02-05 PROCEDURE — 99214 OFFICE O/P EST MOD 30 MIN: CPT | Mod: PBBFAC,PN,25 | Performed by: PHYSICIAN ASSISTANT

## 2024-02-05 RX ORDER — KETOROLAC TROMETHAMINE 30 MG/ML
30 INJECTION, SOLUTION INTRAMUSCULAR; INTRAVENOUS
Status: DISCONTINUED | OUTPATIENT
Start: 2024-02-05 | End: 2024-02-05 | Stop reason: HOSPADM

## 2024-02-05 RX ORDER — BUPIVACAINE HYDROCHLORIDE 5 MG/ML
5 INJECTION, SOLUTION PERINEURAL
Status: DISCONTINUED | OUTPATIENT
Start: 2024-02-05 | End: 2024-02-05 | Stop reason: HOSPADM

## 2024-02-05 RX ORDER — LIDOCAINE HYDROCHLORIDE 10 MG/ML
4 INJECTION INFILTRATION; PERINEURAL
Status: DISCONTINUED | OUTPATIENT
Start: 2024-02-05 | End: 2024-02-05 | Stop reason: HOSPADM

## 2024-02-05 RX ADMIN — LIDOCAINE HYDROCHLORIDE 4 ML: 10 INJECTION, SOLUTION INFILTRATION; PERINEURAL at 01:02

## 2024-02-05 RX ADMIN — KETOROLAC TROMETHAMINE 30 MG: 30 INJECTION, SOLUTION INTRAMUSCULAR at 01:02

## 2024-02-05 RX ADMIN — BUPIVACAINE HYDROCHLORIDE 5 ML: 5 INJECTION, SOLUTION PERINEURAL at 01:02

## 2024-02-05 NOTE — TELEPHONE ENCOUNTER
----- Message from Margaret Manuel sent at 2/5/2024  1:04 PM CST -----  Pt Requesting Call Back    Who called: pt  Who called for pt:  Best call back #:  [phone]  Add notes: pt wants to know can she come in for the 1:45pm on today that was available (pt said a staff told her there was an opening for this appt)

## 2024-02-05 NOTE — PROGRESS NOTES
Subjective:      Patient ID: Debbie Vo is a 63 y.o. female.    Chief Complaint: Pain of the Right Knee      62yo F follow up right knee bone on bone arthritis.  Previous Zilretta injection helped for 3 months.  She recently went to the ED for right knee pain.  She was given anti-inflammatories and pain medication.  She states this medication helps minimally.  She is scheduled for right TKA with Dr. Reid on May 20th.        Review of Systems   Constitutional: Negative for chills and fever.   Cardiovascular:  Negative for chest pain.   Respiratory:  Negative for cough.    Hematologic/Lymphatic: Does not bruise/bleed easily.   Skin:  Negative for poor wound healing and rash.   Musculoskeletal:  Positive for joint pain, myalgias and stiffness.   Gastrointestinal:  Negative for abdominal pain.   Genitourinary:  Negative for bladder incontinence.   Neurological:  Negative for dizziness, loss of balance and weakness.   Psychiatric/Behavioral:  Negative for altered mental status.        Review of patient's allergies indicates:  No Known Allergies     Current Outpatient Medications   Medication Sig Dispense Refill    albuterol (VENTOLIN HFA) 90 mcg/actuation inhaler Inhale 2 puffs into the lungs every 6 (six) hours as needed for Wheezing. Rescue 18 g 3    allopurinoL (ZYLOPRIM) 300 MG tablet Take 1 tablet (300 mg total) by mouth once daily. 90 tablet 3    amLODIPine (NORVASC) 5 MG tablet Take 1 tablet (5 mg total) by mouth once daily. 90 tablet 4    ammonium lactate (LAC-HYDRIN) 12 % lotion Apply to damp skin after bathing 225 g 11    benzonatate (TESSALON) 200 MG capsule take 1 BY MOUTH THREE TIMES DAILY AS NEEDED FOR COUGH 30 capsule 4    colchicine (COLCRYS) 0.6 mg tablet Take 1 tablet (0.6 mg total) by mouth once daily. 7 tablet 0    colchicine-probenecid 0.5-500 mg (CO-BENEMID) 500-0.5 mg Tab Take 1 tablet by mouth once daily. 7 tablet 2    diclofenac sodium (VOLTAREN) 1 % Gel Apply 2 g topically 4  (four) times daily. 150 g 0    FLUoxetine 20 MG capsule Take 1 capsule (20 mg total) by mouth once daily. 90 capsule 4    fluticasone propionate (FLONASE) 50 mcg/actuation nasal spray 1 spray (50 mcg total) by Each Nostril route once daily. 10 mL 5    gabapentin (NEURONTIN) 300 MG capsule Take 1 capsule (300 mg total) by mouth 3 (three) times daily as needed (pain). 270 capsule 3    ketorolac (TORADOL) 10 mg tablet Take 1 tablet (10 mg total) by mouth every 8 (eight) hours. for 5 days 15 tablet 0    loratadine (CLARITIN) 10 mg tablet Take 1 tablet (10 mg total) by mouth once daily. 90 tablet 3    meclizine (ANTIVERT) 25 mg tablet Take 1 tablet (25 mg total) by mouth 3 (three) times daily as needed. 50 tablet 2    metoprolol succinate (TOPROL-XL) 100 MG 24 hr tablet Take 1 tablet (100 mg total) by mouth once daily. 90 tablet 4    neomycin-polymyxin-hydrocortisone (CORTISPORIN) 3.5-10,000-0.5 mg/g-unit/g-% cream Apply topically 2 (two) times daily. 10 g 2    neomycin-polymyxin-hydrocortisone (CORTISPORIN) 3.5-10,000-1 mg/mL-unit/mL-% otic suspension Place 3 drops into both ears 3 (three) times daily. 10 mL 0    omeprazole (PRILOSEC) 20 MG capsule Take 1 capsule (20 mg total) by mouth once daily. 90 capsule 3    oxyCODONE-acetaminophen (PERCOCET) 5-325 mg per tablet Take 1 tablet by mouth every 6 (six) hours as needed for Pain. 10 tablet 0    topiramate (TOPAMAX) 50 MG tablet Take 1 tablet (50 mg total) by mouth every evening. 90 tablet 4    triamcinolone acetonide 0.1% (KENALOG) 0.1 % cream Apply topically 2 (two) times daily as needed (dryness or itching). 80 g 3    valsartan (DIOVAN) 320 MG tablet Take 1 tablet (320 mg total) by mouth once daily. 90 tablet 3    valACYclovir (VALTREX) 1000 MG tablet Take 1 tablet (1,000 mg total) by mouth 3 (three) times daily. for 5 days 15 tablet 0     No current facility-administered medications for this visit.        The patient's relevant past medical, surgical, and social  "history was reviewed in Epic.       Objective:      VITAL SIGNS: BP (!) 158/89   Pulse 89   Ht 5' 8" (1.727 m)   Wt 133.7 kg (294 lb 12.1 oz)   BMI 44.82 kg/m²     General    Nursing note and vitals reviewed.  Constitutional: She is oriented to person, place, and time. She appears well-developed and well-nourished.   Neurological: She is alert and oriented to person, place, and time.     General Musculoskeletal Exam   Gait: antalgic       Right Knee Exam     Tenderness   The patient is tender to palpation of the medial joint line.    Range of Motion   Extension:  abnormal   Flexion:  abnormal     Muscle Strength   Right Lower Extremity   Quadriceps:  4/5            Assessment:       1. Primary osteoarthritis of right knee    2. Chronic pain of right knee          Plan:         Debbie was seen today for pain.    Diagnoses and all orders for this visit:    Primary osteoarthritis of right knee  -     Cancel: Prior authorization Order  -     Large Joint Aspiration/Injection: R knee  -     Prior authorization Order    Chronic pain of right knee  -     Cancel: Prior authorization Order  -     Large Joint Aspiration/Injection: R knee  -     Prior authorization Order    Right knee arthritis.  Toradol given today for acute pain.  She will return for right knee Zilretta injection once authorized. Explained to the patient this will be the last injection before her total knee replacement in May.  She understands.    Diagnoses and plan discussed with the patient, as well as the expected course and duration of his symptoms.  All questions and concerns were addressed prior to the end of the visit.   Instructed patient to call office if they have any future questions/concerns or to schedule apt. Patient will return to see me if symptoms worsen or fail to improve    Note dictated with voice recognition software, please excuse any grammatical errors.        Arely Gale PA-C   02/05/2024    "

## 2024-02-05 NOTE — PROCEDURES
Large Joint Aspiration/Injection: R knee    Date/Time: 2/5/2024 1:45 PM    Performed by: Arely Gale PA-C  Authorized by: Areyl Gale PA-C    Consent Done?:  Yes (Verbal)  Indications:  Arthritis  Site marked: the procedure site was marked    Timeout: prior to procedure the correct patient, procedure, and site was verified    Prep: patient was prepped and draped in usual sterile fashion      Local anesthesia used?: Yes    Local anesthetic:  Topical anesthetic    Details:  Needle Size:  22 G  Ultrasonic Guidance for needle placement?: No    Approach:  Anterolateral  Location:  Knee  Site:  R knee  Medications:  30 mg ketorolac 30 mg/mL (1 mL); 5 mL BUPivacaine 0.5 % (5 mg/mL); 4 mL LIDOcaine HCL 10 mg/ml (1%) 10 mg/mL (1 %)  Patient tolerance:  Patient tolerated the procedure well with no immediate complications

## 2024-02-09 ENCOUNTER — TELEPHONE (OUTPATIENT)
Dept: FAMILY MEDICINE | Facility: CLINIC | Age: 64
End: 2024-02-09
Payer: MEDICARE

## 2024-02-09 RX ORDER — PROBENECID AND COLCHICINE .5; 5 MG/1; MG/1
1 TABLET ORAL DAILY
Qty: 7 TABLET | Refills: 2 | Status: SHIPPED | OUTPATIENT
Start: 2024-02-09

## 2024-02-09 NOTE — TELEPHONE ENCOUNTER
Can you please refill  colchicine-probenecid 0.5-500 mg (CO-BENEMID) 500-0.5 mg Tab   #7 for gout    To romi stallworth

## 2024-02-09 NOTE — ADDENDUM NOTE
Addended by: CHRISTINA CALVO on: 2/9/2024 10:43 AM     Modules accepted: Orders     Subjective   HPI Comments: Patient c/o MS relapse over past few days with diffuse aching and weakness.  Got shot of acthar but still feeling bad.  Also c/o sharp chest pain over past 2 days that is worse with movement.    Patient is a 55 y.o. female presenting with chest pain.   History provided by:  Patient   used: No    Chest Pain   Pain location:  L chest  Pain quality: sharp    Pain radiates to:  L shoulder  Pain severity:  Moderate  Onset quality:  Gradual  Duration:  2 days  Timing:  Constant  Progression:  Worsening  Chronicity:  Recurrent  Context: not breathing, not drug use, not eating, not intercourse, not lifting, not movement, not raising an arm, not at rest, not stress and not trauma    Relieved by:  Nothing  Worsened by:  Nothing  Ineffective treatments:  None tried  Associated symptoms: weakness    Associated symptoms: no abdominal pain, no AICD problem, no altered mental status, no anorexia, no anxiety, no back pain, no claudication, no cough, no diaphoresis, no dizziness, no dysphagia, no fatigue, no fever, no headache, no heartburn, no lower extremity edema, no nausea, no near-syncope, no numbness, no orthopnea, no palpitations, no PND, no shortness of breath, no syncope and no vomiting    Risk factors: no aortic disease, no birth control, no coronary artery disease, no diabetes mellitus, no Gavin-Danlos syndrome, no high cholesterol, no hypertension, no immobilization, not male, no Marfan's syndrome, not obese, not pregnant, no prior DVT/PE, no smoking and no surgery        Review of Systems   Constitutional: Negative for diaphoresis, fatigue and fever.   HENT: Negative.  Negative for trouble swallowing.    Respiratory: Negative.  Negative for cough and shortness of breath.    Cardiovascular: Positive for chest pain. Negative for palpitations, orthopnea, claudication, syncope, PND and near-syncope.   Gastrointestinal: Negative.  Negative for abdominal pain, anorexia,  heartburn, nausea and vomiting.   Genitourinary: Negative.    Musculoskeletal: Negative.  Negative for back pain.   Skin: Negative.    Neurological: Positive for weakness. Negative for dizziness, numbness and headaches.   Hematological: Negative.    Psychiatric/Behavioral: Negative.    All other systems reviewed and are negative.      Past Medical History:   Diagnosis Date   • MS (multiple sclerosis)        Allergies   Allergen Reactions   • Ciprofloxacin Rash   • Penicillins Rash   • Sulfa Antibiotics Rash   • Thorazine [Chlorpromazine] Anxiety       Past Surgical History:   Procedure Laterality Date   • HYSTERECTOMY         Family History   Problem Relation Age of Onset   • Breast cancer Maternal Aunt        Social History     Social History   • Marital status:      Spouse name: N/A   • Number of children: N/A   • Years of education: N/A     Social History Main Topics   • Smoking status: Never Smoker   • Smokeless tobacco: None   • Alcohol use No   • Drug use: No   • Sexual activity: Defer     Other Topics Concern   • None     Social History Narrative   • None       Prior to Admission medications    Not on File       Medications   sodium chloride 0.9 % flush 10 mL (not administered)   methylPREDNISolone sodium succinate (SOLU-Medrol) 1,000 mg in sodium chloride 0.9 % 100 mL IVPB (0 mg Intravenous Stopped 2/6/18 1610)   ondansetron (ZOFRAN) injection 4 mg (4 mg Intravenous Given 2/6/18 1615)   HYDROmorphone (DILAUDID) injection 1 mg (1 mg Intravenous Given 2/6/18 1615)       Vitals:    02/06/18 1651   BP:    Pulse:    Resp: 19   Temp: 97.8 °F (36.6 °C)   SpO2:          Objective   Physical Exam   Constitutional: She is oriented to person, place, and time. She appears well-developed and well-nourished.   HENT:   Head: Normocephalic and atraumatic.   Mouth/Throat: Oropharynx is clear and moist.   Neck: Normal range of motion. Neck supple.   Cardiovascular: Normal rate and regular rhythm.    Pulmonary/Chest:  Effort normal and breath sounds normal.   Abdominal: Soft. Bowel sounds are normal.   Musculoskeletal: Normal range of motion.   Neurological: She is alert and oriented to person, place, and time.   Skin: Skin is warm and dry.   Psychiatric: She has a normal mood and affect. Her behavior is normal.   Nursing note and vitals reviewed.      Procedures         Lab Results (last 24 hours)     Procedure Component Value Units Date/Time    CBC & Differential [[id number]] Collected:  02/06/18 1443    Specimen:  Blood Updated:  02/06/18 1451    Narrative:       The following orders were created for panel order CBC & Differential.  Procedure                               Abnormality         Status                     ---------                               -----------         ------                     CBC Auto Differential[[id number]]        Abnormal            Final result                 Please view results for these tests on the individual orders.    Comprehensive Metabolic Panel [[id number]]  (Abnormal) Collected:  02/06/18 1443    Specimen:  Blood Updated:  02/06/18 1502     Glucose 140 (H) mg/dL      BUN 18 mg/dL       Specimen hemolyzed. Results may be affected.        Creatinine 0.70 mg/dL      Sodium 140 mmol/L      Potassium 4.5 mmol/L       Specimen hemolyzed.  Results may be affected.        Chloride 100 mmol/L      CO2 30.0 mmol/L      Calcium 9.3 mg/dL      Total Protein 7.1 g/dL      Albumin 4.30 g/dL      ALT (SGPT) 26 U/L       Specimen hemolyzed.  Results may be affected.        AST (SGOT) 30 U/L       Specimen hemolyzed.  Results may be affected.        Alkaline Phosphatase 66 U/L       Specimen hemolyzed. Results may be affected.        Total Bilirubin 0.5 mg/dL      eGFR Non African Amer 87 mL/min/1.73      Globulin 2.8 gm/dL      A/G Ratio 1.5 g/dL      BUN/Creatinine Ratio 25.7 (H)     Anion Gap 10.0 mmol/L     Troponin [[id number]]  (Normal) Collected:  02/06/18 1443    Specimen:  Blood Updated:  02/06/18  1512     Troponin I <0.012 ng/mL     CBC Auto Differential [[id number]]  (Abnormal) Collected:  02/06/18 1443    Specimen:  Blood Updated:  02/06/18 1451     WBC 8.45 10*3/mm3      RBC 4.00 (L) 10*6/mm3      Hemoglobin 12.7 g/dL      Hematocrit 37.6 %      MCV 94.0 fL      MCH 31.8 pg      MCHC 33.8 g/dL      RDW 12.2 %      RDW-SD 42.4 fl      MPV 9.3 fL      Platelets 326 10*3/mm3      Neutrophil % 87.3 (H) %      Lymphocyte % 7.6 (L) %      Monocyte % 3.9 (L) %      Eosinophil % 0.4 %      Basophil % 0.4 %      Immature Grans % 0.4 %      Neutrophils, Absolute 7.39 10*3/mm3      Lymphocytes, Absolute 0.64 (L) 10*3/mm3      Monocytes, Absolute 0.33 10*3/mm3      Eosinophils, Absolute 0.03 10*3/mm3      Basophils, Absolute 0.03 10*3/mm3      Immature Grans, Absolute 0.03 10*3/mm3      nRBC 0.0 /100 WBC           XR Chest 1 View   Final Result   1. No acute disease.           This report was finalized on 02/06/2018 14:56 by Dr. Maximilian Miles MD.          ED Course  ED Course   Comment By Time   Told patient testing is all okay here.  She is still holding her chest and intermittently clutching harder and grimacing with pain.  I told her this is probably chest wall or pleuritic pain of some type since her pain has been present for 3 days with normal tests but I did offer to keep for second set and/or admit if she were still concerned about heart problems.  However she talked with her  and has decided to go home and follow up with PCP if needed. John Eden Jr., MD 02/06 3487         HEART Score (for prediction of 6-week risk of major adverse cardiac event) reviewed and/or performed as part of the patient evaluation and treatment planning process.  The result associated with this review/performance is: 2      MDM  Number of Diagnoses or Management Options  Chest wall pain: new and requires workup     Amount and/or Complexity of Data Reviewed  Clinical lab tests: ordered and reviewed  Tests in the radiology  section of CPT®: ordered and reviewed  Tests in the medicine section of CPT®: reviewed and ordered    Risk of Complications, Morbidity, and/or Mortality  Presenting problems: moderate  Diagnostic procedures: moderate  Management options: moderate    Patient Progress  Patient progress: stable      Final diagnoses:   Chest wall pain          John Eden Jr., MD  02/06/18 0882

## 2024-02-19 ENCOUNTER — OFFICE VISIT (OUTPATIENT)
Dept: ORTHOPEDICS | Facility: CLINIC | Age: 64
End: 2024-02-19
Payer: MEDICARE

## 2024-02-19 VITALS
SYSTOLIC BLOOD PRESSURE: 158 MMHG | DIASTOLIC BLOOD PRESSURE: 89 MMHG | HEIGHT: 68 IN | WEIGHT: 293 LBS | BODY MASS INDEX: 44.41 KG/M2 | HEART RATE: 89 BPM

## 2024-02-19 DIAGNOSIS — M17.11 PRIMARY OSTEOARTHRITIS OF RIGHT KNEE: Primary | ICD-10-CM

## 2024-02-19 PROCEDURE — 99499 UNLISTED E&M SERVICE: CPT | Mod: S$GLB,,, | Performed by: PHYSICIAN ASSISTANT

## 2024-02-19 PROCEDURE — 20610 DRAIN/INJ JOINT/BURSA W/O US: CPT | Mod: RT,S$GLB,, | Performed by: PHYSICIAN ASSISTANT

## 2024-02-19 PROCEDURE — 99999 PR PBB SHADOW E&M-EST. PATIENT-LVL IV: CPT | Mod: PBBFAC,,, | Performed by: PHYSICIAN ASSISTANT

## 2024-02-19 NOTE — PROCEDURES
Large Joint Aspiration/Injection: R knee    Date/Time: 2/19/2024 2:00 PM    Performed by: Arely Gale PA-C  Authorized by: Arely Gale PA-C    Consent Done?:  Yes (Verbal)  Indications:  Arthritis  Site marked: the procedure site was marked    Timeout: prior to procedure the correct patient, procedure, and site was verified    Prep: patient was prepped and draped in usual sterile fashion      Local anesthesia used?: Yes    Local anesthetic:  Topical anesthetic    Details:  Needle Size:  22 G  Ultrasonic Guidance for needle placement?: No    Approach:  Anterolateral  Location:  Knee  Site:  R knee  Medications:  32 mg triamcinolone acetonide 32 mg  Patient tolerance:  Patient tolerated the procedure well with no immediate complications

## 2024-02-19 NOTE — PROGRESS NOTES
Subjective:      Patient ID: Debbie Vo is a 63 y.o. female.    Chief Complaint: Pain and Injections of the Right Knee      Patient is here for  Zilretta  injection(s) for right knee osteoarthritis. Patient tolerated last injection well.TKA in May.         Review of Systems   Constitutional: Negative for chills and fever.   Cardiovascular:  Negative for chest pain.   Respiratory:  Negative for cough.    Hematologic/Lymphatic: Does not bruise/bleed easily.   Skin:  Negative for poor wound healing and rash.   Musculoskeletal:  Positive for joint pain, myalgias and stiffness.   Gastrointestinal:  Negative for abdominal pain.   Genitourinary:  Negative for bladder incontinence.   Neurological:  Negative for dizziness, loss of balance and weakness.   Psychiatric/Behavioral:  Negative for altered mental status.        Review of patient's allergies indicates:  No Known Allergies     Current Outpatient Medications   Medication Sig Dispense Refill    albuterol (VENTOLIN HFA) 90 mcg/actuation inhaler Inhale 2 puffs into the lungs every 6 (six) hours as needed for Wheezing. Rescue 18 g 3    allopurinoL (ZYLOPRIM) 300 MG tablet Take 1 tablet (300 mg total) by mouth once daily. 90 tablet 3    amLODIPine (NORVASC) 5 MG tablet Take 1 tablet (5 mg total) by mouth once daily. 90 tablet 4    ammonium lactate (LAC-HYDRIN) 12 % lotion Apply to damp skin after bathing 225 g 11    benzonatate (TESSALON) 200 MG capsule take 1 BY MOUTH THREE TIMES DAILY AS NEEDED FOR COUGH 30 capsule 4    colchicine (COLCRYS) 0.6 mg tablet Take 1 tablet (0.6 mg total) by mouth once daily. 7 tablet 0    colchicine-probenecid 0.5-500 mg (CO-BENEMID) 500-0.5 mg Tab Take 1 tablet by mouth once daily. 7 tablet 2    diclofenac sodium (VOLTAREN) 1 % Gel Apply 2 g topically 4 (four) times daily. 150 g 0    FLUoxetine 20 MG capsule Take 1 capsule (20 mg total) by mouth once daily. 90 capsule 4    fluticasone propionate (FLONASE) 50 mcg/actuation  "nasal spray 1 spray (50 mcg total) by Each Nostril route once daily. 10 mL 5    gabapentin (NEURONTIN) 300 MG capsule Take 1 capsule (300 mg total) by mouth 3 (three) times daily as needed (pain). 270 capsule 3    loratadine (CLARITIN) 10 mg tablet Take 1 tablet (10 mg total) by mouth once daily. 90 tablet 3    meclizine (ANTIVERT) 25 mg tablet Take 1 tablet (25 mg total) by mouth 3 (three) times daily as needed. 50 tablet 2    metoprolol succinate (TOPROL-XL) 100 MG 24 hr tablet Take 1 tablet (100 mg total) by mouth once daily. 90 tablet 4    neomycin-polymyxin-hydrocortisone (CORTISPORIN) 3.5-10,000-0.5 mg/g-unit/g-% cream Apply topically 2 (two) times daily. 10 g 2    neomycin-polymyxin-hydrocortisone (CORTISPORIN) 3.5-10,000-1 mg/mL-unit/mL-% otic suspension Place 3 drops into both ears 3 (three) times daily. 10 mL 0    omeprazole (PRILOSEC) 20 MG capsule Take 1 capsule (20 mg total) by mouth once daily. 90 capsule 3    oxyCODONE-acetaminophen (PERCOCET) 5-325 mg per tablet Take 1 tablet by mouth every 6 (six) hours as needed for Pain. 10 tablet 0    topiramate (TOPAMAX) 50 MG tablet Take 1 tablet (50 mg total) by mouth every evening. 90 tablet 4    triamcinolone acetonide 0.1% (KENALOG) 0.1 % cream Apply topically 2 (two) times daily as needed (dryness or itching). 80 g 3    valsartan (DIOVAN) 320 MG tablet Take 1 tablet (320 mg total) by mouth once daily. 90 tablet 3    valACYclovir (VALTREX) 1000 MG tablet Take 1 tablet (1,000 mg total) by mouth 3 (three) times daily. for 5 days 15 tablet 0     No current facility-administered medications for this visit.        The patient's relevant past medical, surgical, and social history was reviewed in Epic.       Objective:      VITAL SIGNS: BP (!) 158/89   Pulse 89   Ht 5' 8" (1.727 m)   Wt 133.7 kg (294 lb 12.1 oz)   BMI 44.82 kg/m²     Ortho/SPM Exam         Assessment:       1. Primary osteoarthritis of right knee          Plan:         Debbie was seen today " for pain and injections.    Diagnoses and all orders for this visit:    Primary osteoarthritis of right knee  -     Large Joint Aspiration/Injection: R knee    I injected the right knee with one dose of Zilretta  today.  Last steroid before TKA. Can do toradol in the future.     Diagnoses and plan discussed with the patient, as well as the expected course and duration of his symptoms.  All questions and concerns were addressed prior to the end of the visit.   Instructed patient to call office if they have any future questions/concerns or to schedule apt. Patient will return to see me if symptoms worsen or fail to improve    Note dictated with voice recognition software, please excuse any grammatical errors.        Arely Gale PA-C   02/19/2024

## 2024-02-23 ENCOUNTER — TELEPHONE (OUTPATIENT)
Dept: FAMILY MEDICINE | Facility: CLINIC | Age: 64
End: 2024-02-23
Payer: MEDICARE

## 2024-02-23 NOTE — TELEPHONE ENCOUNTER
----- Message from Ellie Shultz sent at 2/23/2024 11:55 AM CST -----  Type:  Same Day Appointment Request    Caller is requesting a same day appointment.  Caller declined first available appointment listed below.    Name of Caller: Pt   When is the first available appointment? Panels were closed   Symptoms: Cold     Best Call Back Number: [phone]      Coughing - productive yellow  Started yesterday  No fever   Chest congestion  Rattling in chest  Headache    Taking nyquil     Can you call in rx - no appts available   \[facility]\"" pharmacy     She does not have access to take home covid    Do you want to recommend UC?

## 2024-03-01 ENCOUNTER — OFFICE VISIT (OUTPATIENT)
Dept: FAMILY MEDICINE | Facility: CLINIC | Age: 64
End: 2024-03-01
Payer: MEDICARE

## 2024-03-01 VITALS
WEIGHT: 287.06 LBS | HEIGHT: 68 IN | HEART RATE: 98 BPM | BODY MASS INDEX: 43.51 KG/M2 | DIASTOLIC BLOOD PRESSURE: 88 MMHG | TEMPERATURE: 98 F | OXYGEN SATURATION: 96 % | SYSTOLIC BLOOD PRESSURE: 136 MMHG

## 2024-03-01 DIAGNOSIS — Z12.31 ENCOUNTER FOR SCREENING MAMMOGRAM FOR BREAST CANCER: ICD-10-CM

## 2024-03-01 DIAGNOSIS — J40 BRONCHITIS: Primary | ICD-10-CM

## 2024-03-01 PROCEDURE — 99213 OFFICE O/P EST LOW 20 MIN: CPT | Mod: S$GLB,,, | Performed by: FAMILY MEDICINE

## 2024-03-01 NOTE — PROGRESS NOTES
" Patient ID: Debbie Vo is a 63 y.o. female.    Chief Complaint: Cough, Sore Throat, and Nasal Congestion    HPI       Debbie Vo is a 63 y.o. female was treated about a week ago for bronchitis.  Was having nasal congestion drip cough and audible wheezing.  Continues have a cough mostly at night when laying down.  Was given azithromycin and prednisone.  Also given albuterol inhaler.  Using albuterol inhaler few times a day recently.  Not helping relieve any of her symptoms.  In discussion symptoms are improving every several days.      Review of Symptoms        Physical Exam    Vitals:    03/01/24 1136   BP: 136/88   BP Location: Left arm   Patient Position: Sitting   Pulse: 98   Temp: 97.7 °F (36.5 °C)   TempSrc: Oral   SpO2: 96%   Weight: 130.2 kg (287 lb 0.6 oz)   Height: 5' 8" (1.727 m)        Constitutional:   Oriented to person, place, and time.appears well-developed and well-nourished.   No distress.     HENT:   Head: Normocephalic and atraumatic.     Right Ear: Tympanic membrane, external ear and ear canal normal     Left Ear: Tympanic membrane, external ear and ear canal normal    Nose: External Normal. Normal turbinates, No rhinorrhea     Mouth/Throat: Uvula is midline, oropharynx is clear and moist and mucous membranes are normal.     Neck: supple no anterior cervical adenopathy    Carotid artery:  Bruit    NEG []   POS[]    Eyes:   Conjunctivae are normal. Right eye exhibits no discharge. Left eye exhibits no discharge. No scleral icterus. No periorbital edema     Cardiovascular:  Regular rate and rhythm with normal S1 and S2     Pulmonary/Chest:   Clear to auscultation bilaterally without wheezes, rhonchi or rales    Musculoskeletal:  No edema. No obvious deformity No wasting     Neurological:   Alert and oriented to person, place, and time. Coordination normal.     Skin:   Skin is warm and dry.   No diaphoresis.   No rash noted.    Psychiatric: Normal mood and affect. " Behavior is normal. Judgment and thought content normal.       Assessment / Plan:      ICD-10-CM ICD-9-CM   1. Bronchitis  J40 490   2. Encounter for screening mammogram for breast cancer  Z12.31 V76.12     Bronchitis    Encounter for screening mammogram for breast cancer  -     Mammo Digital Screening Bilat w/ [facility]; Future; Expected date: 03/01/2024    Bronchitis-getting better-reassurance if symptoms improve every 3-5 days then no changes need be made.  Will re-evaluate if need be symptoms are getting better.    Discontinue albuterol as it is not doing any good.

## 2024-03-20 ENCOUNTER — OFFICE VISIT (OUTPATIENT)
Dept: FAMILY MEDICINE | Facility: CLINIC | Age: 64
End: 2024-03-20
Payer: MEDICARE

## 2024-03-20 VITALS
OXYGEN SATURATION: 95 % | SYSTOLIC BLOOD PRESSURE: 120 MMHG | DIASTOLIC BLOOD PRESSURE: 80 MMHG | HEART RATE: 108 BPM | HEIGHT: 68 IN | WEIGHT: 289.56 LBS | TEMPERATURE: 98 F | BODY MASS INDEX: 43.88 KG/M2

## 2024-03-20 DIAGNOSIS — M54.16 LUMBAR RADICULOPATHY, CHRONIC: ICD-10-CM

## 2024-03-20 DIAGNOSIS — Z13.6 SCREENING FOR CARDIOVASCULAR CONDITION: ICD-10-CM

## 2024-03-20 DIAGNOSIS — K21.9 GASTROESOPHAGEAL REFLUX DISEASE WITHOUT ESOPHAGITIS: ICD-10-CM

## 2024-03-20 DIAGNOSIS — B02.29 POST HERPETIC NEURALGIA: ICD-10-CM

## 2024-03-20 DIAGNOSIS — R23.2 HOT FLASHES: ICD-10-CM

## 2024-03-20 DIAGNOSIS — M10.9 ACUTE GOUT INVOLVING TOE OF RIGHT FOOT, UNSPECIFIED CAUSE: ICD-10-CM

## 2024-03-20 DIAGNOSIS — I10 ESSENTIAL HYPERTENSION: ICD-10-CM

## 2024-03-20 DIAGNOSIS — F33.42 RECURRENT MAJOR DEPRESSIVE DISORDER, IN FULL REMISSION: ICD-10-CM

## 2024-03-20 DIAGNOSIS — R73.9 HYPERGLYCEMIA: ICD-10-CM

## 2024-03-20 DIAGNOSIS — E66.01 MORBID (SEVERE) OBESITY DUE TO EXCESS CALORIES: ICD-10-CM

## 2024-03-20 DIAGNOSIS — D69.2 SENILE PURPURA: ICD-10-CM

## 2024-03-20 DIAGNOSIS — Z00.00 WELLNESS EXAMINATION: Primary | ICD-10-CM

## 2024-03-20 DIAGNOSIS — L29.9 PRURITUS: ICD-10-CM

## 2024-03-20 PROCEDURE — 99396 PREV VISIT EST AGE 40-64: CPT | Mod: S$GLB,,, | Performed by: STUDENT IN AN ORGANIZED HEALTH CARE EDUCATION/TRAINING PROGRAM

## 2024-03-20 RX ORDER — TRIAMCINOLONE ACETONIDE 1 MG/G
CREAM TOPICAL 2 TIMES DAILY PRN
Qty: 80 G | Refills: 3 | Status: SHIPPED | OUTPATIENT
Start: 2024-03-20

## 2024-03-20 RX ORDER — GABAPENTIN 300 MG/1
300 CAPSULE ORAL 3 TIMES DAILY PRN
Qty: 270 CAPSULE | Refills: 3 | Status: SHIPPED | OUTPATIENT
Start: 2024-03-20

## 2024-03-20 RX ORDER — AMLODIPINE BESYLATE 5 MG/1
5 TABLET ORAL DAILY
Qty: 90 TABLET | Refills: 4 | Status: SHIPPED | OUTPATIENT
Start: 2024-03-20 | End: 2025-03-20

## 2024-03-20 RX ORDER — FLUOXETINE HYDROCHLORIDE 20 MG/1
20 CAPSULE ORAL DAILY
Qty: 90 CAPSULE | Refills: 4 | Status: SHIPPED | OUTPATIENT
Start: 2024-03-20

## 2024-03-20 RX ORDER — OMEPRAZOLE 20 MG/1
20 CAPSULE, DELAYED RELEASE ORAL DAILY
Qty: 90 CAPSULE | Refills: 3 | Status: SHIPPED | OUTPATIENT
Start: 2024-03-20

## 2024-03-20 RX ORDER — ALLOPURINOL 300 MG/1
300 TABLET ORAL DAILY
Qty: 90 TABLET | Refills: 3 | Status: SHIPPED | OUTPATIENT
Start: 2024-03-20

## 2024-03-20 RX ORDER — TOPIRAMATE 50 MG/1
50 TABLET, FILM COATED ORAL NIGHTLY
Qty: 90 TABLET | Refills: 4 | Status: SHIPPED | OUTPATIENT
Start: 2024-03-20

## 2024-03-20 RX ORDER — VALSARTAN 320 MG/1
320 TABLET ORAL DAILY
Qty: 90 TABLET | Refills: 3 | Status: SHIPPED | OUTPATIENT
Start: 2024-03-20 | End: 2025-03-20

## 2024-03-20 RX ORDER — METOPROLOL SUCCINATE 100 MG/1
100 TABLET, EXTENDED RELEASE ORAL DAILY
Qty: 90 TABLET | Refills: 4 | Status: SHIPPED | OUTPATIENT
Start: 2024-03-20 | End: 2025-06-13

## 2024-03-20 NOTE — PROGRESS NOTES
Patient ID: Debbie Vo is a 63 y.o. female.     Chief Complaint: wellness    Follow-up  Pertinent negatives include no chest pain, coughing, fever, headaches, myalgias, nausea, rash, vomiting or weakness.     Patient here for annual wellness exam. She has no complaints today. She has upcoming knee surgery. She denies recent chest pain and shortness of breath. She denies fevers and chills. She reports normal bowel movements.      Review of Systems  Review of Systems   Constitutional:  Negative for fever.   HENT:  Negative for ear pain and sinus pain.    Eyes:  Negative for discharge.   Respiratory:  Negative for cough and shortness of breath.    Cardiovascular:  Negative for chest pain and leg swelling.   Gastrointestinal:  Negative for diarrhea, nausea and vomiting.   Genitourinary:  Negative for urgency.   Musculoskeletal:  Negative for myalgias.   Skin:  Negative for rash.   Neurological:  Negative for weakness and headaches.   Psychiatric/Behavioral:  Negative for depression.    All other systems reviewed and are negative.      Currently Medications  Current Outpatient Medications on File Prior to Visit   Medication Sig Dispense Refill    albuterol (VENTOLIN HFA) 90 mcg/actuation inhaler Inhale 2 puffs into the lungs every 6 (six) hours as needed for Wheezing. Rescue 18 g 3    ammonium lactate (LAC-HYDRIN) 12 % lotion Apply to damp skin after bathing 225 g 11    benzonatate (TESSALON) 200 MG capsule take 1 BY MOUTH THREE TIMES DAILY AS NEEDED FOR COUGH 30 capsule 4    colchicine (COLCRYS) 0.6 mg tablet Take 1 tablet (0.6 mg total) by mouth once daily. 7 tablet 0    colchicine-probenecid 0.5-500 mg (CO-BENEMID) 500-0.5 mg Tab Take 1 tablet by mouth once daily. 7 tablet 2    diclofenac sodium (VOLTAREN) 1 % Gel Apply 2 g topically 4 (four) times daily. 150 g 0    fluticasone propionate (FLONASE) 50 mcg/actuation nasal spray 1 spray (50 mcg total) by Each Nostril route once daily. 10 mL 5     loratadine (CLARITIN) 10 mg tablet Take 1 tablet (10 mg total) by mouth once daily. 90 tablet 3    meclizine (ANTIVERT) 25 mg tablet Take 1 tablet (25 mg total) by mouth 3 (three) times daily as needed. 50 tablet 2    neomycin-polymyxin-hydrocortisone (CORTISPORIN) 3.5-10,000-0.5 mg/g-unit/g-% cream Apply topically 2 (two) times daily. 10 g 2    neomycin-polymyxin-hydrocortisone (CORTISPORIN) 3.5-10,000-1 mg/mL-unit/mL-% otic suspension Place 3 drops into both ears 3 (three) times daily. 10 mL 0    oxyCODONE-acetaminophen (PERCOCET) 5-325 mg per tablet Take 1 tablet by mouth every 6 (six) hours as needed for Pain. 10 tablet 0    valACYclovir (VALTREX) 1000 MG tablet Take 1 tablet (1,000 mg total) by mouth 3 (three) times daily. for 5 days 15 tablet 0    [DISCONTINUED] allopurinoL (ZYLOPRIM) 300 MG tablet Take 1 tablet (300 mg total) by mouth once daily. 90 tablet 3    [DISCONTINUED] amLODIPine (NORVASC) 5 MG tablet Take 1 tablet (5 mg total) by mouth once daily. 90 tablet 4    [DISCONTINUED] FLUoxetine 20 MG capsule Take 1 capsule (20 mg total) by mouth once daily. 90 capsule 4    [DISCONTINUED] gabapentin (NEURONTIN) 300 MG capsule Take 1 capsule (300 mg total) by mouth 3 (three) times daily as needed (pain). 270 capsule 3    [DISCONTINUED] metoprolol succinate (TOPROL-XL) 100 MG 24 hr tablet Take 1 tablet (100 mg total) by mouth once daily. 90 tablet 4    [DISCONTINUED] omeprazole (PRILOSEC) 20 MG capsule Take 1 capsule (20 mg total) by mouth once daily. 90 capsule 3    [DISCONTINUED] topiramate (TOPAMAX) 50 MG tablet Take 1 tablet (50 mg total) by mouth every evening. 90 tablet 4    [DISCONTINUED] triamcinolone acetonide 0.1% (KENALOG) 0.1 % cream Apply topically 2 (two) times daily as needed (dryness or itching). 80 g 3    [DISCONTINUED] valsartan (DIOVAN) 320 MG tablet Take 1 tablet (320 mg total) by mouth once daily. 90 tablet 3     No current facility-administered medications on file prior to visit.  "      Physical  Exam  Vitals:    03/20/24 0826   BP: 120/80   BP Location: Left arm   Patient Position: Sitting   Pulse: 108   Temp: 98.2 °F (36.8 °C)   SpO2: 95%   Weight: 131.4 kg (289 lb 9.2 oz)   Height: 5' 8" (1.727 m)      Body mass index is 44.03 kg/m².  Wt Readings from Last 3 Encounters:   03/20/24 131.4 kg (289 lb 9.2 oz)   03/01/24 130.2 kg (287 lb 0.6 oz)   02/19/24 133.7 kg (294 lb 12.1 oz)       Physical Exam  Vitals and nursing note reviewed.   Constitutional:       General: She is not in acute distress.     Appearance: She is obese. She is not ill-appearing.   HENT:      Head: Normocephalic and atraumatic.      Right Ear: External ear normal.      Left Ear: External ear normal.      Nose: Nose normal.      Mouth/Throat:      Mouth: Mucous membranes are moist.   Eyes:      Extraocular Movements: Extraocular movements intact.      Conjunctiva/sclera: Conjunctivae normal.   Cardiovascular:      Rate and Rhythm: Normal rate and regular rhythm.      Pulses: Normal pulses.      Heart sounds: No murmur heard.  Pulmonary:      Effort: Pulmonary effort is normal. No respiratory distress.      Breath sounds: No wheezing.   Abdominal:      General: There is no distension.      Palpations: Abdomen is soft. There is no mass.      Tenderness: There is no abdominal tenderness.   Musculoskeletal:         General: No swelling.      Cervical back: Normal range of motion.   Skin:     Coloration: Skin is not jaundiced.      Findings: No rash.   Neurological:      General: No focal deficit present.      Mental Status: She is alert and oriented to person, place, and time.   Psychiatric:         Mood and Affect: Mood normal.         Thought Content: Thought content normal.         Labs:    Complete Blood Count  Lab Results   Component Value Date    RBC 5.15 07/05/2023    HGB 13.9 07/05/2023    HCT 45.1 07/05/2023    MCV 88 07/05/2023    MCH 27.0 07/05/2023    MCHC 30.8 (L) 07/05/2023    RDW 13.7 07/05/2023     " "07/05/2023    MPV 10.5 07/05/2023    GRAN 5.3 07/05/2023    GRAN 59.4 07/05/2023    LYMPH 2.7 07/05/2023    LYMPH 30.1 07/05/2023    MONO 0.8 07/05/2023    MONO 8.5 07/05/2023    EOS 0.1 07/05/2023    BASO 0.04 07/05/2023    EOSINOPHIL 1.2 07/05/2023    BASOPHIL 0.4 07/05/2023    DIFFMETHOD Automated 07/05/2023       Comprehensive Metabolic Panel  No results found for: "GLU", "BUN", "CREATININE", "NA", "K", "CL", "PROT", "ALBUMIN", "BILITOT", "AST", "ALKPHOS", "CO2", "ALT", "ANIONGAP", "EGFRNONAA", "ESTGFRAFRICA"    TSH  No results found for: "TSH"    Imaging:  X-ray Knee Ortho Bilateral  Narrative: EXAMINATION:  XR KNEE ORTHO BILAT    CLINICAL HISTORY:  Unilateral primary osteoarthritis, right knee    TECHNIQUE:  AP standing, lateral and Merchant views of both knees were performed.    COMPARISON:  07/02/2022    FINDINGS:  Tricompartment degenerative changes, noting advanced joint space loss with extensive subchondral sclerosis/cystic change and osteophytosis.  No fracture.  No marrow replacement process.  Alignment within normal limits.  Impression: Advanced degenerative changes.    Electronically signed by: William Arvizu MD  Date:    08/24/2023  Time:    14:32      Assessment/Plan:    1. Wellness examination    2. Essential hypertension  -     valsartan (DIOVAN) 320 MG tablet; Take 1 tablet (320 mg total) by mouth once daily.  Dispense: 90 tablet; Refill: 3  -     metoprolol succinate (TOPROL-XL) 100 MG 24 hr tablet; Take 1 tablet (100 mg total) by mouth once daily.  Dispense: 90 tablet; Refill: 4  -     amLODIPine (NORVASC) 5 MG tablet; Take 1 tablet (5 mg total) by mouth once daily.  Dispense: 90 tablet; Refill: 4  -     CBC Auto Differential; Future  -     Comprehensive metabolic panel; Future; Expected date: 03/20/2024  -     TSH; Future; Expected date: 03/20/2024    3. Pruritus  -     triamcinolone acetonide 0.1% (KENALOG) 0.1 % cream; Apply topically 2 (two) times daily as needed (dryness or itching).  " Dispense: 80 g; Refill: 3    4. Post herpetic neuralgia  -     topiramate (TOPAMAX) 50 MG tablet; Take 1 tablet (50 mg total) by mouth every evening.  Dispense: 90 tablet; Refill: 4    5. Gastroesophageal reflux disease without esophagitis  -     omeprazole (PRILOSEC) 20 MG capsule; Take 1 capsule (20 mg total) by mouth once daily.  Dispense: 90 capsule; Refill: 3    6. Lumbar radiculopathy, chronic  -     gabapentin (NEURONTIN) 300 MG capsule; Take 1 capsule (300 mg total) by mouth 3 (three) times daily as needed (pain).  Dispense: 270 capsule; Refill: 3    7. Hot flashes  -     FLUoxetine 20 MG capsule; Take 1 capsule (20 mg total) by mouth once daily.  Dispense: 90 capsule; Refill: 4    8. Acute gout involving toe of right foot, unspecified cause  -     allopurinoL (ZYLOPRIM) 300 MG tablet; Take 1 tablet (300 mg total) by mouth once daily.  Dispense: 90 tablet; Refill: 3    9. Hyperglycemia  -     HEMOGLOBIN A1C; Future; Expected date: 03/20/2024    10. Screening for cardiovascular condition  -     LIPID PANEL; Future; Expected date: 03/20/2024    11. Morbid (severe) obesity due to excess calories  Assessment & Plan:  Body mass index is 44.03 kg/m².  Discussed diet and exercise      12. Senile purpura  Assessment & Plan:  - chronic   - stable        13. Recurrent major depressive disorder, in full remission  Assessment & Plan:  - in remission  - continue fluoxetine           Discussed how to stay healthy including: diet, exercise, refraining from smoking and discussed screening exams / tests needed for age, sex and family Hx.    RTC pending labs    Sergio Pitt MD

## 2024-04-11 ENCOUNTER — OFFICE VISIT (OUTPATIENT)
Dept: ORTHOPEDICS | Facility: CLINIC | Age: 64
End: 2024-04-11
Payer: MEDICARE

## 2024-04-11 VITALS — HEIGHT: 68 IN | WEIGHT: 289.69 LBS | BODY MASS INDEX: 43.9 KG/M2

## 2024-04-11 DIAGNOSIS — M17.11 PRIMARY OSTEOARTHRITIS OF RIGHT KNEE: ICD-10-CM

## 2024-04-11 DIAGNOSIS — E66.01 MORBID OBESITY WITH BMI OF 40.0-44.9, ADULT: Primary | ICD-10-CM

## 2024-04-11 PROCEDURE — 99999 PR PBB SHADOW E&M-EST. PATIENT-LVL III: CPT | Mod: PBBFAC,,, | Performed by: ORTHOPAEDIC SURGERY

## 2024-04-11 PROCEDURE — 99213 OFFICE O/P EST LOW 20 MIN: CPT | Mod: S$GLB,,, | Performed by: ORTHOPAEDIC SURGERY

## 2024-04-11 PROCEDURE — 3008F BODY MASS INDEX DOCD: CPT | Mod: CPTII,S$GLB,, | Performed by: ORTHOPAEDIC SURGERY

## 2024-04-11 PROCEDURE — 1159F MED LIST DOCD IN RCRD: CPT | Mod: CPTII,S$GLB,, | Performed by: ORTHOPAEDIC SURGERY

## 2024-04-11 PROCEDURE — G2211 COMPLEX E/M VISIT ADD ON: HCPCS | Mod: S$GLB,,, | Performed by: ORTHOPAEDIC SURGERY

## 2024-04-11 PROCEDURE — 4010F ACE/ARB THERAPY RXD/TAKEN: CPT | Mod: CPTII,S$GLB,, | Performed by: ORTHOPAEDIC SURGERY

## 2024-04-11 NOTE — PROGRESS NOTES
Subjective:      Patient ID: Debbie Vo is a 63 y.o. female.    Chief Complaint: Pain of the Right Knee    Knee Pain: right  swelling: Yes  worsens with activity: Yes  relieved by: injections, good relief w zilretta         Social History     Occupational History    Not on file   Tobacco Use    Smoking status: Never     Passive exposure: Never    Smokeless tobacco: Never   Substance and Sexual Activity    Alcohol use: No    Drug use: No    Sexual activity: Yes     Partners: Male      Review of Systems   Constitutional: Negative for diaphoresis.   HENT:  Negative for ear discharge, nosebleeds and stridor.    Eyes:  Negative for photophobia.   Cardiovascular:  Negative for syncope.   Respiratory:  Negative for hemoptysis, shortness of breath and wheezing.    Neurological:  Negative for tremors.   Psychiatric/Behavioral: Negative.           Objective:    General    Constitutional: She is oriented to person, place, and time. She appears well-developed.   HENT:   Head: Normocephalic and atraumatic.   Right Ear: External ear normal.   Left Ear: External ear normal.   Eyes: EOM are normal.   Cardiovascular:  Intact distal pulses.            Neurological: She is alert and oriented to person, place, and time.   Psychiatric: She has a normal mood and affect. Her behavior is normal. Judgment and thought content normal.     General Musculoskeletal Exam   Gait: antalgic and abnormal       Right Knee Exam     Inspection   Swelling: present  Effusion: present    Tenderness   The patient is tender to palpation of the medial joint line.    Crepitus   The patient has crepitus of the patella.    Range of Motion   Flexion:  abnormal     Other   Sensation: normal    Muscle Strength   Right Lower Extremity   Quadriceps:  4/5   Hamstrin/5          Assessment:       1. Morbid obesity with BMI of 40.0-44.9, adult    2. Primary osteoarthritis of right knee          Plan:       Cont observation  F/u3 mos

## 2024-05-14 ENCOUNTER — RESEARCH ENCOUNTER (OUTPATIENT)
Dept: RESEARCH | Facility: HOSPITAL | Age: 64
End: 2024-05-14
Payer: MEDICARE

## 2024-05-14 ENCOUNTER — OFFICE VISIT (OUTPATIENT)
Dept: ORTHOPEDICS | Facility: CLINIC | Age: 64
End: 2024-05-14
Payer: MEDICARE

## 2024-05-14 VITALS — HEIGHT: 68 IN | BODY MASS INDEX: 43.9 KG/M2 | WEIGHT: 289.69 LBS

## 2024-05-14 DIAGNOSIS — G89.29 CHRONIC PAIN OF RIGHT KNEE: ICD-10-CM

## 2024-05-14 DIAGNOSIS — M17.11 PRIMARY OSTEOARTHRITIS OF RIGHT KNEE: Primary | ICD-10-CM

## 2024-05-14 DIAGNOSIS — M25.561 CHRONIC PAIN OF RIGHT KNEE: ICD-10-CM

## 2024-05-14 PROCEDURE — 4010F ACE/ARB THERAPY RXD/TAKEN: CPT | Mod: CPTII,S$GLB,, | Performed by: ORTHOPAEDIC SURGERY

## 2024-05-14 PROCEDURE — 99999 PR PBB SHADOW E&M-EST. PATIENT-LVL IV: CPT | Mod: PBBFAC,,, | Performed by: ORTHOPAEDIC SURGERY

## 2024-05-14 PROCEDURE — 20610 DRAIN/INJ JOINT/BURSA W/O US: CPT | Mod: RT,S$GLB,, | Performed by: ORTHOPAEDIC SURGERY

## 2024-05-14 PROCEDURE — 3008F BODY MASS INDEX DOCD: CPT | Mod: CPTII,S$GLB,, | Performed by: ORTHOPAEDIC SURGERY

## 2024-05-14 PROCEDURE — 1159F MED LIST DOCD IN RCRD: CPT | Mod: CPTII,S$GLB,, | Performed by: ORTHOPAEDIC SURGERY

## 2024-05-14 PROCEDURE — 99213 OFFICE O/P EST LOW 20 MIN: CPT | Mod: 25,S$GLB,, | Performed by: ORTHOPAEDIC SURGERY

## 2024-05-14 RX ORDER — BUPIVACAINE HYDROCHLORIDE 5 MG/ML
5 INJECTION, SOLUTION PERINEURAL
Status: DISCONTINUED | OUTPATIENT
Start: 2024-05-14 | End: 2024-05-14 | Stop reason: HOSPADM

## 2024-05-14 RX ORDER — KETOROLAC TROMETHAMINE 30 MG/ML
30 INJECTION, SOLUTION INTRAMUSCULAR; INTRAVENOUS
Status: DISCONTINUED | OUTPATIENT
Start: 2024-05-14 | End: 2024-05-14 | Stop reason: HOSPADM

## 2024-05-14 RX ORDER — LIDOCAINE HYDROCHLORIDE 10 MG/ML
4 INJECTION INFILTRATION; PERINEURAL
Status: DISCONTINUED | OUTPATIENT
Start: 2024-05-14 | End: 2024-05-14 | Stop reason: HOSPADM

## 2024-05-14 RX ORDER — CELECOXIB 200 MG/1
200 CAPSULE ORAL DAILY
Qty: 30 CAPSULE | Refills: 0 | Status: SHIPPED | OUTPATIENT
Start: 2024-05-14 | End: 2024-05-30 | Stop reason: SDUPTHER

## 2024-05-14 RX ADMIN — KETOROLAC TROMETHAMINE 30 MG: 30 INJECTION, SOLUTION INTRAMUSCULAR; INTRAVENOUS at 02:05

## 2024-05-14 RX ADMIN — LIDOCAINE HYDROCHLORIDE 4 ML: 10 INJECTION INFILTRATION; PERINEURAL at 02:05

## 2024-05-14 RX ADMIN — BUPIVACAINE HYDROCHLORIDE 5 ML: 5 INJECTION, SOLUTION PERINEURAL at 02:05

## 2024-05-14 NOTE — PROGRESS NOTES
[facility] [address]          Subjective:     Patient ID: Debbie Vo is a 63 y.o. female.    Chief Complaint: Pain of the Right Knee    Knee Pain: right  swelling: Yes  worsens with activity: Yes  relieved by: injections Zilretta provided her with a about 3 months relief, she had her last injection back in February      Past Medical History:   Diagnosis Date    Benign essential hypertension     Colon cancer     Depression     Gout, unspecified     Osteoarthritis         Past Surgical History:   Procedure Laterality Date    CHOLECYSTECTOMY      COLON SURGERY  2002    colon resection    COLONOSCOPY N/A 03/24/2016    Procedure: COLONOSCOPY;  Surgeon: Ernst Bobby MD;  Location: [facility] ENDO;  Service: Endoscopy;  Laterality: N/A;  Needs Flex sigmoidoscopy    EPIDURAL STEROID INJECTION INTO LUMBAR SPINE N/A 06/21/2018    Procedure: Injection-steroid-epidural-lumbar;  Surgeon: Avi Morales Jr., MD;  Location: [facility] PAIN MGT;  Service: Pain Management;  Laterality: N/A;  ORAL SEDATION    HYSTERECTOMY      INJECTION OF ANESTHETIC AGENT INTO SACROILIAC JOINT Bilateral 08/25/2021    Procedure: BLOCK, SACROILIAC JOINT*-- bilateral;  Surgeon: Lucia Carroll MD;  Location: [facility] PAIN MGT;  Service: Pain Management;  Laterality: Bilateral;    PARTIAL HYSTERECTOMY          Current Outpatient Medications   Medication Instructions    albuterol (VENTOLIN HFA) 90 mcg/actuation inhaler 2 puffs, Inhalation, Every 6 hours PRN, Rescue    allopurinoL (ZYLOPRIM) 300 mg, Oral, Daily    amLODIPine (NORVASC) 5 mg, Oral, Daily    ammonium lactate (LAC-HYDRIN) 12 % lotion Apply to damp skin after bathing    benzonatate (TESSALON) 200 MG capsule take 1 BY MOUTH THREE TIMES DAILY AS NEEDED FOR COUGH    colchicine (COLCRYS) 0.6 mg, Oral, Daily    colchicine-probenecid 0.5-500 mg (CO-BENEMID) 500-0.5 mg Tab 1 tablet, Oral, Daily    diclofenac sodium (VOLTAREN) 2 g, Topical (Top), 4 times daily    FLUoxetine 20  mg, Oral, Daily    fluticasone propionate (FLONASE) 50 mcg, Each Nostril, Daily    gabapentin (NEURONTIN) 300 mg, Oral, 3 times daily PRN    loratadine (CLARITIN) 10 mg, Oral, Daily    meclizine (ANTIVERT) 25 mg, Oral, 3 times daily PRN    metoprolol succinate (TOPROL-XL) 100 mg, Oral, Daily    neomycin-polymyxin-hydrocortisone (CORTISPORIN) 3.5-10,000-0.5 mg/g-unit/g-% cream Topical (Top), 2 times daily    neomycin-polymyxin-hydrocortisone (CORTISPORIN) 3.5-10,000-1 mg/mL-unit/mL-% otic suspension 3 drops, Both Ears, 3 times daily    omeprazole (PRILOSEC) 20 mg, Oral, Daily    oxyCODONE-acetaminophen (PERCOCET) 5-325 mg per tablet 1 tablet, Oral, Every 6 hours PRN    topiramate (TOPAMAX) 50 mg, Oral, Nightly    triamcinolone acetonide 0.1% (KENALOG) 0.1 % cream Topical (Top), 2 times daily PRN    valACYclovir (VALTREX) 1,000 mg, Oral, 3 times daily    valsartan (DIOVAN) 320 mg, Oral, Daily        Review of patient's allergies indicates:  No Known Allergies    Social History     Socioeconomic History    Marital status: Single   Tobacco Use    Smoking status: Never     Passive exposure: Never    Smokeless tobacco: Never   Substance and Sexual Activity    Alcohol use: No    Drug use: No    Sexual activity: Yes     Partners: Male     Social Determinants of Health     Financial Resource Strain: Low Risk  (7/8/2022)    Overall Financial Resource Strain ([facility])     Difficulty of Paying Living Expenses: Not hard at all   Food Insecurity: No Food Insecurity (7/8/2022)    Hunger Vital Sign     Worried About Running Out of Food in the Last Year: Never true     Ran Out of Food in the Last Year: Never true   Transportation Needs: No Transportation Needs (7/8/2022)    PRAPARE - Transportation     Lack of Transportation (Medical): No     Lack of Transportation (Non-Medical): No   Physical Activity: Inactive (7/8/2022)    Exercise Vital Sign     Days of Exercise per Week: 0 days     Minutes of Exercise per Session: 0 min    Stress: No Stress Concern Present (7/8/2022)    [employer] [place] [employer] - Occupational Stress Questionnaire     Feeling of Stress : Not at all   Housing Stability: Low Risk  (7/8/2022)    Housing Stability Vital Sign     Unable to Pay for Housing in the Last Year: No     Number of Places Lived in the Last Year: 2     Unstable Housing in the Last Year: No       Family History   Problem Relation Name Age of Onset    Heart disease Mother      Cancer Mother          colon cancer    Diabetes Mother      Colon cancer Mother      Cancer Father          Liver cancer    Kidney disease Father      Liver cancer Father      Cancer Sister Ana Luisa         breast cancer    BRCA 1/2 Sister Ana Luisa     Breast cancer Sister Ana Luisa     Cancer Brother Seng         Liver cancer    Liver cancer Brother Seng     Cancer Brother Lazaro         colon cancer    Colon cancer Brother Lazaro     Cancer Brother Da         thoat cancer    Throat cancer Brother Da     No Known Problems Brother Joe     No Known Problems Son x4          Review of systems negative except as noted above    Objective:   Physical Exam:     Right knee  Skin atraumatic   Moderate effusion  Tender to palpation medial joint line, tender to palpation lateral joint line  ROM 0-80    Imaging independently interpreted:  Radiographs of the right knee show kl 4 osteoarthritis with joint space narrowing bone-on-bone and osteophytosis    Assessment:        Debbie Vo is a 63 y.o. female with No diagnosis found.    Plan :  -we injected the right knee w 1cc of ketorolac, marcaine and lidocaine under sterile conditions with the patient's informed consent for severe bone on bone knee oa. KL score 4  -I discussed a new treatment therapy called Iovera, which is cryotherapy, to provide symptomatic relief along the sensory distribution of the infrapatellar tendon branch of the saphenous nerve, AFCN, and LFCN.  The patient elected to move forward with  this.  We did discuss the fact that this is a fairly novel procedure and the is very limited scientific data around this; however, it is FDA approved.  In a few patients there can be continued pain along the treated nerve but the exact risk has not been quantified but seems to be rare. The patient was given patient information and literature to review prior to the procedure as well. Based on this, the patient feels the benefits outweigh the risks.  -she has right total knee arthroplasty scheduled on 11/11/2024        No orders of the defined types were placed in this encounter.       Evgeny Rizzo MD   05/14/2024

## 2024-05-14 NOTE — PROCEDURES
Large Joint Aspiration/Injection: R knee    Date/Time: 5/14/2024 2:45 PM    Performed by: Rich Reid MD  Authorized by: Rich Reid MD    Consent Done?:  Yes (Verbal)  Indications:  Arthritis  Site marked: the procedure site was marked    Timeout: prior to procedure the correct patient, procedure, and site was verified    Prep: patient was prepped and draped in usual sterile fashion      Local anesthesia used?: Yes    Local anesthetic:  Topical anesthetic    Details:  Needle Size:  22 G  Ultrasonic Guidance for needle placement?: No    Approach:  Anterolateral  Location:  Knee  Site:  R knee  Medications:  30 mg ketorolac 30 mg/mL (1 mL); 5 mL BUPivacaine 0.5 % (5 mg/mL); 4 mL LIDOcaine HCL 10 mg/ml (1%) 10 mg/mL (1 %)  Patient tolerance:  Patient tolerated the procedure well with no immediate complications

## 2024-05-14 NOTE — PROGRESS NOTES
Protocol: innovations in Genicular Outcomes Registry (Jazz)  Protocol#: Jazz  IRB#: 2021.156  Version Date: 10-Dmitry-2023  PI: Rich Reid MD  Patient Initials: HERBERTHRShea     Study Recruitment Note:     Research coordinator met with patient, in private clinic room, to discuss above mentioned protocol. Patient is alert and oriented x 3. Mood and affect appropriate to the situation. Patient states that she is not participating in any other research studies. Patient states understanding about the diagnosis of osteoarthritis of the knee and of the procedure to being done on that knee.  Purpose of study reviewed with the patient.  Patient states understanding of this information.   Length of study and number of participants reviewed with patient; patient states understanding of the length of the study.   Study procedure discussed in detail with patient. Patient states understanding of this information.  Potential benefits of study along with costs and/or payment reimbursement per insurance discussed with patient; Patient states understanding that insurance will cover cost of all standard of care medications and procedures. Patient aware of $20 payment for qualifying visits with completed questionnaires.  Alternative treatment methods discussed with patient; Patient states understanding of this information.   Study related questions/compensation for injury, and whom to contact regarding rights as a research subject all reviewed with patient; Patient verbalizes understanding of this information.   Voluntary participation and withdrawal from research stressed to patient; patient states understanding that she may withdraw consent at any time without compromise to care.   Confidentiality and HIPAA discussed with patient. Patient verbalizes understanding of this information.        Patient states her interest in study and will decide participation at next OA appointment. Study brochure and copy of paper consent form was given to  patient for review. She was instructed to call if she has any questions or concerns.

## 2024-05-21 ENCOUNTER — RESEARCH ENCOUNTER (OUTPATIENT)
Dept: RESEARCH | Facility: HOSPITAL | Age: 64
End: 2024-05-21
Payer: MEDICARE

## 2024-05-21 ENCOUNTER — PROCEDURE VISIT (OUTPATIENT)
Dept: ORTHOPEDICS | Facility: CLINIC | Age: 64
End: 2024-05-21
Payer: MEDICARE

## 2024-05-21 VITALS — WEIGHT: 289.69 LBS | BODY MASS INDEX: 43.9 KG/M2 | HEIGHT: 68 IN

## 2024-05-21 DIAGNOSIS — M17.11 PRIMARY OSTEOARTHRITIS OF RIGHT KNEE: ICD-10-CM

## 2024-05-21 DIAGNOSIS — M25.561 CHRONIC PAIN OF RIGHT KNEE: ICD-10-CM

## 2024-05-21 DIAGNOSIS — G89.29 CHRONIC PAIN OF RIGHT KNEE: ICD-10-CM

## 2024-05-21 PROCEDURE — 99499 UNLISTED E&M SERVICE: CPT | Mod: S$GLB,,, | Performed by: PHYSICIAN ASSISTANT

## 2024-05-21 PROCEDURE — 64640 INJECTION TREATMENT OF NERVE: CPT | Mod: RT,S$GLB,, | Performed by: PHYSICIAN ASSISTANT

## 2024-05-21 NOTE — PROCEDURES
Procedures    Procedure Note Iovera:    DATE OF PROCEDURE:  05/21/2024     PREOPERATIVE DIAGNOSIS: right knee pain.     POSTOPERATIVE DIAGNOSIS: right knee pain.     PROCEDURE: Iovera treatment of anterior femoral cutaneous nerve, Lateral femoral cut nerve, and both branches of infrapatellar saphenous nerve using at least 4 different punctures to treat all 4 nerves. (cpt 64640 x3)    COMPLICATIONS: None.     IMPLANTS:  None    ESTIMATED BLOOD LOSS:  < 5cc    SPECIMENS REMOVED:  None    ANESTHESIA: Local lidocaine    INDICATIONS FOR PROCEDURE: This is a 63 y.o. female with longstanding knee pain. They have failed non operative management including injections.I discussed a new treatment therapy called Iovera, which is cryotherapy, to provide symptomatic relief along the sensory distribution of the infrapatellar tendon branch of the saphenous nerve  and AFCN. The patient elected to move forward with this  We did discuss the fact that this is a fairly novel procedure and there is very limited scientific data around this.  However, it FDA approved.  The patient was given patient information and literature to review prior to the procedure as well.  Based on this, the patient agreed to move forward with doing the procedure.      PROCEDURE:    The patient was placed supine on the exam table and the proximal medial aspect of the right tibia and anterior aspect of distal femur was prepped with sterile Betadine and alcohol.  A line was drawn extending approximately 5 cm medial to inferior pole of the patella distally to a point approximately 5 cm medial to the tibial tubercle.  A second line was drawn in a medial to lateral direction the width of the patella approximately 7 cm proximal to the patella. We then infiltrated the skin with lidocaine along both lines using a 25g needle. We then introduced the Iovera device along these lines and this device penetrated the skin, creating cryotherapy to both branches of the  infrapatellar saphenous nerve, a third treatment to the anterior femoral cutaneous nerve, and fourth LFCN. 7 punctures of the skin were made to treat the 2 branches of the ISN and another 7 punctures were made to treat the AFCN and LFCN. There were a total of 4 nerves treated with iovera of each knee. The patient tolerated the procedure well with no problems.

## 2024-05-21 NOTE — PROGRESS NOTES
Protocol: innovations in Genicular Outcomes Registry (Jazz)  Protocol#: Jazz  IRB#: 2021.156  Version Date: 10-Dmitry-2023  PI: Rich Reid MD  Patient Initials: D.R.     Declined Study Note    Patient is in today to be treated for OA knee pain. Patient was called by Lorena about participation in Jazz Study and to confirm Iovera treatment. Patient declined study and proceeded with Iovera treatment.     Will re-approach patient about study in November when she comes back for her TKA.

## 2024-05-28 ENCOUNTER — TELEPHONE (OUTPATIENT)
Dept: ORTHOPEDICS | Facility: CLINIC | Age: 64
End: 2024-05-28
Payer: MEDICARE

## 2024-05-28 DIAGNOSIS — M17.11 PRIMARY OSTEOARTHRITIS OF RIGHT KNEE: Primary | ICD-10-CM

## 2024-05-28 NOTE — TELEPHONE ENCOUNTER
Spoke to patient.  She stated that the Iovera did not work. She can barely walk.  Zilretta ordered and scheduled for June 5. Will notify her if insurance does not approve. Verbalized understanding.

## 2024-05-28 NOTE — TELEPHONE ENCOUNTER
----- Message from Jeremi Wheeler sent at 5/28/2024  9:31 AM CDT -----  Contact: Pt  .Type:  Sooner Apoointment Request    Caller is requesting a sooner appointment.  Caller declined first available appointment listed below.  Caller will not accept being placed on the waitlist and is requesting a message be sent to doctor.  Name of Caller:pt  When is the first available appointment?  Symptoms: injection  Would the patient rather a call back or a response via [facility]?  Call back  Best Call Back Number:[phone]  Additional Information: Pt. is calling to get an injection in her knee she would like the injection medication that starts with Z. She does not know the name.

## 2024-05-30 ENCOUNTER — TELEPHONE (OUTPATIENT)
Dept: ORTHOPEDICS | Facility: CLINIC | Age: 64
End: 2024-05-30
Payer: MEDICARE

## 2024-05-30 DIAGNOSIS — M17.11 PRIMARY OSTEOARTHRITIS OF RIGHT KNEE: Primary | ICD-10-CM

## 2024-05-30 RX ORDER — CELECOXIB 200 MG/1
200 CAPSULE ORAL DAILY
Qty: 30 CAPSULE | Refills: 0 | Status: SHIPPED | OUTPATIENT
Start: 2024-05-30 | End: 2024-06-29

## 2024-05-30 NOTE — TELEPHONE ENCOUNTER
----- Message from Jeremi Wheeler sent at 5/30/2024  9:48 AM CDT -----  Contact: Pt  .Type:  Needs Medical Advice    Who Called: pt  Symptoms (please be specific):  pain right knee  How long has patient had these symptoms:    Pharmacy name and phone #:  [facility] - [place] - [address]   Phone: [phone]  Fax: [phone]  Would the patient rather a call back or a response via [facility]?  Call back  Best Call Back Number: [phone]   Additional Information: Pt. Is requesting a call back regarding can she  get something for pain  called in prior to her appt. On 06/05/2024   Postoperative Anesthesia Note    Name:    Gavin Reis  MRN:      [id number]    Patient Vitals for the past 12 hrs:   BP Temp Temp src Pulse Resp SpO2 Height Weight   10/02/17 1328 119/68 - - 70 17 99 % - -   10/02/17 1313 124/73 - - 76 17 98 % - -   10/02/17 1258 116/69 97 °F (36.1 °C) Oral 71 16 100 % - -   10/02/17 1130 121/68 98.1 °F (36.7 °C) Oral 80 16 98 % 5' 4\" (1.626 m) 214 lb (97.1 kg)        LABS:    CBC  Lab Results   Component Value Date/Time    WBC 7.0 09/14/2017 11:45 AM    HGB 13.1 09/14/2017 11:45 AM    HCT 40.2 09/14/2017 11:45 AM     09/14/2017 11:45 AM     RENAL  Lab Results   Component Value Date/Time     09/14/2017 11:45 AM    K 4.2 09/14/2017 11:45 AM     09/14/2017 11:45 AM    CO2 23 09/14/2017 11:45 AM    BUN 9 09/14/2017 11:45 AM    CREATININE 0.6 09/14/2017 11:45 AM    GLUCOSE 91 09/14/2017 11:45 AM     COAGS  No results found for: PROTIME, INR, APTT    Intake & Output:       Nausea & Vomiting:  No    Level of Consciousness:  Awake    Pain Assessment:  Adequate analgesia    Anesthesia Complications:  No apparent anesthetic complications    SUMMARY      Vital signs stable  OK to discharge from Stage I post anesthesia care.   Care transferred from Anesthesiology department on discharge from perioperative area

## 2024-05-30 NOTE — TELEPHONE ENCOUNTER
Spoke to patient. States that she lost her medication, Celebrex. Will forward to Tracie to see if it can be refilled.

## 2024-06-05 ENCOUNTER — OFFICE VISIT (OUTPATIENT)
Dept: ORTHOPEDICS | Facility: CLINIC | Age: 64
End: 2024-06-05
Payer: MEDICARE

## 2024-06-05 VITALS — HEIGHT: 68 IN | BODY MASS INDEX: 43.9 KG/M2 | WEIGHT: 289.69 LBS

## 2024-06-05 DIAGNOSIS — M17.11 PRIMARY OSTEOARTHRITIS OF RIGHT KNEE: Primary | ICD-10-CM

## 2024-06-05 PROCEDURE — 99499 UNLISTED E&M SERVICE: CPT | Mod: S$GLB,,, | Performed by: PHYSICIAN ASSISTANT

## 2024-06-05 PROCEDURE — 20610 DRAIN/INJ JOINT/BURSA W/O US: CPT | Mod: RT,S$GLB,, | Performed by: PHYSICIAN ASSISTANT

## 2024-06-05 PROCEDURE — 99999 PR PBB SHADOW E&M-EST. PATIENT-LVL III: CPT | Mod: PBBFAC,,, | Performed by: PHYSICIAN ASSISTANT

## 2024-06-05 NOTE — PROGRESS NOTES
Subjective:      Patient ID: Debbie Vo is a 64 y.o. female.    Chief Complaint: Pain and Injections of the Right Knee      Patient is here for  Zilretta  injection(s) for right knee osteoarthritis. No relief from iovera. No new complaints today.           Review of Systems   Constitutional: Negative for chills and fever.   Cardiovascular:  Negative for chest pain.   Respiratory:  Negative for cough.    Hematologic/Lymphatic: Does not bruise/bleed easily.   Skin:  Negative for poor wound healing and rash.   Musculoskeletal:  Positive for joint pain, myalgias and stiffness.   Gastrointestinal:  Negative for abdominal pain.   Genitourinary:  Negative for bladder incontinence.   Neurological:  Negative for dizziness, loss of balance and weakness.   Psychiatric/Behavioral:  Negative for altered mental status.        Review of patient's allergies indicates:  No Known Allergies     Current Outpatient Medications   Medication Sig Dispense Refill    albuterol (VENTOLIN HFA) 90 mcg/actuation inhaler Inhale 2 puffs into the lungs every 6 (six) hours as needed for Wheezing. Rescue 18 g 3    allopurinoL (ZYLOPRIM) 300 MG tablet Take 1 tablet (300 mg total) by mouth once daily. 90 tablet 3    amLODIPine (NORVASC) 5 MG tablet Take 1 tablet (5 mg total) by mouth once daily. 90 tablet 4    ammonium lactate (LAC-HYDRIN) 12 % lotion Apply to damp skin after bathing 225 g 11    benzonatate (TESSALON) 200 MG capsule take 1 BY MOUTH THREE TIMES DAILY AS NEEDED FOR COUGH 30 capsule 4    celecoxib (CELEBREX) 200 MG capsule Take 1 capsule (200 mg total) by mouth once daily. 30 capsule 0    colchicine (COLCRYS) 0.6 mg tablet Take 1 tablet (0.6 mg total) by mouth once daily. 7 tablet 0    colchicine-probenecid 0.5-500 mg (CO-BENEMID) 500-0.5 mg Tab Take 1 tablet by mouth once daily. 7 tablet 2    diclofenac sodium (VOLTAREN) 1 % Gel Apply 2 g topically 4 (four) times daily. 150 g 0    FLUoxetine 20 MG capsule Take 1 capsule  "(20 mg total) by mouth once daily. 90 capsule 4    fluticasone propionate (FLONASE) 50 mcg/actuation nasal spray 1 spray (50 mcg total) by Each Nostril route once daily. 10 mL 5    gabapentin (NEURONTIN) 300 MG capsule Take 1 capsule (300 mg total) by mouth 3 (three) times daily as needed (pain). 270 capsule 3    loratadine (CLARITIN) 10 mg tablet Take 1 tablet (10 mg total) by mouth once daily. 90 tablet 3    meclizine (ANTIVERT) 25 mg tablet Take 1 tablet (25 mg total) by mouth 3 (three) times daily as needed. 50 tablet 2    metoprolol succinate (TOPROL-XL) 100 MG 24 hr tablet Take 1 tablet (100 mg total) by mouth once daily. 90 tablet 4    neomycin-polymyxin-hydrocortisone (CORTISPORIN) 3.5-10,000-0.5 mg/g-unit/g-% cream Apply topically 2 (two) times daily. 10 g 2    neomycin-polymyxin-hydrocortisone (CORTISPORIN) 3.5-10,000-1 mg/mL-unit/mL-% otic suspension Place 3 drops into both ears 3 (three) times daily. 10 mL 0    omeprazole (PRILOSEC) 20 MG capsule Take 1 capsule (20 mg total) by mouth once daily. 90 capsule 3    oxyCODONE-acetaminophen (PERCOCET) 5-325 mg per tablet Take 1 tablet by mouth every 6 (six) hours as needed for Pain. 10 tablet 0    topiramate (TOPAMAX) 50 MG tablet Take 1 tablet (50 mg total) by mouth every evening. 90 tablet 4    triamcinolone acetonide 0.1% (KENALOG) 0.1 % cream Apply topically 2 (two) times daily as needed (dryness or itching). 80 g 3    valACYclovir (VALTREX) 1000 MG tablet Take 1 tablet (1,000 mg total) by mouth 3 (three) times daily. for 5 days 15 tablet 0    valsartan (DIOVAN) 320 MG tablet Take 1 tablet (320 mg total) by mouth once daily. 90 tablet 3     No current facility-administered medications for this visit.        The patient's relevant past medical, surgical, and social history was reviewed in Epic.       Objective:      VITAL SIGNS: Ht 5' 8" (1.727 m)   Wt 131.4 kg (289 lb 11 oz)   BMI 44.05 kg/m²     Ortho/SPM Exam         Assessment:       1. Primary " osteoarthritis of right knee          Plan:         Debbie was seen today for pain and injections.    Diagnoses and all orders for this visit:    Primary osteoarthritis of right knee  -     Large Joint Aspiration/Injection: R knee    I injected the right knee with one dose of Zilretta  today.  We will see the patient back in 6 mos or as needed.     Diagnoses and plan discussed with the patient, as well as the expected course and duration of his symptoms.  All questions and concerns were addressed prior to the end of the visit.   Instructed patient to call office if they have any future questions/concerns or to schedule apt. Patient will return to see me if symptoms worsen or fail to improve    Note dictated with voice recognition software, please excuse any grammatical errors.        Arely Gale PA-C   06/05/2024

## 2024-06-05 NOTE — PROCEDURES
Large Joint Aspiration/Injection: R knee    Date/Time: 6/5/2024 2:45 PM    Performed by: Arely Gale PA-C  Authorized by: Arely Gale PA-C    Consent Done?:  Yes (Verbal)  Indications:  Arthritis  Site marked: the procedure site was marked    Timeout: prior to procedure the correct patient, procedure, and site was verified    Prep: patient was prepped and draped in usual sterile fashion      Local anesthesia used?: Yes    Local anesthetic:  Topical anesthetic    Details:  Needle Size:  22 G  Ultrasonic Guidance for needle placement?: No    Approach:  Anterolateral  Location:  Knee  Site:  R knee  Medications:  32 mg triamcinolone acetonide 32 mg  Patient tolerance:  Patient tolerated the procedure well with no immediate complications

## 2024-08-09 DIAGNOSIS — J30.2 SEASONAL ALLERGIES: ICD-10-CM

## 2024-08-09 DIAGNOSIS — R23.2 HOT FLASHES: ICD-10-CM

## 2024-08-09 RX ORDER — PROBENECID AND COLCHICINE .5; 5 MG/1; MG/1
1 TABLET ORAL DAILY
Qty: 7 TABLET | Refills: 2 | Status: SHIPPED | OUTPATIENT
Start: 2024-08-09

## 2024-08-09 RX ORDER — LORATADINE 10 MG/1
10 TABLET ORAL DAILY
Qty: 90 TABLET | Refills: 3 | Status: SHIPPED | OUTPATIENT
Start: 2024-08-09

## 2024-08-09 RX ORDER — FLUOXETINE HYDROCHLORIDE 20 MG/1
20 CAPSULE ORAL DAILY
Qty: 90 CAPSULE | Refills: 4 | Status: SHIPPED | OUTPATIENT
Start: 2024-08-09 | End: 2025-11-02

## 2024-10-01 ENCOUNTER — RESEARCH ENCOUNTER (OUTPATIENT)
Dept: RESEARCH | Facility: HOSPITAL | Age: 64
End: 2024-10-01
Payer: MEDICARE

## 2024-10-01 ENCOUNTER — HOSPITAL ENCOUNTER (OUTPATIENT)
Dept: RADIOLOGY | Facility: HOSPITAL | Age: 64
Discharge: HOME | End: 2024-10-01
Attending: ORTHOPAEDIC SURGERY
Payer: MEDICARE

## 2024-10-01 ENCOUNTER — CLINICAL SUPPORT (OUTPATIENT)
Dept: LAB | Facility: HOSPITAL | Age: 64
End: 2024-10-01
Attending: ORTHOPAEDIC SURGERY
Payer: MEDICARE

## 2024-10-01 ENCOUNTER — OFFICE VISIT (OUTPATIENT)
Dept: ORTHOPEDICS | Facility: CLINIC | Age: 64
End: 2024-10-01
Payer: MEDICARE

## 2024-10-01 VITALS — WEIGHT: 286.63 LBS | HEIGHT: 68 IN | BODY MASS INDEX: 43.44 KG/M2

## 2024-10-01 DIAGNOSIS — M62.81 QUADRICEPS WEAKNESS: ICD-10-CM

## 2024-10-01 DIAGNOSIS — K21.9 GASTROESOPHAGEAL REFLUX DISEASE, UNSPECIFIED WHETHER ESOPHAGITIS PRESENT: ICD-10-CM

## 2024-10-01 DIAGNOSIS — F33.42 RECURRENT MAJOR DEPRESSIVE DISORDER, IN FULL REMISSION: ICD-10-CM

## 2024-10-01 DIAGNOSIS — E66.01 MORBID OBESITY WITH BMI OF 40.0-44.9, ADULT: ICD-10-CM

## 2024-10-01 DIAGNOSIS — M17.11 PRIMARY OSTEOARTHRITIS OF RIGHT KNEE: ICD-10-CM

## 2024-10-01 DIAGNOSIS — I10 ESSENTIAL HYPERTENSION: ICD-10-CM

## 2024-10-01 DIAGNOSIS — G89.29 CHRONIC PAIN OF RIGHT KNEE: ICD-10-CM

## 2024-10-01 DIAGNOSIS — E66.01 MORBID OBESITY WITH BMI OF 40.0-44.9, ADULT: Primary | ICD-10-CM

## 2024-10-01 DIAGNOSIS — M25.561 CHRONIC PAIN OF RIGHT KNEE: ICD-10-CM

## 2024-10-01 PROCEDURE — G2211 COMPLEX E/M VISIT ADD ON: HCPCS | Mod: S$GLB,,, | Performed by: ORTHOPAEDIC SURGERY

## 2024-10-01 PROCEDURE — 77073 BONE LENGTH STUDIES: CPT | Mod: 26,,, | Performed by: RADIOLOGY

## 2024-10-01 PROCEDURE — 99215 OFFICE O/P EST HI 40 MIN: CPT | Mod: S$GLB,,, | Performed by: ORTHOPAEDIC SURGERY

## 2024-10-01 PROCEDURE — 99999 PR PBB SHADOW E&M-EST. PATIENT-LVL IV: CPT | Mod: PBBFAC,,, | Performed by: ORTHOPAEDIC SURGERY

## 2024-10-01 PROCEDURE — 4010F ACE/ARB THERAPY RXD/TAKEN: CPT | Mod: CPTII,S$GLB,, | Performed by: ORTHOPAEDIC SURGERY

## 2024-10-01 PROCEDURE — 1159F MED LIST DOCD IN RCRD: CPT | Mod: CPTII,S$GLB,, | Performed by: ORTHOPAEDIC SURGERY

## 2024-10-01 PROCEDURE — 77073 BONE LENGTH STUDIES: CPT | Mod: TC,PN

## 2024-10-01 PROCEDURE — 3008F BODY MASS INDEX DOCD: CPT | Mod: CPTII,S$GLB,, | Performed by: ORTHOPAEDIC SURGERY

## 2024-10-01 NOTE — PROGRESS NOTES
Subjective:      Patient ID: Debbie Vo is a 64 y.o. female.    Chief Complaint: Pain of the Right Knee    Knee Pain: right  swelling: Yes  worsens with activity: Yes  relieved by: injections         Social History     Occupational History    Not on file   Tobacco Use    Smoking status: Never     Passive exposure: Never    Smokeless tobacco: Never   Substance and Sexual Activity    Alcohol use: No    Drug use: No    Sexual activity: Yes     Partners: Male      Social Drivers of Health     Tobacco Use: Low Risk  (10/1/2024)    Patient History     Smoking Tobacco Use: Never     Smokeless Tobacco Use: Never     Passive Exposure: Never   Alcohol Use: Not At Risk (7/8/2022)    AUDIT-C     Frequency of Alcohol Consumption: Never     Average Number of Drinks: Patient does not drink     Frequency of Binge Drinking: Never   Financial Resource Strain: Low Risk  (7/8/2022)    Overall Financial Resource Strain (CARDIA)     Difficulty of Paying Living Expenses: Not hard at all   Food Insecurity: No Food Insecurity (7/8/2022)    Hunger Vital Sign     Worried About Running Out of Food in the Last Year: Never true     Ran Out of Food in the Last Year: Never true   Transportation Needs: No Transportation Needs (7/8/2022)    PRAPARE - Transportation     Lack of Transportation (Medical): No     Lack of Transportation (Non-Medical): No   Physical Activity: Inactive (7/8/2022)    Exercise Vital Sign     Days of Exercise per Week: 0 days     Minutes of Exercise per Session: 0 min   Stress: No Stress Concern Present (7/8/2022)    [employer] [place] - Occupational Stress Questionnaire     Feeling of Stress : Not at all   Housing Stability: Low Risk  (7/8/2022)    Housing Stability Vital Sign     Unable to Pay for Housing in the Last Year: No     Number of Places Lived in the Last Year: 2     Unstable Housing in the Last Year: No   Depression: Low Risk  (6/5/2024)    Depression     Last PHQ-4: Flowsheet  Data: 0   Utilities: Not on file   Health Literacy: Not on file   Social Isolation: Not on file      Review of Systems   Constitutional: Negative for diaphoresis.   HENT:  Negative for ear discharge, nosebleeds and stridor.    Eyes:  Negative for photophobia.   Cardiovascular:  Negative for syncope.   Respiratory:  Negative for hemoptysis, shortness of breath and wheezing.    Neurological:  Negative for tremors.   Psychiatric/Behavioral: Negative.           Objective:    General    Constitutional: She is oriented to person, place, and time. She appears well-developed.   HENT:   Head: Normocephalic and atraumatic.   Right Ear: External ear normal.   Left Ear: External ear normal.   Eyes: EOM are normal.   Cardiovascular:  Intact distal pulses.            Neurological: She is alert and oriented to person, place, and time.   Psychiatric: She has a normal mood and affect. Her behavior is normal. Judgment and thought content normal.     General Musculoskeletal Exam   Gait: antalgic and abnormal       Right Knee Exam     Inspection   Swelling: present  Effusion: present    Tenderness   The patient is tender to palpation of the medial joint line.    Crepitus   The patient has crepitus of the patella.    Range of Motion   Flexion:  abnormal     Other   Sensation: normal    Muscle Strength   Right Lower Extremity   Quadriceps:  4/5   Hamstrin/5          Assessment:       1. Morbid obesity with BMI of 40.0-44.9, adult    2. Primary osteoarthritis of right knee    3. Chronic pain of right knee    4. Quadriceps weakness    5. Essential hypertension    6. Recurrent major depressive disorder, in full remission    7. Gastroesophageal reflux disease, unspecified whether esophagitis present          Plan:   The patient has failed non operative management, has painful crepitus, and has swelling. The natural history of severe degenerative arthritis was discussed at length and nonoperative and operative treatment was reviewed. Due  to the pain and associated disability, I recommend right total knee replacement for KL 4.  The risks, benefits, convalescence, and alternatives were reviewed.  Numerous questions were asked and answered.  Models were used as an education tool.  Surgery will be scheduled at a convenient time.  The discussion with the patient included a description of a total knee replacement.  A total knee replacement (TKR) means a resurfacing of all three surfaces-the patella, femur, and tibia, with metal and plastic parts. The prosthetic parts are usually cemented into position and will allow a patient a full range of motion from. The postoperative motion, however, is determined by multiple factors, the most important of which is preoperative motion. In general, the better the motion preoperatively, the better the motion postoperatively.  In patients with good bone stock and bone quality (ie males, younger patients) I will generally use an uncemented tibial component and leave the patella unresurfaced, in an effort to preserve bone stock for any future revision surgeries. In those patients we discuss the risk of anterior knee pain in a small subset of patients (5-10%) with an unresurfaced patella. The operation, pending medical clearance, generally requires a hospitalization of 0-3 days for one knee (possibly longer for a bilateral replacement). In general, we prefer to perform the procedure under epidural/spinal anesthesia.  Patient blood donation will be based on the individual patient but is not common and based on preoperative hemoglobin/hematocrit levels.The operation will be done preferably in a minimally invasive fashion which will facilitate recovery.  While performing the procedure in a minimally invasive manner is our preference, some patients are not candidates.  In that situation, a non-minimally invasive technique will be performed.  This will not adversely affect the long term success of the TKR.  Postoperatively, the  patient is  walking the day of surgery and may be discahrged the day of surgery if stable with a walker or cane.  The first couple of days can be painful and the pain medication will alleviate but not eliminate the pain.  Thus, the patient must really push hard to get the range of motion.   The cane is to be dispensed with once the patient is secure enough.  In general, there is no cast or brace required with a routine knee replacement. In the long term, we do not encourage our patients to run for the sake of running; although, pending their preoperative status, we often allow patients to play doubles tennis or comparable activities.  We also allow them to do gentle intermediate downhill skiing if they are truly an expert skier.  Biking is encouraged as well as swimming.  The follow-up periods are usually at 2 weeks, 3 months, six months, and yearly intervals.  Potential complications with total knee replacements include infection (less than 2%), which is much higher in patients with obesity, diabetes, or other conditions which adversely affect the immune system.  (Patients with a previous history of osteomyelitis can have infection rates as high as 15%).  By nerve damage we mean a peroneal nerve palsy with a foot drop (a flapping foot with ambulation).  This is particularly more apt to occur with a bad valgus (knock knee) deformity.  The incidence can be quite high in this particular patient population.  There are always areas of skin numbness, but this is not an untoward effect, nor do we consider it a complication.  Other potential complications include dislocation of the patellar component (less than 2%).  The loosening of either the tibial or femoral component is much more infrequent.  It usually occurs with infection or long-term use in a patient population that is at extreme risk (e.g., markedly overweight and not conscientious about their exercises).  Major blood vessel damage is also extremely rare.   Theoretically, because of the anatomic proximity of the popliteal artery, it could be lacerated with subsequent repairs required.  Albeit unlikely, a disruption of the popliteal artery could theoretically result in an amputation.  Similarly, infection could theoretically result in an amputation if one were to grow out an organism that cannot be controlled with antibiotics.  General medical complications include phlebitis for which we prophylactically anticoagulate, but it could still occur and fatal pulmonary embolus has been reported.  Cardiovascular problems, such as a heart attack or ischemia, are always a concern with such hemodynamic changes in the blood vascular system.  Our goal is to improve at least 80% of the pain and hopefully 100% but some aspects of the patients anatomy or other factors may not provide complete pain relief. Other general complications are very rare but in medicine anything could theoretically happen.  Patient is on chronic anticoagulation, we would like the patient off any anticoagulation at least 72 hours before surgery to enable us to perform neuraxial anesthesia.  We also discussed performing a pre-operative peripheral sensory block of the bracnhes of the AFCN and ISN of the same knee using iovera to help with pain control post surgery. This is a relatively new technology with early data demonstrating significant pain improvement and functional recovery. However the science defining all the associated risks is still developing. From what we know thus far, a small number of patients can have nerve pain along the areas of the treated nerves. I think the patient understands the risk benefit ratio of total knee replacement and the iovera treatment and would like to pursue the total knee replacement and iovera treatment. we will begin the pre-operative process.  The mobility limitation cannot be sufficiently resolved by the use of a cane. Patient's functional mobility deficit can be  sufficiently resolved with the use of a (Rollator, Rolling Walker or Walker). Patient's mobility limitation significantly impairs their ability to participate in one of more activities of daily living. The use of a (Rollator, RW or Walker) will significantly improve the patient's ability to participate in MRADLS and the patient will use it on regular basis in the home.

## 2024-10-01 NOTE — PROGRESS NOTES
Protocol: innovations in Genicular Outcomes Registry (Jazz)  Protocol#: Jazz  IRB#: 2021.156  Version Date: 10-Dmitry-2023  PI: Rich Reid MD  Patient Initials: D.R.     Declined Study Note    Patient was approached again about possible study participation. Patient declined study.

## 2024-10-23 ENCOUNTER — OFFICE VISIT (OUTPATIENT)
Dept: FAMILY MEDICINE | Facility: HOSPITAL | Age: 64
End: 2024-10-23
Attending: NURSE PRACTITIONER
Payer: MEDICARE

## 2024-10-23 ENCOUNTER — ANESTHESIA EVENT (OUTPATIENT)
Dept: SURGERY | Facility: HOSPITAL | Age: 64
End: 2024-10-23
Payer: MEDICARE

## 2024-10-23 ENCOUNTER — HOSPITAL ENCOUNTER (OUTPATIENT)
Dept: PREADMISSION TESTING | Facility: HOSPITAL | Age: 64
Discharge: HOME | End: 2024-10-23
Attending: NURSE PRACTITIONER
Payer: MEDICARE

## 2024-10-23 ENCOUNTER — HOSPITAL ENCOUNTER (OUTPATIENT)
Dept: RADIOLOGY | Facility: HOSPITAL | Age: 64
Discharge: HOME | End: 2024-10-23
Attending: ORTHOPAEDIC SURGERY
Payer: MEDICARE

## 2024-10-23 VITALS
WEIGHT: 293 LBS | HEART RATE: 90 BPM | RESPIRATION RATE: 16 BRPM | DIASTOLIC BLOOD PRESSURE: 87 MMHG | BODY MASS INDEX: 44.41 KG/M2 | HEIGHT: 68 IN | SYSTOLIC BLOOD PRESSURE: 121 MMHG | OXYGEN SATURATION: 98 %

## 2024-10-23 VITALS
OXYGEN SATURATION: 98 % | HEART RATE: 90 BPM | SYSTOLIC BLOOD PRESSURE: 121 MMHG | WEIGHT: 293 LBS | TEMPERATURE: 99 F | RESPIRATION RATE: 16 BRPM | DIASTOLIC BLOOD PRESSURE: 87 MMHG | BODY MASS INDEX: 44.41 KG/M2 | HEIGHT: 68 IN

## 2024-10-23 DIAGNOSIS — Z01.818 ENCOUNTER FOR PREOPERATIVE ASSESSMENT: Primary | ICD-10-CM

## 2024-10-23 PROCEDURE — 99215 OFFICE O/P EST HI 40 MIN: CPT | Mod: 25

## 2024-10-23 PROCEDURE — 71045 X-RAY EXAM CHEST 1 VIEW: CPT | Mod: TC,FY

## 2024-10-23 PROCEDURE — 71045 X-RAY EXAM CHEST 1 VIEW: CPT | Mod: 26,,, | Performed by: RADIOLOGY

## 2024-10-23 RX ORDER — LIDOCAINE HYDROCHLORIDE 10 MG/ML
1 INJECTION, SOLUTION EPIDURAL; INFILTRATION; INTRACAUDAL; PERINEURAL ONCE
OUTPATIENT
Start: 2024-10-23 | End: 2024-10-23

## 2024-10-23 NOTE — ANESTHESIA PREPROCEDURE EVALUATION
"                                                                                                             11/11/2024  Debbie Vo is a 64 y.o., female scheduled for  ARTHROPLASTY, KNEE, TOTAL monoblock (Right) 11/11/24.    Per family medicine Dr. Mcgraw, "The patient is medically optimized to proceed with a low to moderate risk for a low to moderate risk surgery."    Past Medical History:   Diagnosis Date    Benign essential hypertension     Colon cancer     Depression     Gout, unspecified     Osteoarthritis      Past Surgical History:   Procedure Laterality Date    CHOLECYSTECTOMY      COLON SURGERY  2002    colon resection    COLONOSCOPY N/A 03/24/2016    Procedure: COLONOSCOPY;  Surgeon: Ernst Bobby MD;  Location: [facility] ENDO;  Service: Endoscopy;  Laterality: N/A;  Needs Flex sigmoidoscopy    EPIDURAL STEROID INJECTION INTO LUMBAR SPINE N/A 06/21/2018    Procedure: Injection-steroid-epidural-lumbar;  Surgeon: Avi Morales Jr., MD;  Location: [facility] PAIN MGT;  Service: Pain Management;  Laterality: N/A;  ORAL SEDATION    HYSTERECTOMY      INJECTION OF ANESTHETIC AGENT INTO SACROILIAC JOINT Bilateral 08/25/2021    Procedure: BLOCK, SACROILIAC JOINT*-- bilateral;  Surgeon: Lucia Carroll MD;  Location: [facility] PAIN [facility]GT;  Service: Pain Management;  Laterality: Bilateral;    PARTIAL HYSTERECTOMY       Review of patient's allergies indicates:  No Known Allergies    Current Outpatient Medications   Medication Instructions    acetaminophen (TYLENOL) 325 mg, Oral, Every 4 hours    albuterol (VENTOLIN HFA) 90 mcg/actuation inhaler 2 puffs, Inhalation, Every 6 hours PRN, Rescue    allopurinoL (ZYLOPRIM) 300 mg, Oral, Daily    amLODIPine (NORVASC) 5 mg, Oral, Daily    ammonium lactate (LAC-HYDRIN) 12 % lotion Apply to damp skin after bathing    aspirin (ECOTRIN) 81 mg, Oral, 2 times daily    benzonatate (TESSALON) 200 MG capsule take 1 BY MOUTH THREE TIMES DAILY AS NEEDED FOR COUGH    " cephALEXin (KEFLEX) 500 mg, Oral, Every 6 hours    colchicine (COLCRYS) 0.6 mg, Oral, Daily    colchicine-probenecid 0.5-500 mg (CO-BENEMID) 500-0.5 mg Tab 1 tablet, Oral, Daily    diclofenac (VOLTAREN) 75 mg, Oral, 2 times daily    diclofenac sodium (VOLTAREN) 2 g, Topical (Top), 4 times daily    famotidine (PEPCID) 20 mg, Oral, 2 times daily    FLUoxetine 20 mg, Oral, Daily    fluticasone propionate (FLONASE) 50 mcg, Each Nostril, Daily    gabapentin (NEURONTIN) 300 mg, Oral, 3 times daily PRN    loratadine (CLARITIN) 10 mg, Oral, Daily    meclizine (ANTIVERT) 25 mg, Oral, 3 times daily PRN    metoprolol succinate (TOPROL-XL) 100 mg, Oral, Daily    neomycin-polymyxin-hydrocortisone (CORTISPORIN) 3.5-10,000-0.5 mg/g-unit/g-% cream Topical (Top), 2 times daily    neomycin-polymyxin-hydrocortisone (CORTISPORIN) 3.5-10,000-1 mg/mL-unit/mL-% otic suspension 3 drops, Both Ears, 3 times daily    omeprazole (PRILOSEC) 20 mg, Oral, Daily    oxyCODONE-acetaminophen (PERCOCET) 5-325 mg per tablet 1 tablet, Oral, Every 6 hours PRN    topiramate (TOPAMAX) 50 mg, Oral, Nightly    tranexamic acid (LYSTEDA) 650 mg, Oral, 2 times daily    triamcinolone acetonide 0.1% (KENALOG) 0.1 % cream Topical (Top), 2 times daily PRN    valACYclovir (VALTREX) 1,000 mg, Oral, 3 times daily    valsartan (DIOVAN) 320 mg, Oral, Daily       Pre-op Assessment    I have reviewed the Patient Summary Reports.     I have reviewed the Nursing Notes. I have reviewed the NPO Status.   I have reviewed the Medications.     Review of Systems  Anesthesia Hx:  No problems with previous Anesthesia   Neg history of prior surgery.           Personal Hx of Anesthesia complications, Post-Operative Nausea/Vomiting                    Social:  Non-Smoker, No Alcohol Use       Hematology/Oncology:                        --  Cancer in past history (S/P subtotal colectomy):                     Cardiovascular:  Exercise tolerance: good   Denies Pacemaker. Hypertension   Denies Valvular problems/Murmurs.  Denies MI.        Denies Angina.                                    Pulmonary:  Pulmonary Normal      Denies Shortness of breath.   Risk factors for ILDA                Hepatic/GI:     GERD, well controlled    Not Taking GLP-1 Agonists            Musculoskeletal:  Arthritis      Joint Disease:  Arthritis, Osteoarthritis, Gout     Spine Disorders: lumbar            Neurological:  Denies TIA.  Denies CVA. Neuromuscular Disease,   Denies Seizures.     Neuro Symptoms of tremor    Osteoarthritis                           Endocrine:  Denies Diabetes. Denies Hypothyroidism.  Denies Hyperthyroidism.       Morbid Obesity / BMI > 40  Psych:    depression                Physical Exam  General: Well nourished, Cooperative, Alert and Oriented    Airway:  Mallampati: III / II  Mouth Opening: Normal  TM Distance: 4 - 6 cm  Tongue: Normal  Neck ROM: Normal ROM  Neck: Girth Increased    Dental:  Intact    Chest/Lungs:  Normal Respiratory Rate    Heart:  Rate: Normal  Rhythm: Regular Rhythm      Lab Results   Component Value Date    WBC 8.38 10/01/2024    HGB 14.2 10/01/2024    HCT 45.4 10/01/2024     10/01/2024    CHOL 220 (H) 07/05/2023    TRIG 113 07/05/2023    HDL 48 07/05/2023    ALT 15 10/01/2024    AST 11 10/01/2024     10/01/2024    K 4.0 10/01/2024     10/01/2024    CREATININE 0.7 10/01/2024    BUN 11 10/01/2024    CO2 24 10/01/2024    TSH 0.572 07/05/2023    HGBA1C 5.5 07/05/2023     Results for orders placed or performed in visit on 10/01/24   EKG 12-lead    Collection Time: 10/07/24  8:45 AM   Result Value Ref Range    QRS Duration 94 ms    OHS QTC Calculation 475 ms    Narrative    Test Reason : E66.01,Z68.41,M17.11,M25.561,G89.29,M62.81,I10,F33.42,K21.9,    Vent. Rate : 095 BPM     Atrial Rate : 095 BPM     P-R Int : 170 ms          QRS Dur : 094 ms      QT Int : 378 ms       P-R-T Axes : 050 -30 011 degrees     QTc Int : 475 ms    Normal sinus rhythm  Left axis  deviation  Nonspecific T wave abnormality  Abnormal ECG  When compared with ECG of 08-SEP-2021 17:58,  No significant change was found  Confirmed by Lauren Young MD ([id number]) on 10/8/2024 2:21:54 PM    Referred By: HALEY LUCERO           Confirmed By:Lauren Young MD     X-Ray Chest 1 View Pre-OP  Narrative: EXAMINATION:  XR CHEST 1 VIEW PRE-OP    CLINICAL HISTORY:  Morbid (severe) obesity due to excess calories    TECHNIQUE:  Single frontal view of the chest was performed.    COMPARISON:  None    FINDINGS:  Cardiomediastinal silhouette within normal limits.  No confluent airspace opacity or lobar consolidation.  No pleural effusion or gross pneumothorax.  No acute osseous abnormality.  Impression: No acute cardiopulmonary abnormality.    Electronically signed by: Vidal Robles  Date:    10/23/2024  Time:    09:39        Anesthesia Plan  Type of Anesthesia, risks & benefits discussed:    Anesthesia Type: CSE, Spinal, Regional  Intra-op Monitoring Plan: Standard ASA Monitors  Post Op Pain Control Plan: multimodal analgesia, IV/PO Opioids PRN and peripheral nerve block  Induction:  IV  Informed Consent: Informed consent signed with the Patient and all parties understand the risks and agree with anesthesia plan.  All questions answered.   ASA Score: 3  Day of Surgery Review of History & Physical: H&P Update referred to the surgeon/provider.    Ready For Surgery From Anesthesia Perspective.     .

## 2024-10-23 NOTE — PROGRESS NOTES
[facility]    Subjective:     Debbie Vo is a 64 y.o. year old female with PMHx of HTN, GERD who presents to clinic for pre-op examination.    She denies any concerns for complaints at this time. Scheduled for R TKA with Dr. Reid on 11/11.     Patient Active Problem List    Diagnosis Date Noted    Quadriceps weakness 10/01/2024    Chronic pain of right knee 11/02/2023    Primary osteoarthritis of right knee 11/02/2023    Pain 08/25/2021    Carpal tunnel syndrome of left wrist 10/08/2019    Recurrent major depressive disorder, in full remission 10/08/2018    Senile purpura 10/08/2018    Myofascial pain syndrome 06/04/2018    Abdominal wall hernia 03/06/2016    History of colon cancer 02/26/2016    Gastroesophageal reflux disease 08/13/2015    Lumbar radiculopathy, chronic 02/12/2015    Essential hypertension 02/12/2015    Morbid obesity with BMI of 40.0-44.9, adult 02/12/2015        Review of patient's allergies indicates:  No Known Allergies     Past Medical History:   Diagnosis Date    Benign essential hypertension     Colon cancer     Depression     Gout, unspecified     Osteoarthritis       Past Surgical History:   Procedure Laterality Date    CHOLECYSTECTOMY      COLON SURGERY  2002    colon resection    COLONOSCOPY N/A 03/24/2016    Procedure: COLONOSCOPY;  Surgeon: Ernst Bobby MD;  Location: [facility];  Service: Endoscopy;  Laterality: N/A;  Needs Flex sigmoidoscopy    EPIDURAL STEROID INJECTION INTO LUMBAR SPINE N/A 06/21/2018    Procedure: Injection-steroid-epidural-lumbar;  Surgeon: Avi Morales Jr., MD;  Location: [facility];  Service: Pain Management;  Laterality: N/A;  ORAL SEDATION    HYSTERECTOMY      INJECTION OF ANESTHETIC AGENT INTO SACROILIAC JOINT Bilateral 08/25/2021    Procedure: BLOCK, SACROILIAC JOINT*-- bilateral;  Surgeon: Lucia Carroll MD;  Location: [facility] [place];  Service: Pain Management;  Laterality: Bilateral;    PARTIAL HYSTERECTOMY    "     Family History   Problem Relation Name Age of Onset    Heart disease Mother      Cancer Mother          colon cancer    Diabetes Mother      Colon cancer Mother      Cancer Father          Liver cancer    Kidney disease Father      Liver cancer Father      Cancer Sister Ana Luisa         breast cancer    BRCA 1/2 Sister Ana Luisa     Breast cancer Sister Ana Luisa     Cancer Brother Seng         Liver cancer    Liver cancer Brother Seng     Cancer Brother Lazaro         colon cancer    Colon cancer Brother Lazaro     Cancer Brother Da         thoat cancer    Throat cancer Brother Da     No Known Problems Brother Joe     No Known Problems Son x4       Social History     Tobacco Use    Smoking status: Never     Passive exposure: Never    Smokeless tobacco: Never   Substance Use Topics    Alcohol use: No        Objective:     Vitals:    10/23/24 1022   BP: 121/87   BP Location: Right arm   Patient Position: Sitting   Pulse: 90   Resp: 16   SpO2: 98%   Weight: 133.5 kg (294 lb 5 oz)   Height: 5' 8" (1.727 m)     Body mass index is 44.75 kg/m².    Gen: No apparent distress, well nourished and developed, appears stated age  CV: RRR, S1 and S2 present, no LE edema  Resp: CTAB, normal respiratory effort      Assessment/Plan:     Debbie Vo is a 64 y.o. year old female who presents to clinic for pre-op examination.    Pre-operative evaluation with risk assessment              -           Scheduled for right TKA on 11/11 by Dr. Reid  -           DASI score: 14.2 w/ Functional Capacity in METS: 4.49 (>4) with a revised cardiac index risk of 0 points class I risk 3.9% 30 day risk of death, MI, or cardiac arrest.   - ACS NSQIP risk calculator      - Patient is not on antiplatelets/anticoagulation.   - Patient reports a h/o previous reaction to anesthesia to where she had N/V. States pre-treated usually for this though unsure with what.   - Patient is not a smoker.  - Patient has no prior h/o HLD/CAD w/ either " MI or CVA. Continue medical management.  - Patient has no prior h/o DM. Last HgbA1C:~5.5  - Patient has no prior h/o thyroid disease. TSH: 0.572  - Patient has prior h/o HTN. BP: 121/87.   - Patient has no prior h/o COPD/Asthma/ILDA/OHS. Does not use CPAP.  - Patient has no prior h/o HF. No ECHO on file.  - Body mass index is 44.75 kg/m².              -           The patient is medically optimized to proceed with a low to moderate risk for a low to moderate risk surgery.   - Please call our office for any additional questions or concerns.    Beverly Mcgraw MD  [facility], PGY-3  10/23/2024

## 2024-10-23 NOTE — DISCHARGE INSTRUCTIONS

## 2024-10-23 NOTE — PRE-PROCEDURE INSTRUCTIONS
Erik Higginbotham.     Allergies, medical, surgical, family and psychosocial histories reviewed with patient. Periop plan of care reviewed. Preop instructions given, including medications to take and to hold. Hibiclens soap and instructions on use given. Time allotted for questions to be addressed.      Arrival time 0530.    Please take Metoprolol, Amlodipine, and Valsartan the morning of surgery.     Surgery instructions reviewed and surgery booklet given to the patient.

## 2024-11-01 ENCOUNTER — TELEPHONE (OUTPATIENT)
Dept: ORTHOPEDICS | Facility: CLINIC | Age: 64
End: 2024-11-01
Payer: MEDICARE

## 2024-11-03 ENCOUNTER — HOSPITAL ENCOUNTER (EMERGENCY)
Facility: HOSPITAL | Age: 64
Discharge: HOME | End: 2024-11-03
Attending: EMERGENCY MEDICINE
Payer: MEDICARE

## 2024-11-03 VITALS
WEIGHT: 293 LBS | HEIGHT: 68 IN | DIASTOLIC BLOOD PRESSURE: 94 MMHG | RESPIRATION RATE: 18 BRPM | BODY MASS INDEX: 44.41 KG/M2 | TEMPERATURE: 99 F | OXYGEN SATURATION: 96 % | SYSTOLIC BLOOD PRESSURE: 145 MMHG | HEART RATE: 100 BPM

## 2024-11-03 DIAGNOSIS — M54.31 SCIATICA OF RIGHT SIDE: Primary | ICD-10-CM

## 2024-11-03 PROCEDURE — 63600175 PHARM REV CODE 636 W HCPCS: Performed by: EMERGENCY MEDICINE

## 2024-11-03 PROCEDURE — 99284 EMERGENCY DEPT VISIT MOD MDM: CPT | Mod: 25

## 2024-11-03 PROCEDURE — 96372 THER/PROPH/DIAG INJ SC/IM: CPT | Performed by: EMERGENCY MEDICINE

## 2024-11-03 RX ORDER — PREDNISONE 20 MG/1
40 TABLET ORAL
Status: COMPLETED | OUTPATIENT
Start: 2024-11-03 | End: 2024-11-03

## 2024-11-03 RX ORDER — KETOROLAC TROMETHAMINE 30 MG/ML
15 INJECTION, SOLUTION INTRAMUSCULAR; INTRAVENOUS
Status: COMPLETED | OUTPATIENT
Start: 2024-11-03 | End: 2024-11-03

## 2024-11-03 RX ORDER — METHOCARBAMOL 750 MG/1
1500 TABLET, FILM COATED ORAL 3 TIMES DAILY
Qty: 30 TABLET | Refills: 0 | Status: SHIPPED | OUTPATIENT
Start: 2024-11-03 | End: 2024-11-08

## 2024-11-03 RX ADMIN — PREDNISONE 40 MG: 20 TABLET ORAL at 12:11

## 2024-11-03 RX ADMIN — KETOROLAC TROMETHAMINE 15 MG: 30 INJECTION, SOLUTION INTRAMUSCULAR; INTRAVENOUS at 12:11

## 2024-11-03 NOTE — ED NOTES
Pt presents to ED with C/O R buttock pain that radiates down the leg to the knee. She states more pain to the thigh area. Pt denies the pain radiating to her foot or any numbness or tingling to the feet. She states she do have a HX of Sciatica and Neuropathy. She states she did take Gabapentin and Advil this am. Pt states she is unable to put pressure to the R leg. She states she is scheduled for R knee surgery on November 11 for a R knee replacement. She states her pain is 8/10 at this time.

## 2024-11-03 NOTE — ED PROVIDER NOTES
Encounter Date: 11/3/2024       History     Chief Complaint   Patient presents with    Back Pain     C/o nontraumatic R lower back pain radiating to RLE x4 days. Pt taking tylenol and Advil w/o sx relief. Denies other sx or complaints.      Patient is a 64-year-old female who complains of a 4 day history of nontraumatic pain to her right buttock that radiates into her right leg.  She has no right leg weakness or numbness.  No bowel or bladder incontinence.  No urinary complaints.  Patient is scheduled for a right total knee replacement in 8 days.      Review of patient's allergies indicates:  No Known Allergies  Past Medical History:   Diagnosis Date    Benign essential hypertension     Colon cancer     Depression     Gout, unspecified     Osteoarthritis      Past Surgical History:   Procedure Laterality Date    CHOLECYSTECTOMY      COLON SURGERY  2002    colon resection    COLONOSCOPY N/A 03/24/2016    Procedure: COLONOSCOPY;  Surgeon: Ernst Bobby MD;  Location: [facility] ENDO;  Service: Endoscopy;  Laterality: N/A;  Needs Flex sigmoidoscopy    EPIDURAL STEROID INJECTION INTO LUMBAR SPINE N/A 06/21/2018    Procedure: Injection-steroid-epidural-lumbar;  Surgeon: Avi Morales Jr., MD;  Location: [facility] PAIN MGT;  Service: Pain Management;  Laterality: N/A;  ORAL SEDATION    HYSTERECTOMY      INJECTION OF ANESTHETIC AGENT INTO SACROILIAC JOINT Bilateral 08/25/2021    Procedure: BLOCK, SACROILIAC JOINT*-- bilateral;  Surgeon: Lucia Carroll MD;  Location: [facility] PAIN MGT;  Service: Pain Management;  Laterality: Bilateral;    PARTIAL HYSTERECTOMY       Family History   Problem Relation Name Age of Onset    Heart disease Mother      Cancer Mother          colon cancer    Diabetes Mother      Colon cancer Mother      Cancer Father          Liver cancer    Kidney disease Father      Liver cancer Father      Cancer Sister Ana Luisa         breast cancer    BRCA 1/2 Sister Ana Luisa     Breast cancer Sister Ana Luisa      Cancer Brother Seng         Liver cancer    Liver cancer Brother Seng     Cancer Brother Lazaro         colon cancer    Colon cancer Brother Lazaro     Cancer Brother Da         thoat cancer    Throat cancer Brother Da     No Known Problems Brother Joe     No Known Problems Son x4      Social History     Tobacco Use    Smoking status: Never     Passive exposure: Never    Smokeless tobacco: Never   Substance Use Topics    Alcohol use: No    Drug use: No     Review of Systems   Gastrointestinal:  Negative for nausea and vomiting.   Genitourinary:  Negative for difficulty urinating, dysuria and hematuria.   Musculoskeletal:         Right leg pain.   All other systems reviewed and are negative.      Physical Exam     Initial Vitals [11/03/24 1140]   BP Pulse Resp Temp SpO2   (!) 145/94 100 18 98.8 °F (37.1 °C) 96 %      MAP       --         Physical Exam    Nursing note and vitals reviewed.  Constitutional: No distress.   Cardiovascular:  Normal rate, regular rhythm and normal heart sounds.           Pulmonary/Chest: Breath sounds normal.   Abdominal: Abdomen is soft. There is no abdominal tenderness.   Musculoskeletal:      Comments: There is tenderness of the right upper to mid buttock.     Neurological: She is alert and oriented to person, place, and time. She has normal strength. No sensory deficit.   Skin: Skin is warm and dry.   Psychiatric: Thought content normal.         ED Course   Procedures  Labs Reviewed - No data to display       Imaging Results    None          Medications   ketorolac injection 15 mg (15 mg Intramuscular Given 11/3/24 1210)   predniSONE tablet 40 mg (40 mg Oral Given 11/3/24 1210)     Medical Decision Making  Emergent evaluation of a 64-year-old female with signs and symptoms of right-sided sciatica.  Patient says she has had sciatica in the past.  She was given a shot of Toradol here in the emergency department, but I have advised her from this point on not to take any NSAIDs as  she is scheduled for a knee replacement in 8 days.  I will place on Robaxin and suggest she take Tylenol for pain.  She should follow up with her primary physician concerning this when able but may return to the emergency department for any possible worsening.    Risk  Prescription drug management.                                      Clinical Impression:  Final diagnoses:  [M54.31] Sciatica of right side (Primary)          ED Disposition Condition    Discharge Stable          ED Prescriptions       Medication Sig Dispense Start Date End Date Auth. Provider    methocarbamoL (ROBAXIN) 750 MG Tab Take 2 tablets (1,500 mg total) by mouth 3 (three) times daily. for 5 days 30 tablet 11/3/2024 11/8/2024 Soham Landeros MD          Follow-up Information       Follow up With Specialties Details Why Contact Info    Sergio Pitt MD Internal Medicine  As needed [address] [address] [phone]               Soham Landeros MD  11/03/24 0551

## 2024-11-04 ENCOUNTER — PATIENT OUTREACH (OUTPATIENT)
Dept: EMERGENCY MEDICINE | Facility: HOSPITAL | Age: 64
End: 2024-11-04
Payer: MEDICARE

## 2024-11-04 ENCOUNTER — NURSE TRIAGE (OUTPATIENT)
Dept: ADMINISTRATIVE | Facility: CLINIC | Age: 64
End: 2024-11-04
Payer: MEDICARE

## 2024-11-04 NOTE — TELEPHONE ENCOUNTER
Patient was seen in ER yesterday for back pain and is calling to make sure that the visit will not affect her surgery scheduled for knee replacement next week. Reassured patient the ER doctor was aware of the scheduled surgery and will send message to ortho to notify them of recent ER visit. Patient reports her pain has improved and does not require triage at this time. Instructed to call back with additional questions or worsening of symptoms. Patient verbalized understanding.     Reason for Disposition   Question about upcoming scheduled surgery, procedure or test, no triage required, and triager able to answer question    Protocols used: Information Only Call - No Triage-A-OH

## 2024-11-10 ENCOUNTER — NURSE TRIAGE (OUTPATIENT)
Dept: ADMINISTRATIVE | Facility: CLINIC | Age: 64
End: 2024-11-10
Payer: MEDICARE

## 2024-11-10 RX ORDER — TALC
6 POWDER (GRAM) TOPICAL NIGHTLY PRN
OUTPATIENT
Start: 2024-11-10

## 2024-11-10 RX ORDER — CEPHALEXIN 500 MG/1
500 CAPSULE ORAL EVERY 6 HOURS
OUTPATIENT
Start: 2024-11-12 | End: 2024-11-14

## 2024-11-10 RX ORDER — AMOXICILLIN 250 MG
1 CAPSULE ORAL 2 TIMES DAILY
OUTPATIENT
Start: 2024-11-10

## 2024-11-10 RX ORDER — ACETAMINOPHEN 325 MG/1
650 TABLET ORAL EVERY 6 HOURS
OUTPATIENT
Start: 2024-11-11

## 2024-11-10 RX ORDER — BISACODYL 10 MG/1
10 SUPPOSITORY RECTAL 2 TIMES DAILY PRN
OUTPATIENT
Start: 2024-11-10

## 2024-11-10 RX ORDER — ONDANSETRON HYDROCHLORIDE 2 MG/ML
4 INJECTION, SOLUTION INTRAVENOUS EVERY 12 HOURS PRN
OUTPATIENT
Start: 2024-11-10

## 2024-11-10 RX ORDER — PREGABALIN 75 MG/1
75 CAPSULE ORAL 2 TIMES DAILY
OUTPATIENT
Start: 2024-11-10

## 2024-11-10 RX ORDER — NAPROXEN SODIUM 220 MG/1
81 TABLET, FILM COATED ORAL 2 TIMES DAILY
OUTPATIENT
Start: 2024-11-10 | End: 2024-12-22

## 2024-11-10 RX ORDER — DIPHENHYDRAMINE HCL 25 MG
25 CAPSULE ORAL EVERY 6 HOURS PRN
OUTPATIENT
Start: 2024-11-10

## 2024-11-10 RX ORDER — FAMOTIDINE 20 MG/1
20 TABLET, FILM COATED ORAL 2 TIMES DAILY
OUTPATIENT
Start: 2024-11-10

## 2024-11-10 RX ORDER — PROMETHAZINE HYDROCHLORIDE 25 MG/ML
6.25 INJECTION, SOLUTION INTRAMUSCULAR; INTRAVENOUS EVERY 6 HOURS PRN
OUTPATIENT
Start: 2024-11-10

## 2024-11-10 RX ORDER — CELECOXIB 100 MG/1
200 CAPSULE ORAL 2 TIMES DAILY
OUTPATIENT
Start: 2024-11-10

## 2024-11-10 NOTE — TELEPHONE ENCOUNTER
Pt reports lower back pain that radiates down right leg that began 2 weeks ago. Pt reports she was evaluated in ED for this and given prescription for muscle relaxer. Pt reports she has been taking tylenol and muscle relaxer with minimal improvement. Current pain of 8/10. Pt denies urinary changes, numbness/tingling, difficulty walking. Pt reports she is scheduled for knee replacement in the morning and is asking if she is okay to continue with surgery tomorrow. Care advice to see physician within 4 hours or PCP triage. Secure chat sent to Alli Mccormick MD verbalized it is okay to have the surgery tomorrow. She can also try warm compresses or cold compresses and to continue the muscle relaxers as well. Pt made aware and verbalizes understanding.   Reason for Disposition   [1] SEVERE back pain (e.g., excruciating, unable to do any normal activities) AND [2] not improved 2 hours after pain medicine    Additional Information   Negative: Passed out (e.g., fainted, lost consciousness, blacked out and was not responding)   Negative: Shock suspected (e.g., cold/pale/clammy skin, too weak to stand, low BP, rapid pulse)   Negative: Sounds like a life-threatening emergency to the triager   Negative: [1] SEVERE back pain (e.g., excruciating) AND [2] sudden onset AND [3] age > 60 years   Negative: [1] Unable to urinate (or only a few drops) > 4 hours AND [2] bladder feels very full (e.g., palpable bladder or strong urge to urinate)   Negative: [1] Loss of bladder or bowel control (urine or bowel incontinence; wetting self, leaking stool) AND [2] new-onset   Negative: Numbness in groin or rectal area (i.e., loss of sensation)   Negative: [1] SEVERE abdominal pain AND [2] present > 1 hour   Negative: [1] Abdominal pain AND [2] age > 60 years   Negative: Weakness of a leg or foot (e.g., unable to bear weight, dragging foot)   Negative: Unable to walk   Negative: Patient sounds very sick or weak to the triager    Protocols used:  Back Pain-A-AH

## 2024-11-10 NOTE — TELEPHONE ENCOUNTER
[facility] Emergency Department Plan of Care Note    Referral source: [facility]    Discussed patient with [facility].        Disposition recommended:  Patient with back and leg pain patient would appear to have signs and symptoms consistent with patellofemoral syndrome.  Patient is currently pending surgical intervention on her knee tomorrow.  Feel patient can continue with that plan and I have given her some instructions for pain control.

## 2024-11-11 ENCOUNTER — HOSPITAL ENCOUNTER (OUTPATIENT)
Facility: HOSPITAL | Age: 64
Discharge: HOME | End: 2024-11-11
Attending: ORTHOPAEDIC SURGERY | Admitting: ORTHOPAEDIC SURGERY
Payer: MEDICARE

## 2024-11-11 ENCOUNTER — ANESTHESIA (OUTPATIENT)
Dept: SURGERY | Facility: HOSPITAL | Age: 64
End: 2024-11-11
Payer: MEDICARE

## 2024-11-11 DIAGNOSIS — Z96.651 TOTAL KNEE REPLACEMENT STATUS, RIGHT: Primary | ICD-10-CM

## 2024-11-11 DIAGNOSIS — M17.11 PRIMARY OSTEOARTHRITIS OF RIGHT KNEE: ICD-10-CM

## 2024-11-11 LAB
APPEARANCE FLD: CLEAR
BODY FLD TYPE: NORMAL
BODY FLD TYPE: NORMAL
COLOR FLD: YELLOW
CRYSTALS FLD MICRO: NEGATIVE
LYMPHOCYTES NFR FLD MANUAL: 88 %
NEUTROPHILS NFR FLD MANUAL: 12 %
WBC # FLD: 310 /CU MM

## 2024-11-11 PROCEDURE — 63600175 PHARM REV CODE 636 W HCPCS: Performed by: NURSE ANESTHETIST, CERTIFIED REGISTERED

## 2024-11-11 PROCEDURE — 63600175 PHARM REV CODE 636 W HCPCS: Mod: JZ,JG | Performed by: ORTHOPAEDIC SURGERY

## 2024-11-11 PROCEDURE — 64447 NJX AA&/STRD FEMORAL NRV IMG: CPT | Mod: 59,RT,, | Performed by: STUDENT IN AN ORGANIZED HEALTH CARE EDUCATION/TRAINING PROGRAM

## 2024-11-11 PROCEDURE — 97530 THERAPEUTIC ACTIVITIES: CPT

## 2024-11-11 PROCEDURE — 25000003 PHARM REV CODE 250: Performed by: NURSE ANESTHETIST, CERTIFIED REGISTERED

## 2024-11-11 PROCEDURE — 63600175 PHARM REV CODE 636 W HCPCS: Performed by: STUDENT IN AN ORGANIZED HEALTH CARE EDUCATION/TRAINING PROGRAM

## 2024-11-11 PROCEDURE — C9290 INJ, BUPIVACAINE LIPOSOME: HCPCS

## 2024-11-11 PROCEDURE — 97165 OT EVAL LOW COMPLEX 30 MIN: CPT

## 2024-11-11 PROCEDURE — 97535 SELF CARE MNGMENT TRAINING: CPT

## 2024-11-11 PROCEDURE — 97116 GAIT TRAINING THERAPY: CPT

## 2024-11-11 PROCEDURE — 89060 EXAM SYNOVIAL FLUID CRYSTALS: CPT | Performed by: ORTHOPAEDIC SURGERY

## 2024-11-11 PROCEDURE — 71000033 HC RECOVERY, INTIAL HOUR: Performed by: ORTHOPAEDIC SURGERY

## 2024-11-11 PROCEDURE — 89051 BODY FLUID CELL COUNT: CPT | Performed by: ORTHOPAEDIC SURGERY

## 2024-11-11 PROCEDURE — 25000003 PHARM REV CODE 250

## 2024-11-11 PROCEDURE — 25000003 PHARM REV CODE 250: Performed by: STUDENT IN AN ORGANIZED HEALTH CARE EDUCATION/TRAINING PROGRAM

## 2024-11-11 PROCEDURE — A6010 COLLAGEN BASED WOUND FILLER: HCPCS | Performed by: ORTHOPAEDIC SURGERY

## 2024-11-11 PROCEDURE — D9220A PRA ANESTHESIA: Mod: ANES,,, | Performed by: STUDENT IN AN ORGANIZED HEALTH CARE EDUCATION/TRAINING PROGRAM

## 2024-11-11 PROCEDURE — 97161 PT EVAL LOW COMPLEX 20 MIN: CPT

## 2024-11-11 PROCEDURE — 36000710: Performed by: ORTHOPAEDIC SURGERY

## 2024-11-11 PROCEDURE — A4247 BETADINE/IODINE SWABS/WIPES: HCPCS

## 2024-11-11 PROCEDURE — C1713 ANCHOR/SCREW BN/BN,TIS/BN: HCPCS | Performed by: ORTHOPAEDIC SURGERY

## 2024-11-11 PROCEDURE — D9220A PRA ANESTHESIA: Mod: CRNA,,, | Performed by: NURSE ANESTHETIST, CERTIFIED REGISTERED

## 2024-11-11 PROCEDURE — 36000711: Performed by: ORTHOPAEDIC SURGERY

## 2024-11-11 PROCEDURE — 63600175 PHARM REV CODE 636 W HCPCS

## 2024-11-11 PROCEDURE — 71000016 HC POSTOP RECOV ADDL HR: Performed by: ORTHOPAEDIC SURGERY

## 2024-11-11 PROCEDURE — 63600175 PHARM REV CODE 636 W HCPCS: Mod: JZ,JG | Performed by: STUDENT IN AN ORGANIZED HEALTH CARE EDUCATION/TRAINING PROGRAM

## 2024-11-11 PROCEDURE — 27201423 OPTIME MED/SURG SUP & DEVICES STERILE SUPPLY: Performed by: ORTHOPAEDIC SURGERY

## 2024-11-11 PROCEDURE — 25000003 PHARM REV CODE 250: Performed by: ORTHOPAEDIC SURGERY

## 2024-11-11 PROCEDURE — 71000015 HC POSTOP RECOV 1ST HR: Performed by: ORTHOPAEDIC SURGERY

## 2024-11-11 PROCEDURE — 64447 NJX AA&/STRD FEMORAL NRV IMG: CPT

## 2024-11-11 PROCEDURE — C1776 JOINT DEVICE (IMPLANTABLE): HCPCS | Performed by: ORTHOPAEDIC SURGERY

## 2024-11-11 PROCEDURE — 37000008 HC ANESTHESIA 1ST 15 MINUTES: Performed by: ORTHOPAEDIC SURGERY

## 2024-11-11 PROCEDURE — 37000009 HC ANESTHESIA EA ADD 15 MINS: Performed by: ORTHOPAEDIC SURGERY

## 2024-11-11 PROCEDURE — 20985 CPTR-ASST DIR MS PX: CPT | Mod: ,,, | Performed by: ORTHOPAEDIC SURGERY

## 2024-11-11 PROCEDURE — 36680 INSERT NEEDLE BONE CAVITY: CPT | Mod: 51,RT,, | Performed by: ORTHOPAEDIC SURGERY

## 2024-11-11 PROCEDURE — 27447 TOTAL KNEE ARTHROPLASTY: CPT | Mod: 22,RT,, | Performed by: ORTHOPAEDIC SURGERY

## 2024-11-11 DEVICE — IMPLANTABLE DEVICE: Type: IMPLANTABLE DEVICE | Site: KNEE | Status: FUNCTIONAL

## 2024-11-11 DEVICE — COLLAGEN CELLERATE ACTIVATED 1GM: Type: IMPLANTABLE DEVICE | Site: KNEE | Status: FUNCTIONAL

## 2024-11-11 RX ORDER — DEXAMETHASONE SODIUM PHOSPHATE 4 MG/ML
10 INJECTION, SOLUTION INTRA-ARTICULAR; INTRALESIONAL; INTRAMUSCULAR; INTRAVENOUS; SOFT TISSUE ONCE
Status: DISCONTINUED | OUTPATIENT
Start: 2024-11-11 | End: 2024-11-11 | Stop reason: HOSPADM

## 2024-11-11 RX ORDER — PROCHLORPERAZINE EDISYLATE 5 MG/ML
5 INJECTION INTRAMUSCULAR; INTRAVENOUS EVERY 30 MIN PRN
Status: DISCONTINUED | OUTPATIENT
Start: 2024-11-11 | End: 2024-11-11 | Stop reason: HOSPADM

## 2024-11-11 RX ORDER — BUPIVACAINE HYDROCHLORIDE 2.5 MG/ML
INJECTION, SOLUTION EPIDURAL; INFILTRATION; INTRACAUDAL
Status: DISCONTINUED | OUTPATIENT
Start: 2024-11-11 | End: 2024-11-11

## 2024-11-11 RX ORDER — CELECOXIB 100 MG/1
400 CAPSULE ORAL ONCE
Status: COMPLETED | OUTPATIENT
Start: 2024-11-11 | End: 2024-11-11

## 2024-11-11 RX ORDER — ACETAMINOPHEN 500 MG
1000 TABLET ORAL ONCE
Status: COMPLETED | OUTPATIENT
Start: 2024-11-11 | End: 2024-11-11

## 2024-11-11 RX ORDER — CEFAZOLIN SODIUM 1 G/3ML
INJECTION, POWDER, FOR SOLUTION INTRAMUSCULAR; INTRAVENOUS
Status: DISCONTINUED | OUTPATIENT
Start: 2024-11-11 | End: 2024-11-11

## 2024-11-11 RX ORDER — ASPIRIN 81 MG/1
81 TABLET ORAL 2 TIMES DAILY
Qty: 84 TABLET | Refills: 0 | Status: SHIPPED | OUTPATIENT
Start: 2024-11-11 | End: 2024-12-23

## 2024-11-11 RX ORDER — MIDAZOLAM HYDROCHLORIDE 1 MG/ML
INJECTION INTRAMUSCULAR; INTRAVENOUS
Status: DISCONTINUED | OUTPATIENT
Start: 2024-11-11 | End: 2024-11-11

## 2024-11-11 RX ORDER — ACETAMINOPHEN 500 MG
1000 TABLET ORAL ONCE
Status: DISCONTINUED | OUTPATIENT
Start: 2024-11-11 | End: 2024-11-11 | Stop reason: SDUPTHER

## 2024-11-11 RX ORDER — LIDOCAINE HYDROCHLORIDE 10 MG/ML
1 INJECTION, SOLUTION EPIDURAL; INFILTRATION; INTRACAUDAL; PERINEURAL ONCE
Status: DISCONTINUED | OUTPATIENT
Start: 2024-11-11 | End: 2024-11-11 | Stop reason: HOSPADM

## 2024-11-11 RX ORDER — HYDROMORPHONE HYDROCHLORIDE 2 MG/ML
0.5 INJECTION, SOLUTION INTRAMUSCULAR; INTRAVENOUS; SUBCUTANEOUS EVERY 10 MIN PRN
Status: DISCONTINUED | OUTPATIENT
Start: 2024-11-11 | End: 2024-11-11 | Stop reason: HOSPADM

## 2024-11-11 RX ORDER — TRANEXAMIC ACID 100 MG/ML
INJECTION, SOLUTION INTRAVENOUS
Status: DISCONTINUED | OUTPATIENT
Start: 2024-11-11 | End: 2024-11-11

## 2024-11-11 RX ORDER — POVIDONE-IODINE 10 %
SOLUTION, NON-ORAL TOPICAL
Status: DISCONTINUED | OUTPATIENT
Start: 2024-11-11 | End: 2024-11-11 | Stop reason: HOSPADM

## 2024-11-11 RX ORDER — LIDOCAINE HYDROCHLORIDE 20 MG/ML
INJECTION INTRAVENOUS
Status: DISCONTINUED | OUTPATIENT
Start: 2024-11-11 | End: 2024-11-11

## 2024-11-11 RX ORDER — FAMOTIDINE 20 MG/1
20 TABLET, FILM COATED ORAL 2 TIMES DAILY
Qty: 84 TABLET | Refills: 0 | Status: SHIPPED | OUTPATIENT
Start: 2024-11-11 | End: 2024-12-23

## 2024-11-11 RX ORDER — METHOCARBAMOL 500 MG/1
1000 TABLET, FILM COATED ORAL 4 TIMES DAILY
Status: DISCONTINUED | OUTPATIENT
Start: 2024-11-11 | End: 2024-11-11 | Stop reason: HOSPADM

## 2024-11-11 RX ORDER — METHOCARBAMOL 500 MG/1
1000 TABLET, FILM COATED ORAL 4 TIMES DAILY
Status: DISCONTINUED | OUTPATIENT
Start: 2024-11-11 | End: 2024-11-11

## 2024-11-11 RX ORDER — TRANEXAMIC ACID 10 MG/ML
1000 INJECTION, SOLUTION INTRAVENOUS EVERY 6 HOURS
Status: DISCONTINUED | OUTPATIENT
Start: 2024-11-11 | End: 2024-11-11 | Stop reason: HOSPADM

## 2024-11-11 RX ORDER — GLUCAGON 1 MG
1 KIT INJECTION
Status: DISCONTINUED | OUTPATIENT
Start: 2024-11-11 | End: 2024-11-11 | Stop reason: HOSPADM

## 2024-11-11 RX ORDER — HYDROMORPHONE HYDROCHLORIDE 2 MG/ML
0.5 INJECTION, SOLUTION INTRAMUSCULAR; INTRAVENOUS; SUBCUTANEOUS EVERY 10 MIN PRN
Status: DISCONTINUED | OUTPATIENT
Start: 2024-11-11 | End: 2024-11-11 | Stop reason: SDUPTHER

## 2024-11-11 RX ORDER — BUPIVACAINE HYDROCHLORIDE 2.5 MG/ML
INJECTION, SOLUTION EPIDURAL; INFILTRATION; INTRACAUDAL
Status: DISCONTINUED | OUTPATIENT
Start: 2024-11-11 | End: 2024-11-11 | Stop reason: HOSPADM

## 2024-11-11 RX ORDER — CEPHALEXIN 500 MG/1
500 CAPSULE ORAL EVERY 6 HOURS
Qty: 4 CAPSULE | Refills: 0 | Status: SHIPPED | OUTPATIENT
Start: 2024-11-11 | End: 2024-11-18

## 2024-11-11 RX ORDER — DEXAMETHASONE SODIUM PHOSPHATE 4 MG/ML
INJECTION, SOLUTION INTRA-ARTICULAR; INTRALESIONAL; INTRAMUSCULAR; INTRAVENOUS; SOFT TISSUE
Status: DISCONTINUED | OUTPATIENT
Start: 2024-11-11 | End: 2024-11-11

## 2024-11-11 RX ORDER — DICLOFENAC SODIUM 75 MG/1
75 TABLET, DELAYED RELEASE ORAL 2 TIMES DAILY
Qty: 84 TABLET | Refills: 0 | Status: SHIPPED | OUTPATIENT
Start: 2024-11-11 | End: 2024-12-23

## 2024-11-11 RX ORDER — OXYCODONE HYDROCHLORIDE 5 MG/1
10 TABLET ORAL EVERY 4 HOURS PRN
Status: DISCONTINUED | OUTPATIENT
Start: 2024-11-11 | End: 2024-11-11 | Stop reason: HOSPADM

## 2024-11-11 RX ORDER — TRANEXAMIC ACID 10 MG/ML
1000 INJECTION, SOLUTION INTRAVENOUS ONCE
Status: DISCONTINUED | OUTPATIENT
Start: 2024-11-11 | End: 2024-11-11 | Stop reason: HOSPADM

## 2024-11-11 RX ORDER — PREGABALIN 75 MG/1
150 CAPSULE ORAL ONCE
Status: COMPLETED | OUTPATIENT
Start: 2024-11-11 | End: 2024-11-11

## 2024-11-11 RX ORDER — PHENYLEPHRINE HYDROCHLORIDE 10 MG/ML
INJECTION INTRAVENOUS
Status: DISCONTINUED | OUTPATIENT
Start: 2024-11-11 | End: 2024-11-11

## 2024-11-11 RX ORDER — PROPOFOL 10 MG/ML
VIAL (ML) INTRAVENOUS CONTINUOUS PRN
Status: DISCONTINUED | OUTPATIENT
Start: 2024-11-11 | End: 2024-11-11

## 2024-11-11 RX ORDER — OXYCODONE HYDROCHLORIDE 5 MG/1
5 TABLET ORAL
Status: DISCONTINUED | OUTPATIENT
Start: 2024-11-11 | End: 2024-11-11 | Stop reason: HOSPADM

## 2024-11-11 RX ORDER — KETAMINE HCL IN 0.9 % NACL 50 MG/5 ML
SYRINGE (ML) INTRAVENOUS
Status: DISCONTINUED | OUTPATIENT
Start: 2024-11-11 | End: 2024-11-11

## 2024-11-11 RX ORDER — ACETAMINOPHEN 325 MG/1
325 TABLET ORAL EVERY 4 HOURS
Qty: 84 TABLET | Refills: 0 | Status: SHIPPED | OUTPATIENT
Start: 2024-11-11 | End: 2024-11-25

## 2024-11-11 RX ORDER — TRANEXAMIC ACID 650 MG/1
650 TABLET ORAL 2 TIMES DAILY
Qty: 28 TABLET | Refills: 0 | Status: SHIPPED | OUTPATIENT
Start: 2024-11-11 | End: 2024-11-25

## 2024-11-11 RX ORDER — EPHEDRINE SULFATE 50 MG/ML
INJECTION, SOLUTION INTRAVENOUS
Status: DISCONTINUED | OUTPATIENT
Start: 2024-11-11 | End: 2024-11-11

## 2024-11-11 RX ORDER — TRANEXAMIC ACID 650 MG/1
1950 TABLET ORAL ONCE
Status: COMPLETED | OUTPATIENT
Start: 2024-11-11 | End: 2024-11-11

## 2024-11-11 RX ORDER — BUPIVACAINE 13.3 MG/ML
INJECTION, SUSPENSION, LIPOSOMAL INFILTRATION
Status: DISCONTINUED | OUTPATIENT
Start: 2024-11-11 | End: 2024-11-11

## 2024-11-11 RX ORDER — ONDANSETRON HYDROCHLORIDE 2 MG/ML
INJECTION, SOLUTION INTRAVENOUS
Status: DISCONTINUED | OUTPATIENT
Start: 2024-11-11 | End: 2024-11-11

## 2024-11-11 RX ORDER — IPRATROPIUM BROMIDE AND ALBUTEROL SULFATE 2.5; .5 MG/3ML; MG/3ML
3 SOLUTION RESPIRATORY (INHALATION) EVERY 4 HOURS PRN
Status: DISCONTINUED | OUTPATIENT
Start: 2024-11-11 | End: 2024-11-11 | Stop reason: HOSPADM

## 2024-11-11 RX ADMIN — CEFAZOLIN 3 G: 330 INJECTION, POWDER, FOR SOLUTION INTRAMUSCULAR; INTRAVENOUS at 08:11

## 2024-11-11 RX ADMIN — BUPIVACAINE HYDROCHLORIDE 10 ML: 2.5 INJECTION, SOLUTION EPIDURAL; INFILTRATION; INTRACAUDAL; PERINEURAL at 11:11

## 2024-11-11 RX ADMIN — SODIUM CHLORIDE: 0.9 INJECTION, SOLUTION INTRAVENOUS at 08:11

## 2024-11-11 RX ADMIN — Medication 20 MG: at 08:11

## 2024-11-11 RX ADMIN — TRANEXAMIC ACID 1950 MG: 650 TABLET ORAL at 06:11

## 2024-11-11 RX ADMIN — EPHEDRINE SULFATE 25 MG: 50 INJECTION, SOLUTION INTRAMUSCULAR; INTRAVENOUS; SUBCUTANEOUS at 08:11

## 2024-11-11 RX ADMIN — EPHEDRINE SULFATE 10 MG: 50 INJECTION, SOLUTION INTRAVENOUS at 08:11

## 2024-11-11 RX ADMIN — OXYCODONE 10 MG: 5 TABLET ORAL at 01:11

## 2024-11-11 RX ADMIN — PHENYLEPHRINE HYDROCHLORIDE 100 MCG: 10 INJECTION INTRAVENOUS at 08:11

## 2024-11-11 RX ADMIN — PROPOFOL 20 MG: 10 INJECTION, EMULSION INTRAVENOUS at 10:11

## 2024-11-11 RX ADMIN — EPHEDRINE SULFATE 5 MG: 50 INJECTION, SOLUTION INTRAVENOUS at 09:11

## 2024-11-11 RX ADMIN — PROPOFOL 30 MG: 10 INJECTION, EMULSION INTRAVENOUS at 10:11

## 2024-11-11 RX ADMIN — PREGABALIN 150 MG: 75 CAPSULE ORAL at 06:11

## 2024-11-11 RX ADMIN — PHENYLEPHRINE HYDROCHLORIDE 100 MCG: 10 INJECTION INTRAVENOUS at 09:11

## 2024-11-11 RX ADMIN — PROPOFOL 50 MG: 10 INJECTION, EMULSION INTRAVENOUS at 08:11

## 2024-11-11 RX ADMIN — TRANEXAMIC ACID 1000 MG: 100 INJECTION, SOLUTION INTRAVENOUS at 09:11

## 2024-11-11 RX ADMIN — PHENYLEPHRINE HYDROCHLORIDE 0.2 MCG/KG/MIN: 10 INJECTION INTRAVENOUS at 09:11

## 2024-11-11 RX ADMIN — LIDOCAINE HYDROCHLORIDE 100 MG: 20 INJECTION, SOLUTION INTRAVENOUS at 08:11

## 2024-11-11 RX ADMIN — Medication 10 MG: at 09:11

## 2024-11-11 RX ADMIN — MIDAZOLAM HYDROCHLORIDE 1 MG: 1 INJECTION, SOLUTION INTRAMUSCULAR; INTRAVENOUS at 08:11

## 2024-11-11 RX ADMIN — HYDROMORPHONE HYDROCHLORIDE 0.5 MG: 2 INJECTION INTRAMUSCULAR; INTRAVENOUS; SUBCUTANEOUS at 11:11

## 2024-11-11 RX ADMIN — ONDANSETRON 4 MG: 2 INJECTION, SOLUTION INTRAMUSCULAR; INTRAVENOUS at 10:11

## 2024-11-11 RX ADMIN — EPHEDRINE SULFATE 5 MG: 50 INJECTION, SOLUTION INTRAVENOUS at 08:11

## 2024-11-11 RX ADMIN — GLYCOPYRROLATE 0.2 MG: 0.2 INJECTION, SOLUTION INTRAMUSCULAR; INTRAVITREAL at 08:11

## 2024-11-11 RX ADMIN — SODIUM CHLORIDE: 0.9 INJECTION, SOLUTION INTRAVENOUS at 09:11

## 2024-11-11 RX ADMIN — METHOCARBAMOL 1000 MG: 500 TABLET ORAL at 01:11

## 2024-11-11 RX ADMIN — CELECOXIB 400 MG: 100 CAPSULE ORAL at 06:11

## 2024-11-11 RX ADMIN — ACETAMINOPHEN 1000 MG: 500 TABLET ORAL at 06:11

## 2024-11-11 RX ADMIN — TRANEXAMIC ACID 1000 MG: 10 INJECTION, SOLUTION INTRAVENOUS at 11:11

## 2024-11-11 RX ADMIN — BUPIVACAINE 10 ML: 13.3 INJECTION, SUSPENSION, LIPOSOMAL INFILTRATION at 11:11

## 2024-11-11 RX ADMIN — ONDANSETRON 4 MG: 2 INJECTION, SOLUTION INTRAMUSCULAR; INTRAVENOUS at 08:11

## 2024-11-11 RX ADMIN — OXYCODONE 5 MG: 5 TABLET ORAL at 11:11

## 2024-11-11 RX ADMIN — PROPOFOL 150 MCG/KG/MIN: 10 INJECTION, EMULSION INTRAVENOUS at 08:11

## 2024-11-11 RX ADMIN — DEXAMETHASONE SODIUM PHOSPHATE 10 MG: 4 INJECTION, SOLUTION INTRA-ARTICULAR; INTRALESIONAL; INTRAMUSCULAR; INTRAVENOUS; SOFT TISSUE at 08:11

## 2024-11-11 RX ADMIN — PHENYLEPHRINE HYDROCHLORIDE 200 MCG: 10 INJECTION INTRAVENOUS at 09:11

## 2024-11-11 RX ADMIN — MEPIVACAINE HYDROCHLORIDE 3 ML: 15 INJECTION, SOLUTION EPIDURAL; INFILTRATION at 08:11

## 2024-11-11 RX ADMIN — PHENYLEPHRINE HYDROCHLORIDE 200 MCG: 10 INJECTION INTRAVENOUS at 08:11

## 2024-11-11 NOTE — PLAN OF CARE
Problem: Physical Therapy  Goal: Physical Therapy Goal  Description: Goals to be met by: 24     Patient will increase functional independence with mobility by performin. Gait  x 40 feet with Contact Guard Assistance using Rolling Walker. MET  2. Ascend/Descend 4 inch curb step with Contact Guard Assistance and Minimal Assistance using Rolling Walker. MET    Outcome: Adequate for Care Transition     PT Eval completed, note to follow.    Pt participating in initial PT/OT evaluations this date. Pt educated on ice/rest/elevation, weight-bearing status/precautions, home safety, car/toilet/shower/tub t/fs, and safe use of DME for functional mobility and ADLs. Pt ambulated safely and is cleared to d/c home this date with low intensity therapy (OP PT) and family/caregiver assist as needed. Pt to attend OP PT tomorrow's date. D/c PT. Provided and educated caregiver on use of gait belt for safe entry into home and for transfers.

## 2024-11-11 NOTE — OP NOTE
Procedure Note Maggie Monoblock TKA:      DATE OF PROCEDURE:  11/11/2024    PREOPERATIVE DIAGNOSIS: right knee bone-on-bone osteoarthritis.     POSTOPERATIVE DIAGNOSIS: same    PROCEDURE: Image less Computer-assisted  right total knee arthroplasty with unresurfaced patella.     ATTENDING SURGEON: Rich Reid M.D.   ASSISTANT:     Risk Adjustment: Please see media tab for any additional diagnoses faxed from my office related to theTKA.    COMPLICATIONS: None.     IMPLANTS:   1. Maggie NexGen trabecular metal monoblock tibial tray size 6 and 17 mm height .   2. Maggie Persona femoral component wide Size 9  right.     INDICATIONS FOR PROCEDURE: This is a 64 y.o. female with longstanding knee pain. They have failed nonoperative management including injections. Risks and benefits of total knee arthroplasty were explained to the patient. The patient agreed to move forward with total knee arthroplasty. We also discussed the fact that no resurfacing the patella could lead to anterior knee pain requiring additional surgery in the future.     FINDINGS: The patient had a complete articular cartilage loss down to subchondral bone throughout the knee. In high risk patients with medical co morbidities such as diabetes, obesity, smokers, anticoagulation... we add additional risk mitigation strategies such as cellerate, prineo, and long term po abx's.   The case was made difficult secondary to patient's body habitus. We had to increase our typical incision length. Mobilizing the patella was challenging because of the overlying soft tissues. When subluxing the tibia to expose the proximal tibia, the calf was impinging on the thigh making exposure difficult.       PROCEDURE IN DETAIL: The patient was then brought to the Operating Room and spinal anesthesia started without any difficulties. The patient was placed supine on the operative table. An area of the proximal tibia, medial to the tubercle, was sterilely prepped and  the intra osseous needle was introduce into the proximal tibia under power. Intra osseous 1g vancomycin was then infused in the proximal tibia using the inserted needle. the needle was them manually removed. .  Knee was then prepped and draped in sterile fashion. Prior to incision, proper site and procedure as well as antibiotic administration was verified. AFCN and ISN nerves were then injected with marcaine. Anterior midline incision was created overlying center patella proximally to the medial border of the tibial tubercle distally. Skin, subcutaneous fat and deep fascial layer were sharply incised until we came to extensor mechanism retinaculum. Flap was then elevated medially to VMO and laterally to lateral border of patella. Knee was then aspirated and synovial fluid sent for cell count. Medial parapatellar arthrotomy was injected with Marcaine and a medial parapatellar arthrotomy was then created. Synovium overlying the anterolateral distal femur was then excised as well as the infrapatellar tendon fat pad. Sleeve of soft tissue was elevated off the proximal medial tibia. Periphery of the patella was denvervated using bovie cautery, Hohmann retractors then placed medially and laterally along the distal femur to protect the collateral ligaments. ACL and anterior horn of lateral meniscus were then transected. We then pinned our navigation tracker on to distal femur and then performed our anatomy survey for the femur. We placed distal femoral cutting guide such that we were perpendicular to mechanical axis, aiming for about 1 degree of flexion and about 9 mm of bony resection. Distal femur was then resected. Next, we placed AP sizing guide on distal femur and measured for a size 9 femoral component. We then drilled 2 pin holes in 5 degrees of external rotation relative to posterior condylar axis. Anteroposterior condylar bone was then resected. Resected posterior femoral bone measured approximately 5 mm in  difference with the lateral piece thinner than the medial piece. Next, we subluxed the tibia forward and resected medial and lateral menisci. We then pinned our navigation tracker on to the proximal tibia and then performed our anatomy survey for tibia. We placed our proximal tibial cutting guide such that we were perpendicular to the mechanical axis, aiming for about 4 to 5 mm of bony resection laterally  and about  5 degrees posterior slope. Proximal tibial bone was then resected and detached from posterior soft tissues using Bovie cautery. Next, with knee at 90 degrees, we placed a laminar  in lateral compartment and resected the ACL as well as small osteophytes posteriorly along the femur. We then injected the posterior capsule with marcaine. We then assessed our gaps using a 17 mm spacer block. With the 17 mm spacer block, the knee came out to full extension with nice stability with varus and valgus stress, and nice symmetry between flexion and extension. We assessed our tibial cut and our alignment jens fell within the center of the ankle. Once we were happy with soft tissue sleeves and bony cuts, we then placed tibial protector on the proximal tibia and we then created our chamfer cuts. We then placed our femoral trial. This was lateralized as much as possible and anchored using the lug hole screws.  Next, we subluxed the tibia forward, and sized our proximal tibia for a size 6 baseplate, the center of which was rotated such that it was in line with medial third tibial tubercle. This was then pinned in place. We then trialed using a 17 trial polyethylene. With 17 mm polyethylene, the knee came out to full extension. We had nice stability with varus and valgus stress and nice symmetry between flexion and extension. Patella tracked centrally within trochlear groove. Once we were happy with all our trial components, we then removed all our trial components, except the tibial baseplate. We then drilled our  trabecular metal peg holes. We then drilled the sclerotic bone along the tibia using a long drill bit as well. We then placed the knee in extension and examined the posterior aspect of knee for bleeding. We then prepared our bony surfaces for implantation using pulse lavage antibiotic saline. Femoral component was then impacted into palce. We then protected the femoral component with a lap pad and subluxed the tibia forward. we then subluxed the tibia forward and impacted the monoblock tibial component into place. We then reexamined our gaps, stability and tracking; all of which remained unchanged. We then copiously irrigated the knee with pulse lavage betadine saline. We then closed our medial parapatellar arthrotomy with a running #2 barbed suture,  subcutaneous deep fascial layer was closed with simple interrupted #1 vicryl suture, subcutaneous skin with 2-0 Vicryl and incision with silver water proof dressing. The patient was then transferred to Recovery Room bed in stable condition.

## 2024-11-11 NOTE — PT/OT/SLP EVAL
Physical Therapy Evaluation, Treatment, and Discharge Note    Patient Name:  Debbie Vo   MRN:  [id number]    Recommendations:     Discharge Recommendations: Low Intensity Therapy (OP PT)  Discharge Equipment Recommendations: none   Barriers to discharge: None    Assessment:     Debbie Vo is a 64 y.o. female admitted with a medical diagnosis of The primary encounter diagnosis was Total knee replacement status, right. A diagnosis of Primary osteoarthritis of right knee was also pertinent to this visit.    Pt participating in initial PT/OT evaluations this date. Pt ambulated safely and is cleared to d/c home this date with low intensity therapy (OP PT) and family/caregiver assist as needed. Pt to attend OP PT tomorrow's date. D/c PT. Provided and educated caregiver on use of gait belt for safe entry into home and for transfers.     Recent Surgery: Procedure(s) (LRB):  ARTHROPLASTY, KNEE, TOTAL monoblock (Right) Day of Surgery    Plan:     During this hospitalization, patient does not require further acute PT services.  Please re-consult if situation changes.      Subjective     Chief Complaint: post-op knee pain as expected  Patient/Family Comments/goals: mild dizziness, pt has no concerns for d/c home this date  Pain/Comfort:  Pain Rating 1: 2/10  Location - Side 1: Right  Location - Orientation 1: generalized  Location 1: knee  Pain Addressed 1: Pre-medicate for activity, Reposition, Distraction, Cessation of Activity, Nurse notified  Pain Rating Post-Intervention 1:  (not rated, mild increase with activity)    Patients cultural, spiritual, Latter-day conflicts given the current situation: no    Living Environment:  Pt lives with her son in a [facility], [address] with B HR, and T/S  Prior to admission, patients level of function was Independent, driving, no falls, and performs ADL's without assist.  Equipment used at home: walker, rolling, bedside commode, bath bench.  DME owned (not currently  "used): rolling walker, bedside commode, and transfer tub bench.  Upon discharge, patient will have assistance from friend and son.    Objective:     Communicated with nsg prior to session.  Patient found HOB elevated with peripheral IV upon PT entry to room.    General Precautions: Standard, fall    Orthopedic Precautions:RLE weight bearing as tolerated   Braces: N/A  Respiratory Status: Room air    Exams:  Cognitive Exam:  Patient is oriented to Person, Place, Time, and Situation  Postural Exam:  Patient presented with the following abnormalities:    -       No postural abnormalities identified  Sensation:    -       Intact  light/touch BLE  Skin Integrity/Edema:      -       Skin integrity: Wound surgical R knee with dressing intact, no drainage  -       Edema: Moderate RLE  RLE ROM: ankle WFL, knee/hip limited by pain, knee AROM grossly 0-75 deg  RLE Strength: ankle WFL, knee/hip NT due to pain and recent sx  LLE ROM: WFL  LLE Strength: WFL    Functional Mobility:  Bed Mobility:     Scooting: contact guard assistance  Supine to Sit: Janet for RLE  Transfers:     Sit to Stand:  minimum assistance with rolling walker, verbal and visual cues for hand/foot placement and sequencing for maintaining precautions  Toilet Transfer: moderate assistance with  rolling walker and grab bars  using  Step Transfer  Gait: Pt ambulated ~25 ft x 2 with RW and CGA-SBA; pt educated on 3 pt gait sequencing and proper use of RW with good carryover; VC's for upright posture and appropriate proximity to RW, break in between to perform stair negotiation   Stairs:  Pt ascended/descended 4" curb step with Rolling Walker with Minimal Assistance. Assist and verbal cues for RW management and safe sequencing, caregiver training with gait belt performed      AM-PAC 6 CLICK MOBILITY  Total Score:18       Treatment and Education:  Pt educated on role of PT/POC and pt agreeable to participate in therapy session  Pt reports mild dizziness and BP " assessed  Orthostatics negative   Gait and stair negotiation performed as above  Toilet transfer and dressing performed with OT assist, see OT note  Pt educated on ice/rest/elevation, weight-bearing status/precautions, home safety, car/toilet/shower/tub t/fs, and safe use of DME for functional mobility and ADLs.   Educated pt on R quad sets and ankle pumps      AM-PAC 6 CLICK MOBILITY  Total Score:18     Patient left sitting edge of bed with all lines intact, call button in reach, nsg notified, and friend present.    GOALS:   Multidisciplinary Problems       Physical Therapy Goals          Problem: Physical Therapy    Goal Priority Disciplines Outcome Interventions   Physical Therapy Goal     PT, PT/OT Adequate for Care Transition    Description: Goals to be met by: 24     Patient will increase functional independence with mobility by performin. Gait  x 40 feet with Contact Guard Assistance using Rolling Walker. MET  2. Ascend/Descend 4 inch curb step with Contact Guard Assistance and Minimal Assistance using Rolling Walker. MET                         History:     Past Medical History:   Diagnosis Date    Benign essential hypertension     Colon cancer     Depression     Gout, unspecified     Osteoarthritis        Past Surgical History:   Procedure Laterality Date    CHOLECYSTECTOMY      COLON SURGERY  2002    colon resection    COLONOSCOPY N/A 2016    Procedure: COLONOSCOPY;  Surgeon: Ernst Bobby MD;  Location: [facility] ENDO;  Service: Endoscopy;  Laterality: N/A;  Needs Flex sigmoidoscopy    EPIDURAL STEROID INJECTION INTO LUMBAR SPINE N/A 2018    Procedure: Injection-steroid-epidural-lumbar;  Surgeon: Avi Morales Jr., MD;  Location: [facility] PAIN MGT;  Service: Pain Management;  Laterality: N/A;  ORAL SEDATION    HYSTERECTOMY      INJECTION OF ANESTHETIC AGENT INTO SACROILIAC JOINT Bilateral 2021    Procedure: BLOCK, SACROILIAC JOINT*-- bilateral;  Surgeon: Lucia PAREDES  MD Carly;  Location: [facility] PAIN MGT;  Service: Pain Management;  Laterality: Bilateral;    PARTIAL HYSTERECTOMY         Time Tracking:     PT Received On: 11/11/24  PT Start Time: 1442     PT Stop Time: 1515  PT Total Time (min): 33 min     Billable Minutes: Evaluation 10, Gait Training 13, and Therapeutic Activity 10      11/11/2024

## 2024-11-11 NOTE — ANESTHESIA PROCEDURE NOTES
Peripheral Block    Patient location during procedure: pre-op   Block not for primary anesthetic.  Reason for block: at surgeon's request and post-op pain management   Post-op Pain Location: right knee   Start time: 11/11/2024 11:04 AM  Timeout: 11/11/2024 11:03 AM   End time: 11/11/2024 11:07 AM    Staffing  Authorizing Provider: Yasmany Christensen MD  Performing Provider: Nancy Hinkle MD    Staffing  Performed by: Nancy Hinkle MD  Authorized by: Yasmany Christensen MD    Preanesthetic Checklist  Completed: patient identified, IV checked, site marked, risks and benefits discussed, surgical consent, monitors and equipment checked, pre-op evaluation and timeout performed  Peripheral Block  Patient position: supine  Prep: ChloraPrep  Patient monitoring: heart rate, cardiac monitor, continuous pulse ox, continuous capnometry and frequent blood pressure checks  Block type: adductor canal  Laterality: right  Injection technique: single shot  Needle  Needle type: Stimuplex   Needle gauge: 21 G  Needle length: 4 in  Needle localization: anatomical landmarks and ultrasound guidance   -ultrasound image captured on disc.  Assessment  Injection assessment: negative aspiration, negative parasthesia and local visualized surrounding nerve  Paresthesia pain: none  Heart rate change: no  Slow fractionated injection: yes  Pain Tolerance: comfortable throughout block  Medications:    Medications: bupivacaine (pf) (MARCAINE) injection 0.25% - Perineural   10 mL - 11/11/2024 11:06:00 AM    Additional Notes  VSS.  [facility] RN monitoring vitals throughout procedure.  Patient tolerated procedure well.

## 2024-11-11 NOTE — DISCHARGE INSTRUCTIONS
Post Op Total Knee Arthroplasty Instructions     1. Enteric coated aspirin 81 mg by mouth twice a day for 6 weeks to prevent blood clots,  unless otherwise indicated.  2. Please take a stomach reflux medication such as pepcid, prevacid, nexium (H-2 blocker or PPI) while on aspirin to prevent stomach ulcers. You will be given a prescription for pepcid.  3. Ayush stockings should be worn as much as possible for 6 weeks to prevent blood clots.  4. Do not start antibiotics for any suspected infections related to the surgery until evaluated by dr morris staff  5. No driving for approximately 2-4 weeks  6. You can shower once the incision is completely dry, otherwise place a new dry dressing twice a day if there is drainage. Please call the office if the drainage increases after discharge.  7. You may resume all pre-surgery medications unless otherwise indicated  8. All patients should be seen in Dr Morris office approx 2 weeks after surgery  9. Dr Reid prefers outpatient physical therapy upon discharge home. If home PT is needed, please contact Dr Reid for approval  10. Patients should see their primary care doctor after discharge home

## 2024-11-11 NOTE — ANESTHESIA POSTPROCEDURE EVALUATION
Anesthesia Post Evaluation    Patient: Debbie Vo    Procedure(s) Performed: Procedure(s) (LRB):  ARTHROPLASTY, KNEE, TOTAL monoblock (Right)    Final Anesthesia Type: CSE      Patient location during evaluation: PACU  Patient participation: Yes- Able to Participate  Level of consciousness: awake  Post-procedure vital signs: reviewed and stable  Pain management: adequate  Airway patency: patent    PONV status at discharge: No PONV  Anesthetic complications: no      Cardiovascular status: blood pressure returned to baseline  Respiratory status: unassisted  Hydration status: euvolemic  Follow-up not needed.              Vitals Value Taken Time   /56 11/11/24 1355   Temp 36.7 °C (98.1 °F) 11/11/24 1319   Pulse 80 11/11/24 1357   Resp 20 11/11/24 1357   SpO2 98 % 11/11/24 1357   Vitals shown include unfiled device data.      Event Time   Out of Recovery 11:48:00         Pain/Annette Score: Pain Rating Prior to Med Admin: 8 (11/11/2024  1:19 PM)  Annette Score: 10 (11/11/2024  4:12 PM)

## 2024-11-11 NOTE — BRIEF OP NOTE
Orthopedic Surgery Brief Op Discharge    Procedure:right total knee arthroplasty    Pre-op diagnosis:right knee osteoarthritis    Postop diagnosis: same    Surgeon: MD Arun Romo MD Christopher Bloise, MD    Blood loss: 100cc    Fluids: see anesthesia documentation    Anesthesia: Spinal anesthesia    Complications: none    Hospital Course  Patient presented to [facility] on day of scheduled surgery and underwent right total knee arthroplasty, please see operative report for further detail. Patient did well postoperatively and was found to be fit for discharge on POD# 0.  The patient mobilized with physical therapy.  They were taught how to use DME appropriately.  Their pain was controlled.  The patient met all discharge criteria.  The patient was discharged home in stable condition. Patient was given paper prescriptions.  They were given a follow-up appointment in 2 weeks in clinic for a wound check and range of motion check.  All questions and concerns of the patient were answered to their satisfaction.    Condition: Patient tolerated procedure well, was extubated, and returned to the recovery unit in stable condition.     Post Op Total Knee Arthroplasty Instructions   1. Enteric coated aspirin 81 mg by mouth twice a day for 6 weeks to prevent blood clots,  unless otherwise indicated.  2. Please take a stomach reflux medication such as pepcid, prevacid, nexium (H-2 blocker or PPI) while on aspirin to prevent stomach ulcers. You will be given a prescription for pepcid.  3. Ayush stockings should be worn as much as possible for 6 weeks to prevent blood clots.  4. Do not start antibiotics for any suspected infections related to the surgery until evaluated by dr morris staff  5. No driving for approximately 2-4 weeks  6. You can shower once the incision is completely dry, otherwise place a new dry dressing twice a day if there is drainage. Please call the office if the drainage increases after  discharge.  7. You may resume all pre-surgery medications unless otherwise indicated  8. All patients should be seen in Dr Bobby office approx 2 weeks after surgery  9. Dr Reid prefers outpatient physical therapy upon discharge home. If home PT is needed, please contact Dr Reid for approval  10. Patients should see their primary care doctor after discharge home      Please call our team with questions or concerns    Ernst Quan MD PGY-3  [employer] [facility]

## 2024-11-11 NOTE — H&P
Interval H&P    Patient seen and examined in preop holding area. No changes to history or exam other than noted below.    To OR today for Right knee TKA w/ Dr. Reid      [facility]    Subjective:     Debbie Vo is a 64 y.o. year old female with PMHx of HTN, GERD who presents to clinic for pre-op examination.    She denies any concerns for complaints at this time. Scheduled for R TKA with Dr. Reid on 11/11.     Patient Active Problem List    Diagnosis Date Noted    Quadriceps weakness 10/01/2024    Chronic pain of right knee 11/02/2023    Primary osteoarthritis of right knee 11/02/2023    Pain 08/25/2021    Carpal tunnel syndrome of left wrist 10/08/2019    Recurrent major depressive disorder, in full remission 10/08/2018    Senile purpura 10/08/2018    Myofascial pain syndrome 06/04/2018    Abdominal wall hernia 03/06/2016    History of colon cancer 02/26/2016    Gastroesophageal reflux disease 08/13/2015    Lumbar radiculopathy, chronic 02/12/2015    Essential hypertension 02/12/2015    Morbid obesity with BMI of 40.0-44.9, adult 02/12/2015        Review of patient's allergies indicates:  No Known Allergies     Past Medical History:   Diagnosis Date    Benign essential hypertension     Colon cancer     Depression     Gout, unspecified     Osteoarthritis       Past Surgical History:   Procedure Laterality Date    CHOLECYSTECTOMY      COLON SURGERY  2002    colon resection    COLONOSCOPY N/A 03/24/2016    Procedure: COLONOSCOPY;  Surgeon: Ernst Bobby MD;  Location: [facility] ENDO;  Service: Endoscopy;  Laterality: N/A;  Needs Flex sigmoidoscopy    EPIDURAL STEROID INJECTION INTO LUMBAR SPINE N/A 06/21/2018    Procedure: Injection-steroid-epidural-lumbar;  Surgeon: Avi Morales Jr., MD;  Location: [facility] PAIN MGT;  Service: Pain Management;  Laterality: N/A;  ORAL SEDATION    HYSTERECTOMY      INJECTION OF ANESTHETIC AGENT INTO SACROILIAC JOINT Bilateral 08/25/2021    Procedure:  BLOCK, SACROILIAC JOINT*-- bilateral;  Surgeon: Lucia Carroll MD;  Location: [facility] PAIN MGT;  Service: Pain Management;  Laterality: Bilateral;    PARTIAL HYSTERECTOMY        Family History   Problem Relation Name Age of Onset    Heart disease Mother      Cancer Mother          colon cancer    Diabetes Mother      Colon cancer Mother      Cancer Father          Liver cancer    Kidney disease Father      Liver cancer Father      Cancer Sister Ana Luisa         breast cancer    BRCA 1/2 Sister Ana Luisa     Breast cancer Sister Ana Luisa     Cancer Brother Seng         Liver cancer    Liver cancer Brother Seng     Cancer Brother Lazaro         colon cancer    Colon cancer Brother Lazaro     Cancer Brother Da         thoat cancer    Throat cancer Brother Da     No Known Problems Brother Joe     No Known Problems Son x4       Social History     Tobacco Use    Smoking status: Never     Passive exposure: Never    Smokeless tobacco: Never   Substance Use Topics    Alcohol use: No        Objective:     There were no vitals filed for this visit.    There is no height or weight on file to calculate BMI.    Gen: No apparent distress, well nourished and developed, appears stated age  CV: RRR, S1 and S2 present, no LE edema  Resp: CTAB, normal respiratory effort      Assessment/Plan:     Debbie Vo is a 64 y.o. year old female who presents to clinic for pre-op examination.    Pre-operative evaluation with risk assessment              -           Scheduled for right TKA on 11/11 by Dr. Reid  -           DASI score: 14.2 w/ Functional Capacity in METS: 4.49 (>4) with a revised cardiac index risk of 0 points class I risk 3.9% 30 day risk of death, MI, or cardiac arrest.   - ACS NSQIP risk calculator      - Patient is not on antiplatelets/anticoagulation.   - Patient reports a h/o previous reaction to anesthesia to where she had N/V. States pre-treated usually for this though unsure with what.   - Patient is not a  smoker.  - Patient has no prior h/o HLD/CAD w/ either MI or CVA. Continue medical management.  - Patient has no prior h/o DM. Last HgbA1C:~5.5  - Patient has no prior h/o thyroid disease. TSH: 0.572  - Patient has prior h/o HTN. BP: 121/87.   - Patient has no prior h/o COPD/Asthma/ILDA/OHS. Does not use CPAP.  - Patient has no prior h/o HF. No ECHO on file.  - There is no height or weight on file to calculate BMI.              -           The patient is medically optimized to proceed with a low to moderate risk for a low to moderate risk surgery.   - Please call our office for any additional questions or concerns.    Beverly Mcgraw MD  [facility], PGY-3  11/11/2024          Patient ID: Debbie Vo is a 64 y.o. female.     Chief Complaint: Pain of the Right Knee     Knee Pain: right  swelling: Yes  worsens with activity: Yes  relieved by: injections           Social History            Occupational History    Not on file   Tobacco Use    Smoking status: Never       Passive exposure: Never    Smokeless tobacco: Never   Substance and Sexual Activity    Alcohol use: No    Drug use: No    Sexual activity: Yes       Partners: Male      Social Drivers of Health           Tobacco Use: Low Risk  (10/1/2024)     Patient History      Smoking Tobacco Use: Never      Smokeless Tobacco Use: Never      Passive Exposure: Never   Alcohol Use: Not At Risk (7/8/2022)     AUDIT-C      Frequency of Alcohol Consumption: Never      Average Number of Drinks: Patient does not drink      Frequency of Binge Drinking: Never   Financial Resource Strain: Low Risk  (7/8/2022)     Overall Financial Resource Strain (CARDIA)      Difficulty of Paying Living Expenses: Not hard at all   Food Insecurity: No Food Insecurity (7/8/2022)     Hunger Vital Sign      Worried About Running Out of Food in the Last Year: Never true      Ran Out of Food in the Last Year: Never true   Transportation Needs: No Transportation Needs (7/8/2022)      PRAPARE - Transportation      Lack of Transportation (Medical): No      Lack of Transportation (Non-Medical): No   Physical Activity: Inactive (7/8/2022)     Exercise Vital Sign      Days of Exercise per Week: 0 days      Minutes of Exercise per Session: 0 min   Stress: No Stress Concern Present (7/8/2022)     [employer] [employer] - Occupational Stress Questionnaire      Feeling of Stress : Not at all   Housing Stability: Low Risk  (7/8/2022)     Housing Stability Vital Sign      Unable to Pay for Housing in the Last Year: No      Number of Places Lived in the Last Year: 2      Unstable Housing in the Last Year: No   Depression: Low Risk  (6/5/2024)     Depression      Last PHQ-4: Flowsheet Data: 0   Utilities: Not on file   Health Literacy: Not on file   Social Isolation: Not on file      Review of Systems   Constitutional: Negative for diaphoresis.   HENT:  Negative for ear discharge, nosebleeds and stridor.    Eyes:  Negative for photophobia.   Cardiovascular:  Negative for syncope.   Respiratory:  Negative for hemoptysis, shortness of breath and wheezing.    Neurological:  Negative for tremors.   Psychiatric/Behavioral: Negative.              Objective:      Objective  General     Constitutional: She is oriented to person, place, and time. She appears well-developed.   HENT:   Head: Normocephalic and atraumatic.   Right Ear: External ear normal.   Left Ear: External ear normal.   Eyes: EOM are normal.   Cardiovascular:  Intact distal pulses.            Neurological: She is alert and oriented to person, place, and time.   Psychiatric: She has a normal mood and affect. Her behavior is normal. Judgment and thought content normal.      General Musculoskeletal Exam   Gait: antalgic and abnormal         Right Knee Exam      Inspection   Swelling: present  Effusion: present     Tenderness   The patient is tender to palpation of the medial joint line.     Crepitus   The patient has crepitus of the  patella.     Range of Motion   Flexion:  abnormal      Other   Sensation: normal     Muscle Strength   Right Lower Extremity   Quadriceps:  4/5   Hamstrin/5            Assessment:      Assessment  1. Morbid obesity with BMI of 40.0-44.9, adult    2. Primary osteoarthritis of right knee    3. Chronic pain of right knee    4. Quadriceps weakness    5. Essential hypertension    6. Recurrent major depressive disorder, in full remission    7. Gastroesophageal reflux disease, unspecified whether esophagitis present             Plan:   The patient has failed non operative management, has painful crepitus, and has swelling. The natural history of severe degenerative arthritis was discussed at length and nonoperative and operative treatment was reviewed. Due to the pain and associated disability, I recommend right total knee replacement for KL 4.  The risks, benefits, convalescence, and alternatives were reviewed.  Numerous questions were asked and answered.  Models were used as an education tool.  Surgery will be scheduled at a convenient time.  The discussion with the patient included a description of a total knee replacement.  A total knee replacement (TKR) means a resurfacing of all three surfaces-the patella, femur, and tibia, with metal and plastic parts. The prosthetic parts are usually cemented into position and will allow a patient a full range of motion from. The postoperative motion, however, is determined by multiple factors, the most important of which is preoperative motion. In general, the better the motion preoperatively, the better the motion postoperatively.  In patients with good bone stock and bone quality (ie males, younger patients) I will generally use an uncemented tibial component and leave the patella unresurfaced, in an effort to preserve bone stock for any future revision surgeries. In those patients we discuss the risk of anterior knee pain in a small subset of patients (5-10%) with an  unresurfaced patella. The operation, pending medical clearance, generally requires a hospitalization of 0-3 days for one knee (possibly longer for a bilateral replacement). In general, we prefer to perform the procedure under epidural/spinal anesthesia.  Patient blood donation will be based on the individual patient but is not common and based on preoperative hemoglobin/hematocrit levels.The operation will be done preferably in a minimally invasive fashion which will facilitate recovery.  While performing the procedure in a minimally invasive manner is our preference, some patients are not candidates.  In that situation, a non-minimally invasive technique will be performed.  This will not adversely affect the long term success of the TKR.  Postoperatively, the patient is  walking the day of surgery and may be discahrged the day of surgery if stable with a walker or cane.  The first couple of days can be painful and the pain medication will alleviate but not eliminate the pain.  Thus, the patient must really push hard to get the range of motion.   The cane is to be dispensed with once the patient is secure enough.  In general, there is no cast or brace required with a routine knee replacement. In the long term, we do not encourage our patients to run for the sake of running; although, pending their preoperative status, we often allow patients to play doubles tennis or comparable activities.  We also allow them to do gentle intermediate downhill skiing if they are truly an expert skier.  Biking is encouraged as well as swimming.  The follow-up periods are usually at 2 weeks, 3 months, six months, and yearly intervals.  Potential complications with total knee replacements include infection (less than 2%), which is much higher in patients with obesity, diabetes, or other conditions which adversely affect the immune system.  (Patients with a previous history of osteomyelitis can have infection rates as high as 15%).  By  nerve damage we mean a peroneal nerve palsy with a foot drop (a flapping foot with ambulation).  This is particularly more apt to occur with a bad valgus (knock knee) deformity.  The incidence can be quite high in this particular patient population.  There are always areas of skin numbness, but this is not an untoward effect, nor do we consider it a complication.  Other potential complications include dislocation of the patellar component (less than 2%).  The loosening of either the tibial or femoral component is much more infrequent.  It usually occurs with infection or long-term use in a patient population that is at extreme risk (e.g., markedly overweight and not conscientious about their exercises).  Major blood vessel damage is also extremely rare.  Theoretically, because of the anatomic proximity of the popliteal artery, it could be lacerated with subsequent repairs required.  Albeit unlikely, a disruption of the popliteal artery could theoretically result in an amputation.  Similarly, infection could theoretically result in an amputation if one were to grow out an organism that cannot be controlled with antibiotics.  General medical complications include phlebitis for which we prophylactically anticoagulate, but it could still occur and fatal pulmonary embolus has been reported.  Cardiovascular problems, such as a heart attack or ischemia, are always a concern with such hemodynamic changes in the blood vascular system.  Our goal is to improve at least 80% of the pain and hopefully 100% but some aspects of the patients anatomy or other factors may not provide complete pain relief. Other general complications are very rare but in medicine anything could theoretically happen.  Patient is on chronic anticoagulation, we would like the patient off any anticoagulation at least 72 hours before surgery to enable us to perform neuraxial anesthesia.  We also discussed performing a pre-operative peripheral sensory block  of the bracnhes of the AFCN and ISN of the same knee using iovera to help with pain control post surgery. This is a relatively new technology with early data demonstrating significant pain improvement and functional recovery. However the science defining all the associated risks is still developing. From what we know thus far, a small number of patients can have nerve pain along the areas of the treated nerves. I think the patient understands the risk benefit ratio of total knee replacement and the iovera treatment and would like to pursue the total knee replacement and iovera treatment. we will begin the pre-operative process.  The mobility limitation cannot be sufficiently resolved by the use of a cane. Patient's functional mobility deficit can be sufficiently resolved with the use of a (Rollator, Rolling Walker or Walker). Patient's mobility limitation significantly impairs their ability to participate in one of more activities of daily living. The use of a (Rollator, RW or Walker) will significantly improve the patient's ability to participate in MRADLS and the patient will use it on regular basis in the home.

## 2024-11-11 NOTE — PLAN OF CARE
Occupational therapy evaluation completed, co-evaluation with physical therapy 2/2 first attempt out of bed s/p R TKA post op day 0. At baseline patient is usually functioning at independence level and has had 0 falls in past 3 months. Patient on evaluation able to complete out of room mobility with rolling walker with CGA-SBA and LB dressing with Min A. Reciprocates proper icing/elevation/positioning regimen, adaptive ADL techniques, and safe functional mobility with rolling walker. Patient is functionally safe to d/c to home with support of family and anticipate outpatient PT to follow.     Problem: Occupational Therapy  Goal: Occupational Therapy Goal  Description: Goals to be met by: 2024     Patient will increase functional independence with ADLs by performin% reciprocation of positioning/icing/elevation regimen and task / activity modifications s/p total knee replacement to maximize ease of reintegration to home environment  ( goal met 2024).       Outcome: Adequate for Care Transition

## 2024-11-11 NOTE — ANESTHESIA PROCEDURE NOTES
CSE    Patient location during procedure: OR  Start time: 11/11/2024 8:11 AM  Timeout: 11/11/2024 8:10 AM  End time: 11/11/2024 8:25 AM      Staffing  Authorizing Provider: Yasmany Christensen MD  Performing Provider: Nancy Hinkle MD    Staffing  Performed by: Nancy Hinkle MD  Authorized by: Yasmany Christensen MD    Preanesthetic Checklist  Completed: patient identified, IV checked, site marked, risks and benefits discussed, surgical consent, monitors and equipment checked, pre-op evaluation and timeout performed  CSE  Patient position: sitting  Prep: ChloraPrep  Patient monitoring: heart rate, continuous pulse ox and frequent blood pressure checks  Approach: midline  Spinal Needle  Needle type: Tuohy   Needle gauge: 25 G  Needle length: 5 in  Epidural Needle  Injection technique: ARIK air  Needle type: Tuohy   Needle gauge: 17 G  Needle length: 3.5 in  Needle insertion depth: 10 cm  Location: L3-4  Needle localization: anatomical landmarks   Catheter  Catheter type: springwound  Catheter size: 19 G  Catheter at skin depth: 15 cm  Assessment  Intrathecal Medications:   administered: primary anesthetic mcg of    Medications:    Medications: Epidural - mepivacaine (CARBOCAINE) injection 15 mg/mL (1.5%) - Epidural   3 mL - 11/11/2024 8:21:00 AM

## 2024-11-11 NOTE — PT/OT/SLP EVAL
Occupational Therapy   Evaluation, treatment and Discharge Note    Name: Debbie Vo  MRN: [id number]  Admitting Diagnosis: <principal problem not specified>  Recent Surgery: Procedure(s) (LRB):  ARTHROPLASTY, KNEE, TOTAL monoblock (Right) Day of Surgery    Recommendations:     Discharge Recommendations: Low Intensity Therapy (OP PT)  Discharge Equipment Recommendations: none  Barriers to discharge:  Other (Comment) (No acute OT barriers)    Assessment:     Debbie Vo is a 64 y.o. female with a medical diagnosis of <principal problem not specified>. At this time, patient is functioning at their prior level of function and does not require further acute OT services.     Occupational therapy evaluation completed, co-evaluation with physical therapy 2/2 first attempt out of bed s/p R TKA post op day 0. Patient on evaluation able to complete out of room mobility with rolling walker with CGA-SBA and LB dressing with Min A. Reciprocates proper icing/elevation/positioning regimen, adaptive ADL techniques, and safe functional mobility with rolling walker. Patient is functionally safe to d/c to home with support of family and anticipate outpatient PT to follow.     Plan:     During this hospitalization, patient does not require further acute OT services.  Please re-consult if situation changes.    Plan of Care Reviewed with: patient, friend    Subjective     Chief Complaint: R knee pain   Patient/Family Comments/goals: To go home    Occupational Profile:  Living Environment: Pt lives with her son in [facility], 4STE with B HR and T/s   Previous level of function: At baseline patient is usually functioning at independence level and has had 0 falls in past 3 months. Pt reports she needs help with socks and shoes  Equipment Used at home: walker, rolling, bedside commode, bath bench  Assistance upon Discharge: Friend and son     Pain/Comfort:  Pain Rating 1: 2/10  Location - Side 1: Right  Location -  Orientation 1: generalized  Location 1: knee  Pain Addressed 1: Pre-medicate for activity, Reposition, Distraction, Cessation of Activity, Nurse notified  Pain Rating Post-Intervention 1:  (not rated; mild increase with activity)      Objective:     Communicated with: Nurse prior to session.  Patient found HOB elevated with peripheral IV upon OT entry to room.    General Precautions: Standard, fall  Orthopedic Precautions: RLE weight bearing as tolerated  Braces: N/A  Respiratory Status: Room air     Occupational Performance:    Bed Mobility:    Patient completed Scooting/Bridging with contact guard assistance  Patient completed Supine to Sit with minimum assistance for RLE    Functional Mobility/Transfers:  Patient completed Sit <> Stand Transfer with minimum assistance  with  rolling walker   Patient completed Toilet Transfer Step Transfer technique with moderate assistance with  rolling walker; From low toilet. Discussed placement of BSC over toilet  Functional Mobility: Out of room mobility with CGA-SBA with rolling walker; pt educated on walking sequence and taking small turns. Verbal cues used throughout session.   In room mobility to the bathroom with CGA-SBA with rolling walker. Verbal cues provided for walker management.     Activities of Daily Living:  Lower Body Dressing: minimum assistance; educated on putting RLE in first  Toileting: minimum assistance     Cognitive/Visual Perceptual:  Cognitive/Psychosocial Skills:     -       Oriented to: Person, Place, Time, and Situation   -       Follows Commands/attention:Follows multistep  commands  -       Communication: clear/fluent  -       Safety awareness/insight to disability: intact     Physical Exam:  Edema:    - RLE moderate swelling  Skin Integrity:   - Wound surgical R knee with dressing intact; no drainage  Sensation:    -       Intact light/touch BLE/BUE  Upper Extremity Range of Motion:     -       Right Upper Extremity: WFL  -       Left Upper  Extremity: WFL  Upper Extremity Strength:    -       Right Upper Extremity: WFL  -       Left Upper Extremity: WFL   Strength:    -       Right Upper Extremity: WFL  -       Left Upper Extremity: WFL    AMPAC 6 Click ADL:  AMPAC Total Score: 21    Treatment & Education:  Patient educated on role of OT/ POC development.  Co-evaluation with physical therapy in consideration of first attempt out of bed s/p Day 0 .   Patient educated on positioning / elevating surgical RLE with correct placement of pillow to encourage knee extension and prevent knee contracture, and use of ice pack with barrier.     Patient guided to transition eob for assessment and reported slight dizziness.   Vitals taken throughout session:    Upon entry: 136/81,99%,87bpm   Sitting HOB elevated: 132/81,96%,90bpm   Seated EOB:125/77   Standin/81  Initiated ADL / functional mobility training as above with instruction on proper hand placement on walker and proper dressing sequence.  Lower body dressing performed by donning surgical extremity first  following therapist demonstration.   Functional mobility performed as above with instructed mobility sequence.    Educated on home safety with showers and/or shower transfer via performance of dry run approach prior to true shower with reciprocation verbally.   Educated on placement/use of DME rolling walker at all time.     Patient left  sitting edge of bed  with all lines intact, call button in reach, nurse notified, and friend present    GOALS:   Multidisciplinary Problems       Occupational Therapy Goals          Problem: Occupational Therapy    Goal Priority Disciplines Outcome Interventions   Occupational Therapy Goal     OT, PT/OT Adequate for Care Transition    Description: Goals to be met by: 2024     Patient will increase functional independence with ADLs by performin% reciprocation of positioning/icing/elevation regimen and task / activity modifications s/p total knee  replacement to maximize ease of reintegration to home environment  ( goal met 11/11/2024).                            History:     Past Medical History:   Diagnosis Date    Benign essential hypertension     Colon cancer     Depression     Gout, unspecified     Osteoarthritis          Past Surgical History:   Procedure Laterality Date    CHOLECYSTECTOMY      COLON SURGERY  2002    colon resection    COLONOSCOPY N/A 03/24/2016    Procedure: COLONOSCOPY;  Surgeon: Ernst Bobby MD;  Location: [facility] ENDO;  Service: Endoscopy;  Laterality: N/A;  Needs Flex sigmoidoscopy    EPIDURAL STEROID INJECTION INTO LUMBAR SPINE N/A 06/21/2018    Procedure: Injection-steroid-epidural-lumbar;  Surgeon: Avi Morales Jr., MD;  Location: [facility] PAIN MGT;  Service: Pain Management;  Laterality: N/A;  ORAL SEDATION    HYSTERECTOMY      INJECTION OF ANESTHETIC AGENT INTO SACROILIAC JOINT Bilateral 08/25/2021    Procedure: BLOCK, SACROILIAC JOINT*-- bilateral;  Surgeon: Lucia Carroll MD;  Location: [facility] PAIN MGT;  Service: Pain Management;  Laterality: Bilateral;    PARTIAL HYSTERECTOMY         Time Tracking:     OT Date of Treatment: 11/11/24  OT Start Time: 1441  OT Stop Time: 1522  OT Total Time (min): 41 min    Billable Minutes:Evaluation 15  Self Care/Home Management 15  Therapeutic Activity 11 11/11/2024

## 2024-11-12 ENCOUNTER — CLINICAL SUPPORT (OUTPATIENT)
Dept: REHABILITATION | Facility: HOSPITAL | Age: 64
End: 2024-11-12
Payer: MEDICARE

## 2024-11-12 VITALS
TEMPERATURE: 98 F | SYSTOLIC BLOOD PRESSURE: 109 MMHG | HEIGHT: 68 IN | BODY MASS INDEX: 44.41 KG/M2 | DIASTOLIC BLOOD PRESSURE: 69 MMHG | RESPIRATION RATE: 17 BRPM | WEIGHT: 293 LBS | OXYGEN SATURATION: 97 % | HEART RATE: 89 BPM

## 2024-11-12 DIAGNOSIS — R53.1 DECREASED STRENGTH: Primary | ICD-10-CM

## 2024-11-12 DIAGNOSIS — M25.661 DECREASED RANGE OF MOTION (ROM) OF RIGHT KNEE: ICD-10-CM

## 2024-11-12 DIAGNOSIS — Z74.09 DECREASED MOBILITY AND ENDURANCE: ICD-10-CM

## 2024-11-12 PROCEDURE — 97110 THERAPEUTIC EXERCISES: CPT | Mod: PN

## 2024-11-12 PROCEDURE — 97161 PT EVAL LOW COMPLEX 20 MIN: CPT | Mod: PN

## 2024-11-12 NOTE — PLAN OF CARE
[facility]   Physical Therapy Initial Evaluation      Name: Debbie Vo  Clinic Number: [id number]    Therapy Diagnosis:   Encounter Diagnoses   Name Primary?    Decreased strength Yes    Decreased range of motion (ROM) of right knee     Decreased mobility and endurance         Physician: Rich Reid MD    Physician Orders: PT Eval and Treat  Medical Diagnosis from Referral:   M17.11 (ICD-10-CM) - Primary osteoarthritis of right knee   M25.561,G89.29 (ICD-10-CM) - Chronic pain of right knee   M62.81 (ICD-10-CM) - Quadriceps weakness     Evaluation Date: 2024  Authorization Period Expiration: 24  Plan of Care Expiration: 1/10/25  Progress Note Due: 12/10/24  Date of Surgery: 24  Visit # / Visits authorized:    FOTO: 1/ 3    Precautions: Standard, Depression, Gout, HTN    Time In: 1:10 PM  Time Out: 2:00 PM  Total Billable Time: 50 minutes (1LCE, 1TE)    Subjective     Date of onset: 24    History of current condition - Debbie reports: right knee pain for years, was using SPC off and on, and finally received right TKA on 24. Here with , using RW, and able to ambulate in with AD. Slept well last night, taking pain medication, and bandage intact. No falls. Denies any numbness or tingling. Moderate low back pain and thinks it radiates to her right knee as well, chronic problem.     Falls: none    Imagin24 knee xray  Postsurgical changes status post right total knee arthroplasty.  No evidence of acute fracture or dislocation.  Expected subcutaneous emphysema.    Prior Therapy: none   Social History: lives with  and son, 4 steps to enter   Occupation: retired, dietary supervisory in hospital   Prior Level of Function: chronic right knee pain, limited in squatting and stairs, prolonged standing/walking  Current Level of Function: limited knee range of motion with pain, limited WB activities    Pain:  Current 5/10, worst 8/10,  best 2/10   Location: right knee anteriorly and medially  Description: aching, tight, sharp, swollen  Aggravating Factors: moving right knee and WB activities   Easing Factors: sitting down with leg supported    Patients goals: to get back to regular social and daily activities     Medical History:   Past Medical History:   Diagnosis Date    Benign essential hypertension     Colon cancer     Depression     Gout, unspecified     Osteoarthritis        Surgical History:   Debbie Vo  has a past surgical history that includes Partial hysterectomy; Colon surgery (2002); Colonoscopy (N/A, 03/24/2016); Hysterectomy; Epidural steroid injection into lumbar spine (N/A, 06/21/2018); Injection of anesthetic agent into sacroiliac joint (Bilateral, 08/25/2021); Cholecystectomy; and Total knee arthroplasty (Right, 11/11/2024).    Medications:   Debbie has a current medication list which includes the following prescription(s): acetaminophen, albuterol, allopurinol, amlodipine, ammonium lactate, aspirin, benzonatate, cephalexin, colchicine, colchicine-probenecid 0.5-500 mg, diclofenac, diclofenac sodium, famotidine, fluoxetine, fluticasone propionate, gabapentin, loratadine, meclizine, metoprolol succinate, neomycin-polymyxin-hydrocortisone, neomycin-polymyxin-hydrocortisone, omeprazole, oxycodone-acetaminophen, topiramate, tranexamic acid, triamcinolone acetonide 0.1%, valacyclovir, and valsartan.    Allergies:   Review of patient's allergies indicates:  No Known Allergies     Objective      Gait: step to and step through gait with RW, decreased RLW WB and stance time  Posture: decreased RLE WB  Sensation: WNL  Palpation: +tender to palpation at   Skin: bandage intact. No drainage present.     NT = not tested  * = knee pain with testing  Right knee MMT/ROM  AROM: 12-55 degrees   PROM: 11-59 degrees   L/E Strength w/ BlikBookET Muscle Bob Dynamometer Right Left Pain/Dysfunction with Movement   (approx 4 sec hold w/  max contraction)   Hip Flexion 3.3 kg  16.0 kg     Hip Abduction NT kg  NT kg  Deferred due to pain   Quadriceps 3.9 kg  23.0 kg     Hamstrings 6.9 kg  3.8 kg       TU seconds (with RW)  30 second sit-to-stand test (without U/E support): 4x with RW and upper extremity use    Limitation/Restriction for FOTO Knee Survey    Therapist reviewed FOTO scores for Debbie Vo on 2024.   FOTO documents entered into [employer] - see Media section.    Limitation Score: 78%       Treatment     Total Treatment time (time-based codes) separate from Evaluation: 10 minutes     Debbie received the treatments listed below:      therapeutic exercises to develop strength, endurance, ROM, flexibility, posture, and core stabilization for 10 minutes including:  Quad sets: 15x5'' holds, towel under knee  Heel prop: 3 minutes with belt around feet and towel under ankle  Seated heel slides: 15x right  Seated hamstring/calf stretch: 4x20'' holds  Seated marching: 10x each    Patient Education and Home Exercises     Education provided:   - Rehab process and prognosis  - Importance of home exercise program  - Avoidance of concordant pain(s), rest as needed  - Frequent knee AROM to keep knee loose    Written Home Exercises Provided: Yes. Exercises were reviewed and Debbie was able to demonstrate them prior to the end of the session.  Debbie demonstrated good  understanding of the education provided. See EMR under Patient Instructions for exercises provided during therapy sessions.    Assessment     Debbie is a 64 y.o. female referred to outpatient Physical Therapy with a medical diagnosis of Primary osteoarthritis of right knee, Chronic pain of right knee, and Quadriceps weakness. Pt received right TKA on 24. Patient presents with decreased right knee range of motion, decreased RLE strength, gait deviations including limited WB tolerance, and pain/limitations with transfers and walking/standing activities.    Patient  prognosis is Good.   Patient will benefit from skilled outpatient Physical Therapy to address the deficits stated above and in the chart below, provide patient /family education, and to maximize patientt's level of independence.     Plan of care discussed with patient: Yes  Patient's spiritual, cultural and educational needs considered and patient is agreeable to the plan of care and goals as stated below:     Anticipated Barriers for therapy: Gout, Depression    Medical Necessity is demonstrated by the following  History  Co-morbidities and personal factors that may impact the plan of care [x] LOW: no personal factors / co-morbidities  [] MODERATE: 1-2 personal factors / co-morbidities  [] HIGH: 3+ personal factors / co-morbidities    Moderate / High Support Documentation:   Co-morbidities affecting plan of care: HTN, Gout    Personal Factors:   no deficits     Examination  Body Structures and Functions, activity limitations and participation restrictions that may impact the plan of care [x] LOW: addressing 1-2 elements  [] MODERATE: 3+ elements  [] HIGH: 4+ elements (please support below)    Moderate / High Support Documentation: none     Clinical Presentation [x] LOW: stable  [] MODERATE: Evolving  [] HIGH: Unstable     Decision Making/ Complexity Score: low       Goals:  Short Term Goals: 3 weeks  1. Pt will be independent with HEP supplement PT in improving functional mobility.  2. Pt will improve right LE strength to at least 75% of left LE strength as measured via MicroFet handheld dynamometer in order to improve functional mobility.  3. Pt will improve right knee AROM to at least 3-100 degrees in order to improve gait.    Long Term Goals: 8 weeks  1. Pt will be independent with updated HEP supplement PT in improving functional mobility.  2. Pt will improve right LE strength to at least 90% of left LE strength as measured via MicroFet handheld dynamometer in order to improve functional mobility.  3. Pt will  improve right knee AROM to at least 0-110 degrees in order to improve gait and ability to perform ADLs.  4. Pt will improve FOTO knee survey score to </= 55% limited in order to demo improved functional mobility.  5. Pt will perform TUG in < 10 seconds without AD in order to demo improved gait speed.  6. Pt will perform at least 12 sit to stands without UE support on 30 second sit to stand test in order to demo improved ability to perform transfers.    Plan     Plan of care Certification: 11/12/2024 to 1/10/25.    Outpatient Physical Therapy 3 times weekly for 8 weeks to include the following interventions: Cervical/Lumbar Traction, Debridement (nonselective), Debridement (selective), Electrical Stimulation  , Gait Training, Manual Therapy, Moist Heat/ Ice, Neuromuscular Re-ed, Orthotic Management and Training, Patient Education, Self Care, Therapeutic Activities, and Therapeutic Exercise.     Nghia Suarez, PT        Physician's Signature: _________________________________________ Date: ________________

## 2024-11-13 ENCOUNTER — CLINICAL SUPPORT (OUTPATIENT)
Dept: REHABILITATION | Facility: HOSPITAL | Age: 64
End: 2024-11-13
Payer: MEDICARE

## 2024-11-13 DIAGNOSIS — Z74.09 DECREASED MOBILITY AND ENDURANCE: ICD-10-CM

## 2024-11-13 DIAGNOSIS — M25.661 DECREASED RANGE OF MOTION (ROM) OF RIGHT KNEE: ICD-10-CM

## 2024-11-13 DIAGNOSIS — R53.1 DECREASED STRENGTH: Primary | ICD-10-CM

## 2024-11-13 PROCEDURE — 97140 MANUAL THERAPY 1/> REGIONS: CPT | Mod: PN,CQ

## 2024-11-13 PROCEDURE — 97112 NEUROMUSCULAR REEDUCATION: CPT | Mod: PN,CQ

## 2024-11-13 PROCEDURE — 97110 THERAPEUTIC EXERCISES: CPT | Mod: PN,CQ

## 2024-11-13 NOTE — PROGRESS NOTES
"[facility]   Physical Therapy Treatment Note      Name: Debbie Vo  Clinic Number: [id number]    Therapy Diagnosis:   Encounter Diagnoses   Name Primary?    Decreased strength Yes    Decreased range of motion (ROM) of right knee     Decreased mobility and endurance      Physician: iRch Reid MD    Visit Date: 11/13/2024    Physician Orders: PT Eval and Treat  Medical Diagnosis from Referral:   M17.11 (ICD-10-CM) - Primary osteoarthritis of right knee   M25.561,G89.29 (ICD-10-CM) - Chronic pain of right knee   M62.81 (ICD-10-CM) - Quadriceps weakness      Evaluation Date: 11/12/2024  Authorization Period Expiration: 12/31/24  Plan of Care Expiration: 1/10/25  Progress Note Due: 12/10/24  Date of Surgery: 11/11/24  Visit # / Visits authorized: 2/ 20, (+1)  FOTO: 1/ 3     Precautions: Standard, Depression, Gout, HTN     Time In: 3:00 PM  Time Out: 4:00 PM  Total Billable Time: 53 minutes (2 TE, 1 MT, 1 NMR)    PTA Visit #: 1/5     Subjective     Patient reports: "It feels a bit tight and swollen". Pt agreeable to PT session. She reports using prescribed meds and ice for pain control.  She was compliant with home exercise program.  Response to previous treatment: 1 st after  Functional change: 1 st after    Pain: 4/10 "soreness pain"  Location: right posterior knee     Objective      Objective Measures updated at progress report unless specified.     Treatment     Debbie received the treatments listed below:    therapeutic exercises to develop strength, endurance, ROM, flexibility, posture, and core stabilization for 20 minutes including:  Quad sets: 15x5'' holds, towel under knee  Heel prop: 3 minutes with belt around feet and towel under ankle  Seated heel slides: 15x right  Seated hamstring/calf stretch: 4x20'' holds  Seated marching: 10x2 each    manual therapy techniques: Joint mobilizations were applied to the: R knee for 13 minutes, including:  Patella mobs all planes " "available  Manual gastrocnemius st multiple bouts    neuromuscular re-education activities to improve: Kinesthetic, Sense, and Proprioception for 20 minutes. The following activities were included:  SLR AAROM R 2x10 R  Heel slides AAROM 5"x15 R    therapeutic activities to improve functional performance for 00  minutes, including:  -    Ice pack at end of session x 7 min   Patient Education and Home Exercises       Education provided:   - cont HEP  -reviewed Knee anatomy  -Ice frequency w/ elevation    Written Home Exercises Provided: Pt instructed to continue prior HEP. Exercises were reviewed and Debbie was able to demonstrate them prior to the end of the session.  Debbie demonstrated good  understanding of the education provided. See Electronic Medical Record under Patient Instructions for exercises provided during therapy sessions    Assessment     Debbie is a 64 y.o. female referred to outpatient Physical Therapy with a medical diagnosis of Primary osteoarthritis of right knee, Chronic pain of right knee, and Quadriceps weakness. Pt received right TKA on 11/11/24.   Pt completed today's session focusing on R knee strengthening/ ROM as per TKA protocol. Pt did exhibit apprehension with knee flexion due to fear of pain, which improved over coarse of treatment.  Debbie Is progressing well towards her goals.   Patient prognosis is Good.     Patient will continue to benefit from skilled outpatient physical therapy to address the deficits listed in the problem list box on initial evaluation, provide pt/family education and to maximize pt's level of independence in the home and community environment.     Patient's spiritual, cultural and educational needs considered and pt agreeable to plan of care and goals.     Anticipated barriers to physical therapy: Gout, Depression    Goals:  Short Term Goals: 3 weeks  1. Pt will be independent with HEP supplement PT in improving functional mobility.(Ongoing, not met)  2. Pt " will improve right LE strength to at least 75% of left LE strength as measured via MicroFet handheld dynamometer in order to improve functional mobility.(Ongoing, not met)  3. Pt will improve right knee AROM to at least 3-100 degrees in order to improve gait.(Ongoing, not met)     Long Term Goals: 8 weeks  1. Pt will be independent with updated HEP supplement PT in improving functional mobility.(Ongoing, not met)  2. Pt will improve right LE strength to at least 90% of left LE strength as measured via MicroFet handheld dynamometer in order to improve functional mobility.(Ongoing, not met)  3. Pt will improve right knee AROM to at least 0-110 degrees in order to improve gait and ability to perform ADLs. (Ongoing, not met)  4. Pt will improve FOTO knee survey score to </= 55% limited in order to demo improved functional mobility.(Ongoing, not met)  5. Pt will perform TUG in < 10 secon(Ongoing, not met)ds without AD in order to demo improved gait speed.  6. Pt will perform at least 12 sit to stands without UE support on 30 second sit to stand test in order to demo improved ability to perform transfers.(Ongoing, not met)    Plan   Cont to advance PT as per POC, monitor response to session   Plan of care Certification: 11/12/2024 to 1/10/25.     Outpatient Physical Therapy 3 times weekly for 8 weeks to include the following interventions: Cervical/Lumbar Traction, Debridement (nonselective), Debridement (selective), Electrical Stimulation  , Gait Training, Manual Therapy, Moist Heat/ Ice, Neuromuscular Re-ed, Orthotic Management and Training, Patient Education, Self Care, Therapeutic Activities, and Therapeutic Exercise.     Endy Higuera, PTA   11/13/2024

## 2024-11-18 ENCOUNTER — CLINICAL SUPPORT (OUTPATIENT)
Dept: REHABILITATION | Facility: HOSPITAL | Age: 64
End: 2024-11-18
Payer: MEDICARE

## 2024-11-18 DIAGNOSIS — Z74.09 DECREASED MOBILITY AND ENDURANCE: ICD-10-CM

## 2024-11-18 DIAGNOSIS — R53.1 DECREASED STRENGTH: Primary | ICD-10-CM

## 2024-11-18 DIAGNOSIS — M25.661 DECREASED RANGE OF MOTION (ROM) OF RIGHT KNEE: ICD-10-CM

## 2024-11-18 PROCEDURE — 97110 THERAPEUTIC EXERCISES: CPT | Mod: PN,CQ

## 2024-11-18 PROCEDURE — 97530 THERAPEUTIC ACTIVITIES: CPT | Mod: PN,CQ

## 2024-11-18 PROCEDURE — 97112 NEUROMUSCULAR REEDUCATION: CPT | Mod: PN,CQ

## 2024-11-18 NOTE — PROGRESS NOTES
"[facility]   Physical Therapy Treatment Note      Name: Debbie Vo  Clinic Number: [id number]    Therapy Diagnosis:   Encounter Diagnoses   Name Primary?    Decreased strength Yes    Decreased range of motion (ROM) of right knee     Decreased mobility and endurance      Physician: Rich Reid MD    Visit Date: 11/18/2024    Physician Orders: PT Eval and Treat  Medical Diagnosis from Referral:   M17.11 (ICD-10-CM) - Primary osteoarthritis of right knee   M25.561,G89.29 (ICD-10-CM) - Chronic pain of right knee   M62.81 (ICD-10-CM) - Quadriceps weakness      Evaluation Date: 11/12/2024  Authorization Period Expiration: 12/31/24  Plan of Care Expiration: 1/10/25  Progress Note Due: 12/10/24  Date of Surgery: 11/11/24  Visit # / Visits authorized: 2/ 20, (+1)  FOTO: 1/ 3     Precautions: Standard, Depression, Gout, HTN     Time In: 3:00 PM  Time Out: 4:00 PM  Total Billable Time: 55 minutes (2 TA, 1 TE, 1 NMR)    PTA Visit #: 2/5     Subjective     Patient reports: "I had a good weekend, it's not too bad today and I took my pain medicine". Pt agreeable to PT session.  She was compliant with home exercise program.  Response to previous treatment: no adverse response  Functional change: decreased R knee pain with weightbearing.    Pain: 4/10 "soreness pain"  Location: right posterior knee     Objective      Objective Measures updated at progress report unless specified.     Treatment     Debbie received the treatments listed below:    therapeutic exercises to develop strength, endurance, ROM, flexibility, posture, and core stabilization for 10 minutes including:  Quad sets: 15x5'' holds, towel under knee  Heel prop: 3 minutes with belt around feet and towel under ankle  Seated heel slides: 15x right  Seated hamstring/calf stretch: 4x20'' holds  Seated marching: 10x2 each    manual therapy techniques: Joint mobilizations were applied to the: R knee for 7 minutes, including:  Patella " "mobs all planes available  Manual gastrocnemius st multiple bouts    neuromuscular re-education activities to improve: Kinesthetic, Sense, and Proprioception for 13 minutes. The following activities were included:  SLR AAROM R 2x10 R  Heel slides AAROM 5"x15 R    therapeutic activities to improve functional performance for 25  minutes, including:  -fwd stepping in // bars 10 ft x 3 trials   -lateral stepping in // bars 10 ft x 1 trial (pain)  -Heel raises 2x10 in // B  Patient Education and Home Exercises       Education provided:   - cont HEP  -reviewed Knee anatomy  -Ice frequency w/ elevation    Written Home Exercises Provided: Pt instructed to continue prior HEP. Exercises were reviewed and Debbie was able to demonstrate them prior to the end of the session.  Debbie demonstrated good  understanding of the education provided. See Electronic Medical Record under Patient Instructions for exercises provided during therapy sessions    Assessment     Debbie is a 64 y.o. female referred to outpatient Physical Therapy with a medical diagnosis of Primary osteoarthritis of right knee, Chronic pain of right knee, and Quadriceps weakness. Pt received right TKA on 11/11/24.   Debbie's arrived to session with reports improved pain since her previous session. She completed session with good tolerance, focusing on R knee ROM / strengthening activities. Introduction to standing activities today (light difficulty), rest breaks granted as needed for general fatigue which she recovered with no adverse response. She is ambulating with RW, will focus on more weight bearing as appropriate.   Debbie Is progressing well towards her goals.   Patient prognosis is Good.     Patient will continue to benefit from skilled outpatient physical therapy to address the deficits listed in the problem list box on initial evaluation, provide pt/family education and to maximize pt's level of independence in the home and community environment. "     Patient's spiritual, cultural and educational needs considered and pt agreeable to plan of care and goals.     Anticipated barriers to physical therapy: Gout, Depression    Goals:Goals:  Short Term Goals: 3 weeks  1. Pt will be independent with HEP supplement PT in improving functional mobility.  2. Pt will improve right LE strength to at least 75% of left LE strength as measured via MicroFet handheld dynamometer in order to improve functional mobility.  3. Pt will improve right knee AROM to at least 3-100 degrees in order to improve gait.     Long Term Goals: 8 weeks  1. Pt will be independent with updated HEP supplement PT in improving functional mobility.  2. Pt will improve right LE strength to at least 90% of left LE strength as measured via MicroFet handheld dynamometer in order to improve functional mobility.  3. Pt will improve right knee AROM to at least 0-110 degrees in order to improve gait and ability to perform ADLs.  4. Pt will improve FOTO knee survey score to </= 55% limited in order to demo improved functional mobility.  5. Pt will perform TUG in < 10 seconds without AD in order to demo improved gait speed.  6. Pt will perform at least 12 sit to stands without UE support on 30 second sit to stand test in order to demo improved ability to perform transfers.     Plan   Cont to advance PT as per POC, monitor response to session   Plan of care Certification: 11/12/2024 to 1/10/25.     Outpatient Physical Therapy 3 times weekly for 8 weeks to include the following interventions: Cervical/Lumbar Traction, Debridement (nonselective), Debridement (selective), Electrical Stimulation  , Gait Training, Manual Therapy, Moist Heat/ Ice, Neuromuscular Re-ed, Orthotic Management and Training, Patient Education, Self Care, Therapeutic Activities, and Therapeutic Exercise.     Endy Higuera, PTA   11/18/2024

## 2024-11-19 ENCOUNTER — TELEPHONE (OUTPATIENT)
Dept: ORTHOPEDICS | Facility: CLINIC | Age: 64
End: 2024-11-19
Payer: MEDICARE

## 2024-11-19 ENCOUNTER — CLINICAL SUPPORT (OUTPATIENT)
Dept: REHABILITATION | Facility: HOSPITAL | Age: 64
End: 2024-11-19
Payer: MEDICARE

## 2024-11-19 DIAGNOSIS — M25.661 DECREASED RANGE OF MOTION (ROM) OF RIGHT KNEE: ICD-10-CM

## 2024-11-19 DIAGNOSIS — Z96.651 AFTERCARE FOLLOWING RIGHT KNEE JOINT REPLACEMENT SURGERY: Primary | ICD-10-CM

## 2024-11-19 DIAGNOSIS — Z47.1 AFTERCARE FOLLOWING RIGHT KNEE JOINT REPLACEMENT SURGERY: Primary | ICD-10-CM

## 2024-11-19 DIAGNOSIS — R53.1 DECREASED STRENGTH: Primary | ICD-10-CM

## 2024-11-19 DIAGNOSIS — Z74.09 DECREASED MOBILITY AND ENDURANCE: ICD-10-CM

## 2024-11-19 PROCEDURE — 97110 THERAPEUTIC EXERCISES: CPT | Mod: KX,PN

## 2024-11-19 PROCEDURE — 97112 NEUROMUSCULAR REEDUCATION: CPT | Mod: KX,PN

## 2024-11-19 RX ORDER — TRAMADOL HYDROCHLORIDE 50 MG/1
50 TABLET ORAL EVERY 12 HOURS PRN
Qty: 28 TABLET | Refills: 0 | Status: SHIPPED | OUTPATIENT
Start: 2024-11-19 | End: 2024-12-03

## 2024-11-19 RX ORDER — CYCLOBENZAPRINE HCL 5 MG
5 TABLET ORAL NIGHTLY
Qty: 14 TABLET | Refills: 0 | Status: SHIPPED | OUTPATIENT
Start: 2024-11-19 | End: 2024-12-03

## 2024-11-19 NOTE — PROGRESS NOTES
"[facility]   Physical Therapy Treatment Note      Name: Debbie Vo  Clinic Number: [id number]    Therapy Diagnosis:   Encounter Diagnoses   Name Primary?    Decreased strength Yes    Decreased range of motion (ROM) of right knee     Decreased mobility and endurance      Physician: Rich Reid MD    Visit Date: 11/19/2024    Physician Orders: PT Eval and Treat  Medical Diagnosis from Referral:   M17.11 (ICD-10-CM) - Primary osteoarthritis of right knee   M25.561,G89.29 (ICD-10-CM) - Chronic pain of right knee   M62.81 (ICD-10-CM) - Quadriceps weakness      Evaluation Date: 11/12/2024  Authorization Period Expiration: 12/31/24  Plan of Care Expiration: 1/10/25  Progress Note Due: 12/10/24  Date of Surgery: 11/11/24  Visit # / Visits authorized: 3/20, (+1)  FOTO: 1/ 3     Precautions: Standard, Depression, Gout, HTN     Time In: 3:00 PM  Time Out: 4:00 PM  Total Billable Time: 30 minutes (1TE, 1NMR)    PTA Visit #: 0/5     Subjective     Patient reports: knee is very sore and stiff recently   She was compliant with home exercise program.  Response to previous treatment: no adverse response  Functional change: decreased R knee pain with weightbearing.    Pain: 4/10 "soreness pain"  Location: right posterior knee     Objective      Objective Measures updated at progress report unless specified.     Treatment     Debbie received the treatments listed below:      therapeutic exercises to develop strength, endurance, ROM, flexibility, posture, and core stabilization for 30 minutes including:  Heel prop: 3 minutes with belt around feet and towel under ankle  Seated heel slides: 15x right  Seated hamstring/calf stretch: 4x20'' holds  Seated marching: 10x2 each    manual therapy techniques: Joint mobilizations were applied to the: R knee for 10 minutes, including:  Patella mobs all planes available  Manual gastrocnemius st multiple bouts    neuromuscular re-education activities to " improve: Kinesthetic, Sense, and Proprioception for 15 minutes. The following activities were included:  Quad sets: 15x5'' holds, towel under knee  SLR AAROM R 2x10 right = HOLD    therapeutic activities to improve functional performance for 00  minutes, including:  -fwd stepping in // bars 10 ft x 3 trials   -lateral stepping in // bars 10 ft x 1 trial (pain)  -Heel raises 2x10 in // Bilateral    Patient Education and Home Exercises       Education provided:   - cont HEP  -reviewed Knee anatomy  -Ice frequency w/ elevation    Written Home Exercises Provided: Pt instructed to continue prior HEP. Exercises were reviewed and Debbie was able to demonstrate them prior to the end of the session.  Debbie demonstrated good  understanding of the education provided. See Electronic Medical Record under Patient Instructions for exercises provided during therapy sessions    Assessment     Debbie is a 64 y.o. female referred to outpatient Physical Therapy with a medical diagnosis of Primary osteoarthritis of right knee, Chronic pain of right knee, and Quadriceps weakness. Pt received right TKA on 11/11/24.   Increased knee irritability today, reports pain medication not helping at all, limited home exercise program compliance over the weekend, and education on importance of exercises and rest as needed. Knee range of motion improved throughout the session and knee extension is improving most of all.     Debbie Is progressing well towards her goals.   Patient prognosis is Good.     Patient will continue to benefit from skilled outpatient physical therapy to address the deficits listed in the problem list box on initial evaluation, provide pt/family education and to maximize pt's level of independence in the home and community environment.     Patient's spiritual, cultural and educational needs considered and pt agreeable to plan of care and goals.     Anticipated barriers to physical therapy: Gout,  Depression    Goals:Goals:  Short Term Goals: 3 weeks  1. Pt will be independent with HEP supplement PT in improving functional mobility.  2. Pt will improve right LE strength to at least 75% of left LE strength as measured via MicroFet handheld dynamometer in order to improve functional mobility.  3. Pt will improve right knee AROM to at least 3-100 degrees in order to improve gait.     Long Term Goals: 8 weeks  1. Pt will be independent with updated HEP supplement PT in improving functional mobility.  2. Pt will improve right LE strength to at least 90% of left LE strength as measured via MicroFet handheld dynamometer in order to improve functional mobility.  3. Pt will improve right knee AROM to at least 0-110 degrees in order to improve gait and ability to perform ADLs.  4. Pt will improve FOTO knee survey score to </= 55% limited in order to demo improved functional mobility.  5. Pt will perform TUG in < 10 seconds without AD in order to demo improved gait speed.  6. Pt will perform at least 12 sit to stands without UE support on 30 second sit to stand test in order to demo improved ability to perform transfers.     Plan   Cont to advance PT as per POC, monitor response to session   Plan of care Certification: 11/12/2024 to 1/10/25.     Outpatient Physical Therapy 3 times weekly for 8 weeks to include the following interventions: Cervical/Lumbar Traction, Debridement (nonselective), Debridement (selective), Electrical Stimulation  , Gait Training, Manual Therapy, Moist Heat/ Ice, Neuromuscular Re-ed, Orthotic Management and Training, Patient Education, Self Care, Therapeutic Activities, and Therapeutic Exercise.     Nghia Suarez, PT   11/19/2024       6 months

## 2024-11-21 ENCOUNTER — CLINICAL SUPPORT (OUTPATIENT)
Dept: REHABILITATION | Facility: HOSPITAL | Age: 64
End: 2024-11-21
Payer: MEDICARE

## 2024-11-21 DIAGNOSIS — M25.661 DECREASED RANGE OF MOTION (ROM) OF RIGHT KNEE: ICD-10-CM

## 2024-11-21 DIAGNOSIS — R53.1 DECREASED STRENGTH: Primary | ICD-10-CM

## 2024-11-21 DIAGNOSIS — Z74.09 DECREASED MOBILITY AND ENDURANCE: ICD-10-CM

## 2024-11-21 PROCEDURE — 97530 THERAPEUTIC ACTIVITIES: CPT | Mod: KX,PN

## 2024-11-21 PROCEDURE — 97112 NEUROMUSCULAR REEDUCATION: CPT | Mod: KX,PN

## 2024-11-25 ENCOUNTER — CLINICAL SUPPORT (OUTPATIENT)
Dept: REHABILITATION | Facility: HOSPITAL | Age: 64
End: 2024-11-25
Payer: MEDICARE

## 2024-11-25 DIAGNOSIS — M25.661 DECREASED RANGE OF MOTION (ROM) OF RIGHT KNEE: ICD-10-CM

## 2024-11-25 DIAGNOSIS — R53.1 DECREASED STRENGTH: Primary | ICD-10-CM

## 2024-11-25 DIAGNOSIS — Z74.09 DECREASED MOBILITY AND ENDURANCE: ICD-10-CM

## 2024-11-25 PROCEDURE — 97112 NEUROMUSCULAR REEDUCATION: CPT | Mod: KX,PN,CQ

## 2024-11-25 PROCEDURE — 97110 THERAPEUTIC EXERCISES: CPT | Mod: KX,PN,CQ

## 2024-11-25 NOTE — PROGRESS NOTES
"[facility]   Physical Therapy Treatment Note      Name: Debbie Vo  Clinic Number: [id number]    Therapy Diagnosis:   Encounter Diagnoses   Name Primary?    Decreased strength Yes    Decreased range of motion (ROM) of right knee     Decreased mobility and endurance      Physician: Rich Reid MD    Visit Date: 11/21/2024    Physician Orders: PT Eval and Treat  Medical Diagnosis from Referral:   M17.11 (ICD-10-CM) - Primary osteoarthritis of right knee   M25.561,G89.29 (ICD-10-CM) - Chronic pain of right knee   M62.81 (ICD-10-CM) - Quadriceps weakness      Evaluation Date: 11/12/2024  Authorization Period Expiration: 12/31/24  Plan of Care Expiration: 1/10/25  Progress Note Due: 12/10/24  Date of Surgery: 11/11/24  Visit # / Visits authorized: 4/20, (+1)  FOTO: 1/ 3     Precautions: Standard, Depression, Gout, HTN     Time In: 3:00 PM  Time Out: 4:00 PM  Total Billable Time: 30 minutes (1TA, 1NMR)    PTA Visit #: 0/5     Subjective     Patient reports: knee feeling much better and thinks she is walking better as well.   She was compliant with home exercise program.  Response to previous treatment: no adverse response  Functional change: decreased R knee pain with weightbearing.    Pain: 4/10 "soreness pain"  Location: right posterior knee     Objective      Objective Measures updated at progress report unless specified.     Treatment     Debbie received the treatments listed below:      therapeutic exercises to develop strength, endurance, ROM, flexibility, posture, and core stabilization for 30 minutes including:  Heel prop: 3 minutes with belt around feet and towel under ankle  Seated heel slides: 30x right  Seated hamstring/calf stretch: 6x20'' holds  Seated marching: 15x2 each    manual therapy techniques: Joint mobilizations were applied to the: R knee for 00 minutes, including:  Patella mobs all planes available  Manual gastrocnemius st multiple bouts    neuromuscular " re-education activities to improve: Kinesthetic, Sense, and Proprioception for 15 minutes. The following activities were included:  Quad sets: 15x10'' holds, towel under knee  SLR AAROM R 2x8 - hold due to back pain    therapeutic activities to improve functional performance for 15 minutes, including:  -fwd stepping in // bars 10 ft x 3 trials   -lateral stepping in // bars 10 ft x 3 trials  -Mini-squats: 2x10  -Heel raises 2x15 in // Bilateral    Patient Education and Home Exercises       Education provided:   - cont HEP  -reviewed Knee anatomy  -Ice frequency w/ elevation    Written Home Exercises Provided: Pt instructed to continue prior HEP. Exercises were reviewed and Debbie was able to demonstrate them prior to the end of the session.  Debbie demonstrated good  understanding of the education provided. See Electronic Medical Record under Patient Instructions for exercises provided during therapy sessions    Assessment     Debbie is a 64 y.o. female referred to outpatient Physical Therapy with a medical diagnosis of Primary osteoarthritis of right knee, Chronic pain of right knee, and Quadriceps weakness. Pt received right TKA on 11/11/24.   Improved knee irritability level, able to progression strengthening and knee range of motion, and decreased assistance with all activities. Continue weight bearing activities and focus on quad strengthening.     Debbie Is progressing well towards her goals.   Patient prognosis is Good.     Patient will continue to benefit from skilled outpatient physical therapy to address the deficits listed in the problem list box on initial evaluation, provide pt/family education and to maximize pt's level of independence in the home and community environment.     Patient's spiritual, cultural and educational needs considered and pt agreeable to plan of care and goals.     Anticipated barriers to physical therapy: Gout, Depression    Goals:Goals:  Short Term Goals: 3 weeks  1. Pt will be  independent with HEP supplement PT in improving functional mobility.  2. Pt will improve right LE strength to at least 75% of left LE strength as measured via MicroFet handheld dynamometer in order to improve functional mobility.  3. Pt will improve right knee AROM to at least 3-100 degrees in order to improve gait.     Long Term Goals: 8 weeks  1. Pt will be independent with updated HEP supplement PT in improving functional mobility.  2. Pt will improve right LE strength to at least 90% of left LE strength as measured via MicroFet handheld dynamometer in order to improve functional mobility.  3. Pt will improve right knee AROM to at least 0-110 degrees in order to improve gait and ability to perform ADLs.  4. Pt will improve FOTO knee survey score to </= 55% limited in order to demo improved functional mobility.  5. Pt will perform TUG in < 10 seconds without AD in order to demo improved gait speed.  6. Pt will perform at least 12 sit to stands without UE support on 30 second sit to stand test in order to demo improved ability to perform transfers.     Plan   Cont to advance PT as per POC, monitor response to session   Plan of care Certification: 11/12/2024 to 1/10/25.     Outpatient Physical Therapy 3 times weekly for 8 weeks to include the following interventions: Cervical/Lumbar Traction, Debridement (nonselective), Debridement (selective), Electrical Stimulation  , Gait Training, Manual Therapy, Moist Heat/ Ice, Neuromuscular Re-ed, Orthotic Management and Training, Patient Education, Self Care, Therapeutic Activities, and Therapeutic Exercise.     Nghia Suarez, PT   11/25/2024

## 2024-11-25 NOTE — PROGRESS NOTES
"[facility]   Physical Therapy Treatment Note      Name: Debbie Vo  Clinic Number: [id number]    Therapy Diagnosis:   Encounter Diagnoses   Name Primary?    Decreased strength Yes    Decreased range of motion (ROM) of right knee     Decreased mobility and endurance      Physician: Rich Reid MD    Visit Date: 11/25/2024    Physician Orders: PT Eval and Treat  Medical Diagnosis from Referral:   M17.11 (ICD-10-CM) - Primary osteoarthritis of right knee   M25.561,G89.29 (ICD-10-CM) - Chronic pain of right knee   M62.81 (ICD-10-CM) - Quadriceps weakness      Evaluation Date: 11/12/2024  Authorization Period Expiration: 12/31/24  Plan of Care Expiration: 1/10/25  Progress Note Due: 12/10/24  Date of Surgery: 11/11/24  Visit # / Visits authorized: 5/20, (+1)  FOTO: 1/ 3     Precautions: Standard, Depression, Gout, HTN     Time In: 2:00 PM  Time Out: 3:00 PM  Total Billable Time: 30 minutes (1TA, 1NMR)    PTA Visit #: 1/5     Subjective     Patient reports: agreeable to PT session. No confirmation of HEP performance over weekend.   She was compliant with home exercise program.  Response to previous treatment: no adverse response  Functional change: decreased R knee pain with weightbearing.    Pain: 4/10 "soreness pain"  Location: right posterior knee     Objective      Objective Measures updated at progress report unless specified.     Treatment     Debbie received the treatments listed below:      therapeutic exercises to develop strength, endurance, ROM, flexibility, posture, and core stabilization for 30 minutes including:  Heel prop: 3 minutes with belt around feet and towel under ankle  Seated heel slides: 30x right  Seated hamstring/calf stretch: 6x20'' holds  Seated marching: 15x2 each    manual therapy techniques: Joint mobilizations were applied to the: R knee for 00 minutes, including:  Patella mobs all planes available  Manual gastrocnemius st multiple " bouts    neuromuscular re-education activities to improve: Kinesthetic, Sense, and Proprioception for 15 minutes. The following activities were included:  Quad sets: 15x10'' holds, towel under knee  SLR AAROM R 2x8 - hold due to back pain    therapeutic activities to improve functional performance for 15 minutes, including:  -fwd stepping in // bars 10 ft x 3 trials OOT  -lateral stepping in // bars 10 ft x 3 trials OOT  -Mini-squats: 2x10  -Heel raises 2x15 in // Bilateral  -Stationary cycle 1/2 revolutions x6 min to promote tissue pliability  -hip abduction 2x10 R standing   -Hip extension 2x10 R standing  Patient Education and Home Exercises       Education provided:   - cont HEP  -reviewed Knee anatomy  -Ice frequency w/ elevation    Written Home Exercises Provided: Pt instructed to continue prior HEP. Exercises were reviewed and Debbie was able to demonstrate them prior to the end of the session.  Debbie demonstrated good  understanding of the education provided. See Electronic Medical Record under Patient Instructions for exercises provided during therapy sessions    Assessment     Debbie is a 64 y.o. female referred to outpatient Physical Therapy with a medical diagnosis of Primary osteoarthritis of right knee, Chronic pain of right knee, and Quadriceps weakness. Pt received right TKA on 11/11/24.   Pt completed today's session focusing on TKA strengthening and ROM. No adverse response to session today. Incorporated stationary cycle (1/2 revolutions) for ROM and tissue pliability. Pt does demonstrate some guarding with knee flexion activities. Noted 2+edema in R LE tibial    Debbie Is progressing well towards her goals.   Patient prognosis is Good.     Patient will continue to benefit from skilled outpatient physical therapy to address the deficits listed in the problem list box on initial evaluation, provide pt/family education and to maximize pt's level of independence in the home and community environment.      Patient's spiritual, cultural and educational needs considered and pt agreeable to plan of care and goals.     Anticipated barriers to physical therapy: Gout, Depression    Goals:Goals:  Short Term Goals: 3 weeks  1. Pt will be independent with HEP supplement PT in improving functional mobility. (Ongoing, NOT MET)  2. Pt will improve right LE strength to at least 75% of left LE strength as measured via MicroFet handheld dynamometer in order to improve functional mobility. (Ongoing, NOT MET)  3. Pt will improve right knee AROM to at least 3-100 degrees in order to improve gait. (Ongoing, NOT MET)     Long Term Goals: 8 weeks  1. Pt will be independent with updated HEP supplement PT in improving functional mobility. (Ongoing, NOT MET)  2. Pt will improve right LE strength to at least 90% of left LE strength as measured via MicroFet handheld dynamometer in order to improve functional mobility. (Ongoing, NOT MET)  3. Pt will improve right knee AROM to at least 0-110 degrees in order to improve gait and ability to perform ADLs. (Ongoing, NOT MET)  4. Pt will improve FOTO knee survey score to </= 55% limited in order to demo improved functional mobility. (Ongoing, NOT MET)  5. Pt will perform TUG in < 10 seconds without AD in order to demo improved gait speed. (Ongoing, NOT MET)  6. Pt will perform at least 12 sit to stands without UE support on 30 second sit to stand test in order to demo improved ability to perform transfers. (Ongoing, NOT MET)    Plan   Cont to advance PT as per POC, monitor response to session   Plan of care Certification: 11/12/2024 to 1/10/25.     Outpatient Physical Therapy 3 times weekly for 8 weeks to include the following interventions: Cervical/Lumbar Traction, Debridement (nonselective), Debridement (selective), Electrical Stimulation  , Gait Training, Manual Therapy, Moist Heat/ Ice, Neuromuscular Re-ed, Orthotic Management and Training, Patient Education, Self Care, Therapeutic  Activities, and Therapeutic Exercise.     Endy Higuera, PTA   11/25/2024     no

## 2024-11-26 ENCOUNTER — CLINICAL SUPPORT (OUTPATIENT)
Dept: REHABILITATION | Facility: HOSPITAL | Age: 64
End: 2024-11-26
Payer: MEDICARE

## 2024-11-26 DIAGNOSIS — R53.1 DECREASED STRENGTH: Primary | ICD-10-CM

## 2024-11-26 DIAGNOSIS — M25.661 DECREASED RANGE OF MOTION (ROM) OF RIGHT KNEE: ICD-10-CM

## 2024-11-26 DIAGNOSIS — Z74.09 DECREASED MOBILITY AND ENDURANCE: ICD-10-CM

## 2024-11-26 PROCEDURE — 97112 NEUROMUSCULAR REEDUCATION: CPT | Mod: KX,PN

## 2024-11-26 PROCEDURE — 97530 THERAPEUTIC ACTIVITIES: CPT | Mod: KX,PN

## 2024-11-27 ENCOUNTER — OFFICE VISIT (OUTPATIENT)
Dept: ORTHOPEDICS | Facility: CLINIC | Age: 64
End: 2024-11-27
Payer: MEDICARE

## 2024-11-27 VITALS — WEIGHT: 293 LBS | BODY MASS INDEX: 44.41 KG/M2 | HEIGHT: 68 IN

## 2024-11-27 DIAGNOSIS — Z96.651 AFTERCARE FOLLOWING RIGHT KNEE JOINT REPLACEMENT SURGERY: Primary | ICD-10-CM

## 2024-11-27 DIAGNOSIS — Z47.1 AFTERCARE FOLLOWING RIGHT KNEE JOINT REPLACEMENT SURGERY: Primary | ICD-10-CM

## 2024-11-27 DIAGNOSIS — M53.3 SACROILIAC JOINT DYSFUNCTION OF RIGHT SIDE: ICD-10-CM

## 2024-11-27 PROCEDURE — 99024 POSTOP FOLLOW-UP VISIT: CPT | Mod: S$GLB,,, | Performed by: PHYSICIAN ASSISTANT

## 2024-11-27 PROCEDURE — 1159F MED LIST DOCD IN RCRD: CPT | Mod: CPTII,S$GLB,, | Performed by: PHYSICIAN ASSISTANT

## 2024-11-27 PROCEDURE — 99999 PR PBB SHADOW E&M-EST. PATIENT-LVL IV: CPT | Mod: PBBFAC,,, | Performed by: PHYSICIAN ASSISTANT

## 2024-11-27 PROCEDURE — 4010F ACE/ARB THERAPY RXD/TAKEN: CPT | Mod: CPTII,S$GLB,, | Performed by: PHYSICIAN ASSISTANT

## 2024-11-27 NOTE — PROGRESS NOTES
Subjective:      Chief Complaint: Pain and Post-op Evaluation of the Right Knee    Patient ID: Debbie Vo is a 64 y.o. female.  Patient is 2 weeks s/p  right primary total knee replacement  Anterior knee pain: Yes  Has improved pain  Is in physical therapy  Yes  Problems w incision  No  Is  happy with result  Yes  Opiod free: No-taking tramadol   Also taking diclofenac, flexeril and gabapentin. States she is having SI joint pain. Has seen pain management for this before. In PT and does home exercise program. States her back is interfering with PT. Using walker today.         Past Medical History:   Diagnosis Date    Benign essential hypertension     Colon cancer     Depression     Gout, unspecified     Osteoarthritis         Past Surgical History:   Procedure Laterality Date    CHOLECYSTECTOMY      COLON SURGERY  2002    colon resection    COLONOSCOPY N/A 03/24/2016    Procedure: COLONOSCOPY;  Surgeon: Ernst Bobby MD;  Location: [facility] ENDO;  Service: Endoscopy;  Laterality: N/A;  Needs Flex sigmoidoscopy    EPIDURAL STEROID INJECTION INTO LUMBAR SPINE N/A 06/21/2018    Procedure: Injection-steroid-epidural-lumbar;  Surgeon: Avi Morales Jr., MD;  Location: [facility] PAIN MGT;  Service: Pain Management;  Laterality: N/A;  ORAL SEDATION    HYSTERECTOMY      INJECTION OF ANESTHETIC AGENT INTO SACROILIAC JOINT Bilateral 08/25/2021    Procedure: BLOCK, SACROILIAC JOINT*-- bilateral;  Surgeon: Lucia Carroll MD;  Location: [facility] PAIN MGT;  Service: Pain Management;  Laterality: Bilateral;    PARTIAL HYSTERECTOMY      TOTAL KNEE ARTHROPLASTY Right 11/11/2024    Procedure: ARTHROPLASTY, KNEE, TOTAL monoblock;  Surgeon: Rich Reid MD;  Location: [facility] OR;  Service: Orthopedics;  Laterality: Right;  bmi - 43.58        Current Outpatient Medications   Medication Instructions    albuterol (VENTOLIN HFA) 90 mcg/actuation inhaler 2 puffs, Inhalation, Every 6 hours PRN, Rescue    allopurinoL  (ZYLOPRIM) 300 mg, Oral, Daily    amLODIPine (NORVASC) 5 mg, Oral, Daily    ammonium lactate (LAC-HYDRIN) 12 % lotion Apply to damp skin after bathing    aspirin (ECOTRIN) 81 mg, Oral, 2 times daily    benzonatate (TESSALON) 200 MG capsule take 1 BY MOUTH THREE TIMES DAILY AS NEEDED FOR COUGH    colchicine (COLCRYS) 0.6 mg, Oral, Daily    colchicine-probenecid 0.5-500 mg (CO-BENEMID) 500-0.5 mg Tab 1 tablet, Oral, Daily    cyclobenzaprine (FLEXERIL) 5 mg, Oral, Nightly    diclofenac (VOLTAREN) 75 mg, Oral, 2 times daily    diclofenac sodium (VOLTAREN) 2 g, Topical (Top), 4 times daily    famotidine (PEPCID) 20 mg, Oral, 2 times daily    FLUoxetine 20 mg, Oral, Daily    fluticasone propionate (FLONASE) 50 mcg, Each Nostril, Daily    gabapentin (NEURONTIN) 300 mg, Oral, 3 times daily PRN    loratadine (CLARITIN) 10 mg, Oral, Daily    meclizine (ANTIVERT) 25 mg, Oral, 3 times daily PRN    metoprolol succinate (TOPROL-XL) 100 mg, Oral, Daily    neomycin-polymyxin-hydrocortisone (CORTISPORIN) 3.5-10,000-0.5 mg/g-unit/g-% cream Topical (Top), 2 times daily    neomycin-polymyxin-hydrocortisone (CORTISPORIN) 3.5-10,000-1 mg/mL-unit/mL-% otic suspension 3 drops, Both Ears, 3 times daily    omeprazole (PRILOSEC) 20 mg, Oral, Daily    topiramate (TOPAMAX) 50 mg, Oral, Nightly    traMADoL (ULTRAM) 50 mg, Oral, Every 12 hours PRN    triamcinolone acetonide 0.1% (KENALOG) 0.1 % cream Topical (Top), 2 times daily PRN    valACYclovir (VALTREX) 1,000 mg, Oral, 3 times daily    valsartan (DIOVAN) 320 mg, Oral, Daily        Review of patient's allergies indicates:  No Known Allergies    Review of Systems   Constitutional: Negative for fever and malaise/fatigue.   Eyes:  Negative for blurred vision.   Cardiovascular:  Negative for chest pain.   Respiratory:  Negative for shortness of breath.    Skin:  Negative for poor wound healing.   Musculoskeletal:  Positive for joint pain and myalgias.   Genitourinary:  Negative for bladder  "incontinence.   Neurological:  Negative for dizziness, numbness and paresthesias.   Psychiatric/Behavioral:  Negative for altered mental status.        The patient's relevant past medical, surgical, and social history was reviewed in Epic.       Objective:      VITAL SIGNS: Ht 5' 8" (1.727 m)   Wt 134.7 kg (296 lb 15.4 oz)   BMI 45.15 kg/m²     General    Nursing note and vitals reviewed.  Constitutional: She is oriented to person, place, and time. She appears well-developed and well-nourished.   Neurological: She is alert and oriented to person, place, and time.     General Musculoskeletal Exam   Gait: antalgic       Right Knee Exam     Inspection   Scars: present    Tenderness   The patient is experiencing no tenderness.     Range of Motion   Extension:  5   Flexion:  80     Tests   Ligament Examination   Lachman: normal (-1 to 2mm)     Other   Sensation: normal    Comments:  5-85 ROM  Incision healing well. No redness or drainage.     Muscle Strength   Right Lower Extremity   Quadriceps:  4/5   Hamstrin/5        Imaging          Assessment:       Debbie Vo is a 64 y.o. female seen in the office today for   1. Aftercare following right knee joint replacement surgery    2. Sacroiliac joint dysfunction of right side     Patient is doing well but flexion not to 90 yet. Will have her follow up in about 3 weeks for ROM check. Stressed the importance of home exercises to the patient.   Will refer back to pain management for her back/SI joint pain.       Plan:       Debbie was seen today for pain and post-op evaluation.    Diagnoses and all orders for this visit:    Aftercare following right knee joint replacement surgery    Sacroiliac joint dysfunction of right side  -     Ambulatory referral/consult to Pain Clinic; Future      Continue PT/HEP  RTC in 3 weeks for ROM check  Follow up with pain management     Diagnoses and plan discussed with the patient, as well as the expected course and duration " of his symptoms.  All questions and concerns were addressed prior to the end of the visit.   Instructed patient to call office if they have any future questions/concerns or to schedule apt. Patient will return to see me if symptoms worsen or fail to improve    Note dictated with voice recognition software, please excuse any grammatical errors.        Arely Gale PA-C      Department of Orthopedic Surgery  [facility]  Office: [phone]  11/27/2024

## 2024-11-29 ENCOUNTER — CLINICAL SUPPORT (OUTPATIENT)
Dept: REHABILITATION | Facility: HOSPITAL | Age: 64
End: 2024-11-29
Payer: MEDICARE

## 2024-11-29 DIAGNOSIS — Z74.09 DECREASED MOBILITY AND ENDURANCE: ICD-10-CM

## 2024-11-29 DIAGNOSIS — R53.1 DECREASED STRENGTH: Primary | ICD-10-CM

## 2024-11-29 DIAGNOSIS — M25.661 DECREASED RANGE OF MOTION (ROM) OF RIGHT KNEE: ICD-10-CM

## 2024-11-29 PROCEDURE — 97530 THERAPEUTIC ACTIVITIES: CPT | Mod: KX,PN

## 2024-11-29 PROCEDURE — 97112 NEUROMUSCULAR REEDUCATION: CPT | Mod: KX,PN

## 2024-11-29 NOTE — PROGRESS NOTES
"[facility]   Physical Therapy Treatment Note      Name: Debbie Vo  Clinic Number: [id number]    Therapy Diagnosis:   Encounter Diagnoses   Name Primary?    Decreased strength Yes    Decreased range of motion (ROM) of right knee     Decreased mobility and endurance      Physician: Rich Reid MD    Visit Date: 11/29/2024    Physician Orders: PT Eval and Treat  Medical Diagnosis from Referral:   M17.11 (ICD-10-CM) - Primary osteoarthritis of right knee   M25.561,G89.29 (ICD-10-CM) - Chronic pain of right knee   M62.81 (ICD-10-CM) - Quadriceps weakness      Evaluation Date: 11/12/2024  Authorization Period Expiration: 12/31/24  Plan of Care Expiration: 1/10/25  Progress Note Due: 12/10/24  Date of Surgery: 11/11/24  Visit # / Visits authorized: 8/20, (+1)  FOTO: 1/3     Precautions: Standard, Depression, Gout, HTN     Time In: 1:05 PM  Time Out: 2:10 PM  Total Billable Time: 40 minutes (2TA, 1NMR)    PTA Visit #: 0/5     Subjective     Patient reports: knee doing better, some soreness, doing exercises at home.   She was compliant with home exercise program.  Response to previous treatment: no adverse response  Functional change: decreased R knee pain with weightbearing.    Pain: 4/10 "soreness pain"  Location: right posterior knee     Objective      Objective Measures updated at progress report unless specified.     Treatment     Debbie received the treatments listed below:      therapeutic exercises to develop strength, endurance, ROM, flexibility, posture, and core stabilization for 15 minutes including:  -Stationary cycle 1/2 to full revolutions x6 min to promote tissue pliability  -Heel prop: 3 minutes with belt around feet and towel under ankle  -Supine heel slides: 6x10'' holds with staps  -Seated hamstring/calf stretch: 6x20'' holds    manual therapy techniques: Joint mobilizations were applied to the: R knee for 00 minutes, including:  -Patella mobs all planes " available  -Manual gastrocnemius st multiple bouts    neuromuscular re-education activities to improve: Kinesthetic, Sense, and Proprioception for 10 minutes. The following activities were included:  -Quad sets: 15x10'' holds, towel under knee  -SLR AAROM R 2x8 - hold due to back pain  -Standing hip abduction 3x10 Bilateral    therapeutic activities to improve functional performance for 30 minutes, including:  -Fwd stepping in // bars 10 ft x 3 trials  -Lateral stepping in // bars 10 ft x 3 trials  -Mini-squats: 2x12  -Heel raises 2x20 in // Bilateral  -Step ups: 2x10, 4 inch step    Patient Education and Home Exercises       Education provided:   - cont HEP  -reviewed Knee anatomy  -Ice frequency w/ elevation    Written Home Exercises Provided: Pt instructed to continue prior HEP. Exercises were reviewed and Debbie was able to demonstrate them prior to the end of the session.  Debbie demonstrated good  understanding of the education provided. See Electronic Medical Record under Patient Instructions for exercises provided during therapy sessions    Assessment     Debbie is a 64 y.o. female referred to outpatient Physical Therapy with a medical diagnosis of Primary osteoarthritis of right knee, Chronic pain of right knee, and Quadriceps weakness. Pt received right TKA on 11/11/24.   Steadily improving week to week, goal to get on SPC by end of next 2 weeks, knee extension nearing neutral but quad strength lagging, and chronic right sided sciatica is a current limiting factor. Consider Dynasplint for knee flexion by end of next week if not > than 105 degrees.    Debbie Is progressing well towards her goals.   Patient prognosis is Good.     Patient will continue to benefit from skilled outpatient physical therapy to address the deficits listed in the problem list box on initial evaluation, provide pt/family education and to maximize pt's level of independence in the home and community environment.     Patient's  spiritual, cultural and educational needs considered and pt agreeable to plan of care and goals.     Anticipated barriers to physical therapy: Gout, Depression    Goals:Goals:  Short Term Goals: 3 weeks  1. Pt will be independent with HEP supplement PT in improving functional mobility. (Ongoing, NOT MET)  2. Pt will improve right LE strength to at least 75% of left LE strength as measured via MicroFet handheld dynamometer in order to improve functional mobility. (Ongoing, NOT MET)  3. Pt will improve right knee AROM to at least 3-100 degrees in order to improve gait. (Ongoing, NOT MET)     Long Term Goals: 8 weeks  1. Pt will be independent with updated HEP supplement PT in improving functional mobility. (Ongoing, NOT MET)  2. Pt will improve right LE strength to at least 90% of left LE strength as measured via MicroFet handheld dynamometer in order to improve functional mobility. (Ongoing, NOT MET)  3. Pt will improve right knee AROM to at least 0-110 degrees in order to improve gait and ability to perform ADLs. (Ongoing, NOT MET)  4. Pt will improve FOTO knee survey score to </= 55% limited in order to demo improved functional mobility. (Ongoing, NOT MET)  5. Pt will perform TUG in < 10 seconds without AD in order to demo improved gait speed. (Ongoing, NOT MET)  6. Pt will perform at least 12 sit to stands without UE support on 30 second sit to stand test in order to demo improved ability to perform transfers. (Ongoing, NOT MET)    Plan   Cont to advance PT as per POC, monitor response to session.    Plan of care Certification: 11/12/2024 to 1/10/25.     Outpatient Physical Therapy 3 times weekly for 8 weeks to include the following interventions: Cervical/Lumbar Traction, Debridement (nonselective), Debridement (selective), Electrical Stimulation  , Gait Training, Manual Therapy, Moist Heat/ Ice, Neuromuscular Re-ed, Orthotic Management and Training, Patient Education, Self Care, Therapeutic Activities, and  Therapeutic Exercise.     Nghia Suarez, PT, DPT   11/29/2024

## 2024-12-01 NOTE — PROGRESS NOTES
"[facility]   Physical Therapy Treatment Note      Name: Debbie Vo  Clinic Number: [id number]    Therapy Diagnosis:   Encounter Diagnoses   Name Primary?    Decreased strength Yes    Decreased range of motion (ROM) of right knee     Decreased mobility and endurance      Physician: Rich Reid MD    Visit Date: 11/26/2024    Physician Orders: PT Eval and Treat  Medical Diagnosis from Referral:   M17.11 (ICD-10-CM) - Primary osteoarthritis of right knee   M25.561,G89.29 (ICD-10-CM) - Chronic pain of right knee   M62.81 (ICD-10-CM) - Quadriceps weakness      Evaluation Date: 11/12/2024  Authorization Period Expiration: 12/31/24  Plan of Care Expiration: 1/10/25  Progress Note Due: 12/10/24  Date of Surgery: 11/11/24  Visit # / Visits authorized: 6/20, (+1)  FOTO: 1/3     Precautions: Standard, Depression, Gout, HTN     Time In: 1:05 PM  Time Out: 2:00 PM  Total Billable Time: 30 minutes (1TA, 1NMR)    PTA Visit #: 0/5     Subjective     Patient reports: knee feeling better and walking better with RW. Feels like her swelling is going down a bit. Not wearing BRADFORD hoses.   She was compliant with home exercise program.  Response to previous treatment: no adverse response  Functional change: decreased R knee pain with weightbearing.    Pain: 4/10 "soreness pain"  Location: right posterior knee     Objective      Objective Measures updated at progress report unless specified.     Treatment     Debbie received the treatments listed below:      therapeutic exercises to develop strength, endurance, ROM, flexibility, posture, and core stabilization for 30 minutes including:  Heel prop: 4 minutes with belt around feet and towel under ankle, 2# weight on knee  Seated heel slides with overpressure: 30x right  Seated hamstring/calf stretch: 6x20'' holds  Standing  marching: 15x2 each    manual therapy techniques: Joint mobilizations were applied to the: R knee for 00 minutes, " including:  Patella mobs all planes available  Manual gastrocnemius st multiple bouts    neuromuscular re-education activities to improve: Kinesthetic, Sense, and Proprioception for 15 minutes. The following activities were included:  Quad sets: 15x10'' holds, towel under knee  SLR AAROM R 2x8    therapeutic activities to improve functional performance for 15 minutes, including:  -fwd stepping in // bars 10 ft x 3 trials  -lateral stepping in // bars 10 ft x 3 trials  -Mini-squats: 2x10  -Heel raises 2x15 in // Bilateral  -Stationary cycle 1/2 revolutions x6 min to promote tissue pliability  -hip abduction 2x10 R standing     Patient Education and Home Exercises       Education provided:   - cont HEP  -reviewed Knee anatomy  -Ice frequency w/ elevation    Written Home Exercises Provided: Pt instructed to continue prior HEP. Exercises were reviewed and Debbie was able to demonstrate them prior to the end of the session.  Debbie demonstrated good  understanding of the education provided. See Electronic Medical Record under Patient Instructions for exercises provided during therapy sessions    Assessment     Debbie is a 64 y.o. female referred to outpatient Physical Therapy with a medical diagnosis of Primary osteoarthritis of right knee, Chronic pain of right knee, and Quadriceps weakness. Pt received right TKA on 11/11/24.   Improving quad strength and knee extension, pain better controlled and knee able to tolerate more standing activities. Right sided sciatica is main barrier however has been better controlled as off late.     Debbie Is progressing well towards her goals.   Patient prognosis is Good.     Patient will continue to benefit from skilled outpatient physical therapy to address the deficits listed in the problem list box on initial evaluation, provide pt/family education and to maximize pt's level of independence in the home and community environment.     Patient's spiritual, cultural and educational  needs considered and pt agreeable to plan of care and goals.     Anticipated barriers to physical therapy: Gout, Depression    Goals:Goals:  Short Term Goals: 3 weeks  1. Pt will be independent with HEP supplement PT in improving functional mobility. (Ongoing, NOT MET)  2. Pt will improve right LE strength to at least 75% of left LE strength as measured via MicroFet handheld dynamometer in order to improve functional mobility. (Ongoing, NOT MET)  3. Pt will improve right knee AROM to at least 3-100 degrees in order to improve gait. (Ongoing, NOT MET)     Long Term Goals: 8 weeks  1. Pt will be independent with updated HEP supplement PT in improving functional mobility. (Ongoing, NOT MET)  2. Pt will improve right LE strength to at least 90% of left LE strength as measured via MicroFet handheld dynamometer in order to improve functional mobility. (Ongoing, NOT MET)  3. Pt will improve right knee AROM to at least 0-110 degrees in order to improve gait and ability to perform ADLs. (Ongoing, NOT MET)  4. Pt will improve FOTO knee survey score to </= 55% limited in order to demo improved functional mobility. (Ongoing, NOT MET)  5. Pt will perform TUG in < 10 seconds without AD in order to demo improved gait speed. (Ongoing, NOT MET)  6. Pt will perform at least 12 sit to stands without UE support on 30 second sit to stand test in order to demo improved ability to perform transfers. (Ongoing, NOT MET)    Plan   Cont to advance PT as per POC, monitor response to session   Plan of care Certification: 11/12/2024 to 1/10/25.     Outpatient Physical Therapy 3 times weekly for 8 weeks to include the following interventions: Cervical/Lumbar Traction, Debridement (nonselective), Debridement (selective), Electrical Stimulation  , Gait Training, Manual Therapy, Moist Heat/ Ice, Neuromuscular Re-ed, Orthotic Management and Training, Patient Education, Self Care, Therapeutic Activities, and Therapeutic Exercise.     Nghia Suarez,  PT, DPT   11/30/2024

## 2024-12-03 ENCOUNTER — CLINICAL SUPPORT (OUTPATIENT)
Dept: REHABILITATION | Facility: HOSPITAL | Age: 64
End: 2024-12-03
Payer: MEDICARE

## 2024-12-03 DIAGNOSIS — Z74.09 DECREASED MOBILITY AND ENDURANCE: ICD-10-CM

## 2024-12-03 DIAGNOSIS — R53.1 DECREASED STRENGTH: Primary | ICD-10-CM

## 2024-12-03 DIAGNOSIS — M25.661 DECREASED RANGE OF MOTION (ROM) OF RIGHT KNEE: ICD-10-CM

## 2024-12-03 PROCEDURE — 97110 THERAPEUTIC EXERCISES: CPT | Mod: PN,CQ

## 2024-12-03 PROCEDURE — 97112 NEUROMUSCULAR REEDUCATION: CPT | Mod: PN,CQ

## 2024-12-03 PROCEDURE — 97530 THERAPEUTIC ACTIVITIES: CPT | Mod: PN,CQ

## 2024-12-03 NOTE — PROGRESS NOTES
"[facility]   Physical Therapy Treatment Note      Name: Debbie Vo  Clinic Number: [id number]    Therapy Diagnosis:   Encounter Diagnoses   Name Primary?    Decreased strength Yes    Decreased range of motion (ROM) of right knee     Decreased mobility and endurance      Physician: Rich Reid MD    Visit Date: 12/3/2024    Physician Orders: PT Eval and Treat  Medical Diagnosis from Referral:   M17.11 (ICD-10-CM) - Primary osteoarthritis of right knee   M25.561,G89.29 (ICD-10-CM) - Chronic pain of right knee   M62.81 (ICD-10-CM) - Quadriceps weakness      Evaluation Date: 11/12/2024  Authorization Period Expiration: 12/31/24  Plan of Care Expiration: 1/10/25  Progress Note Due: 12/10/24  Date of Surgery: 11/11/24  Visit # / Visits authorized: 9/20, (+1)  FOTO: 1/ 3     Precautions: Standard, Depression, Gout, HTN     Time In: 3:00 PM  Time Out: 4:00 PM  Total Billable Time: 55 minutes (2TA, 1NMR) KX MODIFIER    PTA Visit #: 1/5     Subjective     Patient reports: agreeable to PT session. No confirmation of HEP performance over weekend.   She was compliant with home exercise program.  Response to previous treatment: no adverse response  Functional change: decreased R knee pain with weightbearing.    Pain: 4/10 "soreness pain"  Location: right posterior knee     Objective      Objective Measures updated at progress report unless specified.     Treatment     Debbie received the treatments listed below:      therapeutic exercises to develop strength, endurance, ROM, flexibility, posture, and core stabilization for 12 minutes including:  -Stationary cycle 1/2 to full revolutions x6 min to promote tissue pliability  Heel prop: 3 minutes with belt around feet and towel under ankle  Seated heel slides: 30x right  Seated hamstring/calf stretch: 6x20'' holds    manual therapy techniques: Joint mobilizations were applied to the: R knee for 7 minutes, including:  Patella mobs all planes " available  Retrograde massage  Effleurage for edema reduction   Manual gastrocnemius st multiple bouts    neuromuscular re-education activities to improve: Kinesthetic, Sense, and Proprioception for 10 minutes. The following activities were included:  Quad sets: 15x10'' holds, towel under knee  SLR AAROM R 2x8 - hold due to back pain    therapeutic activities to improve functional performance for 25 minutes, including:  -fwd stepping in // bars 10 ft x 3 trials  -lateral stepping in // bars 10 ft x 3 trials  -Mini-squats: 2x12  -Heel raises 2x20 in // Bilateral  -hip abduction 3x10 R standing   -step ups: 2x10, 4 inch step    Patient Education and Home Exercises       Education provided:   - cont HEP  -reviewed Knee anatomy  -Ice frequency w/ elevation    Written Home Exercises Provided: Pt instructed to continue prior HEP. Exercises were reviewed and Debbie was able to demonstrate them prior to the end of the session.  Debbie demonstrated good  understanding of the education provided. See Electronic Medical Record under Patient Instructions for exercises provided during therapy sessions    Assessment     Debbie is a 64 y.o. female referred to outpatient Physical Therapy with a medical diagnosis of Primary osteoarthritis of right knee, Chronic pain of right knee, and Quadriceps weakness. Pt received right TKA on 11/11/24.   Pt arrived to session with grade II+ edema and R knee stiffness, effleurage massage for edema reduction. Pt tolerated massage without complaints of pain. Pt performed standing therex without complaints of pain or fatigue, but complained about R knee stiffness. Monitor R knee edema next visit.          Debbie Is progressing well towards her goals.   Patient prognosis is Good.     Patient will continue to benefit from skilled outpatient physical therapy to address the deficits listed in the problem list box on initial evaluation, provide pt/family education and to maximize pt's level of  independence in the home and community environment.     Patient's spiritual, cultural and educational needs considered and pt agreeable to plan of care and goals.     Anticipated barriers to physical therapy: Gout, Depression    Goals:Goals:  Short Term Goals: 3 weeks  1. Pt will be independent with HEP supplement PT in improving functional mobility. (Ongoing, NOT MET)  2. Pt will improve right LE strength to at least 75% of left LE strength as measured via MicroFet handheld dynamometer in order to improve functional mobility. (Ongoing, NOT MET)  3. Pt will improve right knee AROM to at least 3-100 degrees in order to improve gait. (Ongoing, NOT MET)     Long Term Goals: 8 weeks  1. Pt will be independent with updated HEP supplement PT in improving functional mobility. (Ongoing, NOT MET)  2. Pt will improve right LE strength to at least 90% of left LE strength as measured via MicroFet handheld dynamometer in order to improve functional mobility. (Ongoing, NOT MET)  3. Pt will improve right knee AROM to at least 0-110 degrees in order to improve gait and ability to perform ADLs. (Ongoing, NOT MET)  4. Pt will improve FOTO knee survey score to </= 55% limited in order to demo improved functional mobility. (Ongoing, NOT MET)  5. Pt will perform TUG in < 10 seconds without AD in order to demo improved gait speed. (Ongoing, NOT MET)  6. Pt will perform at least 12 sit to stands without UE support on 30 second sit to stand test in order to demo improved ability to perform transfers. (Ongoing, NOT MET)    Plan   Cont to advance PT as per POC, monitor response to session   Plan of care Certification: 11/12/2024 to 1/10/25.     Outpatient Physical Therapy 3 times weekly for 8 weeks to include the following interventions: Cervical/Lumbar Traction, Debridement (nonselective), Debridement (selective), Electrical Stimulation  , Gait Training, Manual Therapy, Moist Heat/ Ice, Neuromuscular Re-ed, Orthotic Management and Training,  Patient Education, Self Care, Therapeutic Activities, and Therapeutic Exercise.     Endy Higuera, PTA   12/3/2024

## 2024-12-04 ENCOUNTER — CLINICAL SUPPORT (OUTPATIENT)
Dept: REHABILITATION | Facility: HOSPITAL | Age: 64
End: 2024-12-04
Payer: MEDICARE

## 2024-12-04 DIAGNOSIS — Z74.09 DECREASED MOBILITY AND ENDURANCE: ICD-10-CM

## 2024-12-04 DIAGNOSIS — R53.1 DECREASED STRENGTH: Primary | ICD-10-CM

## 2024-12-04 DIAGNOSIS — M25.661 DECREASED RANGE OF MOTION (ROM) OF RIGHT KNEE: ICD-10-CM

## 2024-12-04 PROCEDURE — 97112 NEUROMUSCULAR REEDUCATION: CPT | Mod: KX,PN,CQ

## 2024-12-04 PROCEDURE — 97530 THERAPEUTIC ACTIVITIES: CPT | Mod: KX,PN,CQ

## 2024-12-04 NOTE — PROGRESS NOTES
"[facility]   Physical Therapy Treatment Note      Name: Debbie Vo  Clinic Number: [id number]    Therapy Diagnosis:   Encounter Diagnoses   Name Primary?    Decreased strength Yes    Decreased range of motion (ROM) of right knee     Decreased mobility and endurance      Physician: Rich Reid MD    Visit Date: 12/4/2024    Physician Orders: PT Eval and Treat  Medical Diagnosis from Referral:   M17.11 (ICD-10-CM) - Primary osteoarthritis of right knee   M25.561,G89.29 (ICD-10-CM) - Chronic pain of right knee   M62.81 (ICD-10-CM) - Quadriceps weakness      Evaluation Date: 11/12/2024  Authorization Period Expiration: 12/31/24  Plan of Care Expiration: 1/10/25  Progress Note Due: 12/10/24  Date of Surgery: 11/11/24  Visit # / Visits authorized: 8/20, (+1)  FOTO: 1/ 3     Precautions: Standard, Depression, Gout, HTN     Time In: 3:00 PM  Time Out: 4:00 PM  Total Billable Time: 30 minutes (1TA, 1NMR) KX MODIFIER    PTA Visit #: 2/5     Subjective     Patient reports: agreeable to PT session.   She was compliant with home exercise program.  Response to previous treatment: no adverse response  Functional change: decreased R knee pain with weightbearing.    Pain: 4/10 "soreness pain"  Location: right posterior knee     Objective    12/4/2024  Pt arrived with edema, girth measurements pre and post RLE elevation:  Pre elevation: Ankle 27 cm, Knee 61 cm, Mid-tibia 49 cm  Post elevation: Ankle 26.5 cm, Knee 59 cm, Mid-tibia 46 cm   90' 4*    Treatment     Debbie received the treatments listed below:      therapeutic exercises to develop strength, endurance, ROM, flexibility, posture, and core stabilization for 15 minutes including:  -Stationary cycle 1/2 to full revolutions x6 min to promote tissue pliability  Heel prop: 3 minutes with belt around feet and towel under ankle  Seated heel slides: 30x right  Seated hamstring/calf stretch: 6x20'' holds    manual therapy techniques: Joint " mobilizations were applied to the: R knee for 00 minutes, including:  Patella mobs all planes available  Manual gastrocnemius st multiple bouts    neuromuscular re-education activities to improve: Kinesthetic, Sense, and Proprioception for 15 minutes. The following activities were included:  Quad sets: 15x10'' holds, towel under knee  SLR AAROM R 2x8 - hold due to back pain    therapeutic activities to improve functional performance for 25 minutes, including:  -fwd stepping in // bars 10 ft x 3 trials  -lateral stepping in // bars 10 ft x 3 trials  -Mini-squats: 2x12  -Heel raises 2x20 in // Bilateral  -hip abduction 3x10 R standing   -step ups: 2x10, 4 inch step    Patient Education and Home Exercises       Education provided:   - cont HEP  -reviewed Knee anatomy  -Ice frequency w/ elevation    Written Home Exercises Provided: Pt instructed to continue prior HEP. Exercises were reviewed and Debbie was able to demonstrate them prior to the end of the session.  Debbie demonstrated good  understanding of the education provided. See Electronic Medical Record under Patient Instructions for exercises provided during therapy sessions    Assessment     Debbie is a 64 y.o. female referred to outpatient Physical Therapy with a medical diagnosis of Primary osteoarthritis of right knee, Chronic pain of right knee, and Quadriceps weakness. Pt received right TKA on 11/11/24. She arrived to today's session with edema in the RLE, effleurage massage from prior treatment did decrease edema. Continuation of effleurage massage while in elevation for today's treatment for edema control. Assessed girth measurements pre and post manual therapy as per log, results with favorable outcomes (Reduced by at least 1.5 cm, as per log). Continue to monitor edema for next visit, continuation of therex and neuromuscular education if appropriate.      Debbie Is progressing well towards her goals.   Patient prognosis is Good.     Patient will  continue to benefit from skilled outpatient physical therapy to address the deficits listed in the problem list box on initial evaluation, provide pt/family education and to maximize pt's level of independence in the home and community environment.     Patient's spiritual, cultural and educational needs considered and pt agreeable to plan of care and goals.     Anticipated barriers to physical therapy: Gout, Depression    Goals:Goals:  Short Term Goals: 3 weeks  1. Pt will be independent with HEP supplement PT in improving functional mobility. (Ongoing, NOT MET)  2. Pt will improve right LE strength to at least 75% of left LE strength as measured via MicroFet handheld dynamometer in order to improve functional mobility. (Ongoing, NOT MET)  3. Pt will improve right knee AROM to at least 3-100 degrees in order to improve gait. (Ongoing, NOT MET)     Long Term Goals: 8 weeks  1. Pt will be independent with updated HEP supplement PT in improving functional mobility. (Ongoing, NOT MET)  2. Pt will improve right LE strength to at least 90% of left LE strength as measured via MicroFet handheld dynamometer in order to improve functional mobility. (Ongoing, NOT MET)  3. Pt will improve right knee AROM to at least 0-110 degrees in order to improve gait and ability to perform ADLs. (Ongoing, NOT MET)  4. Pt will improve FOTO knee survey score to </= 55% limited in order to demo improved functional mobility. (Ongoing, NOT MET)  5. Pt will perform TUG in < 10 seconds without AD in order to demo improved gait speed. (Ongoing, NOT MET)  6. Pt will perform at least 12 sit to stands without UE support on 30 second sit to stand test in order to demo improved ability to perform transfers. (Ongoing, NOT MET)    Plan   Cont to advance PT as per POC, monitor response to session   Plan of care Certification: 11/12/2024 to 1/10/25.     Outpatient Physical Therapy 3 times weekly for 8 weeks to include the following interventions:  Cervical/Lumbar Traction, Debridement (nonselective), Debridement (selective), Electrical Stimulation  , Gait Training, Manual Therapy, Moist Heat/ Ice, Neuromuscular Re-ed, Orthotic Management and Training, Patient Education, Self Care, Therapeutic Activities, and Therapeutic Exercise.     Endy Higuera, PTA   12/4/2024

## 2024-12-05 ENCOUNTER — TELEPHONE (OUTPATIENT)
Dept: ORTHOPEDICS | Facility: CLINIC | Age: 64
End: 2024-12-05
Payer: MEDICARE

## 2024-12-05 DIAGNOSIS — Z47.1 AFTERCARE FOLLOWING RIGHT KNEE JOINT REPLACEMENT SURGERY: ICD-10-CM

## 2024-12-05 DIAGNOSIS — Z96.651 AFTERCARE FOLLOWING RIGHT KNEE JOINT REPLACEMENT SURGERY: ICD-10-CM

## 2024-12-05 RX ORDER — TRAMADOL HYDROCHLORIDE 50 MG/1
50 TABLET ORAL EVERY 12 HOURS PRN
Qty: 28 TABLET | Refills: 0 | Status: SHIPPED | OUTPATIENT
Start: 2024-12-05 | End: 2024-12-19

## 2024-12-05 NOTE — TELEPHONE ENCOUNTER
----- Message from Lorrie sent at 12/5/2024  2:00 PM CST -----  Type:  RX Refill Request    Who Called:  Pt  Refill or New Rx: Refill  RX Name and Strength:  Tramadol 50mg  How is the patient currently taking it? (ex. 1XDay):  Is this a 30 day or 90 day RX:  Preferred Pharmacy with phone number: [facility]  [facility] [place] [address] [facility] [address]  Local or Mail Order: Local  Ordering Provider: Dr. Reid  Would the patient rather a call back or a response via [facility]? Call  Best Call Back Number:  [phone]  Additional Information:

## 2024-12-06 ENCOUNTER — CLINICAL SUPPORT (OUTPATIENT)
Dept: REHABILITATION | Facility: HOSPITAL | Age: 64
End: 2024-12-06
Payer: MEDICARE

## 2024-12-06 DIAGNOSIS — Z74.09 DECREASED MOBILITY AND ENDURANCE: ICD-10-CM

## 2024-12-06 DIAGNOSIS — M25.661 DECREASED RANGE OF MOTION (ROM) OF RIGHT KNEE: ICD-10-CM

## 2024-12-06 DIAGNOSIS — R53.1 DECREASED STRENGTH: Primary | ICD-10-CM

## 2024-12-06 PROCEDURE — 97530 THERAPEUTIC ACTIVITIES: CPT | Mod: PN

## 2024-12-06 PROCEDURE — 97112 NEUROMUSCULAR REEDUCATION: CPT | Mod: PN

## 2024-12-06 NOTE — PROGRESS NOTES
"[facility]   Physical Therapy Treatment Note      Name: Debbie Vo  Clinic Number: [id number]    Therapy Diagnosis:   Encounter Diagnoses   Name Primary?    Decreased strength Yes    Decreased range of motion (ROM) of right knee     Decreased mobility and endurance        Physician: Rich Reid MD    Visit Date: 12/6/2024    Physician Orders: PT Eval and Treat  Medical Diagnosis from Referral:   M17.11 (ICD-10-CM) - Primary osteoarthritis of right knee   M25.561,G89.29 (ICD-10-CM) - Chronic pain of right knee   M62.81 (ICD-10-CM) - Quadriceps weakness      Evaluation Date: 11/12/2024  Authorization Period Expiration: 12/31/24  Plan of Care Expiration: 1/10/25  Progress Note Due: 12/10/24  Date of Surgery: 11/11/24  Visit # / Visits authorized: 10/20, (+1)  FOTO: 1/ 3     Precautions: Standard, Depression, Gout, HTN     Time In: 1200 PM  Time Out: 1:00 PM  Total Billable Time: 30 minutes (1TA, 1NMR) KX MODIFIER    PTA Visit #: 0/5     Subjective     Patient reports: She just has some pain around her incision.   She was compliant with home exercise program.  Response to previous treatment: no adverse response  Functional change: decreased R knee pain with weightbearing.    Pain: 4/10 "soreness pain"  Location: right posterior knee     Objective          Treatment     Debbie received the treatments listed below:      therapeutic exercises to develop strength, endurance, ROM, flexibility, posture, and core stabilization for 10 minutes including:  -Stationary cycle 1/2 to full revolutions x6 min to promote tissue pliability NT  Heel prop: 5 minutes with belt around feet and towel under ankle  Seated heel slides: 30x right  Seated hamstring/calf stretch: 6x20'' holds NT    manual therapy techniques: Joint mobilizations were applied to the: R knee for 10 minutes, including:  Patella mobs all planes   Gentle knee extension with superior patellar mobs    neuromuscular re-education " "activities to improve: Kinesthetic, Sense, and Proprioception for 15 minutes. The following activities were included:  Quad sets: 30x10'' holds, towel under knee  SLR AAROM R 2x8 - hold due to back pain  Short arc quad 5" 3x10   Long arc quad 5" 3x10    therapeutic activities to improve functional performance for 25 minutes, including:  -fwd stepping in // bars 10 ft x 3 trials NT  -lateral stepping in // bars 10 ft x 3 trials NT  -Mini-squats: 2x12 (cues for proper posture)  -Heel raises 2x20 in // Bilateral  -hip abduction 3x10 ea  -step ups: 2x10, 4 inch step    Patient Education and Home Exercises       Education provided:   - cont HEP  -reviewed Knee anatomy  -Ice frequency w/ elevation    Written Home Exercises Provided: Pt instructed to continue prior HEP. Exercises were reviewed and Debbie was able to demonstrate them prior to the end of the session.  Debbie demonstrated good  understanding of the education provided. See Electronic Medical Record under Patient Instructions for exercises provided during therapy sessions    Assessment     Debbie is a 64 y.o. female referred to outpatient Physical Therapy with a medical diagnosis of Primary osteoarthritis of right knee, Chronic pain of right knee, and Quadriceps weakness. Pt received right TKA on 11/11/24.     She arrived to today's treatment session with moderate amount of pain. She was able to achieve full knee extension with manual and exercises today. She continues to ambulate with antalgic gait with Rolling walker. She required moderate cues for proper body mechanics with mini squats to decrease anterior translation of knee as well as external rotation of right lower extremity. Patient was fatigued with standing exercises and required multiple seated rest breaks between exercises. PT heavily educated patient on continuing to perform Home exercise program to improve range of motion and overall function.       Debbie Is progressing well towards her goals. "   Patient prognosis is Good.     Patient will continue to benefit from skilled outpatient physical therapy to address the deficits listed in the problem list box on initial evaluation, provide pt/family education and to maximize pt's level of independence in the home and community environment.     Patient's spiritual, cultural and educational needs considered and pt agreeable to plan of care and goals.     Anticipated barriers to physical therapy: Gout, Depression    Goals:Goals:  Short Term Goals: 3 weeks  1. Pt will be independent with HEP supplement PT in improving functional mobility. (Ongoing, NOT MET)  2. Pt will improve right LE strength to at least 75% of left LE strength as measured via MicroFet handheld dynamometer in order to improve functional mobility. (Ongoing, NOT MET)  3. Pt will improve right knee AROM to at least 3-100 degrees in order to improve gait. (Ongoing, NOT MET)     Long Term Goals: 8 weeks  1. Pt will be independent with updated HEP supplement PT in improving functional mobility. (Ongoing, NOT MET)  2. Pt will improve right LE strength to at least 90% of left LE strength as measured via MicroFet handheld dynamometer in order to improve functional mobility. (Ongoing, NOT MET)  3. Pt will improve right knee AROM to at least 0-110 degrees in order to improve gait and ability to perform ADLs. (Ongoing, NOT MET)  4. Pt will improve FOTO knee survey score to </= 55% limited in order to demo improved functional mobility. (Ongoing, NOT MET)  5. Pt will perform TUG in < 10 seconds without AD in order to demo improved gait speed. (Ongoing, NOT MET)  6. Pt will perform at least 12 sit to stands without UE support on 30 second sit to stand test in order to demo improved ability to perform transfers. (Ongoing, NOT MET)    Plan   Cont to advance PT as per POC, monitor response to session   Plan of care Certification: 11/12/2024 to 1/10/25.     Outpatient Physical Therapy 3 times weekly for 8 weeks to  include the following interventions: Cervical/Lumbar Traction, Debridement (nonselective), Debridement (selective), Electrical Stimulation  , Gait Training, Manual Therapy, Moist Heat/ Ice, Neuromuscular Re-ed, Orthotic Management and Training, Patient Education, Self Care, Therapeutic Activities, and Therapeutic Exercise.     Gabrielle Landeros, PT ,DPT,OCS  12/6/2024

## 2024-12-10 ENCOUNTER — CLINICAL SUPPORT (OUTPATIENT)
Dept: REHABILITATION | Facility: HOSPITAL | Age: 64
End: 2024-12-10
Payer: MEDICARE

## 2024-12-10 DIAGNOSIS — Z74.09 DECREASED MOBILITY AND ENDURANCE: ICD-10-CM

## 2024-12-10 DIAGNOSIS — M25.661 DECREASED RANGE OF MOTION (ROM) OF RIGHT KNEE: ICD-10-CM

## 2024-12-10 DIAGNOSIS — R53.1 DECREASED STRENGTH: Primary | ICD-10-CM

## 2024-12-10 PROCEDURE — 97112 NEUROMUSCULAR REEDUCATION: CPT | Mod: KX,PN

## 2024-12-10 PROCEDURE — 97530 THERAPEUTIC ACTIVITIES: CPT | Mod: KX,PN

## 2024-12-10 NOTE — PROGRESS NOTES
"[facility]   Physical Therapy Treatment Note      Name: Debbie Vo  Clinic Number: [id number]    Therapy Diagnosis:   Encounter Diagnoses   Name Primary?    Decreased strength Yes    Decreased range of motion (ROM) of right knee     Decreased mobility and endurance      Physician: Rich Reid MD    Visit Date: 12/10/2024    Physician Orders: PT Eval and Treat  Medical Diagnosis from Referral:   M17.11 (ICD-10-CM) - Primary osteoarthritis of right knee   M25.561,G89.29 (ICD-10-CM) - Chronic pain of right knee   M62.81 (ICD-10-CM) - Quadriceps weakness      Evaluation Date: 11/12/2024  Authorization Period Expiration: 12/31/24  Plan of Care Expiration: 1/10/25  Progress Note Due: 12/10/24  Date of Surgery: 11/11/24  Visit # / Visits authorized: 10/20, (+1)  FOTO: 1/ 3     Precautions: Standard, Depression, Gout, HTN     Time In: 1200 PM  Time Out: 1:00 PM  Total Billable Time: 30 minutes (1TA, 1NMR) KX MODIFIER    PTA Visit #: 0/5     Subjective     Patient reports: walking better, having less knee pain. Low back pain is controlled for now.  She was compliant with home exercise program.  Response to previous treatment: no adverse response  Functional change: decreased R knee pain with weightbearing.    Pain: 4/10 "soreness pain"  Location: right posterior knee     Objective          Treatment     Debbie received the treatments listed below:      therapeutic exercises to develop strength, endurance, ROM, flexibility, posture, and core stabilization for 10 minutes including:  -Stationary cycle 1/2 to full revolutions x6 min to promote tissue pliability  Heel prop: 6 minutes with belt around feet and towel under ankle  Seated heel slides: 30x right with PT overpressure  Seated hamstring/calf stretch: 6x20'' holds  -Heel raises 2x20 in // Bilateral    manual therapy techniques: Joint mobilizations were applied to the: R knee for 10 minutes, including:  Patella mobs all planes " "  Gentle knee extension with superior patellar mobs    neuromuscular re-education activities to improve: Kinesthetic, Sense, and Proprioception for 15 minutes. The following activities were included:  Quad sets: 30x10'' holds, towel under knee  Short arc quad 5" 3x10 3#  Long arc quad/HSC 5" 20x 2#    therapeutic activities to improve functional performance for 25 minutes, including:  -fwd stepping in // bars 10 ft x 3 trials with SPC  -Leg press: 3x10 4 plates double leg   -Mini-squats: 2x12 (cues for proper posture)  -hip abduction 3x10 ea  -step ups: 3x10, 4 inch step    Patient Education and Home Exercises       Education provided:   - cont HEP  -reviewed Knee anatomy  -Ice frequency w/ elevation    Written Home Exercises Provided: Pt instructed to continue prior HEP. Exercises were reviewed and Debbie was able to demonstrate them prior to the end of the session.  Debbie demonstrated good  understanding of the education provided. See Electronic Medical Record under Patient Instructions for exercises provided during therapy sessions    Assessment     Debbie is a 64 y.o. female referred to outpatient Physical Therapy with a medical diagnosis of Primary osteoarthritis of right knee, Chronic pain of right knee, and Quadriceps weakness. Pt received right TKA on 11/11/24.   Improving knee flexion, education again on modifying seated knee flexion habits as well as increasing RLE WB/use during transitions to stand and walking. Transitioning to SPC by next week ideally, waiting on quad strength and gains in knee flexion. Knee flexion overpressure with semi-firm end feel.       Debbie Is progressing well towards her goals.   Patient prognosis is Good.     Patient will continue to benefit from skilled outpatient physical therapy to address the deficits listed in the problem list box on initial evaluation, provide pt/family education and to maximize pt's level of independence in the home and community environment. "     Patient's spiritual, cultural and educational needs considered and pt agreeable to plan of care and goals.     Anticipated barriers to physical therapy: Gout, Depression    Goals:Goals:  Short Term Goals: 3 weeks  1. Pt will be independent with HEP supplement PT in improving functional mobility. (Ongoing, NOT MET)  2. Pt will improve right LE strength to at least 75% of left LE strength as measured via MicroFet handheld dynamometer in order to improve functional mobility. (Ongoing, NOT MET)  3. Pt will improve right knee AROM to at least 3-100 degrees in order to improve gait. (Ongoing, NOT MET)     Long Term Goals: 8 weeks  1. Pt will be independent with updated HEP supplement PT in improving functional mobility. (Ongoing, NOT MET)  2. Pt will improve right LE strength to at least 90% of left LE strength as measured via MicroFet handheld dynamometer in order to improve functional mobility. (Ongoing, NOT MET)  3. Pt will improve right knee AROM to at least 0-110 degrees in order to improve gait and ability to perform ADLs. (Ongoing, NOT MET)  4. Pt will improve FOTO knee survey score to </= 55% limited in order to demo improved functional mobility. (Ongoing, NOT MET)  5. Pt will perform TUG in < 10 seconds without AD in order to demo improved gait speed. (Ongoing, NOT MET)  6. Pt will perform at least 12 sit to stands without UE support on 30 second sit to stand test in order to demo improved ability to perform transfers. (Ongoing, NOT MET)    Plan   Cont to advance PT as per POC, monitor response to session   Plan of care Certification: 11/12/2024 to 1/10/25.     Outpatient Physical Therapy 3 times weekly for 8 weeks to include the following interventions: Cervical/Lumbar Traction, Debridement (nonselective), Debridement (selective), Electrical Stimulation  , Gait Training, Manual Therapy, Moist Heat/ Ice, Neuromuscular Re-ed, Orthotic Management and Training, Patient Education, Self Care, Therapeutic  Activities, and Therapeutic Exercise.     Nghia Suarez, PT, DPT ,DPT,OCS  12/10/2024

## 2024-12-12 ENCOUNTER — CLINICAL SUPPORT (OUTPATIENT)
Dept: REHABILITATION | Facility: HOSPITAL | Age: 64
End: 2024-12-12
Payer: MEDICARE

## 2024-12-12 DIAGNOSIS — R53.1 DECREASED STRENGTH: Primary | ICD-10-CM

## 2024-12-12 DIAGNOSIS — Z74.09 DECREASED MOBILITY AND ENDURANCE: ICD-10-CM

## 2024-12-12 DIAGNOSIS — M25.661 DECREASED RANGE OF MOTION (ROM) OF RIGHT KNEE: ICD-10-CM

## 2024-12-12 PROCEDURE — 97530 THERAPEUTIC ACTIVITIES: CPT | Mod: KX,PN

## 2024-12-12 PROCEDURE — 97112 NEUROMUSCULAR REEDUCATION: CPT | Mod: KX,PN

## 2024-12-13 DIAGNOSIS — I10 ESSENTIAL HYPERTENSION: ICD-10-CM

## 2024-12-13 DIAGNOSIS — R42 VERTIGO: ICD-10-CM

## 2024-12-13 RX ORDER — VALSARTAN 320 MG/1
320 TABLET ORAL
Qty: 90 TABLET | Refills: 3 | Status: SHIPPED | OUTPATIENT
Start: 2024-12-13

## 2024-12-13 RX ORDER — MECLIZINE HYDROCHLORIDE 25 MG/1
25 TABLET ORAL
Qty: 50 TABLET | Refills: 1 | Status: SHIPPED | OUTPATIENT
Start: 2024-12-13

## 2024-12-13 NOTE — TELEPHONE ENCOUNTER
Care Due:                  Date            Visit Type   Department     Provider  --------------------------------------------------------------------------------                                EP -                              PRIMARY      [facility]  Last Visit: 03-      CARE ([facility])   MEDICINE       Sergio Pitt  Next Visit: None Scheduled  None         None Found                                                            Last  Test          Frequency    Reason                     Performed    Due Date  --------------------------------------------------------------------------------    Uric Acid...  12 months..  allopurinoL, colchicine,   Not Found    Overdue                             colchicine-probenecid....    [employer] Embedded Care Due Messages. Reference number: [id number].   12/13/2024 9:41:53 AM CST

## 2024-12-13 NOTE — TELEPHONE ENCOUNTER
No care due was identified.  [facility] Embedded Care Due Messages. Reference number: [id number].   12/13/2024 9:50:34 AM CST

## 2024-12-17 ENCOUNTER — CLINICAL SUPPORT (OUTPATIENT)
Dept: REHABILITATION | Facility: HOSPITAL | Age: 64
End: 2024-12-17
Payer: MEDICARE

## 2024-12-17 DIAGNOSIS — R53.1 DECREASED STRENGTH: Primary | ICD-10-CM

## 2024-12-17 DIAGNOSIS — Z74.09 DECREASED MOBILITY AND ENDURANCE: ICD-10-CM

## 2024-12-17 DIAGNOSIS — M25.661 DECREASED RANGE OF MOTION (ROM) OF RIGHT KNEE: ICD-10-CM

## 2024-12-17 PROCEDURE — 97530 THERAPEUTIC ACTIVITIES: CPT | Mod: KX,PN

## 2024-12-17 PROCEDURE — 97112 NEUROMUSCULAR REEDUCATION: CPT | Mod: KX,PN

## 2024-12-17 NOTE — PROGRESS NOTES
"[facility]   Physical Therapy Treatment Note      Name: Debbie Vo  Clinic Number: [id number]    Therapy Diagnosis:   Encounter Diagnoses   Name Primary?    Decreased strength Yes    Decreased range of motion (ROM) of right knee     Decreased mobility and endurance      Physician: Rich Reid MD    Visit Date: 12/17/2024    Physician Orders: PT Eval and Treat  Medical Diagnosis from Referral:   M17.11 (ICD-10-CM) - Primary osteoarthritis of right knee   M25.561,G89.29 (ICD-10-CM) - Chronic pain of right knee   M62.81 (ICD-10-CM) - Quadriceps weakness      Evaluation Date: 11/12/2024  Authorization Period Expiration: 12/31/24  Plan of Care Expiration: 1/10/25  Progress Note Due: 12/10/24  Date of Surgery: 11/11/24  Visit # / Visits authorized: 12/20, (+1)  FOTO: 1/ 3     Precautions: Standard, Depression, Gout, HTN     Time In: 305 PM  Time Out: 4:00 PM  Total Billable Time: 30 minutes (1TA, 1NMR) KX MODIFIER    PTA Visit #: 0/5     Subjective     Patient reports: can tell her knee is bending better and walking better.  She was compliant with home exercise program.  Response to previous treatment: no adverse response  Functional change: decreased R knee pain with weightbearing.    Pain: 4/10 "soreness pain"  Location: right posterior knee     Objective          Treatment     Debbie received the treatments listed below:      therapeutic exercises to develop strength, endurance, ROM, flexibility, posture, and core stabilization for 10 minutes including:  -Stationary cycle 1/2 to full revolutions x6 min to promote tissue pliability  Heel prop: 6 minutes with belt around feet and towel under ankle  Seated heel slides: 30x right with PT overpressure  Seated hamstring/calf stretch: 6x20'' holds  -Heel raises 2x20 in // Bilateral  Seated knee flexion stretch holds: 6x30'' holds    manual therapy techniques: Joint mobilizations were applied to the: R knee for 10 minutes, " "including:  Patella mobs all planes   Gentle knee extension with superior patellar mobs    neuromuscular re-education activities to improve: Kinesthetic, Sense, and Proprioception for 15 minutes. The following activities were included:  Quad sets: 30x10'' holds, towel under knee  Short arc quad 5" 3x10 3#  Long arc quad/HSC 5" 20x 2#    therapeutic activities to improve functional performance for 25 minutes, including:  -fwd stepping in // bars 10 ft x 3 trials with SPC  -Leg press: 3x10 4 plates double leg   -Mini-squats: 2x12 (cues for proper posture)  -hip abduction 3x10 ea  -step ups: 3x10, 4 inch step    Patient Education and Home Exercises       Education provided:   - cont HEP  -reviewed Knee anatomy  -Ice frequency w/ elevation    Written Home Exercises Provided: Pt instructed to continue prior HEP. Exercises were reviewed and Debbie was able to demonstrate them prior to the end of the session.  Debbie demonstrated good  understanding of the education provided. See Electronic Medical Record under Patient Instructions for exercises provided during therapy sessions    Assessment     Debbie is a 64 y.o. female referred to outpatient Physical Therapy with a medical diagnosis of Primary osteoarthritis of right knee, Chronic pain of right knee, and Quadriceps weakness. Pt received right TKA on 11/11/24.   Knee extension nearly normalized, improving quad strength and functional strength overall, and focusing on knee flexion. Knee flexion goal is at least 90 degrees.     Debbie Is progressing well towards her goals.   Patient prognosis is Good.     Patient will continue to benefit from skilled outpatient physical therapy to address the deficits listed in the problem list box on initial evaluation, provide pt/family education and to maximize pt's level of independence in the home and community environment.     Patient's spiritual, cultural and educational needs considered and pt agreeable to plan of care and goals.   "   Anticipated barriers to physical therapy: Gout, Depression    Goals:Goals:  Short Term Goals: 3 weeks  1. Pt will be independent with HEP supplement PT in improving functional mobility. (Ongoing, NOT MET)  2. Pt will improve right LE strength to at least 75% of left LE strength as measured via MicroFet handheld dynamometer in order to improve functional mobility. (Ongoing, NOT MET)  3. Pt will improve right knee AROM to at least 3-100 degrees in order to improve gait. (Ongoing, NOT MET)     Long Term Goals: 8 weeks  1. Pt will be independent with updated HEP supplement PT in improving functional mobility. (Ongoing, NOT MET)  2. Pt will improve right LE strength to at least 90% of left LE strength as measured via MicroFet handheld dynamometer in order to improve functional mobility. (Ongoing, NOT MET)  3. Pt will improve right knee AROM to at least 0-110 degrees in order to improve gait and ability to perform ADLs. (Ongoing, NOT MET)  4. Pt will improve FOTO knee survey score to </= 55% limited in order to demo improved functional mobility. (Ongoing, NOT MET)  5. Pt will perform TUG in < 10 seconds without AD in order to demo improved gait speed. (Ongoing, NOT MET)  6. Pt will perform at least 12 sit to stands without UE support on 30 second sit to stand test in order to demo improved ability to perform transfers. (Ongoing, NOT MET)    Plan   Cont to advance PT as per POC, monitor response to session   Plan of care Certification: 11/12/2024 to 1/10/25.     Outpatient Physical Therapy 3 times weekly for 8 weeks to include the following interventions: Cervical/Lumbar Traction, Debridement (nonselective), Debridement (selective), Electrical Stimulation  , Gait Training, Manual Therapy, Moist Heat/ Ice, Neuromuscular Re-ed, Orthotic Management and Training, Patient Education, Self Care, Therapeutic Activities, and Therapeutic Exercise.     Nghia Suarez, PT, DPT ,DPT,OCS  12/17/2024

## 2024-12-19 ENCOUNTER — TELEPHONE (OUTPATIENT)
Dept: ORTHOPEDICS | Facility: CLINIC | Age: 64
End: 2024-12-19

## 2024-12-19 ENCOUNTER — OFFICE VISIT (OUTPATIENT)
Dept: ORTHOPEDICS | Facility: CLINIC | Age: 64
End: 2024-12-19
Payer: MEDICARE

## 2024-12-19 VITALS — WEIGHT: 293 LBS | BODY MASS INDEX: 44.41 KG/M2 | HEIGHT: 68 IN

## 2024-12-19 DIAGNOSIS — Z47.1 AFTERCARE FOLLOWING RIGHT KNEE JOINT REPLACEMENT SURGERY: Primary | ICD-10-CM

## 2024-12-19 DIAGNOSIS — Z96.651 AFTERCARE FOLLOWING RIGHT KNEE JOINT REPLACEMENT SURGERY: Primary | ICD-10-CM

## 2024-12-19 DIAGNOSIS — M54.16 LUMBAR RADICULOPATHY, CHRONIC: ICD-10-CM

## 2024-12-19 PROCEDURE — 4010F ACE/ARB THERAPY RXD/TAKEN: CPT | Mod: CPTII,S$GLB,, | Performed by: ORTHOPAEDIC SURGERY

## 2024-12-19 PROCEDURE — 99999 PR PBB SHADOW E&M-EST. PATIENT-LVL III: CPT | Mod: PBBFAC,,, | Performed by: ORTHOPAEDIC SURGERY

## 2024-12-19 PROCEDURE — 99024 POSTOP FOLLOW-UP VISIT: CPT | Mod: S$GLB,,, | Performed by: ORTHOPAEDIC SURGERY

## 2024-12-19 PROCEDURE — 1159F MED LIST DOCD IN RCRD: CPT | Mod: CPTII,S$GLB,, | Performed by: ORTHOPAEDIC SURGERY

## 2024-12-19 RX ORDER — HYDROCODONE BITARTRATE AND ACETAMINOPHEN 5; 325 MG/1; MG/1
1 TABLET ORAL EVERY 12 HOURS PRN
Qty: 14 TABLET | Refills: 0 | Status: SHIPPED | OUTPATIENT
Start: 2024-12-19 | End: 2024-12-19 | Stop reason: SDUPTHER

## 2024-12-19 RX ORDER — HYDROCODONE BITARTRATE AND ACETAMINOPHEN 5; 325 MG/1; MG/1
1 TABLET ORAL EVERY 12 HOURS PRN
Qty: 14 TABLET | Refills: 0 | Status: SHIPPED | OUTPATIENT
Start: 2024-12-19 | End: 2024-12-26

## 2024-12-19 NOTE — PROGRESS NOTES
Subjective:      Patient ID: Debbie Vo is a 64 y.o. female.    Chief Complaint: Pain and Post-op Evaluation of the Right Knee    Patient is 1 months s/p  right primary total knee replacement  Anterior knee pain: Yes  Has improved pain  Is in physical therapy  Yes  Problems w incision  No  Is  happy with result  Yes  Opiod free: No         Social History     Occupational History    Not on file   Tobacco Use    Smoking status: Never     Passive exposure: Never    Smokeless tobacco: Never   Substance and Sexual Activity    Alcohol use: No    Drug use: No    Sexual activity: Yes     Partners: Male      Social Drivers of Health     Tobacco Use: Low Risk  (12/19/2024)    Patient History     Smoking Tobacco Use: Never     Smokeless Tobacco Use: Never     Passive Exposure: Never   Alcohol Use: Not At Risk (7/8/2022)    AUDIT-C     Frequency of Alcohol Consumption: Never     Average Number of Drinks: Patient does not drink     Frequency of Binge Drinking: Never   Financial Resource Strain: Low Risk  (7/8/2022)    Overall Financial Resource Strain (CARDIA)     Difficulty of Paying Living Expenses: Not hard at all   Food Insecurity: No Food Insecurity (7/8/2022)    Hunger Vital Sign     Worried About Running Out of Food in the Last Year: Never true     Ran Out of Food in the Last Year: Never true   Transportation Needs: No Transportation Needs (7/8/2022)    PRAPARE - Transportation     Lack of Transportation (Medical): No     Lack of Transportation (Non-Medical): No   Physical Activity: Inactive (7/8/2022)    Exercise Vital Sign     Days of Exercise per Week: 0 days     Minutes of Exercise per Session: 0 min   Stress: No Stress Concern Present (7/8/2022)    [employer] - Occupational Stress Questionnaire     Feeling of Stress : Not at all   Housing Stability: Low Risk  (7/8/2022)    Housing Stability Vital Sign     Unable to Pay for Housing in the Last Year: No     Number of Places  Lived in the Last Year: 2     Unstable Housing in the Last Year: No   Depression: Low Risk  (10/23/2024)    Depression     Last PHQ-4: Flowsheet Data: 2   Utilities: Not on file   Health Literacy: Not on file   Social Isolation: Not on file      ROS      Objective:    General    Constitutional: She is oriented to person, place, and time. She appears well-developed and well-nourished.   HENT:   Head: Normocephalic and atraumatic.   Nose: Nose normal.   Eyes: EOM are normal.   Pulmonary/Chest: Effort normal.   Neurological: She is alert and oriented to person, place, and time.   Psychiatric: She has a normal mood and affect. Her behavior is normal. Judgment and thought content normal.     General Musculoskeletal Exam   Gait: antalgic       Right Knee Exam     Inspection   Swelling: present  Effusion: present    Range of Motion   Extension:  0   Flexion:  90     Tests   Ligament Examination   MCL - Valgus: normal (0 to 2mm)  LCL - Varus: normal    Other   Sensation: decreased    Comments:  Incision clean, dry intact    Muscle Strength   Right Lower Extremity   Quadriceps:  4/5   Hamstrin/5     Vascular Exam       Edema  Right Lower Leg: present         Assessment:       1. Aftercare following right knee joint replacement surgery          Plan:   Overall patient appears to be doing well and is happy with the result of the knee arthroplasty. They can continue activities as tolerated avoiding high impact activities.  I would like to see the patient back In 2 months with xrays.

## 2024-12-19 NOTE — TELEPHONE ENCOUNTER
----- Message from Libra sent at 12/19/2024  2:51 PM CST -----  Regarding: MEDS  Name of Who is Calling:Pt        What is the request in detail: status of script          Can the clinic reply by [facility]: no        What Number to Call Back [facility]:Telephone Information:  [employer]          [phone]

## 2024-12-20 NOTE — PROGRESS NOTES
"[facility]   Physical Therapy Treatment Note      Name: Debbie Vo  Clinic Number: [id number]    Therapy Diagnosis:   Encounter Diagnoses   Name Primary?    Decreased strength Yes    Decreased range of motion (ROM) of right knee     Decreased mobility and endurance      Physician: Rich Reid MD    Visit Date: 12/12/2024    Physician Orders: PT Eval and Treat  Medical Diagnosis from Referral:   M17.11 (ICD-10-CM) - Primary osteoarthritis of right knee   M25.561,G89.29 (ICD-10-CM) - Chronic pain of right knee   M62.81 (ICD-10-CM) - Quadriceps weakness      Evaluation Date: 11/12/2024  Authorization Period Expiration: 12/31/24  Plan of Care Expiration: 1/10/25  Progress Note Due: 12/10/24  Date of Surgery: 11/11/24  Visit # / Visits authorized: 11/20, (+1)  FOTO: 1/3     Precautions: Standard, Depression, Gout, HTN     Time In: 3:01 PM  Time Out: 4:00 PM  Total Billable Time: 40 minutes (2TA, 1NMR) KX MODIFIER    PTA Visit #: 0/5     Subjective     Patient reports: knee feeling better, knows she will probably need more rehab.  She was compliant with home exercise program.  Response to previous treatment: no adverse response  Functional change: decreased R knee pain with weightbearing.    Pain: 4/10 "soreness pain"  Location: right posterior knee     Objective          Treatment     Debbie received the treatments listed below:      therapeutic exercises to develop strength, endurance, ROM, flexibility, posture, and core stabilization for 10 minutes including:  -Stationary cycle 1/2 to full revolutions x6 min to promote tissue pliability  -Heel prop: 6 minutes with belt around feet and towel under ankle  -Seated heel slides: 30x right with PT overpressure  -Seated knee flexion stretch (sit with bent knee): 5x30'' holds with PT assist  -Seated hamstring/calf stretch: 6x20'' holds  -Heel raises 2x20 in // Bilateral    manual therapy techniques: Joint mobilizations were applied to " "the: R knee for 10 minutes, including:  Patella mobs all planes   Gentle knee extension with superior patellar mobs    neuromuscular re-education activities to improve: Kinesthetic, Sense, and Proprioception for 15 minutes. The following activities were included:  Quad sets: 30x10'' holds, towel under knee  Short arc quad 5" 3x10 3#  Long arc quad/HSC 5" 20x 2#    therapeutic activities to improve functional performance for 25 minutes, including:  -fwd stepping in // bars 10 ft x 3 trials with SPC  -Leg press: 3x10 4 plates double leg   -Mini-squats: 2x12 (cues for proper posture)  -hip abduction 3x10 ea  -step ups: 3x10, 4 inch step    Patient Education and Home Exercises       Education provided:   - cont HEP  -reviewed Knee anatomy  -Ice frequency w/ elevation    Written Home Exercises Provided: Pt instructed to continue prior HEP. Exercises were reviewed and Debbie was able to demonstrate them prior to the end of the session.  Debbie demonstrated good  understanding of the education provided. See Electronic Medical Record under Patient Instructions for exercises provided during therapy sessions    Assessment     Debbie is a 64 y.o. female referred to outpatient Physical Therapy with a medical diagnosis of Primary osteoarthritis of right knee, Chronic pain of right knee, and Quadriceps weakness. Pt received right TKA on 11/11/24.   Knee extension normalizing well, focusing on knee flexion, functional mobility improving, requires more strength to be secure with SPC though balance better than last 2 weeks, and pain fairly managed for now.     Debbie Is progressing well towards her goals.   Patient prognosis is Good.     Patient will continue to benefit from skilled outpatient physical therapy to address the deficits listed in the problem list box on initial evaluation, provide pt/family education and to maximize pt's level of independence in the home and community environment.     Patient's spiritual, cultural and " educational needs considered and pt agreeable to plan of care and goals.     Anticipated barriers to physical therapy: Gout, Depression    Goals:Goals:  Short Term Goals: 3 weeks  1. Pt will be independent with HEP supplement PT in improving functional mobility. (Ongoing, NOT MET)  2. Pt will improve right LE strength to at least 75% of left LE strength as measured via MicroFet handheld dynamometer in order to improve functional mobility. (Ongoing, NOT MET)  3. Pt will improve right knee AROM to at least 3-100 degrees in order to improve gait. (Ongoing, NOT MET)     Long Term Goals: 8 weeks  1. Pt will be independent with updated HEP supplement PT in improving functional mobility. (Ongoing, NOT MET)  2. Pt will improve right LE strength to at least 90% of left LE strength as measured via MicroFet handheld dynamometer in order to improve functional mobility. (Ongoing, NOT MET)  3. Pt will improve right knee AROM to at least 0-110 degrees in order to improve gait and ability to perform ADLs. (Ongoing, NOT MET)  4. Pt will improve FOTO knee survey score to </= 55% limited in order to demo improved functional mobility. (Ongoing, NOT MET)  5. Pt will perform TUG in < 10 seconds without AD in order to demo improved gait speed. (Ongoing, NOT MET)  6. Pt will perform at least 12 sit to stands without UE support on 30 second sit to stand test in order to demo improved ability to perform transfers. (Ongoing, NOT MET)    Plan   Cont to advance PT as per POC, monitor response to session   Plan of care Certification: 11/12/2024 to 1/10/25.     Outpatient Physical Therapy 3 times weekly for 8 weeks to include the following interventions: Cervical/Lumbar Traction, Debridement (nonselective), Debridement (selective), Electrical Stimulation  , Gait Training, Manual Therapy, Moist Heat/ Ice, Neuromuscular Re-ed, Orthotic Management and Training, Patient Education, Self Care, Therapeutic Activities, and Therapeutic Exercise.      Nghia Suarez, PT, DPT ,DPT,OCS  12/20/2024

## 2024-12-26 ENCOUNTER — TELEPHONE (OUTPATIENT)
Dept: ORTHOPEDICS | Facility: CLINIC | Age: 64
End: 2024-12-26
Payer: MEDICARE

## 2024-12-26 DIAGNOSIS — M54.16 LUMBAR RADICULOPATHY, CHRONIC: ICD-10-CM

## 2024-12-26 RX ORDER — HYDROCODONE BITARTRATE AND ACETAMINOPHEN 5; 325 MG/1; MG/1
1 TABLET ORAL EVERY 12 HOURS PRN
Qty: 14 TABLET | Refills: 0 | Status: SHIPPED | OUTPATIENT
Start: 2024-12-26 | End: 2025-01-02

## 2024-12-26 NOTE — TELEPHONE ENCOUNTER
----- Message from Libra sent at 12/26/2024 10:39 AM CST -----  Regarding: meds  Can the clinic reply in [facility]: no         Please refill the medication(s) listed below.         Please call the patient when the prescription(s) is ready for  at this phone number Telephone Information:  Mobile          [phone]              Medication #1  (NORCO) [id number]      Medication #2       Preferred Pharmacy:   [facility] [place] [address]   [place] [address]  Phone: [phone] Fax: [phone]

## 2024-12-30 ENCOUNTER — CLINICAL SUPPORT (OUTPATIENT)
Dept: REHABILITATION | Facility: HOSPITAL | Age: 64
End: 2024-12-30
Payer: MEDICARE

## 2024-12-30 DIAGNOSIS — R53.1 DECREASED STRENGTH: Primary | ICD-10-CM

## 2024-12-30 DIAGNOSIS — M25.661 DECREASED RANGE OF MOTION (ROM) OF RIGHT KNEE: ICD-10-CM

## 2024-12-30 DIAGNOSIS — Z74.09 DECREASED MOBILITY AND ENDURANCE: ICD-10-CM

## 2024-12-30 PROCEDURE — 97110 THERAPEUTIC EXERCISES: CPT | Mod: KX,PN,CQ

## 2024-12-30 PROCEDURE — 97112 NEUROMUSCULAR REEDUCATION: CPT | Mod: KX,PN,CQ

## 2024-12-30 PROCEDURE — 97530 THERAPEUTIC ACTIVITIES: CPT | Mod: KX,PN,CQ

## 2024-12-30 NOTE — PROGRESS NOTES
"[facility]   Physical Therapy Treatment Note      Name: Debbie Vo  Clinic Number: [id number]    Therapy Diagnosis:   Encounter Diagnoses   Name Primary?    Decreased strength Yes    Decreased range of motion (ROM) of right knee     Decreased mobility and endurance      Physician: Rich Reid MD    Visit Date: 12/30/2024    Physician Orders: PT Eval and Treat  Medical Diagnosis from Referral:   M17.11 (ICD-10-CM) - Primary osteoarthritis of right knee   M25.561,G89.29 (ICD-10-CM) - Chronic pain of right knee   M62.81 (ICD-10-CM) - Quadriceps weakness      Evaluation Date: 11/12/2024  Authorization Period Expiration: 12/31/24  Plan of Care Expiration: 1/10/25  Progress Note Due: 12/10/24  Date of Surgery: 11/11/24  Visit # / Visits authorized: 14/20, (+1)  FOTO: 1/ 3     Precautions: Standard, Depression, Gout, HTN     Time In: 305 PM  Time Out: 4:00 PM  Total Billable Time: 60 minutes (2TA,1 TE 1NMR) KX MODIFIER    PTA Visit #: 1/5     Subjective     Patient reports: "The doctors says I'm doing good when I saw him". Pt with no new reports of pain upon arrival. Pt agreeable to PT session.  She was compliant with home exercise program.  Response to previous treatment: no adverse response  Functional change: decreased R knee pain with weightbearing.    Pain: 3/10 "soreness pain"  Location: right posterior knee     Objective      Assess new test/ ROM  Treatment     Debbie received the treatments listed below:      therapeutic exercises to develop strength, endurance, ROM, flexibility, posture, and core stabilization for 10 minutes including:  -Stationary cycle 1/2 to full revolutions x6 min to promote tissue pliability  Heel prop: 6 minutes with belt around feet and towel under ankle  Seated heel slides: 30x right with PT overpressure  Seated hamstring/calf stretch: 6x20'' holds  -Heel raises 2x20 in // Bilateral  Seated knee flexion stretch holds: 6x30'' holds    manual therapy " "techniques: Joint mobilizations were applied to the: R knee for 10 minutes, including:  Patella mobs all planes   Gentle knee extension with superior patellar mobs    neuromuscular re-education activities to improve: Kinesthetic, Sense, and Proprioception for 15 minutes. The following activities were included:  Quad sets: 30x10'' holds, towel under knee  Short arc quad 5" 3x10 3#  Long arc quad/HSC 5" 20x 2#    therapeutic activities to improve functional performance for 25 minutes, including:  -fwd stepping in // bars 10 ft x 3 trials with SPC  -Leg press: 3x10 4 plates double leg   -Mini-squats: 2x12 (cues for proper posture)  -hip abduction 3x10 ea  -step ups: 3x10, 4 inch step    Patient Education and Home Exercises       Education provided:   - cont HEP  -reviewed Knee anatomy  -Ice frequency w/ elevation    Written Home Exercises Provided: Pt instructed to continue prior HEP. Exercises were reviewed and Debbie was able to demonstrate them prior to the end of the session.  Debbie demonstrated good  understanding of the education provided. See Electronic Medical Record under Patient Instructions for exercises provided during therapy sessions    Assessment     Debbie is a 64 y.o. female referred to outpatient Physical Therapy with a medical diagnosis of Primary osteoarthritis of right knee, Chronic pain of right knee, and Quadriceps weakness. Pt received right TKA on 11/11/24.   Pt completed all activities as per logged in treatment log with heavy focus on R knee flexion activities. She reports compliance with HEP with knee flexion activities.  Debbie Is progressing well towards her goals.   Patient prognosis is Good.     Patient will continue to benefit from skilled outpatient physical therapy to address the deficits listed in the problem list box on initial evaluation, provide pt/family education and to maximize pt's level of independence in the home and community environment.     Patient's spiritual, cultural " and educational needs considered and pt agreeable to plan of care and goals.     Anticipated barriers to physical therapy: Gout, Depression    Goals:Goals:  Short Term Goals: 3 weeks  1. Pt will be independent with HEP supplement PT in improving functional mobility. (Ongoing, NOT MET)  2. Pt will improve right LE strength to at least 75% of left LE strength as measured via MicroFet handheld dynamometer in order to improve functional mobility. (Ongoing, NOT MET)  3. Pt will improve right knee AROM to at least 3-100 degrees in order to improve gait. (Ongoing, NOT MET)     Long Term Goals: 8 weeks  1. Pt will be independent with updated HEP supplement PT in improving functional mobility. (Ongoing, NOT MET)  2. Pt will improve right LE strength to at least 90% of left LE strength as measured via MicroFet handheld dynamometer in order to improve functional mobility. (Ongoing, NOT MET)  3. Pt will improve right knee AROM to at least 0-110 degrees in order to improve gait and ability to perform ADLs. (Ongoing, NOT MET)  4. Pt will improve FOTO knee survey score to </= 55% limited in order to demo improved functional mobility. (Ongoing, NOT MET)  5. Pt will perform TUG in < 10 seconds without AD in order to demo improved gait speed. (Ongoing, NOT MET)  6. Pt will perform at least 12 sit to stands without UE support on 30 second sit to stand test in order to demo improved ability to perform transfers. (Ongoing, NOT MET)    Plan   Cont to advance PT as per POC.  Plan of care Certification: 11/12/2024 to 1/10/25.     Outpatient Physical Therapy 3 times weekly for 8 weeks to include the following interventions: Cervical/Lumbar Traction, Debridement (nonselective), Debridement (selective), Electrical Stimulation  , Gait Training, Manual Therapy, Moist Heat/ Ice, Neuromuscular Re-ed, Orthotic Management and Training, Patient Education, Self Care, Therapeutic Activities, and Therapeutic Exercise.     Endy Higuera, NIRMAL    12/30/2024

## 2025-01-06 ENCOUNTER — CLINICAL SUPPORT (OUTPATIENT)
Dept: REHABILITATION | Facility: HOSPITAL | Age: 65
End: 2025-01-06
Payer: MEDICARE

## 2025-01-06 ENCOUNTER — TELEPHONE (OUTPATIENT)
Dept: ORTHOPEDICS | Facility: CLINIC | Age: 65
End: 2025-01-06
Payer: MEDICARE

## 2025-01-06 DIAGNOSIS — Z74.09 DECREASED MOBILITY AND ENDURANCE: ICD-10-CM

## 2025-01-06 DIAGNOSIS — R53.1 DECREASED STRENGTH: Primary | ICD-10-CM

## 2025-01-06 DIAGNOSIS — M25.661 DECREASED RANGE OF MOTION (ROM) OF RIGHT KNEE: ICD-10-CM

## 2025-01-06 PROCEDURE — 97140 MANUAL THERAPY 1/> REGIONS: CPT | Mod: PN,CQ

## 2025-01-06 PROCEDURE — 97112 NEUROMUSCULAR REEDUCATION: CPT | Mod: PN,CQ

## 2025-01-06 PROCEDURE — 97530 THERAPEUTIC ACTIVITIES: CPT | Mod: PN,CQ

## 2025-01-06 NOTE — PROGRESS NOTES
"[facility]   Physical Therapy Treatment Note      Name: Debbie Vo  Clinic Number: [id number]    Therapy Diagnosis:   Encounter Diagnoses   Name Primary?    Decreased strength Yes    Decreased range of motion (ROM) of right knee     Decreased mobility and endurance      Physician: Rich Reid MD    Visit Date: 1/6/2025    Physician Orders: PT Eval and Treat  Medical Diagnosis from Referral:   M17.11 (ICD-10-CM) - Primary osteoarthritis of right knee   M25.561,G89.29 (ICD-10-CM) - Chronic pain of right knee   M62.81 (ICD-10-CM) - Quadriceps weakness      Evaluation Date: 11/12/2024  Authorization Period Expiration: 12/31/24  Plan of Care Expiration: 1/10/25  Progress Note Due: 12/10/24  Date of Surgery: 11/11/24  Visit # / Visits authorized: 14/20, (+1)  FOTO: 1/ 3     Precautions: Standard, Depression, Gout, HTN     Time In: 3:00 PM  Time Out: 4:00 PM  Total Billable Time: 60 minutes (1TA,1 NT 1NMR)     PTA Visit #: 2/5     Subjective     Patient reports: "I'm trying to get this knee to bend more at home". Pt agreeable to PT session.  She was compliant with home exercise program.  Response to previous treatment: no adverse response  Functional change: decreased R knee pain with weightbearing.    Pain: 3/10 "soreness pain"  Location: right posterior knee     Objective      Assess new test/ ROM  Treatment     Debbie received the treatments listed below:      therapeutic exercises to develop strength, endurance, ROM, flexibility, posture, and core stabilization for 15 minutes including:  -Stationary cycle 1/2 to full revolutions x6 min to promote tissue pliability  Heel prop: 6 minutes with belt around feet and towel under ankle  Seated heel slides: 30x right with PT overpressure  Seated hamstring/calf stretch: 6x20'' holds  -Heel raises 2x20 in // Bilateral  Seated knee flexion stretch holds: 6x30'' holds    manual therapy techniques: Joint mobilizations were applied to the: R " "knee for 8 minutes, including:  Patella mobs all planes   Gentle knee extension with superior patellar mobs  Retrograde massage with knee elevated 90* hip flexed in supine on table.  neuromuscular re-education activities to improve: Kinesthetic, Sense, and Proprioception for 15 minutes. The following activities were included:  Quad sets: 30x10'' holds, towel under knee  Short arc quad 5" 3x10 3#  Long arc quad/HSC 5" 20x 2#    therapeutic activities to improve functional performance for 15 minutes, including:  -fwd stepping in // bars 10 ft x 3 trials with SPC  -Leg press: 3x10 4 plates double leg   -Mini-squats: 2x12 (cues for proper posture)  -hip abduction 3x10 ea  -step ups: 3x10, 4 inch step    Patient Education and Home Exercises       Education provided:   - cont HEP  -reviewed Knee anatomy  -Ice frequency w/ elevation    Written Home Exercises Provided: Pt instructed to continue prior HEP. Exercises were reviewed and Debbie was able to demonstrate them prior to the end of the session.  Debbie demonstrated good  understanding of the education provided. See Electronic Medical Record under Patient Instructions for exercises provided during therapy sessions    Assessment     Debbie is a 64 y.o. female referred to outpatient Physical Therapy with a medical diagnosis of Primary osteoarthritis of right knee, Chronic pain of right knee, and Quadriceps weakness. Pt received right TKA on 11/11/24.   Pt completed all activities as per logged in treatment log with heavy focus on R knee flexion activities. She reports compliance with HEP with knee flexion activities. She is able to achieve 87* R knee flexion Passively, will need to continue with knee flexion based activities.   Debbie Is progressing well towards her goals.   Patient prognosis is Good.     Patient will continue to benefit from skilled outpatient physical therapy to address the deficits listed in the problem list box on initial evaluation, provide " pt/family education and to maximize pt's level of independence in the home and community environment.     Patient's spiritual, cultural and educational needs considered and pt agreeable to plan of care and goals.     Anticipated barriers to physical therapy: Gout, Depression    Goals:Goals:  Short Term Goals: 3 weeks  1. Pt will be independent with HEP supplement PT in improving functional mobility. (Ongoing, NOT MET)  2. Pt will improve right LE strength to at least 75% of left LE strength as measured via MicroFet handheld dynamometer in order to improve functional mobility. (Ongoing, NOT MET)  3. Pt will improve right knee AROM to at least 3-100 degrees in order to improve gait. (Ongoing, NOT MET)     Long Term Goals: 8 weeks  1. Pt will be independent with updated HEP supplement PT in improving functional mobility. (Ongoing, NOT MET)  2. Pt will improve right LE strength to at least 90% of left LE strength as measured via MicroFet handheld dynamometer in order to improve functional mobility. (Ongoing, NOT MET)  3. Pt will improve right knee AROM to at least 0-110 degrees in order to improve gait and ability to perform ADLs. (Ongoing, NOT MET)  4. Pt will improve FOTO knee survey score to </= 55% limited in order to demo improved functional mobility. (Ongoing, NOT MET)  5. Pt will perform TUG in < 10 seconds without AD in order to demo improved gait speed. (Ongoing, NOT MET)  6. Pt will perform at least 12 sit to stands without UE support on 30 second sit to stand test in order to demo improved ability to perform transfers. (Ongoing, NOT MET)    Plan   Cont to advance PT as per POC.  Plan of care Certification: 11/12/2024 to 1/10/25.     Outpatient Physical Therapy 3 times weekly for 8 weeks to include the following interventions: Cervical/Lumbar Traction, Debridement (nonselective), Debridement (selective), Electrical Stimulation  , Gait Training, Manual Therapy, Moist Heat/ Ice, Neuromuscular Re-ed, Orthotic  Management and Training, Patient Education, Self Care, Therapeutic Activities, and Therapeutic Exercise.     Endy Higuera, PTA   1/6/2025

## 2025-01-06 NOTE — TELEPHONE ENCOUNTER
Patient states that she is on a lot pain and think she may have a blood clot. Patient want to be evaluated soon. Schedule patient to see Dr. Reid on tomorrow at 1 pm.

## 2025-01-06 NOTE — TELEPHONE ENCOUNTER
----- Message from Aj sent at 1/6/2025  1:36 PM CST -----  Type: General Call Back     Name of Caller:pt   Reason patient is calling to get medication for her knee pain, patient states that she called this morning but hasn't gotten any response [facility] - [address]   Phone: [phone]        Would the patient rather a call back or a response via [facility]? Call   Best Call Back Number:[phone]  Additional Information:

## 2025-01-07 ENCOUNTER — OFFICE VISIT (OUTPATIENT)
Dept: ORTHOPEDICS | Facility: CLINIC | Age: 65
End: 2025-01-07
Payer: MEDICARE

## 2025-01-07 VITALS — WEIGHT: 293 LBS | BODY MASS INDEX: 44.41 KG/M2 | HEIGHT: 68 IN

## 2025-01-07 DIAGNOSIS — Z47.1 AFTERCARE FOLLOWING RIGHT KNEE JOINT REPLACEMENT SURGERY: Primary | ICD-10-CM

## 2025-01-07 DIAGNOSIS — Z96.651 AFTERCARE FOLLOWING RIGHT KNEE JOINT REPLACEMENT SURGERY: Primary | ICD-10-CM

## 2025-01-07 PROCEDURE — 99024 POSTOP FOLLOW-UP VISIT: CPT | Mod: S$GLB,,, | Performed by: ORTHOPAEDIC SURGERY

## 2025-01-07 PROCEDURE — 99999 PR PBB SHADOW E&M-EST. PATIENT-LVL III: CPT | Mod: PBBFAC,,, | Performed by: ORTHOPAEDIC SURGERY

## 2025-01-07 PROCEDURE — 1159F MED LIST DOCD IN RCRD: CPT | Mod: CPTII,S$GLB,, | Performed by: ORTHOPAEDIC SURGERY

## 2025-01-07 RX ORDER — PREGABALIN 75 MG/1
75 CAPSULE ORAL 2 TIMES DAILY
Qty: 28 CAPSULE | Refills: 0 | Status: SHIPPED | OUTPATIENT
Start: 2025-01-07 | End: 2025-01-21

## 2025-01-07 RX ORDER — CYCLOBENZAPRINE HCL 5 MG
5 TABLET ORAL NIGHTLY
Qty: 14 TABLET | Refills: 0 | Status: SHIPPED | OUTPATIENT
Start: 2025-01-07 | End: 2025-01-21

## 2025-01-07 NOTE — PROGRESS NOTES
Subjective:      Patient ID: Debbie Vo is a 64 y.o. female.    Chief Complaint: S/P R TKA 11/11/24    .Patient is 2 months s/p  right primary total knee replacement  Anterior knee pain: No  Has stable pain  Is in physical therapy  Yes  Problems w incision  No  Is  happy with result  Yes  Opiod free: Yes    Patient is here early from her last postop follow-up due to concern with persistent right lower extremity swelling.  Patient states that there has been no new onset of pain or swelling but that the swelling is just taking longer than she initially anticipated to disappear.  We explained that swelling can take up to a year to resolve.  Patient denies any wound healing issues, any fevers or chills, or chest pain or shortness of breath.  She continues to work with physical therapy.  Patient additionally would like a refill on some pain medications.    Social History     Occupational History    Not on file   Tobacco Use    Smoking status: Never     Passive exposure: Never    Smokeless tobacco: Never   Substance and Sexual Activity    Alcohol use: No    Drug use: No    Sexual activity: Yes     Partners: Male      Social Drivers of Health     Tobacco Use: Low Risk  (1/7/2025)    Patient History     Smoking Tobacco Use: Never     Smokeless Tobacco Use: Never     Passive Exposure: Never   Alcohol Use: Not At Risk (7/8/2022)    AUDIT-C     Frequency of Alcohol Consumption: Never     Average Number of Drinks: Patient does not drink     Frequency of Binge Drinking: Never   Financial Resource Strain: Low Risk  (7/8/2022)    Overall Financial Resource Strain (CARDIA)     Difficulty of Paying Living Expenses: Not hard at all   Food Insecurity: No Food Insecurity (7/8/2022)    Hunger Vital Sign     Worried About Running Out of Food in the Last Year: Never true     Ran Out of Food in the Last Year: Never true   Transportation Needs: No Transportation Needs (7/8/2022)    PRAPARE - Transportation     Lack of  Transportation (Medical): No     Lack of Transportation (Non-Medical): No   Physical Activity: Inactive (2022)    Exercise Vital Sign     Days of Exercise per Week: 0 days     Minutes of Exercise per Session: 0 min   Stress: No Stress Concern Present (2022)    [employer] - Occupational Stress Questionnaire     Feeling of Stress : Not at all   Housing Stability: Low Risk  (2022)    Housing Stability Vital Sign     Unable to Pay for Housing in the Last Year: No     Number of Places Lived in the Last Year: 2     Unstable Housing in the Last Year: No   Depression: Low Risk  (10/23/2024)    Depression     Last PHQ-4: Flowsheet Data: 2   Utilities: Not on file   Health Literacy: Not on file   Social Isolation: Not on file      ROS      Objective:    General    Constitutional: She is oriented to person, place, and time. She appears well-developed and well-nourished.   HENT:   Head: Normocephalic and atraumatic.   Nose: Nose normal.   Eyes: EOM are normal.   Pulmonary/Chest: Effort normal.   Neurological: She is alert and oriented to person, place, and time.   Psychiatric: She has a normal mood and affect. Her behavior is normal. Judgment and thought content normal.     General Musculoskeletal Exam   Gait: antalgic       Right Knee Exam     Inspection   Swelling: present  Effusion: present    Range of Motion   Extension:  0   Flexion:  90     Tests   Ligament Examination   MCL - Valgus: normal (0 to 2mm)  LCL - Varus: normal    Other   Sensation: decreased    Comments:  Incision clean, dry intact    Muscle Strength   Right Lower Extremity   Quadriceps:  4/5   Hamstrin/5     Vascular Exam       Edema  Right Lower Leg: present     Assessment:       64F status post left total knee arthroplasty performed on 2024.  Doing well.    Plan:   Overall patient appears to be doing well and is happy with the result of the knee arthroplasty.     We do not have any concern about  her persistent swelling of the right lower extremity.    They can continue activities as tolerated avoiding high impact activities.      We sent her a refill of Flexeril and started her on Lyrica    I would like to see the patient back 3-month post-op    Vance Escobar MD  [employer] Orthopedic Surgery PGY-3

## 2025-01-09 ENCOUNTER — CLINICAL SUPPORT (OUTPATIENT)
Dept: REHABILITATION | Facility: HOSPITAL | Age: 65
End: 2025-01-09
Payer: MEDICARE

## 2025-01-09 DIAGNOSIS — R53.1 DECREASED STRENGTH: Primary | ICD-10-CM

## 2025-01-09 DIAGNOSIS — Z74.09 DECREASED MOBILITY AND ENDURANCE: ICD-10-CM

## 2025-01-09 DIAGNOSIS — M25.661 DECREASED RANGE OF MOTION (ROM) OF RIGHT KNEE: ICD-10-CM

## 2025-01-09 PROCEDURE — 97530 THERAPEUTIC ACTIVITIES: CPT | Mod: PN

## 2025-01-09 PROCEDURE — 97112 NEUROMUSCULAR REEDUCATION: CPT | Mod: PN

## 2025-01-09 NOTE — PROGRESS NOTES
"[facility]   Physical Therapy Treatment Note      Name: Debbie Vo  Clinic Number: [id number]    Therapy Diagnosis:   Encounter Diagnoses   Name Primary?    Decreased strength Yes    Decreased range of motion (ROM) of right knee     Decreased mobility and endurance      Physician: Rich Reid MD    Visit Date: 1/9/2025    Physician Orders: PT Eval and Treat  Medical Diagnosis from Referral:   M17.11 (ICD-10-CM) - Primary osteoarthritis of right knee   M25.561,G89.29 (ICD-10-CM) - Chronic pain of right knee   M62.81 (ICD-10-CM) - Quadriceps weakness      Evaluation Date: 11/12/2024  Authorization Period Expiration: 12/31/24  Plan of Care Expiration: 1/10/25  Progress Note Due: 12/10/24  Date of Surgery: 11/11/24  Visit # / Visits authorized: 16/20, (+1)  FOTO: 1/3 - done today     Precautions: Standard, Depression, Gout, HTN     Time In: 3:05 PM  Time Out: 4:00 PM  Total Billable Time: 30 minutes (1TA,1NMR)     PTA Visit #: 0/5     Subjective     Patient reports: bending knee at home and walking better. Using SPC now.   She was compliant with home exercise program.  Response to previous treatment: no adverse response  Functional change: decreased R knee pain with weightbearing.    Pain: 3/10 "soreness pain"  Location: right posterior knee     Objective        Treatment     Debbie received the treatments listed below:      therapeutic exercises to develop strength, endurance, ROM, flexibility, posture, and core stabilization for 15 minutes including:  -Stationary cycle 1/2 to full revolutions x6 min to promote tissue pliability  Heel prop: 6 minutes with belt around feet and towel under ankle  Seated heel slides: 30x right with PT overpressure  Seated hamstring/calf stretch: 6x20'' holds  -Heel raises 2x20 in // Bilateral  Seated knee flexion stretch holds: 6x30'' holds    manual therapy techniques: Joint mobilizations were applied to the: R knee for 8 minutes, " "including:  Patella mobs all planes   Gentle knee extension with superior patellar mobs  Retrograde massage with knee elevated 90* hip flexed in supine on table    neuromuscular re-education activities to improve: Kinesthetic, Sense, and Proprioception for 15 minutes. The following activities were included:  Quad sets: 30x10'' holds, towel under knee  Short arc quad 5" 3x10 3#  Long arc quad/HSC 5" 20x 2#    therapeutic activities to improve functional performance for 15 minutes, including:  -fwd stepping in // bars 10 ft x 3 trials with SPC - BT  -Leg press: 3x10 4 plates double leg   -Mini-squats: 2x12 (cues for proper posture)  -hip abduction 3x10 ea  -step ups: 3x10, 4 inch step    Patient Education and Home Exercises       Education provided:   - cont HEP  -reviewed Knee anatomy  -Ice frequency w/ elevation    Written Home Exercises Provided: Pt instructed to continue prior HEP. Exercises were reviewed and Debbie was able to demonstrate them prior to the end of the session.  Debbie demonstrated good  understanding of the education provided. See Electronic Medical Record under Patient Instructions for exercises provided during therapy sessions    Assessment     Debbie is a 64 y.o. female referred to outpatient Physical Therapy with a medical diagnosis of Primary osteoarthritis of right knee, Chronic pain of right knee, and Quadriceps weakness. Pt received right TKA on 11/11/24.   Knee flexion nearing 90 degrees, limited by exacerbation on right sided sciatica throughout therapy, and she was shown multiple ways to bend the knee for long duration-low load at home in sitting and with straps. Next appointment is last scheduled and will be reassessed. Will likely need another 2-3 more weeks, she drives in from [place] with her .     Debbie Is progressing well towards her goals.   Patient prognosis is Good.     Patient will continue to benefit from skilled outpatient physical therapy to address the deficits " listed in the problem list box on initial evaluation, provide pt/family education and to maximize pt's level of independence in the home and community environment.     Patient's spiritual, cultural and educational needs considered and pt agreeable to plan of care and goals.     Anticipated barriers to physical therapy: Gout, Depression    Goals:Goals:  Short Term Goals: 3 weeks  1. Pt will be independent with HEP supplement PT in improving functional mobility. (Ongoing, NOT MET)  2. Pt will improve right LE strength to at least 75% of left LE strength as measured via MicroFet handheld dynamometer in order to improve functional mobility. (Ongoing, NOT MET)  3. Pt will improve right knee AROM to at least 3-100 degrees in order to improve gait. (Ongoing, NOT MET)     Long Term Goals: 8 weeks  1. Pt will be independent with updated HEP supplement PT in improving functional mobility. (Ongoing, NOT MET)  2. Pt will improve right LE strength to at least 90% of left LE strength as measured via MicroFet handheld dynamometer in order to improve functional mobility. (Ongoing, NOT MET)  3. Pt will improve right knee AROM to at least 0-110 degrees in order to improve gait and ability to perform ADLs. (Ongoing, NOT MET)  4. Pt will improve FOTO knee survey score to </= 55% limited in order to demo improved functional mobility. (Ongoing, NOT MET)  5. Pt will perform TUG in < 10 seconds without AD in order to demo improved gait speed. (Ongoing, NOT MET)  6. Pt will perform at least 12 sit to stands without UE support on 30 second sit to stand test in order to demo improved ability to perform transfers. (Ongoing, NOT MET)    Plan   Cont to advance PT as per POC.  Plan of care Certification: 11/12/2024 to 1/10/25.     Outpatient Physical Therapy 3 times weekly for 8 weeks to include the following interventions: Cervical/Lumbar Traction, Debridement (nonselective), Debridement (selective), Electrical Stimulation  , Gait Training,  Manual Therapy, Moist Heat/ Ice, Neuromuscular Re-ed, Orthotic Management and Training, Patient Education, Self Care, Therapeutic Activities, and Therapeutic Exercise.     Ngiha Suarez, PT, DPT   1/9/2025

## 2025-01-28 ENCOUNTER — OFFICE VISIT (OUTPATIENT)
Dept: PAIN MEDICINE | Facility: CLINIC | Age: 65
End: 2025-01-28
Payer: MEDICARE

## 2025-01-28 ENCOUNTER — HOSPITAL ENCOUNTER (OUTPATIENT)
Dept: RADIOLOGY | Facility: HOSPITAL | Age: 65
Discharge: HOME | End: 2025-01-28
Attending: ANESTHESIOLOGY
Payer: MEDICARE

## 2025-01-28 VITALS
DIASTOLIC BLOOD PRESSURE: 89 MMHG | HEART RATE: 92 BPM | HEIGHT: 68 IN | RESPIRATION RATE: 17 BRPM | WEIGHT: 293 LBS | BODY MASS INDEX: 44.41 KG/M2 | SYSTOLIC BLOOD PRESSURE: 140 MMHG

## 2025-01-28 DIAGNOSIS — M54.9 DORSALGIA, UNSPECIFIED: ICD-10-CM

## 2025-01-28 DIAGNOSIS — M54.16 LUMBAR RADICULOPATHY, CHRONIC: ICD-10-CM

## 2025-01-28 DIAGNOSIS — M54.16 LUMBAR RADICULOPATHY: Primary | ICD-10-CM

## 2025-01-28 DIAGNOSIS — M53.3 SACROILIAC JOINT DYSFUNCTION OF RIGHT SIDE: ICD-10-CM

## 2025-01-28 DIAGNOSIS — M47.816 LUMBAR SPONDYLOSIS: ICD-10-CM

## 2025-01-28 DIAGNOSIS — M54.16 LUMBAR RADICULOPATHY: ICD-10-CM

## 2025-01-28 PROCEDURE — 1159F MED LIST DOCD IN RCRD: CPT | Mod: CPTII,S$GLB,, | Performed by: ANESTHESIOLOGY

## 2025-01-28 PROCEDURE — 99204 OFFICE O/P NEW MOD 45 MIN: CPT | Mod: S$GLB,,, | Performed by: ANESTHESIOLOGY

## 2025-01-28 PROCEDURE — 3079F DIAST BP 80-89 MM HG: CPT | Mod: CPTII,S$GLB,, | Performed by: ANESTHESIOLOGY

## 2025-01-28 PROCEDURE — 72100 X-RAY EXAM L-S SPINE 2/3 VWS: CPT | Mod: TC,PN

## 2025-01-28 PROCEDURE — 1160F RVW MEDS BY RX/DR IN RCRD: CPT | Mod: CPTII,S$GLB,, | Performed by: ANESTHESIOLOGY

## 2025-01-28 PROCEDURE — 3008F BODY MASS INDEX DOCD: CPT | Mod: CPTII,S$GLB,, | Performed by: ANESTHESIOLOGY

## 2025-01-28 PROCEDURE — 72100 X-RAY EXAM L-S SPINE 2/3 VWS: CPT | Mod: 26,,, | Performed by: RADIOLOGY

## 2025-01-28 PROCEDURE — 99999 PR PBB SHADOW E&M-EST. PATIENT-LVL V: CPT | Mod: PBBFAC,,, | Performed by: ANESTHESIOLOGY

## 2025-01-28 PROCEDURE — 3077F SYST BP >= 140 MM HG: CPT | Mod: CPTII,S$GLB,, | Performed by: ANESTHESIOLOGY

## 2025-01-28 RX ORDER — MELOXICAM 7.5 MG/1
7.5 TABLET ORAL DAILY
Qty: 10 TABLET | Refills: 0 | Status: SHIPPED | OUTPATIENT
Start: 2025-01-28 | End: 2025-02-07

## 2025-01-28 NOTE — PROGRESS NOTES
New Patient Interventional Pain Note (Initial Visit)    Referring Physician: Arely Gale PA-C    PCP: Sergio Pitt MD    Chief Complaint:     Chief Complaint   Patient presents with    Low-back Pain     Patient has pain in lower back that radiates into right buttock and down right leg.  Pain level 7/10        SUBJECTIVE:    Debbie Vo is a 64 y.o. female who presents to the clinic for the evaluation of lower back pain.   Patient reports 5 year history of lower back pain that is slightly increased after right TKA on 11/11/2024.  Patient denies any previous surgical intervention on her lumbar spine.  Patient denies any other surgeries on her bilateral lower extremities besides her right TKA.    Pain described as an aching stabbing pain that starts in the right buttocks area.  This pain then radiates along the posterior and lateral aspect to the mid calf.  Patient denies any significant left lower extremity pain.  Pain is worse with lying supine, better with sitting up.  Pain is currently rated as 7/10. Patient denies any fevers, chills, saddle anesthesia, or bowel and bladder incontinence.      Non-Pharmacologic Treatments:  Physical Therapy/Home Exercise: yes  Ice/Heat:yes  TENS: yes  Acupuncture: no  Massage: yes  Chiropractic: no        Previous Pain Medications:  NSAIDs, Tylenol, muscle relaxers, neuropathics, opioids, topicals       report:  Reviewed and consistent with medication use as prescribed.    Pain Procedures:   None    Pain Disability Index Review:         1/28/2025    11:11 AM 6/4/2018    11:14 AM   Last 3 PDI Scores   Pain Disability Index (PDI) 39 55       Imaging:         Results for orders placed during the hospital encounter of 08/24/23    X-ray Knee Ortho Bilateral    Narrative  EXAMINATION:  XR KNEE ORTHO BILAT    CLINICAL HISTORY:  Unilateral primary osteoarthritis, right knee    TECHNIQUE:  AP standing, lateral and Merchant views of both knees were  performed.    COMPARISON:  07/02/2022    FINDINGS:  Tricompartment degenerative changes, noting advanced joint space loss with extensive subchondral sclerosis/cystic change and osteophytosis.  No fracture.  No marrow replacement process.  Alignment within normal limits.    Impression  Advanced degenerative changes.      Electronically signed by: William Arvizu MD  Date:    08/24/2023  Time:    14:32          Results for orders placed during the hospital encounter of 03/08/18    MRI Lumbar Spine Without Contrast    Narrative  EXAMINATION:  MRI LUMBAR SPINE WITHOUT CONTRAST    CLINICAL HISTORY:  Low back pain, >6wks conservative tx, persistent-progressive sx, surgical candidate;Lumbar radiculopathy, >6wks conservative tx, persistent-progressive sx, surgical candidate; Radiculopathy, lumbar region    TECHNIQUE:  Multiplanar, multisequence MR images were acquired from the thoracolumbar junction to the sacrum without the administration of contrast.    COMPARISON:  CT abdomen pelvis 03/01/2016.    FINDINGS:  Alignment: Slight exaggeration of the normal lumbar lordosis.  Subtle anterolisthesis at L3-4, L4-5 and L5-S1..    Vertebrae: Normal marrow signal. No fracture.    Cord: Normal.  Conus terminates in good position.    Degenerative findings:    T12-L1, L1-2, L2-3: No focal disc herniation, spinal canal stenosis or significant neural foraminal narrowing.  Mild facet arthropathy.    L3-4: Moderate facet arthropathy, left more than right.  There is associated anterolisthesis with unroofing of the disc and mild disc bulging.  Mild endplate marginal osteophyte formation laterally.  Constellation of findings results in left greater than right lateral recess and neural foraminal encroachment.  Only modest narrowing of overall spinal canal diameter.    L4-L5: Moderate facet arthropathy, left more than right.  Mild anterolisthesis with unroofing of the disc.  Minimal disc bulging and degenerative endplate changes.  There is no  significant narrowing of the overall spinal canal diameter.  Left greater than right lateral recess stenosis minimal neural foraminal encroachment.    L5-S1: Moderate facet arthropathy.  There is subtle anterolisthesis with degenerative disc disease including loss disc height, degenerative endplate change in mild endplate marginal osteophyte formation.  No spinal stenosis or high-grade neural foraminal narrowing.    Paraspinal muscles & soft tissues: Unremarkable.    IMPRESSION:    Moderate facet arthropathy in the mid to lower lumbar spine with associated anterolisthesis and degenerative disc disease.  This is most pronounced at the L3-4 level where there is mild encroachment of the overall spinal canal diameter with bilateral lateral recess and neural foraminal narrowing.  Individual disc levels further detailed above.      Electronically signed by: Esequiel Chapa MD  Date:    03/29/2018  Time:    08:21      Past Medical History:   Diagnosis Date    Benign essential hypertension     Colon cancer     Depression     Gout, unspecified     Osteoarthritis      Past Surgical History:   Procedure Laterality Date    CHOLECYSTECTOMY      COLON SURGERY  2002    colon resection    COLONOSCOPY N/A 03/24/2016    Procedure: COLONOSCOPY;  Surgeon: Ernst Bobby MD;  Location: [facility];  Service: Endoscopy;  Laterality: N/A;  Needs Flex sigmoidoscopy    EPIDURAL STEROID INJECTION INTO LUMBAR SPINE N/A 06/21/2018    Procedure: Injection-steroid-epidural-lumbar;  Surgeon: Avi Morales Jr., MD;  Location: [facility];  Service: Pain Management;  Laterality: N/A;  ORAL SEDATION    HYSTERECTOMY      INJECTION OF ANESTHETIC AGENT INTO SACROILIAC JOINT Bilateral 08/25/2021    Procedure: BLOCK, SACROILIAC JOINT*-- bilateral;  Surgeon: Lucia Carroll MD;  Location: [facility] PAIN [facility];  Service: Pain Management;  Laterality: Bilateral;    PARTIAL HYSTERECTOMY      TOTAL KNEE ARTHROPLASTY Right 11/11/2024    Procedure:  ARTHROPLASTY, KNEE, TOTAL monoblock;  Surgeon: Rich Reid MD;  Location: [facility];  Service: Orthopedics;  Laterality: Right;  bmi - 43.58     Social History     Socioeconomic History    Marital status: Single   Tobacco Use    Smoking status: Never     Passive exposure: Never    Smokeless tobacco: Never   Substance and Sexual Activity    Alcohol use: No    Drug use: No    Sexual activity: Yes     Partners: Male     Social Drivers of Health     Financial Resource Strain: Low Risk  (7/8/2022)    Overall Financial Resource Strain (CARDIA)     Difficulty of Paying Living Expenses: Not hard at all   Food Insecurity: No Food Insecurity (7/8/2022)    Hunger Vital Sign     Worried About Running Out of Food in the Last Year: Never true     Ran Out of Food in the Last Year: Never true   Transportation Needs: No Transportation Needs (7/8/2022)    PRAPARE - Transportation     Lack of Transportation (Medical): No     Lack of Transportation (Non-Medical): No   Physical Activity: Inactive (7/8/2022)    Exercise Vital Sign     Days of Exercise per Week: 0 days     Minutes of Exercise per Session: 0 min   Stress: No Stress Concern Present (7/8/2022)    [employer] - Occupational Stress Questionnaire     Feeling of Stress : Not at all   Housing Stability: Low Risk  (7/8/2022)    Housing Stability Vital Sign     Unable to Pay for Housing in the Last Year: No     Number of Places Lived in the Last Year: 2     Unstable Housing in the Last Year: No     Family History   Problem Relation Name Age of Onset    Heart disease Mother      Cancer Mother          colon cancer    Diabetes Mother      Colon cancer Mother      Cancer Father          Liver cancer    Kidney disease Father      Liver cancer Father      Cancer Sister Ana Luisa         breast cancer    BRCA 1/2 Sister Ana Luisa     Breast cancer Sister Ana Luisa     Cancer Brother Seng         Liver cancer    Liver cancer Brother Seng     Cancer Brother Lazaro          colon cancer    Colon cancer Brother Lazaro     Cancer Brother Da         thoat cancer    Throat cancer Brother Da     No Known Problems Brother Joe     No Known Problems Son x4        Review of patient's allergies indicates:  No Known Allergies    Current Outpatient Medications   Medication Sig    albuterol (VENTOLIN HFA) 90 mcg/actuation inhaler Inhale 2 puffs into the lungs every 6 (six) hours as needed for Wheezing. Rescue    allopurinoL (ZYLOPRIM) 300 MG tablet Take 1 tablet (300 mg total) by mouth once daily.    amLODIPine (NORVASC) 5 MG tablet Take 1 tablet (5 mg total) by mouth once daily.    ammonium lactate (LAC-HYDRIN) 12 % lotion Apply to damp skin after bathing    benzonatate (TESSALON) 200 MG capsule take 1 BY MOUTH THREE TIMES DAILY AS NEEDED FOR COUGH    colchicine (COLCRYS) 0.6 mg tablet Take 1 tablet (0.6 mg total) by mouth once daily.    colchicine-probenecid 0.5-500 mg (CO-BENEMID) 500-0.5 mg Tab Take 1 tablet by mouth once daily.    FLUoxetine 20 MG capsule Take 1 capsule (20 mg total) by mouth once daily.    fluticasone propionate (FLONASE) 50 mcg/actuation nasal spray 1 spray (50 mcg total) by Each Nostril route once daily.    gabapentin (NEURONTIN) 300 MG capsule Take 1 capsule (300 mg total) by mouth 3 (three) times daily as needed (pain).    loratadine (CLARITIN) 10 mg tablet Take 1 tablet (10 mg total) by mouth once daily.    meclizine (ANTIVERT) 25 mg tablet TAKE 1 TABLET BY MOUTH THREE TIMES DAILY AS NEEDED    metoprolol succinate (TOPROL-XL) 100 MG 24 hr tablet Take 1 tablet (100 mg total) by mouth once daily.    neomycin-polymyxin-hydrocortisone (CORTISPORIN) 3.5-10,000-0.5 mg/g-unit/g-% cream Apply topically 2 (two) times daily.    neomycin-polymyxin-hydrocortisone (CORTISPORIN) 3.5-10,000-1 mg/mL-unit/mL-% otic suspension Place 3 drops into both ears 3 (three) times daily.    omeprazole (PRILOSEC) 20 MG capsule Take 1 capsule (20 mg total) by mouth once daily.     "triamcinolone acetonide 0.1% (KENALOG) 0.1 % cream Apply topically 2 (two) times daily as needed (dryness or itching).    valACYclovir (VALTREX) 1000 MG tablet Take 1 tablet (1,000 mg total) by mouth 3 (three) times daily. for 5 days    valsartan (DIOVAN) 320 MG tablet TAKE ONE TABLET BY MOUTH ONCE DAILY    famotidine (PEPCID) 20 MG tablet Take 1 tablet (20 mg total) by mouth 2 (two) times daily.    meloxicam (MOBIC) 7.5 MG tablet Take 1 tablet (7.5 mg total) by mouth once daily. for 10 days     No current facility-administered medications for this visit.         ROS  Review of Systems   Constitutional:  Negative for chills, diaphoresis, fatigue and fever.   HENT:  Negative for ear discharge, ear pain, rhinorrhea, trouble swallowing and voice change.    Respiratory:  Negative for chest tightness, shortness of breath, wheezing and stridor.    Cardiovascular:  Positive for leg swelling. Negative for chest pain.   Gastrointestinal:  Negative for blood in stool, diarrhea, nausea and vomiting.   Endocrine: Negative for cold intolerance and heat intolerance.   Genitourinary:  Negative for dysuria, hematuria and urgency.   Musculoskeletal:  Positive for arthralgias, back pain, gait problem, joint swelling and myalgias. Negative for neck pain and neck stiffness.   Skin:  Negative for rash.   Neurological:  Positive for weakness and numbness. Negative for tremors, seizures, speech difficulty, light-headedness and headaches.   Hematological:  Does not bruise/bleed easily.   Psychiatric/Behavioral:  Negative for agitation, confusion and suicidal ideas.             OBJECTIVE:  BP (!) 140/89   Pulse 92   Resp 17   Ht 5' 8" (1.727 m)   Wt 134.2 kg (295 lb 13.7 oz)   BMI 44.98 kg/m²         Physical Exam  Constitutional:       Appearance: Normal appearance.   HENT:      Head: Normocephalic and atraumatic.   Eyes:      Extraocular Movements: Extraocular movements intact.      Pupils: Pupils are equal, round, and reactive to " light.   Cardiovascular:      Pulses: Normal pulses.   Pulmonary:      Effort: Pulmonary effort is normal.   Skin:     General: Skin is warm.      Capillary Refill: Capillary refill takes less than 2 seconds.   Neurological:      Mental Status: She is alert and oriented to person, place, and time.      Sensory: No sensory deficit.      Motor: Weakness present. No abnormal muscle tone.      Gait: Gait abnormal.      Deep Tendon Reflexes:      Reflex Scores:       Patellar reflexes are 2+ on the right side and 2+ on the left side.       Achilles reflexes are 1+ on the right side and 2+ on the left side.  Psychiatric:         Mood and Affect: Mood normal.         Behavior: Behavior normal.         Thought Content: Thought content normal.           Musculoskeletal:    Incision: no  Pain with Flexion: yes  Pain with Extension: yes  ROM:  Decreased  Paraspinous TTP:  Positive on the right  Facet TTP:  L5-S1  Facet Loading:  Positive on the right  SLR:  Positive on the right  SIJ TTP:  Positive on the right  VESTA:  Positive on the right      LABS:  Lab Results   Component Value Date    WBC 8.38 10/01/2024    HGB 14.2 10/01/2024    HCT 45.4 10/01/2024    MCV 87 10/01/2024     10/01/2024       CMP  Sodium   Date Value Ref Range Status   10/01/2024 141 136 - 145 mmol/L Final     Potassium   Date Value Ref Range Status   10/01/2024 4.0 3.5 - 5.1 mmol/L Final     Chloride   Date Value Ref Range Status   10/01/2024 107 95 - 110 mmol/L Final     CO2   Date Value Ref Range Status   10/01/2024 24 23 - 29 mmol/L Final     Glucose   Date Value Ref Range Status   10/01/2024 96 70 - 110 mg/dL Final     BUN   Date Value Ref Range Status   10/01/2024 11 8 - 23 mg/dL Final     Creatinine   Date Value Ref Range Status   10/01/2024 0.7 0.5 - 1.4 mg/dL Final     Calcium   Date Value Ref Range Status   10/01/2024 9.5 8.7 - 10.5 mg/dL Final     Total Protein   Date Value Ref Range Status   10/01/2024 7.6 6.0 - 8.4 g/dL Final      Albumin   Date Value Ref Range Status   10/01/2024 3.8 3.5 - 5.2 g/dL Final   10/01/2024 3.8 3.5 - 5.2 g/dL Final     Total Bilirubin   Date Value Ref Range Status   10/01/2024 1.3 (H) 0.1 - 1.0 mg/dL Final     Comment:     For infants and newborns, interpretation of results should be based  on gestational age, weight and in agreement with clinical  observations.    Premature Infant recommended reference ranges:  Up to 24 hours.............<8.0 mg/dL  Up to 48 hours............<12.0 mg/dL  3-5 days..................<15.0 mg/dL  6-29 days.................<15.0 mg/dL       Alkaline Phosphatase   Date Value Ref Range Status   10/01/2024 68 55 - 135 U/L Final     AST   Date Value Ref Range Status   10/01/2024 11 10 - 40 U/L Final     ALT   Date Value Ref Range Status   10/01/2024 15 10 - 44 U/L Final     Anion Gap   Date Value Ref Range Status   10/01/2024 10 8 - 16 mmol/L Final     eGFR if    Date Value Ref Range Status   12/16/2020 >60.0 >60 mL/min/1.73 m^2 Final     eGFR if non    Date Value Ref Range Status   12/16/2020 >60.0 >60 mL/min/1.73 m^2 Final     Comment:     Calculation used to obtain the estimated glomerular filtration  rate (eGFR) is the CKD-EPI equation.          Lab Results   Component Value Date    HGBA1C 5.5 07/05/2023             ASSESSMENT:       64 y.o. year old female with lower back pain, consistent with     1. Lumbar radiculopathy  X-Ray Lumbar Spine 2 Or 3 Views    meloxicam (MOBIC) 7.5 MG tablet    MRI Lumbar Spine Without Contrast    MRI Lumbar Spine Without Contrast    CANCELED: MRI Lumbar Spine Without Contrast      2. Sacroiliac joint dysfunction of right side  Ambulatory referral/consult to Pain Clinic    meloxicam (MOBIC) 7.5 MG tablet      3. Lumbar spondylosis  meloxicam (MOBIC) 7.5 MG tablet      4. Dorsalgia, unspecified  meloxicam (MOBIC) 7.5 MG tablet      5. Lumbar radiculopathy, chronic  meloxicam (MOBIC) 7.5 MG tablet        Lumbar  radiculopathy  -     X-Ray Lumbar Spine 2 Or 3 Views; Future; Expected date: 01/28/2025  -     Cancel: MRI Lumbar Spine Without Contrast; Future; Expected date: 01/28/2025  -     meloxicam (MOBIC) 7.5 MG tablet; Take 1 tablet (7.5 mg total) by mouth once daily. for 10 days  Dispense: 10 tablet; Refill: 0  -     MRI Lumbar Spine Without Contrast; Future; Expected date: 01/28/2025    Sacroiliac joint dysfunction of right side  -     Ambulatory referral/consult to Pain Clinic  -     meloxicam (MOBIC) 7.5 MG tablet; Take 1 tablet (7.5 mg total) by mouth once daily. for 10 days  Dispense: 10 tablet; Refill: 0    Lumbar spondylosis  -     meloxicam (MOBIC) 7.5 MG tablet; Take 1 tablet (7.5 mg total) by mouth once daily. for 10 days  Dispense: 10 tablet; Refill: 0    Dorsalgia, unspecified  -     meloxicam (MOBIC) 7.5 MG tablet; Take 1 tablet (7.5 mg total) by mouth once daily. for 10 days  Dispense: 10 tablet; Refill: 0    Lumbar radiculopathy, chronic  -     meloxicam (MOBIC) 7.5 MG tablet; Take 1 tablet (7.5 mg total) by mouth once daily. for 10 days  Dispense: 10 tablet; Refill: 0             PLAN:   - Interventions:   - none at this time.  Pain appears to be consistent with right sacroiliac joint dysfunction versus right lumbar radiculopathy.  We will obtain updated imaging of lumbar spine to further evaluate etiology of the pain.    - Anticoagulation use:   No no anticoagulation    - Medications:  - increase gabapentin to 600 mg at night  - start Mobic 7.5 mg daily for 10 days    - Therapy:   - patient has completed 8 weeks of formal physical therapy within the last 3 months with mild relief.  Continue home exercises    - Imaging/Diagnostic:  -x-rays of bilateral knees and previous MRI lumbar spine from 2018 reviewed and findings discussed with patient  - we will obtain updated x-rays of lumbar spine as well as MRI lumbar spine without contrast further evaluate lower back pain with right lumbar radiculopathy that has  continued despite over 8 weeks of conservative therapy    - Consults:   - continue follow up with Orthopedics.  Patient is status post right TKA      - Patient Questions: Answered all of the patient's questions regarding diagnosis, therapy, and treatment     This condition does not require this patient to take time off of work, and the primary goal of our Pain Management services is to improve the patient's functional capacity.     - Follow up visit: return to clinic after MRI        The above plan and management options were discussed at length with patient. Patient is in agreement with the above and verbalized understanding.    I discussed the goals of interventional chronic pain management with the patient on today's visit.  I explained the utility of injections for diagnostic and therapeutic purposes.  We discussed a multimodal approach to pain including treating the patient's given worst pain at any given time.  We will use a systematic approach to addressing pain.  We will also adopt a multimodal approach that includes injections, adjuvant medications, physical therapy, at times psychiatry.  There may be a limited role for opioid use intermittently in the treatment of pain, more particularly for acute pain although no one approach can be used as a sole treatment modality.    I emphasized the importance of regular exercise, core strengthening and stretching, diet and weight loss as a cornerstone of long-term pain management.      Wilbur Karimi MD  Interventional Pain Management  [facility]    Future Appointments   Date Time Provider Department Center   3/7/2025 12:30 PM Wilbur Karimi MD [facility]   3/11/2025  9:30 AM Arely Gale PA-C [id number]           Disclaimer:  This note was prepared using voice recognition system and is likely to have sound alike errors that may have been overlooked even after proof reading.  Please call me with any questions      I  spent a total of 45 minutes on the day of the visit.  This includes face to face time and non-face to face time preparing to see the patient (eg, review of tests), obtaining and/or reviewing separately obtained history, documenting clinical information in the electronic or other health record, independently interpreting results and communicating results to the patient/family/caregiver, or care coordinator.

## 2025-01-30 ENCOUNTER — DOCUMENTATION ONLY (OUTPATIENT)
Dept: REHABILITATION | Facility: HOSPITAL | Age: 65
End: 2025-01-30
Payer: MEDICARE

## 2025-01-30 ENCOUNTER — TELEPHONE (OUTPATIENT)
Dept: ORTHOPEDICS | Facility: CLINIC | Age: 65
End: 2025-01-30
Payer: MEDICARE

## 2025-01-30 RX ORDER — CYCLOBENZAPRINE HCL 5 MG
5 TABLET ORAL NIGHTLY
Qty: 14 TABLET | Refills: 0 | Status: SHIPPED | OUTPATIENT
Start: 2025-01-30 | End: 2025-02-13

## 2025-01-30 RX ORDER — DICLOFENAC SODIUM 75 MG/1
75 TABLET, DELAYED RELEASE ORAL 2 TIMES DAILY
Qty: 28 TABLET | Refills: 0 | Status: SHIPPED | OUTPATIENT
Start: 2025-01-30 | End: 2025-02-13

## 2025-01-30 NOTE — TELEPHONE ENCOUNTER
----- Message from Katelynn sent at 1/30/2025  1:56 PM CST -----  .Type:  Needs Medical Advice    Who Called: pt    Would the patient rather a call back or a response via [facility]? Call back  Best Call Back Number: [phone]  Additional Information:     Pt stated she would like a call back about getting a different pain medication

## 2025-02-21 DIAGNOSIS — Z47.1 AFTERCARE FOLLOWING RIGHT KNEE JOINT REPLACEMENT SURGERY: Primary | ICD-10-CM

## 2025-02-21 DIAGNOSIS — Z96.651 AFTERCARE FOLLOWING RIGHT KNEE JOINT REPLACEMENT SURGERY: Primary | ICD-10-CM

## 2025-03-07 ENCOUNTER — OFFICE VISIT (OUTPATIENT)
Dept: PAIN MEDICINE | Facility: CLINIC | Age: 65
End: 2025-03-07
Payer: MEDICARE

## 2025-03-07 VITALS
WEIGHT: 289.69 LBS | RESPIRATION RATE: 17 BRPM | HEART RATE: 85 BPM | DIASTOLIC BLOOD PRESSURE: 89 MMHG | SYSTOLIC BLOOD PRESSURE: 147 MMHG | HEIGHT: 68 IN | BODY MASS INDEX: 43.9 KG/M2

## 2025-03-07 DIAGNOSIS — M43.16 SPONDYLOLISTHESIS OF LUMBAR REGION: ICD-10-CM

## 2025-03-07 DIAGNOSIS — M54.16 LUMBAR RADICULOPATHY: Primary | ICD-10-CM

## 2025-03-07 DIAGNOSIS — M51.362 DEGENERATION OF INTERVERTEBRAL DISC OF LUMBAR REGION WITH DISCOGENIC BACK PAIN AND LOWER EXTREMITY PAIN: ICD-10-CM

## 2025-03-07 DIAGNOSIS — M54.16 LUMBAR RADICULOPATHY, CHRONIC: ICD-10-CM

## 2025-03-07 PROCEDURE — 99999 PR PBB SHADOW E&M-EST. PATIENT-LVL IV: CPT | Mod: PBBFAC,,, | Performed by: ANESTHESIOLOGY

## 2025-03-07 NOTE — PROGRESS NOTES
Interventional Pain Progress Note       Referring Physician: No ref. provider found    PCP: Sergio Pitt MD    Chief Complaint:  lower back pain        SUBJECTIVE:    Interval History (03/07/2025):      Patient returns to clinic for evaluation of lower back pain.  Since last being seen, patient reports continued lower back pain that starts in the midline and radiates down her right lower extremity along the lateral aspect to just below the knee.  Patient reports occasional radiation down her left lower extremity, but her right lower extremity is her primarily pain.  Pain is worse with prolonged standing and walking, better with sitting down.  Pain is currently rated as 7/10. Denies any fevers, chills, changes in gait, saddle anesthesia, or bowel and bladder incontinence        Initial HPI:     Debbie Vo is a 64 y.o. female who presents to the clinic for the evaluation of lower back pain.   Patient reports 5 year history of lower back pain that is slightly increased after right TKA on 11/11/2024.  Patient denies any previous surgical intervention on her lumbar spine.  Patient denies any other surgeries on her bilateral lower extremities besides her right TKA.    Pain described as an aching stabbing pain that starts in the right buttocks area.  This pain then radiates along the posterior and lateral aspect to the mid calf.  Patient denies any significant left lower extremity pain.  Pain is worse with lying supine, better with sitting up.  Pain is currently rated as 7/10. Patient denies any fevers, chills, saddle anesthesia, or bowel and bladder incontinence.      Non-Pharmacologic Treatments:  Physical Therapy/Home Exercise: yes  Ice/Heat:yes  TENS: yes  Acupuncture: no  Massage: yes  Chiropractic: no        Previous Pain Medications:  NSAIDs, Tylenol, muscle relaxers, neuropathics, opioids, topicals       report:  Reviewed and consistent with medication use as prescribed.    Pain Procedures:    None    Pain Disability Index Review:         1/28/2025    11:11 AM 6/4/2018    11:14 AM   Last 3 PDI Scores   Pain Disability Index (PDI) 39 55       Imaging:     MRI lumbar spine without contrast, 03/04/2025, imaging center [facility]    STUDY:  MRI lumbar spine    HISTORY:  Chronic low-back pain radiating into legs for years.    TECHNIQUE:   Sagittal T1 weighted T2 weighted and STIR images of the lumbar spine axial T1 weighted and T2 weighted images of the lumbar spine are presented.    FINDINGS:   Coronal  view demonstrates mild levoscoliosis.  The height of the lumbar vertebral bodies is maintained.  There is 2 mm of degenerative anterolisthesis of L4 on L5 and of L5 on S1.  There is some loss of T2 disc signal from the L2-3 through L5-S1 levels consistent with disc desiccation discogenic change.  The lower cord and conus are without signal abnormalities.    L1-2:  There is 1-2 mm of disc bulging eccentric to the right with small superimposed broad-based right lateral disc herniation which contributes to moderate right neural canal narrowing.  The left neural canal remains patent superiorly. No central canal stenosis is seen.    L2-3:  There is 1-2 mm of disc bulging combining with ligamentous and facet hypertrophy result in moderate bilateral neural canal narrowing.  No central canal stenosis is seen.     L3-4:  There is 1-2 mm of disc bulging combining with marked ligamentous and facet hypertrophy result in moderate bilateral neural canal narrowing.  There is mild central canal narrowing.    L4-5:  There is disc bulging or pseudo bulging related to mild degenerative anterolisthesis of L4 on L5 and this combines with ligamentous and facet hypertrophy result in moderate bilateral neural canal narrowing.  No central canal stenosis is seen.    L5-S1:  There is disc bulging or pseudo bulging related to degenerative anterolisthesis of L5 on S1 and there is left neural canal narrowing which is moderate. The  right neural canal remains patent superiorly.  No central canal compromise is seen.    IMPRESSION:       Disc bulging eccentric to the right with small superimposed broad-based right lateral disc herniation L1-2 with right neural canal narrowing.     Disc bulging and bilateral neural canal narrowing L2-3.     Disc bulging bilateral neural canal narrowing and mild central canal stenosis L3-4.     Disc bulge or pseudo bulging related to mild degenerative anterolisthesis of L4 on L5 with bilateral neural canal narrowing.     Disc bulging or pseudo bulging related to degenerative anterolisthesis of L5 on S1 with left neural canal narrowing.    Electronically Signed by DOMINIQUE HODGE at 05-Mar-2025 07:15:05 AM  Contrast Type and Amount: NONE        Results for orders placed during the hospital encounter of 08/24/23    X-ray Knee Ortho Bilateral    Narrative  EXAMINATION:  XR KNEE ORTHO BILAT    CLINICAL HISTORY:  Unilateral primary osteoarthritis, right knee    TECHNIQUE:  AP standing, lateral and Merchant views of both knees were performed.    COMPARISON:  07/02/2022    FINDINGS:  Tricompartment degenerative changes, noting advanced joint space loss with extensive subchondral sclerosis/cystic change and osteophytosis.  No fracture.  No marrow replacement process.  Alignment within normal limits.    Impression  Advanced degenerative changes.      Electronically signed by: William Arvizu MD  Date:    08/24/2023  Time:    14:32          Results for orders placed during the hospital encounter of 03/08/18    MRI Lumbar Spine Without Contrast    Narrative  EXAMINATION:  MRI LUMBAR SPINE WITHOUT CONTRAST    CLINICAL HISTORY:  Low back pain, >6wks conservative tx, persistent-progressive sx, surgical candidate;Lumbar radiculopathy, >6wks conservative tx, persistent-progressive sx, surgical candidate; Radiculopathy, lumbar region    TECHNIQUE:  Multiplanar, multisequence MR images were acquired from the thoracolumbar junction to the  sacrum without the administration of contrast.    COMPARISON:  CT abdomen pelvis 03/01/2016.    FINDINGS:  Alignment: Slight exaggeration of the normal lumbar lordosis.  Subtle anterolisthesis at L3-4, L4-5 and L5-S1..    Vertebrae: Normal marrow signal. No fracture.    Cord: Normal.  Conus terminates in good position.    Degenerative findings:    T12-L1, L1-2, L2-3: No focal disc herniation, spinal canal stenosis or significant neural foraminal narrowing.  Mild facet arthropathy.    L3-4: Moderate facet arthropathy, left more than right.  There is associated anterolisthesis with unroofing of the disc and mild disc bulging.  Mild endplate marginal osteophyte formation laterally.  Constellation of findings results in left greater than right lateral recess and neural foraminal encroachment.  Only modest narrowing of overall spinal canal diameter.    L4-L5: Moderate facet arthropathy, left more than right.  Mild anterolisthesis with unroofing of the disc.  Minimal disc bulging and degenerative endplate changes.  There is no significant narrowing of the overall spinal canal diameter.  Left greater than right lateral recess stenosis minimal neural foraminal encroachment.    L5-S1: Moderate facet arthropathy.  There is subtle anterolisthesis with degenerative disc disease including loss disc height, degenerative endplate change in mild endplate marginal osteophyte formation.  No spinal stenosis or high-grade neural foraminal narrowing.    Paraspinal muscles & soft tissues: Unremarkable.    IMPRESSION:    Moderate facet arthropathy in the mid to lower lumbar spine with associated anterolisthesis and degenerative disc disease.  This is most pronounced at the L3-4 level where there is mild encroachment of the overall spinal canal diameter with bilateral lateral recess and neural foraminal narrowing.  Individual disc levels further detailed above.      Electronically signed by: Esequiel Chapa  MD  Date:    03/29/2018  Time:    08:21      Past Medical History:   Diagnosis Date    Benign essential hypertension     Colon cancer     Depression     Gout, unspecified     Osteoarthritis      Past Surgical History:   Procedure Laterality Date    CHOLECYSTECTOMY      COLON SURGERY  2002    colon resection    COLONOSCOPY N/A 03/24/2016    Procedure: COLONOSCOPY;  Surgeon: Ernst Bobby MD;  Location: [facility] ENDO;  Service: Endoscopy;  Laterality: N/A;  Needs Flex sigmoidoscopy    EPIDURAL STEROID INJECTION INTO LUMBAR SPINE N/A 06/21/2018    Procedure: Injection-steroid-epidural-lumbar;  Surgeon: Avi Morales Jr., MD;  Location: [facility] PAIN MGT;  Service: Pain Management;  Laterality: N/A;  ORAL SEDATION    HYSTERECTOMY      INJECTION OF ANESTHETIC AGENT INTO SACROILIAC JOINT Bilateral 08/25/2021    Procedure: BLOCK, SACROILIAC JOINT*-- bilateral;  Surgeon: Lucia Carroll MD;  Location: [facility] PAIN MGT;  Service: Pain Management;  Laterality: Bilateral;    PARTIAL HYSTERECTOMY      TOTAL KNEE ARTHROPLASTY Right 11/11/2024    Procedure: ARTHROPLASTY, KNEE, TOTAL monoblock;  Surgeon: Rich Reid MD;  Location: [facility] OR;  Service: Orthopedics;  Laterality: Right;  bmi - 43.58     Social History     Socioeconomic History    Marital status: Single   Tobacco Use    Smoking status: Never     Passive exposure: Never    Smokeless tobacco: Never   Substance and Sexual Activity    Alcohol use: No    Drug use: No    Sexual activity: Yes     Partners: Male     Social Drivers of Health     Financial Resource Strain: Low Risk  (7/8/2022)    Overall Financial Resource Strain (CARDIA)     Difficulty of Paying Living Expenses: Not hard at all   Food Insecurity: No Food Insecurity (7/8/2022)    Hunger Vital Sign     Worried About Running Out of Food in the Last Year: Never true     Ran Out of Food in the Last Year: Never true   Transportation Needs: No Transportation Needs (7/8/2022)    PRAPARE - Transportation     Lack  of Transportation (Medical): No     Lack of Transportation (Non-Medical): No   Physical Activity: Inactive (7/8/2022)    Exercise Vital Sign     Days of Exercise per Week: 0 days     Minutes of Exercise per Session: 0 min   Stress: No Stress Concern Present (7/8/2022)    [employer] - Occupational Stress Questionnaire     Feeling of Stress : Not at all   Housing Stability: Low Risk  (7/8/2022)    Housing Stability Vital Sign     Unable to Pay for Housing in the Last Year: No     Number of Places Lived in the Last Year: 2     Unstable Housing in the Last Year: No     Family History   Problem Relation Name Age of Onset    Heart disease Mother      Cancer Mother          colon cancer    Diabetes Mother      Colon cancer Mother      Cancer Father          Liver cancer    Kidney disease Father      Liver cancer Father      Cancer Sister Ana Luisa         breast cancer    BRCA 1/2 Sister Ana Luisa     Breast cancer Sister Ana Luisa     Cancer Brother Seng         Liver cancer    Liver cancer Brother Seng     Cancer Brother Lazaro         colon cancer    Colon cancer Brother Lazaro     Cancer Brother Da         thoat cancer    Throat cancer Brother Da     No Known Problems Brother Joe     No Known Problems Son x4        Review of patient's allergies indicates:  No Known Allergies    Current Outpatient Medications   Medication Sig    albuterol (VENTOLIN HFA) 90 mcg/actuation inhaler Inhale 2 puffs into the lungs every 6 (six) hours as needed for Wheezing. Rescue    allopurinoL (ZYLOPRIM) 300 MG tablet Take 1 tablet (300 mg total) by mouth once daily.    amLODIPine (NORVASC) 5 MG tablet Take 1 tablet (5 mg total) by mouth once daily.    ammonium lactate (LAC-HYDRIN) 12 % lotion Apply to damp skin after bathing    benzonatate (TESSALON) 200 MG capsule take 1 BY MOUTH THREE TIMES DAILY AS NEEDED FOR COUGH    colchicine (COLCRYS) 0.6 mg tablet Take 1 tablet (0.6 mg total) by mouth once daily.     colchicine-probenecid 0.5-500 mg (CO-BENEMID) 500-0.5 mg Tab Take 1 tablet by mouth once daily.    FLUoxetine 20 MG capsule Take 1 capsule (20 mg total) by mouth once daily.    fluticasone propionate (FLONASE) 50 mcg/actuation nasal spray 1 spray (50 mcg total) by Each Nostril route once daily.    gabapentin (NEURONTIN) 300 MG capsule Take 1 capsule (300 mg total) by mouth 3 (three) times daily as needed (pain).    loratadine (CLARITIN) 10 mg tablet Take 1 tablet (10 mg total) by mouth once daily.    meclizine (ANTIVERT) 25 mg tablet TAKE 1 TABLET BY MOUTH THREE TIMES DAILY AS NEEDED    metoprolol succinate (TOPROL-XL) 100 MG 24 hr tablet Take 1 tablet (100 mg total) by mouth once daily.    neomycin-polymyxin-hydrocortisone (CORTISPORIN) 3.5-10,000-0.5 mg/g-unit/g-% cream Apply topically 2 (two) times daily.    neomycin-polymyxin-hydrocortisone (CORTISPORIN) 3.5-10,000-1 mg/mL-unit/mL-% otic suspension Place 3 drops into both ears 3 (three) times daily.    omeprazole (PRILOSEC) 20 MG capsule Take 1 capsule (20 mg total) by mouth once daily.    triamcinolone acetonide 0.1% (KENALOG) 0.1 % cream Apply topically 2 (two) times daily as needed (dryness or itching).    valACYclovir (VALTREX) 1000 MG tablet Take 1 tablet (1,000 mg total) by mouth 3 (three) times daily. for 5 days    valsartan (DIOVAN) 320 MG tablet TAKE ONE TABLET BY MOUTH ONCE DAILY    famotidine (PEPCID) 20 MG tablet Take 1 tablet (20 mg total) by mouth 2 (two) times daily.     No current facility-administered medications for this visit.         ROS  Review of Systems   Constitutional:  Negative for chills, diaphoresis, fatigue and fever.   Respiratory:  Negative for chest tightness, shortness of breath, wheezing and stridor.    Cardiovascular:  Positive for leg swelling. Negative for chest pain.   Gastrointestinal:  Negative for blood in stool, diarrhea, nausea and vomiting.   Endocrine: Negative for cold intolerance and heat intolerance.  "  Genitourinary:  Negative for dysuria, hematuria and urgency.   Musculoskeletal:  Positive for arthralgias, back pain, gait problem, joint swelling and myalgias. Negative for neck pain and neck stiffness.   Skin:  Negative for rash.   Neurological:  Positive for weakness and numbness. Negative for tremors, seizures, speech difficulty, light-headedness and headaches.   Hematological:  Does not bruise/bleed easily.   Psychiatric/Behavioral:  Negative for agitation, confusion and suicidal ideas.             OBJECTIVE:  BP (!) 147/89 (BP Location: Right arm, Patient Position: Sitting)   Pulse 85   Resp 17   Ht 5' 8" (1.727 m)   Wt 131.4 kg (289 lb 11 oz)   BMI 44.05 kg/m²         Physical Exam  Constitutional:       Appearance: Normal appearance.   HENT:      Head: Normocephalic and atraumatic.   Eyes:      Extraocular Movements: Extraocular movements intact.      Pupils: Pupils are equal, round, and reactive to light.   Cardiovascular:      Pulses: Normal pulses.   Pulmonary:      Effort: Pulmonary effort is normal.   Musculoskeletal:      Cervical back: Normal range of motion and neck supple.   Skin:     General: Skin is warm.      Capillary Refill: Capillary refill takes less than 2 seconds.   Neurological:      Mental Status: She is alert and oriented to person, place, and time.      Sensory: No sensory deficit.      Motor: Weakness present. No abnormal muscle tone.      Gait: Gait abnormal.      Deep Tendon Reflexes:      Reflex Scores:       Patellar reflexes are 2+ on the right side and 2+ on the left side.       Achilles reflexes are 1+ on the right side and 2+ on the left side.  Psychiatric:         Mood and Affect: Mood normal.         Behavior: Behavior normal.         Thought Content: Thought content normal.           Musculoskeletal:    Incision: no  Pain with Flexion: yes  Pain with Extension: yes  ROM:  Decreased  Paraspinous TTP:  Positive on the right  Facet TTP:  L5-S1  Facet Loading:  Positive " on the right  SLR:  Positive on the right  SIJ TTP:  Positive on the right  VESTA:  Positive on the right      LABS:  Lab Results   Component Value Date    WBC 8.38 10/01/2024    HGB 14.2 10/01/2024    HCT 45.4 10/01/2024    MCV 87 10/01/2024     10/01/2024       CMP  Sodium   Date Value Ref Range Status   10/01/2024 141 136 - 145 mmol/L Final     Potassium   Date Value Ref Range Status   10/01/2024 4.0 3.5 - 5.1 mmol/L Final     Chloride   Date Value Ref Range Status   10/01/2024 107 95 - 110 mmol/L Final     CO2   Date Value Ref Range Status   10/01/2024 24 23 - 29 mmol/L Final     Glucose   Date Value Ref Range Status   10/01/2024 96 70 - 110 mg/dL Final     BUN   Date Value Ref Range Status   10/01/2024 11 8 - 23 mg/dL Final     Creatinine   Date Value Ref Range Status   10/01/2024 0.7 0.5 - 1.4 mg/dL Final     Calcium   Date Value Ref Range Status   10/01/2024 9.5 8.7 - 10.5 mg/dL Final     Total Protein   Date Value Ref Range Status   10/01/2024 7.6 6.0 - 8.4 g/dL Final     Albumin   Date Value Ref Range Status   10/01/2024 3.8 3.5 - 5.2 g/dL Final   10/01/2024 3.8 3.5 - 5.2 g/dL Final     Total Bilirubin   Date Value Ref Range Status   10/01/2024 1.3 (H) 0.1 - 1.0 mg/dL Final     Comment:     For infants and newborns, interpretation of results should be based  on gestational age, weight and in agreement with clinical  observations.    Premature Infant recommended reference ranges:  Up to 24 hours.............<8.0 mg/dL  Up to 48 hours............<12.0 mg/dL  3-5 days..................<15.0 mg/dL  6-29 days.................<15.0 mg/dL       Alkaline Phosphatase   Date Value Ref Range Status   10/01/2024 68 55 - 135 U/L Final     AST   Date Value Ref Range Status   10/01/2024 11 10 - 40 U/L Final     ALT   Date Value Ref Range Status   10/01/2024 15 10 - 44 U/L Final     Anion Gap   Date Value Ref Range Status   10/01/2024 10 8 - 16 mmol/L Final     eGFR if    Date Value Ref Range Status    12/16/2020 >60.0 >60 mL/min/1.73 m^2 Final     eGFR if non    Date Value Ref Range Status   12/16/2020 >60.0 >60 mL/min/1.73 m^2 Final     Comment:     Calculation used to obtain the estimated glomerular filtration  rate (eGFR) is the CKD-EPI equation.          Lab Results   Component Value Date    HGBA1C 5.5 07/05/2023             ASSESSMENT:       64 y.o. year old female with lower back pain, consistent with     1. Lumbar radiculopathy  IR Epidural Transfor 1st Vert Lumbar Eduardo    Case Request-RAD/Other Procedure Area: Bilateral L4/5 TF SIMONA      2. Lumbar radiculopathy, chronic        3. Spondylolisthesis of lumbar region        4. Degeneration of intervertebral disc of lumbar region with discogenic back pain and lower extremity pain          Lumbar radiculopathy  -     IR Epidural Transfor 1st Vert Lumbar Eduardo; Future; Expected date: 03/07/2025  -     Case Request-RAD/Other Procedure Area: Bilateral L4/5 TF SIMONA    Lumbar radiculopathy, chronic    Spondylolisthesis of lumbar region    Degeneration of intervertebral disc of lumbar region with discogenic back pain and lower extremity pain             PLAN:   - Interventions:   - schedule patient for bilateral L4-5 transforaminal epidural steroid injection for lumbar radiculopathy    - Anticoagulation use:   No no anticoagulation    - Medications:  - increase gabapentin to 300 mg 3 times a day       - Therapy:   - patient has completed 8 weeks of formal physical therapy within the last 3 months with mild relief.  Continue home exercises    - Imaging/Diagnostic:   - updated x-rays and MRI lumbar spine reviewed and findings discussed with patient.  There is grade 1 anterolisthesis of L4 upon L5 and L5 upon S1.  There is associated facet hypertrophy and ligament hypertrophy at L4-5 with moderate bilateral foraminal stenosis and no canal stenosis.  -x-rays of bilateral knees and previous MRI lumbar spine from 2018 reviewed    - Consults:   - continue  follow up with Orthopedics.  Patient is status post right TKA      - Patient Questions: Answered all of the patient's questions regarding diagnosis, therapy, and treatment     This condition does not require this patient to take time off of work, and the primary goal of our Pain Management services is to improve the patient's functional capacity.     - Follow up visit: return to clinic  four weeks after procedure    Visit today included increased complexity associated with the care of the episodic problem of chronic pain which was addressed and continue to manage the longitudinal care of the patient due to the serious and/or complex managed problem(s) listed above.      The above plan and management options were discussed at length with patient. Patient is in agreement with the above and verbalized understanding.    I discussed the goals of interventional chronic pain management with the patient on today's visit.  I explained the utility of injections for diagnostic and therapeutic purposes.  We discussed a multimodal approach to pain including treating the patient's given worst pain at any given time.  We will use a systematic approach to addressing pain.  We will also adopt a multimodal approach that includes injections, adjuvant medications, physical therapy, at times psychiatry.  There may be a limited role for opioid use intermittently in the treatment of pain, more particularly for acute pain although no one approach can be used as a sole treatment modality.    I emphasized the importance of regular exercise, core strengthening and stretching, diet and weight loss as a cornerstone of long-term pain management.      Wilbur Karimi MD  Interventional Pain Management  [facility]    Future Appointments   Date Time Provider Department Center   3/11/2025  9:00 AM [facility] ORTHO CLINIC LIMIT [id number]   3/11/2025  9:30 AM Arely Gale PA-C [id number]   4/25/2025 12:45  Wilbur Gonzales MD [facility]           Disclaimer:  This note was prepared using voice recognition system and is likely to have sound alike errors that may have been overlooked even after proof reading.  Please call me with any questions      I spent a total of 30 minutes on the day of the visit.  This includes face to face time and non-face to face time preparing to see the patient (eg, review of tests), obtaining and/or reviewing separately obtained history, documenting clinical information in the electronic or other health record, independently interpreting results and communicating results to the patient/family/caregiver, or care coordinator.

## 2025-03-07 NOTE — H&P (VIEW-ONLY)
Interventional Pain Progress Note       Referring Physician: No ref. provider found    PCP: Sergio Pitt MD    Chief Complaint:  lower back pain        SUBJECTIVE:    Interval History (03/07/2025):      Patient returns to clinic for evaluation of lower back pain.  Since last being seen, patient reports continued lower back pain that starts in the midline and radiates down her right lower extremity along the lateral aspect to just below the knee.  Patient reports occasional radiation down her left lower extremity, but her right lower extremity is her primarily pain.  Pain is worse with prolonged standing and walking, better with sitting down.  Pain is currently rated as 7/10. Denies any fevers, chills, changes in gait, saddle anesthesia, or bowel and bladder incontinence        Initial HPI:     Debbie Vo is a 64 y.o. female who presents to the clinic for the evaluation of lower back pain.   Patient reports 5 year history of lower back pain that is slightly increased after right TKA on 11/11/2024.  Patient denies any previous surgical intervention on her lumbar spine.  Patient denies any other surgeries on her bilateral lower extremities besides her right TKA.    Pain described as an aching stabbing pain that starts in the right buttocks area.  This pain then radiates along the posterior and lateral aspect to the mid calf.  Patient denies any significant left lower extremity pain.  Pain is worse with lying supine, better with sitting up.  Pain is currently rated as 7/10. Patient denies any fevers, chills, saddle anesthesia, or bowel and bladder incontinence.      Non-Pharmacologic Treatments:  Physical Therapy/Home Exercise: yes  Ice/Heat:yes  TENS: yes  Acupuncture: no  Massage: yes  Chiropractic: no        Previous Pain Medications:  NSAIDs, Tylenol, muscle relaxers, neuropathics, opioids, topicals       report:  Reviewed and consistent with medication use as prescribed.    Pain Procedures:    None    Pain Disability Index Review:         1/28/2025    11:11 AM 6/4/2018    11:14 AM   Last 3 PDI Scores   Pain Disability Index (PDI) 39 55       Imaging:     MRI lumbar spine without contrast, 03/04/2025, imaging center [facility]    STUDY:  MRI lumbar spine    HISTORY:  Chronic low-back pain radiating into legs for years.    TECHNIQUE:   Sagittal T1 weighted T2 weighted and STIR images of the lumbar spine axial T1 weighted and T2 weighted images of the lumbar spine are presented.    FINDINGS:   Coronal  view demonstrates mild levoscoliosis.  The height of the lumbar vertebral bodies is maintained.  There is 2 mm of degenerative anterolisthesis of L4 on L5 and of L5 on S1.  There is some loss of T2 disc signal from the L2-3 through L5-S1 levels consistent with disc desiccation discogenic change.  The lower cord and conus are without signal abnormalities.    L1-2:  There is 1-2 mm of disc bulging eccentric to the right with small superimposed broad-based right lateral disc herniation which contributes to moderate right neural canal narrowing.  The left neural canal remains patent superiorly. No central canal stenosis is seen.    L2-3:  There is 1-2 mm of disc bulging combining with ligamentous and facet hypertrophy result in moderate bilateral neural canal narrowing.  No central canal stenosis is seen.     L3-4:  There is 1-2 mm of disc bulging combining with marked ligamentous and facet hypertrophy result in moderate bilateral neural canal narrowing.  There is mild central canal narrowing.    L4-5:  There is disc bulging or pseudo bulging related to mild degenerative anterolisthesis of L4 on L5 and this combines with ligamentous and facet hypertrophy result in moderate bilateral neural canal narrowing.  No central canal stenosis is seen.    L5-S1:  There is disc bulging or pseudo bulging related to degenerative anterolisthesis of L5 on S1 and there is left neural canal narrowing which is moderate. The  right neural canal remains patent superiorly.  No central canal compromise is seen.    IMPRESSION:       Disc bulging eccentric to the right with small superimposed broad-based right lateral disc herniation L1-2 with right neural canal narrowing.     Disc bulging and bilateral neural canal narrowing L2-3.     Disc bulging bilateral neural canal narrowing and mild central canal stenosis L3-4.     Disc bulge or pseudo bulging related to mild degenerative anterolisthesis of L4 on L5 with bilateral neural canal narrowing.     Disc bulging or pseudo bulging related to degenerative anterolisthesis of L5 on S1 with left neural canal narrowing.    Electronically Signed by DOMINIQUE HODGE at 05-Mar-2025 07:15:05 AM  Contrast Type and Amount: NONE        Results for orders placed during the hospital encounter of 08/24/23    X-ray Knee Ortho Bilateral    Narrative  EXAMINATION:  XR KNEE ORTHO BILAT    CLINICAL HISTORY:  Unilateral primary osteoarthritis, right knee    TECHNIQUE:  AP standing, lateral and Merchant views of both knees were performed.    COMPARISON:  07/02/2022    FINDINGS:  Tricompartment degenerative changes, noting advanced joint space loss with extensive subchondral sclerosis/cystic change and osteophytosis.  No fracture.  No marrow replacement process.  Alignment within normal limits.    Impression  Advanced degenerative changes.      Electronically signed by: William Arvizu MD  Date:    08/24/2023  Time:    14:32          Results for orders placed during the hospital encounter of 03/08/18    MRI Lumbar Spine Without Contrast    Narrative  EXAMINATION:  MRI LUMBAR SPINE WITHOUT CONTRAST    CLINICAL HISTORY:  Low back pain, >6wks conservative tx, persistent-progressive sx, surgical candidate;Lumbar radiculopathy, >6wks conservative tx, persistent-progressive sx, surgical candidate; Radiculopathy, lumbar region    TECHNIQUE:  Multiplanar, multisequence MR images were acquired from the thoracolumbar junction to the  sacrum without the administration of contrast.    COMPARISON:  CT abdomen pelvis 03/01/2016.    FINDINGS:  Alignment: Slight exaggeration of the normal lumbar lordosis.  Subtle anterolisthesis at L3-4, L4-5 and L5-S1..    Vertebrae: Normal marrow signal. No fracture.    Cord: Normal.  Conus terminates in good position.    Degenerative findings:    T12-L1, L1-2, L2-3: No focal disc herniation, spinal canal stenosis or significant neural foraminal narrowing.  Mild facet arthropathy.    L3-4: Moderate facet arthropathy, left more than right.  There is associated anterolisthesis with unroofing of the disc and mild disc bulging.  Mild endplate marginal osteophyte formation laterally.  Constellation of findings results in left greater than right lateral recess and neural foraminal encroachment.  Only modest narrowing of overall spinal canal diameter.    L4-L5: Moderate facet arthropathy, left more than right.  Mild anterolisthesis with unroofing of the disc.  Minimal disc bulging and degenerative endplate changes.  There is no significant narrowing of the overall spinal canal diameter.  Left greater than right lateral recess stenosis minimal neural foraminal encroachment.    L5-S1: Moderate facet arthropathy.  There is subtle anterolisthesis with degenerative disc disease including loss disc height, degenerative endplate change in mild endplate marginal osteophyte formation.  No spinal stenosis or high-grade neural foraminal narrowing.    Paraspinal muscles & soft tissues: Unremarkable.    IMPRESSION:    Moderate facet arthropathy in the mid to lower lumbar spine with associated anterolisthesis and degenerative disc disease.  This is most pronounced at the L3-4 level where there is mild encroachment of the overall spinal canal diameter with bilateral lateral recess and neural foraminal narrowing.  Individual disc levels further detailed above.      Electronically signed by: Esequiel Chapa  MD  Date:    03/29/2018  Time:    08:21      Past Medical History:   Diagnosis Date    Benign essential hypertension     Colon cancer     Depression     Gout, unspecified     Osteoarthritis      Past Surgical History:   Procedure Laterality Date    CHOLECYSTECTOMY      COLON SURGERY  2002    colon resection    COLONOSCOPY N/A 03/24/2016    Procedure: COLONOSCOPY;  Surgeon: Ernst Bobby MD;  Location: [facility] ENDO;  Service: Endoscopy;  Laterality: N/A;  Needs Flex sigmoidoscopy    EPIDURAL STEROID INJECTION INTO LUMBAR SPINE N/A 06/21/2018    Procedure: Injection-steroid-epidural-lumbar;  Surgeon: Avi Morales Jr., MD;  Location: [facility] PAIN MGT;  Service: Pain Management;  Laterality: N/A;  ORAL SEDATION    HYSTERECTOMY      INJECTION OF ANESTHETIC AGENT INTO SACROILIAC JOINT Bilateral 08/25/2021    Procedure: BLOCK, SACROILIAC JOINT*-- bilateral;  Surgeon: Lucia Carroll MD;  Location: [facility] PAIN MGT;  Service: Pain Management;  Laterality: Bilateral;    PARTIAL HYSTERECTOMY      TOTAL KNEE ARTHROPLASTY Right 11/11/2024    Procedure: ARTHROPLASTY, KNEE, TOTAL monoblock;  Surgeon: Rich Reid MD;  Location: [facility] OR;  Service: Orthopedics;  Laterality: Right;  bmi - 43.58     Social History     Socioeconomic History    Marital status: Single   Tobacco Use    Smoking status: Never     Passive exposure: Never    Smokeless tobacco: Never   Substance and Sexual Activity    Alcohol use: No    Drug use: No    Sexual activity: Yes     Partners: Male     Social Drivers of Health     Financial Resource Strain: Low Risk  (7/8/2022)    Overall Financial Resource Strain (CARDIA)     Difficulty of Paying Living Expenses: Not hard at all   Food Insecurity: No Food Insecurity (7/8/2022)    Hunger Vital Sign     Worried About Running Out of Food in the Last Year: Never true     Ran Out of Food in the Last Year: Never true   Transportation Needs: No Transportation Needs (7/8/2022)    PRAPARE - Transportation     Lack  of Transportation (Medical): No     Lack of Transportation (Non-Medical): No   Physical Activity: Inactive (7/8/2022)    Exercise Vital Sign     Days of Exercise per Week: 0 days     Minutes of Exercise per Session: 0 min   Stress: No Stress Concern Present (7/8/2022)    [employer] - Occupational Stress Questionnaire     Feeling of Stress : Not at all   Housing Stability: Low Risk  (7/8/2022)    Housing Stability Vital Sign     Unable to Pay for Housing in the Last Year: No     Number of Places Lived in the Last Year: 2     Unstable Housing in the Last Year: No     Family History   Problem Relation Name Age of Onset    Heart disease Mother      Cancer Mother          colon cancer    Diabetes Mother      Colon cancer Mother      Cancer Father          Liver cancer    Kidney disease Father      Liver cancer Father      Cancer Sister Ana Luisa         breast cancer    BRCA 1/2 Sister Ana Luisa     Breast cancer Sister Ana Luisa     Cancer Brother Seng         Liver cancer    Liver cancer Brother Seng     Cancer Brother Lazaro         colon cancer    Colon cancer Brother Lazaro     Cancer Brother Da         thoat cancer    Throat cancer Brother Da     No Known Problems Brother Joe     No Known Problems Son x4        Review of patient's allergies indicates:  No Known Allergies    Current Outpatient Medications   Medication Sig    albuterol (VENTOLIN HFA) 90 mcg/actuation inhaler Inhale 2 puffs into the lungs every 6 (six) hours as needed for Wheezing. Rescue    allopurinoL (ZYLOPRIM) 300 MG tablet Take 1 tablet (300 mg total) by mouth once daily.    amLODIPine (NORVASC) 5 MG tablet Take 1 tablet (5 mg total) by mouth once daily.    ammonium lactate (LAC-HYDRIN) 12 % lotion Apply to damp skin after bathing    benzonatate (TESSALON) 200 MG capsule take 1 BY MOUTH THREE TIMES DAILY AS NEEDED FOR COUGH    colchicine (COLCRYS) 0.6 mg tablet Take 1 tablet (0.6 mg total) by mouth once daily.     colchicine-probenecid 0.5-500 mg (CO-BENEMID) 500-0.5 mg Tab Take 1 tablet by mouth once daily.    FLUoxetine 20 MG capsule Take 1 capsule (20 mg total) by mouth once daily.    fluticasone propionate (FLONASE) 50 mcg/actuation nasal spray 1 spray (50 mcg total) by Each Nostril route once daily.    gabapentin (NEURONTIN) 300 MG capsule Take 1 capsule (300 mg total) by mouth 3 (three) times daily as needed (pain).    loratadine (CLARITIN) 10 mg tablet Take 1 tablet (10 mg total) by mouth once daily.    meclizine (ANTIVERT) 25 mg tablet TAKE 1 TABLET BY MOUTH THREE TIMES DAILY AS NEEDED    metoprolol succinate (TOPROL-XL) 100 MG 24 hr tablet Take 1 tablet (100 mg total) by mouth once daily.    neomycin-polymyxin-hydrocortisone (CORTISPORIN) 3.5-10,000-0.5 mg/g-unit/g-% cream Apply topically 2 (two) times daily.    neomycin-polymyxin-hydrocortisone (CORTISPORIN) 3.5-10,000-1 mg/mL-unit/mL-% otic suspension Place 3 drops into both ears 3 (three) times daily.    omeprazole (PRILOSEC) 20 MG capsule Take 1 capsule (20 mg total) by mouth once daily.    triamcinolone acetonide 0.1% (KENALOG) 0.1 % cream Apply topically 2 (two) times daily as needed (dryness or itching).    valACYclovir (VALTREX) 1000 MG tablet Take 1 tablet (1,000 mg total) by mouth 3 (three) times daily. for 5 days    valsartan (DIOVAN) 320 MG tablet TAKE ONE TABLET BY MOUTH ONCE DAILY    famotidine (PEPCID) 20 MG tablet Take 1 tablet (20 mg total) by mouth 2 (two) times daily.     No current facility-administered medications for this visit.         ROS  Review of Systems   Constitutional:  Negative for chills, diaphoresis, fatigue and fever.   Respiratory:  Negative for chest tightness, shortness of breath, wheezing and stridor.    Cardiovascular:  Positive for leg swelling. Negative for chest pain.   Gastrointestinal:  Negative for blood in stool, diarrhea, nausea and vomiting.   Endocrine: Negative for cold intolerance and heat intolerance.  "  Genitourinary:  Negative for dysuria, hematuria and urgency.   Musculoskeletal:  Positive for arthralgias, back pain, gait problem, joint swelling and myalgias. Negative for neck pain and neck stiffness.   Skin:  Negative for rash.   Neurological:  Positive for weakness and numbness. Negative for tremors, seizures, speech difficulty, light-headedness and headaches.   Hematological:  Does not bruise/bleed easily.   Psychiatric/Behavioral:  Negative for agitation, confusion and suicidal ideas.             OBJECTIVE:  BP (!) 147/89 (BP Location: Right arm, Patient Position: Sitting)   Pulse 85   Resp 17   Ht 5' 8" (1.727 m)   Wt 131.4 kg (289 lb 11 oz)   BMI 44.05 kg/m²         Physical Exam  Constitutional:       Appearance: Normal appearance.   HENT:      Head: Normocephalic and atraumatic.   Eyes:      Extraocular Movements: Extraocular movements intact.      Pupils: Pupils are equal, round, and reactive to light.   Cardiovascular:      Pulses: Normal pulses.   Pulmonary:      Effort: Pulmonary effort is normal.   Musculoskeletal:      Cervical back: Normal range of motion and neck supple.   Skin:     General: Skin is warm.      Capillary Refill: Capillary refill takes less than 2 seconds.   Neurological:      Mental Status: She is alert and oriented to person, place, and time.      Sensory: No sensory deficit.      Motor: Weakness present. No abnormal muscle tone.      Gait: Gait abnormal.      Deep Tendon Reflexes:      Reflex Scores:       Patellar reflexes are 2+ on the right side and 2+ on the left side.       Achilles reflexes are 1+ on the right side and 2+ on the left side.  Psychiatric:         Mood and Affect: Mood normal.         Behavior: Behavior normal.         Thought Content: Thought content normal.           Musculoskeletal:    Incision: no  Pain with Flexion: yes  Pain with Extension: yes  ROM:  Decreased  Paraspinous TTP:  Positive on the right  Facet TTP:  L5-S1  Facet Loading:  Positive " on the right  SLR:  Positive on the right  SIJ TTP:  Positive on the right  VESTA:  Positive on the right      LABS:  Lab Results   Component Value Date    WBC 8.38 10/01/2024    HGB 14.2 10/01/2024    HCT 45.4 10/01/2024    MCV 87 10/01/2024     10/01/2024       CMP  Sodium   Date Value Ref Range Status   10/01/2024 141 136 - 145 mmol/L Final     Potassium   Date Value Ref Range Status   10/01/2024 4.0 3.5 - 5.1 mmol/L Final     Chloride   Date Value Ref Range Status   10/01/2024 107 95 - 110 mmol/L Final     CO2   Date Value Ref Range Status   10/01/2024 24 23 - 29 mmol/L Final     Glucose   Date Value Ref Range Status   10/01/2024 96 70 - 110 mg/dL Final     BUN   Date Value Ref Range Status   10/01/2024 11 8 - 23 mg/dL Final     Creatinine   Date Value Ref Range Status   10/01/2024 0.7 0.5 - 1.4 mg/dL Final     Calcium   Date Value Ref Range Status   10/01/2024 9.5 8.7 - 10.5 mg/dL Final     Total Protein   Date Value Ref Range Status   10/01/2024 7.6 6.0 - 8.4 g/dL Final     Albumin   Date Value Ref Range Status   10/01/2024 3.8 3.5 - 5.2 g/dL Final   10/01/2024 3.8 3.5 - 5.2 g/dL Final     Total Bilirubin   Date Value Ref Range Status   10/01/2024 1.3 (H) 0.1 - 1.0 mg/dL Final     Comment:     For infants and newborns, interpretation of results should be based  on gestational age, weight and in agreement with clinical  observations.    Premature Infant recommended reference ranges:  Up to 24 hours.............<8.0 mg/dL  Up to 48 hours............<12.0 mg/dL  3-5 days..................<15.0 mg/dL  6-29 days.................<15.0 mg/dL       Alkaline Phosphatase   Date Value Ref Range Status   10/01/2024 68 55 - 135 U/L Final     AST   Date Value Ref Range Status   10/01/2024 11 10 - 40 U/L Final     ALT   Date Value Ref Range Status   10/01/2024 15 10 - 44 U/L Final     Anion Gap   Date Value Ref Range Status   10/01/2024 10 8 - 16 mmol/L Final     eGFR if    Date Value Ref Range Status    12/16/2020 >60.0 >60 mL/min/1.73 m^2 Final     eGFR if non    Date Value Ref Range Status   12/16/2020 >60.0 >60 mL/min/1.73 m^2 Final     Comment:     Calculation used to obtain the estimated glomerular filtration  rate (eGFR) is the CKD-EPI equation.          Lab Results   Component Value Date    HGBA1C 5.5 07/05/2023             ASSESSMENT:       64 y.o. year old female with lower back pain, consistent with     1. Lumbar radiculopathy  IR Epidural Transfor 1st Vert Lumbar Eduardo    Case Request-RAD/Other Procedure Area: Bilateral L4/5 TF SIMONA      2. Lumbar radiculopathy, chronic        3. Spondylolisthesis of lumbar region        4. Degeneration of intervertebral disc of lumbar region with discogenic back pain and lower extremity pain          Lumbar radiculopathy  -     IR Epidural Transfor 1st Vert Lumbar Eduardo; Future; Expected date: 03/07/2025  -     Case Request-RAD/Other Procedure Area: Bilateral L4/5 TF SIMONA    Lumbar radiculopathy, chronic    Spondylolisthesis of lumbar region    Degeneration of intervertebral disc of lumbar region with discogenic back pain and lower extremity pain             PLAN:   - Interventions:   - schedule patient for bilateral L4-5 transforaminal epidural steroid injection for lumbar radiculopathy    - Anticoagulation use:   No no anticoagulation    - Medications:  - increase gabapentin to 300 mg 3 times a day       - Therapy:   - patient has completed 8 weeks of formal physical therapy within the last 3 months with mild relief.  Continue home exercises    - Imaging/Diagnostic:   - updated x-rays and MRI lumbar spine reviewed and findings discussed with patient.  There is grade 1 anterolisthesis of L4 upon L5 and L5 upon S1.  There is associated facet hypertrophy and ligament hypertrophy at L4-5 with moderate bilateral foraminal stenosis and no canal stenosis.  -x-rays of bilateral knees and previous MRI lumbar spine from 2018 reviewed    - Consults:   - continue  follow up with Orthopedics.  Patient is status post right TKA      - Patient Questions: Answered all of the patient's questions regarding diagnosis, therapy, and treatment     This condition does not require this patient to take time off of work, and the primary goal of our Pain Management services is to improve the patient's functional capacity.     - Follow up visit: return to clinic  four weeks after procedure    Visit today included increased complexity associated with the care of the episodic problem of chronic pain which was addressed and continue to manage the longitudinal care of the patient due to the serious and/or complex managed problem(s) listed above.      The above plan and management options were discussed at length with patient. Patient is in agreement with the above and verbalized understanding.    I discussed the goals of interventional chronic pain management with the patient on today's visit.  I explained the utility of injections for diagnostic and therapeutic purposes.  We discussed a multimodal approach to pain including treating the patient's given worst pain at any given time.  We will use a systematic approach to addressing pain.  We will also adopt a multimodal approach that includes injections, adjuvant medications, physical therapy, at times psychiatry.  There may be a limited role for opioid use intermittently in the treatment of pain, more particularly for acute pain although no one approach can be used as a sole treatment modality.    I emphasized the importance of regular exercise, core strengthening and stretching, diet and weight loss as a cornerstone of long-term pain management.      Wilbur Karimi MD  Interventional Pain Management  [facility]    Future Appointments   Date Time Provider Department Center   3/11/2025  9:00 AM [facility] ORTHO CLINIC LIMIT 350LBS [facility] ORXRAY [facility]   3/11/2025  9:30 AM Arely Gale PA-C [facility] [id number] [facility]   4/25/2025 12:45  Wilbur Gonzales MD [facility] [place]           Disclaimer:  This note was prepared using voice recognition system and is likely to have sound alike errors that may have been overlooked even after proof reading.  Please call me with any questions      I spent a total of 30 minutes on the day of the visit.  This includes face to face time and non-face to face time preparing to see the patient (eg, review of tests), obtaining and/or reviewing separately obtained history, documenting clinical information in the electronic or other health record, independently interpreting results and communicating results to the patient/family/caregiver, or care coordinator.

## 2025-03-11 ENCOUNTER — OFFICE VISIT (OUTPATIENT)
Dept: ORTHOPEDICS | Facility: CLINIC | Age: 65
End: 2025-03-11
Payer: MEDICARE

## 2025-03-11 ENCOUNTER — HOSPITAL ENCOUNTER (OUTPATIENT)
Dept: RADIOLOGY | Facility: HOSPITAL | Age: 65
Discharge: HOME | End: 2025-03-11
Attending: PHYSICIAN ASSISTANT
Payer: MEDICARE

## 2025-03-11 VITALS — WEIGHT: 293 LBS | BODY MASS INDEX: 44.41 KG/M2 | HEIGHT: 68 IN

## 2025-03-11 DIAGNOSIS — Z47.1 AFTERCARE FOLLOWING RIGHT KNEE JOINT REPLACEMENT SURGERY: Primary | ICD-10-CM

## 2025-03-11 DIAGNOSIS — Z96.651 AFTERCARE FOLLOWING RIGHT KNEE JOINT REPLACEMENT SURGERY: Primary | ICD-10-CM

## 2025-03-11 DIAGNOSIS — Z96.651 AFTERCARE FOLLOWING RIGHT KNEE JOINT REPLACEMENT SURGERY: ICD-10-CM

## 2025-03-11 DIAGNOSIS — Z47.1 AFTERCARE FOLLOWING RIGHT KNEE JOINT REPLACEMENT SURGERY: ICD-10-CM

## 2025-03-11 DIAGNOSIS — M17.12 PRIMARY OSTEOARTHRITIS OF LEFT KNEE: ICD-10-CM

## 2025-03-11 PROCEDURE — 99999 PR PBB SHADOW E&M-EST. PATIENT-LVL IV: CPT | Mod: PBBFAC,,, | Performed by: PHYSICIAN ASSISTANT

## 2025-03-11 PROCEDURE — 3008F BODY MASS INDEX DOCD: CPT | Mod: CPTII,S$GLB,, | Performed by: PHYSICIAN ASSISTANT

## 2025-03-11 PROCEDURE — 99213 OFFICE O/P EST LOW 20 MIN: CPT | Mod: 25,S$GLB,, | Performed by: PHYSICIAN ASSISTANT

## 2025-03-11 PROCEDURE — 73562 X-RAY EXAM OF KNEE 3: CPT | Mod: 26,RT,, | Performed by: RADIOLOGY

## 2025-03-11 PROCEDURE — 73562 X-RAY EXAM OF KNEE 3: CPT | Mod: TC,PN,RT

## 2025-03-11 PROCEDURE — 1159F MED LIST DOCD IN RCRD: CPT | Mod: CPTII,S$GLB,, | Performed by: PHYSICIAN ASSISTANT

## 2025-03-11 PROCEDURE — 20610 DRAIN/INJ JOINT/BURSA W/O US: CPT | Mod: LT,S$GLB,, | Performed by: PHYSICIAN ASSISTANT

## 2025-03-11 RX ORDER — TRIAMCINOLONE ACETONIDE 40 MG/ML
40 INJECTION, SUSPENSION INTRA-ARTICULAR; INTRAMUSCULAR
Status: DISCONTINUED | OUTPATIENT
Start: 2025-03-11 | End: 2025-03-11 | Stop reason: HOSPADM

## 2025-03-11 RX ADMIN — TRIAMCINOLONE ACETONIDE 40 MG: 40 INJECTION, SUSPENSION INTRA-ARTICULAR; INTRAMUSCULAR at 09:03

## 2025-03-11 NOTE — PROGRESS NOTES
Subjective:      Chief Complaint: Pain of the Right Knee and Follow-up (S/P R TKA 11/11/24)    Patient ID: Debbie Vo is a 64 y.o. female.  Patient is 4 months s/p  right primary total knee replacement  Anterior knee pain: No  Has improved pain  Is in physical therapy  No  Problems w incision  No  Is  happy with result  Yes  Opiod free: Yes   Walking with a cane today but she states that is for her back pain.  She is getting an epidural spinal injection in about 2 weeks.  She does have pain that shoots down her right leg.  She is also requesting a left knee injection today.  Last injection was about 2 years ago.        Past Medical History:   Diagnosis Date    Benign essential hypertension     Colon cancer     Depression     Gout, unspecified     Osteoarthritis         Past Surgical History:   Procedure Laterality Date    CHOLECYSTECTOMY      COLON SURGERY  2002    colon resection    COLONOSCOPY N/A 03/24/2016    Procedure: COLONOSCOPY;  Surgeon: Ernst Bobby MD;  Location: [facility] ENDO;  Service: Endoscopy;  Laterality: N/A;  Needs Flex sigmoidoscopy    EPIDURAL STEROID INJECTION INTO LUMBAR SPINE N/A 06/21/2018    Procedure: Injection-steroid-epidural-lumbar;  Surgeon: Avi Morales Jr., MD;  Location: [facility] PAIN MGT;  Service: Pain Management;  Laterality: N/A;  ORAL SEDATION    HYSTERECTOMY      INJECTION OF ANESTHETIC AGENT INTO SACROILIAC JOINT Bilateral 08/25/2021    Procedure: BLOCK, SACROILIAC JOINT*-- bilateral;  Surgeon: Lucia Carroll MD;  Location: [facility] PAIN MGT;  Service: Pain Management;  Laterality: Bilateral;    PARTIAL HYSTERECTOMY      TOTAL KNEE ARTHROPLASTY Right 11/11/2024    Procedure: ARTHROPLASTY, KNEE, TOTAL monoblock;  Surgeon: Rich Reid MD;  Location: [facility] OR;  Service: Orthopedics;  Laterality: Right;  bmi - 43.58        Current Outpatient Medications   Medication Instructions    albuterol (VENTOLIN HFA) 90 mcg/actuation inhaler 2 puffs, Inhalation,  "Every 6 hours PRN, Rescue    allopurinoL (ZYLOPRIM) 300 mg, Oral, Daily    amLODIPine (NORVASC) 5 mg, Oral, Daily    ammonium lactate (LAC-HYDRIN) 12 % lotion Apply to damp skin after bathing    benzonatate (TESSALON) 200 MG capsule take 1 BY MOUTH THREE TIMES DAILY AS NEEDED FOR COUGH    colchicine (COLCRYS) 0.6 mg, Oral, Daily    colchicine-probenecid 0.5-500 mg (CO-BENEMID) 500-0.5 mg Tab 1 tablet, Oral, Daily    famotidine (PEPCID) 20 mg, Oral, 2 times daily    FLUoxetine 20 mg, Oral, Daily    fluticasone propionate (FLONASE) 50 mcg, Each Nostril, Daily    gabapentin (NEURONTIN) 300 mg, Oral, 3 times daily PRN    loratadine (CLARITIN) 10 mg, Oral, Daily    meclizine (ANTIVERT) 25 mg, Oral    metoprolol succinate (TOPROL-XL) 100 mg, Oral, Daily    neomycin-polymyxin-hydrocortisone (CORTISPORIN) 3.5-10,000-0.5 mg/g-unit/g-% cream Topical (Top), 2 times daily    neomycin-polymyxin-hydrocortisone (CORTISPORIN) 3.5-10,000-1 mg/mL-unit/mL-% otic suspension 3 drops, Both Ears, 3 times daily    omeprazole (PRILOSEC) 20 mg, Oral, Daily    triamcinolone acetonide 0.1% (KENALOG) 0.1 % cream Topical (Top), 2 times daily PRN    valACYclovir (VALTREX) 1,000 mg, Oral, 3 times daily    valsartan (DIOVAN) 320 mg, Oral        Review of patient's allergies indicates:  No Known Allergies    Review of Systems   Constitutional: Negative for fever and malaise/fatigue.   Eyes:  Negative for blurred vision.   Cardiovascular:  Negative for chest pain.   Respiratory:  Negative for shortness of breath.    Skin:  Negative for poor wound healing.   Musculoskeletal:  Positive for arthritis, joint pain and myalgias.   Genitourinary:  Negative for bladder incontinence.   Neurological:  Negative for dizziness, numbness and paresthesias.   Psychiatric/Behavioral:  Negative for altered mental status.        The patient's relevant past medical, surgical, and social history was reviewed in Epic.       Objective:      VITAL SIGNS: Ht 5' 8" (1.727 m)  "  Wt (!) 137 kg (302 lb 0.5 oz)   BMI 45.92 kg/m²     General    Nursing note and vitals reviewed.  Constitutional: She is oriented to person, place, and time. She appears well-developed and well-nourished.   Neurological: She is alert and oriented to person, place, and time.     General Musculoskeletal Exam   Gait: normal       Right Knee Exam     Inspection   Scars: present    Tenderness   The patient is experiencing no tenderness.     Range of Motion   Extension:  0   Flexion:  90     Tests   Ligament Examination   Lachman: normal (-1 to 2mm)   MCL - Valgus: normal (0 to 2mm)  LCL - Varus: normal    Other   Sensation: normal    Left Knee Exam     Tenderness   The patient tender to palpation of the medial joint line.    Other   Sensation: normal    Muscle Strength   Right Lower Extremity   Quadriceps:  5/5   Hamstrin/5   Left Lower Extremity   Quadriceps:  5/5   Hamstrin/5     Vascular Exam     Right Pulses  Dorsalis Pedis:      2+  Posterior Tibial:      1+           Imaging  X-Ray Knee 3 View Right  Narrative: EXAMINATION:  XR KNEE 3 VIEW RIGHT    CLINICAL HISTORY:  Aftercare following joint replacement surgery    TECHNIQUE:  AP, lateral, and Merchant views of the right knee were performed.    COMPARISON:  Plain film from 2024    FINDINGS:  Postsurgical changes status post right total knee arthroplasty. Hardware and alignment are maintained. No evidence of acute fracture or dislocation. No joint effusion.  Impression: As above.    Electronically signed by: Leopoldo Krishnan MD  Date:    2025  Time:    09:20    I have reviewed the above radiograph and agree with the findings stated by the radiologist.           Assessment:       Debbie Vo is a 64 y.o. female seen in the office today for   1. Aftercare following right knee joint replacement surgery    2. Primary osteoarthritis of left knee     Overall patient appears to be doing well and is happy with the result of the knee  arthroplasty. They can continue activities as tolerated avoiding high impact activities.  I would like to see the patient back In 3 months with xrays.   Left knee kenalog injection given today for left knee arthritis.       Plan:       Debbie was seen today for pain and follow-up.    Diagnoses and all orders for this visit:    Aftercare following right knee joint replacement surgery    Primary osteoarthritis of left knee  -     Large Joint Aspiration/Injection: L knee          Diagnoses and plan discussed with the patient, as well as the expected course and duration of his symptoms.  All questions and concerns were addressed prior to the end of the visit.   Instructed patient to call office if they have any future questions/concerns or to schedule apt. Patient will return to see me if symptoms worsen or fail to improve    Note dictated with voice recognition software, please excuse any grammatical errors.        Arely Gale PA-C      Department of Orthopedic Surgery  [facility]  Office: [phone]  03/11/2025

## 2025-03-11 NOTE — PROCEDURES
Large Joint Aspiration/Injection: L knee    Date/Time: 3/11/2025 9:30 AM    Performed by: Arely Gale PA-C  Authorized by: Arely Gale PA-C    Consent Done?:  Yes (Verbal)  Indications:  Pain  Site marked: the procedure site was marked    Timeout: prior to procedure the correct patient, procedure, and site was verified    Prep: patient was prepped and draped in usual sterile fashion    Local anesthesia used?: No    Local anesthetic:  Topical anesthetic and lidocaine 1% without epinephrine    Details:  Needle Size:  22 G  Ultrasonic Guidance for needle placement?: No    Approach:  Anterolateral  Location:  Knee  Site:  L knee  Medications:  40 mg triamcinolone acetonide 40 mg/mL  Patient tolerance:  Patient tolerated the procedure well with no immediate complications

## 2025-03-13 NOTE — PRE-PROCEDURE INSTRUCTIONS
Spoke with patient regarding procedure scheduled on 3.20     Arrival time 1245     Has patient been sick with fever or on antibiotics within the last 7 days? No     Does the patient have any open wounds, sores or rashes? No     Does the patient have any recent fractures? no     Has patient received a vaccination within the last 7 days? No     Received the COVID vaccination?      Has the patient stopped all medications as directed? na     Does patient have a pacemaker, defibrillator, or implantable stimulator? No     Does the patient have a ride to and from procedure and someone reliable to remain with patient?  darra     Is the patient diabetic? no      Does the patient have sleep apnea? Or use O2 at home? no     Is the patient receiving sedation?      Is the patient instructed to remain NPO beginning at midnight the night before their procedure? yes     Procedure location confirmed with patient? Yes     Covid- Denies signs/symptoms. Instructed to notify PAT/MD if any changes.

## 2025-03-20 ENCOUNTER — HOSPITAL ENCOUNTER (OUTPATIENT)
Facility: HOSPITAL | Age: 65
Discharge: HOME | End: 2025-03-20
Attending: ANESTHESIOLOGY | Admitting: ANESTHESIOLOGY
Payer: MEDICARE

## 2025-03-20 VITALS
OXYGEN SATURATION: 98 % | HEIGHT: 68 IN | SYSTOLIC BLOOD PRESSURE: 130 MMHG | TEMPERATURE: 97 F | RESPIRATION RATE: 16 BRPM | WEIGHT: 287.81 LBS | DIASTOLIC BLOOD PRESSURE: 76 MMHG | HEART RATE: 81 BPM | BODY MASS INDEX: 43.62 KG/M2

## 2025-03-20 DIAGNOSIS — M54.16 LUMBAR RADICULOPATHY: Primary | ICD-10-CM

## 2025-03-20 PROCEDURE — 64483 NJX AA&/STRD TFRM EPI L/S 1: CPT | Mod: 50,,, | Performed by: ANESTHESIOLOGY

## 2025-03-20 PROCEDURE — 63600175 PHARM REV CODE 636 W HCPCS: Performed by: ANESTHESIOLOGY

## 2025-03-20 PROCEDURE — 64483 NJX AA&/STRD TFRM EPI L/S 1: CPT | Mod: 50 | Performed by: ANESTHESIOLOGY

## 2025-03-20 PROCEDURE — 25500020 PHARM REV CODE 255: Performed by: ANESTHESIOLOGY

## 2025-03-20 RX ORDER — ONDANSETRON HYDROCHLORIDE 2 MG/ML
4 INJECTION, SOLUTION INTRAVENOUS ONCE AS NEEDED
Status: DISCONTINUED | OUTPATIENT
Start: 2025-03-20 | End: 2025-03-20 | Stop reason: HOSPADM

## 2025-03-20 RX ORDER — FENTANYL CITRATE 50 UG/ML
INJECTION, SOLUTION INTRAMUSCULAR; INTRAVENOUS
Status: DISCONTINUED | OUTPATIENT
Start: 2025-03-20 | End: 2025-03-20 | Stop reason: HOSPADM

## 2025-03-20 RX ORDER — BETAMETHASONE SODIUM PHOSPHATE AND BETAMETHASONE ACETATE 3; 3 MG/ML; MG/ML
INJECTION, SUSPENSION INTRA-ARTICULAR; INTRALESIONAL; INTRAMUSCULAR; SOFT TISSUE
Status: DISCONTINUED | OUTPATIENT
Start: 2025-03-20 | End: 2025-03-20 | Stop reason: HOSPADM

## 2025-03-20 RX ORDER — LIDOCAINE HYDROCHLORIDE 10 MG/ML
INJECTION, SOLUTION EPIDURAL; INFILTRATION; INTRACAUDAL; PERINEURAL
Status: DISCONTINUED | OUTPATIENT
Start: 2025-03-20 | End: 2025-03-20 | Stop reason: HOSPADM

## 2025-03-20 RX ORDER — MIDAZOLAM HYDROCHLORIDE 1 MG/ML
INJECTION, SOLUTION INTRAMUSCULAR; INTRAVENOUS
Status: DISCONTINUED | OUTPATIENT
Start: 2025-03-20 | End: 2025-03-20 | Stop reason: HOSPADM

## 2025-03-20 NOTE — OP NOTE
Transforaminal Lumbar Epidural Steroid Injection    INFORMED CONSENT: The procedure, risks, benefits and options were discussed with patient. There are no contraindications to the procedure. The patient expressed understanding and agreed to proceed. The personnel performing the procedure was discussed.    Date of procedure 03/20/2025    Time-out taken to identify patient and procedure side prior to starting the procedure.                     PROCEDURE:    1)  Bilateral  L4/L5 TRANSFORAMINAL EPIDURAL STEROID INJECTION      Pre Procedure diagnosis:    Lumbar radiculopathy [M54.16]  1. Lumbar radiculopathy        Post-Procedure diagnosis:   same    Surgeon: Wilbur Karimi MD    Assistants: None    Medication: 2ml Betamethasone PF 6mg/ml and 2ml Lidocaine PF 1%    Local: 3ml Lidocaine PF 1%    Sedation: Conscious sedation provided by M.D    SEDATION MEDICATIONS: local/IV sedation: Versed 2 mg and fentanyl 50 mcg IV.  Conscious sedation ordered by MD.  Patient reevaluated and sedation administered by MD and monitored by RN.  Total sedation time was less than 10 min.    Total sedation time was <10 min    Complications: None    Specimens: None        TECHNIQUE: The patient was brought to the procedure room. IV access was obtained prior to the procedure. The patient was positioned prone on the fluoroscopy table. Continuous hemodynamic monitoring was initiated including blood pressure, EKG, and pulse oximetry. . The skin was prepped with chlorhexidine and draped in a sterile fashion.   Local Xylocaine was injected by raising a wheel and going down to the periosteum using a 27-gauge hypodermic needle.      The bilateral L4/L5 transforaminal spaces were identified with fluoroscopy in the  AP, oblique, and lateral views.  A 22 gauge spinal quinke needle was then advanced into the area of the trans foraminal spaces at the above levels with confirmation of proper needle position using AP, oblique, and lateral fluoroscopic  views. Once the needle tip was in the area of the transforaminal space, and there was no blood, CSF or paraesthesias,  2 mL of Omnipaque 300mg/ml was injected at each level for a total of 4 mL.  Fluoroscopic imaging in the AP and lateral views revealed a clear outline of the spinal nerve with proximal spread of agent through the neural foramen into the epidural space. A total combination of 1 mL of Lidocaine PF 1% and 1ml of Betamethasone PF 6mg/ml was injected into each level for a total of 4mL of injected medications with displacement of the contrast dye confirming that the medication went into the area of the transforaminal spaces at each level. A sterile dressing was applied. Patient tolerated procedure well.        The patient was monitored for approximately 30 minutes after the procedure.  Patient was given post procedure and discharge instructions to follow at home.  The patient was discharged in a stable condition

## 2025-03-20 NOTE — DISCHARGE INSTRUCTIONS

## 2025-03-20 NOTE — DISCHARGE SUMMARY
Discharge Note  Short Stay      SUMMARY     Admit Date: 3/20/2025    Attending Physician: Wilbur Karimi MD        Discharge Physician: Wilbur Karimi MD        Discharge Date: 3/20/2025 2:50 PM    Procedure(s) (LRB):  Bilateral L4/5 TF SIMONA (Bilateral)    Final Diagnosis: Lumbar radiculopathy [M54.16]    Disposition: Home or self care    Patient Instructions:   Current Discharge Medication List        CONTINUE these medications which have NOT CHANGED    Details   metoprolol succinate (TOPROL-XL) 100 MG 24 hr tablet Take 1 tablet (100 mg total) by mouth once daily.  Qty: 90 tablet, Refills: 4    Comments: .  Associated Diagnoses: Essential hypertension      valsartan (DIOVAN) 320 MG tablet TAKE ONE TABLET BY MOUTH ONCE DAILY  Qty: 90 tablet, Refills: 3    Comments: .      albuterol (VENTOLIN HFA) 90 mcg/actuation inhaler Inhale 2 puffs into the lungs every 6 (six) hours as needed for Wheezing. Rescue  Qty: 18 g, Refills: 3      allopurinoL (ZYLOPRIM) 300 MG tablet Take 1 tablet (300 mg total) by mouth once daily.  Qty: 90 tablet, Refills: 3    Associated Diagnoses: Acute gout involving toe of right foot, unspecified cause      amLODIPine (NORVASC) 5 MG tablet Take 1 tablet (5 mg total) by mouth once daily.  Qty: 90 tablet, Refills: 4    Comments: .  Associated Diagnoses: Essential hypertension      ammonium lactate (LAC-HYDRIN) 12 % lotion Apply to damp skin after bathing  Qty: 225 g, Refills: 11    Associated Diagnoses: Xerosis of skin      benzonatate (TESSALON) 200 MG capsule take 1 BY MOUTH THREE TIMES DAILY AS NEEDED FOR COUGH  Qty: 30 capsule, Refills: 4    Associated Diagnoses: Acute upper respiratory infection, unspecified      colchicine (COLCRYS) 0.6 mg tablet Take 1 tablet (0.6 mg total) by mouth once daily.  Qty: 7 tablet, Refills: 0      colchicine-probenecid 0.5-500 mg (CO-BENEMID) 500-0.5 mg Tab Take 1 tablet by mouth once daily.  Qty: 7 tablet, Refills: 2      famotidine (PEPCID) 20 MG tablet  Take 1 tablet (20 mg total) by mouth 2 (two) times daily.  Qty: 84 tablet, Refills: 0    Associated Diagnoses: Primary osteoarthritis of right knee      FLUoxetine 20 MG capsule Take 1 capsule (20 mg total) by mouth once daily.  Qty: 90 capsule, Refills: 4    Associated Diagnoses: Hot flashes      fluticasone propionate (FLONASE) 50 mcg/actuation nasal spray 1 spray (50 mcg total) by Each Nostril route once daily.  Qty: 10 mL, Refills: 5    Associated Diagnoses: Post-nasal drip      gabapentin (NEURONTIN) 300 MG capsule Take 1 capsule (300 mg total) by mouth 3 (three) times daily as needed (pain).  Qty: 270 capsule, Refills: 3    Associated Diagnoses: Lumbar radiculopathy, chronic      loratadine (CLARITIN) 10 mg tablet Take 1 tablet (10 mg total) by mouth once daily.  Qty: 90 tablet, Refills: 3    Associated Diagnoses: Seasonal allergies      meclizine (ANTIVERT) 25 mg tablet TAKE 1 TABLET BY MOUTH THREE TIMES DAILY AS NEEDED  Qty: 50 tablet, Refills: 1    Comments: 12/13/2024 9:44:24 AM  Associated Diagnoses: Vertigo      neomycin-polymyxin-hydrocortisone (CORTISPORIN) 3.5-10,000-0.5 mg/g-unit/g-% cream Apply topically 2 (two) times daily.  Qty: 10 g, Refills: 2    Associated Diagnoses: Acute otitis externa of left ear, unspecified type      neomycin-polymyxin-hydrocortisone (CORTISPORIN) 3.5-10,000-1 mg/mL-unit/mL-% otic suspension Place 3 drops into both ears 3 (three) times daily.  Qty: 10 mL, Refills: 0    Associated Diagnoses: Acute otitis externa of left ear, unspecified type      omeprazole (PRILOSEC) 20 MG capsule Take 1 capsule (20 mg total) by mouth once daily.  Qty: 90 capsule, Refills: 3    Associated Diagnoses: Gastroesophageal reflux disease without esophagitis      triamcinolone acetonide 0.1% (KENALOG) 0.1 % cream Apply topically 2 (two) times daily as needed (dryness or itching).  Qty: 80 g, Refills: 3    Associated Diagnoses: Pruritus      valACYclovir (VALTREX) 1000 MG tablet Take 1 tablet  (1,000 mg total) by mouth 3 (three) times daily. for 5 days  Qty: 15 tablet, Refills: 0                 Discharge Diagnosis: Lumbar radiculopathy [M54.16]  Condition on Discharge: Stable with no complications to procedure   Diet on Discharge: Same as before.  Activity: as per instruction sheet.  Discharge to: Home with a responsible adult.  Follow up: 2-4 weeks       Please call the office at [phone] if you experience any weakness or loss of sensation, fever > 101.5, pain uncontrolled with oral medications, persistent nausea/vomiting/or diarrhea, redness or drainage from the incisions, or any other worrisome concerns. If physician on call was not reached or could not communicate with our office for any reason please go to the nearest emergency department

## 2025-03-27 ENCOUNTER — TELEPHONE (OUTPATIENT)
Dept: FAMILY MEDICINE | Facility: CLINIC | Age: 65
End: 2025-03-27
Payer: MEDICARE

## 2025-03-27 NOTE — TELEPHONE ENCOUNTER
----- Message from Michelle sent at 3/27/2025 11:43 AM CDT -----  Type: Patient Call Back Who called:Self  What is the request in detail:pt requesting a call back from nurse  Can the clinic reply by [facility]? No Would the patient rather a call back or a response via [facility]? Call back Best call back number:.[phone]  Additional Information: Thank you.

## 2025-04-02 ENCOUNTER — OFFICE VISIT (OUTPATIENT)
Dept: FAMILY MEDICINE | Facility: CLINIC | Age: 65
End: 2025-04-02
Payer: MEDICARE

## 2025-04-02 VITALS
WEIGHT: 288.25 LBS | SYSTOLIC BLOOD PRESSURE: 116 MMHG | HEART RATE: 113 BPM | BODY MASS INDEX: 43.69 KG/M2 | DIASTOLIC BLOOD PRESSURE: 82 MMHG | TEMPERATURE: 99 F | OXYGEN SATURATION: 97 % | HEIGHT: 68 IN

## 2025-04-02 DIAGNOSIS — I10 ESSENTIAL HYPERTENSION: ICD-10-CM

## 2025-04-02 DIAGNOSIS — J00 ACUTE NASOPHARYNGITIS: Primary | ICD-10-CM

## 2025-04-02 DIAGNOSIS — E66.01 MORBID OBESITY WITH BMI OF 40.0-44.9, ADULT: ICD-10-CM

## 2025-04-02 DIAGNOSIS — R05.1 ACUTE COUGH: ICD-10-CM

## 2025-04-02 PROCEDURE — 3074F SYST BP LT 130 MM HG: CPT | Mod: CPTII,S$GLB,, | Performed by: PHYSICIAN ASSISTANT

## 2025-04-02 PROCEDURE — 99214 OFFICE O/P EST MOD 30 MIN: CPT | Mod: S$GLB,,, | Performed by: PHYSICIAN ASSISTANT

## 2025-04-02 PROCEDURE — 1159F MED LIST DOCD IN RCRD: CPT | Mod: CPTII,S$GLB,, | Performed by: PHYSICIAN ASSISTANT

## 2025-04-02 PROCEDURE — 1160F RVW MEDS BY RX/DR IN RCRD: CPT | Mod: CPTII,S$GLB,, | Performed by: PHYSICIAN ASSISTANT

## 2025-04-02 PROCEDURE — 3008F BODY MASS INDEX DOCD: CPT | Mod: CPTII,S$GLB,, | Performed by: PHYSICIAN ASSISTANT

## 2025-04-02 PROCEDURE — 3079F DIAST BP 80-89 MM HG: CPT | Mod: CPTII,S$GLB,, | Performed by: PHYSICIAN ASSISTANT

## 2025-04-02 PROCEDURE — 4010F ACE/ARB THERAPY RXD/TAKEN: CPT | Mod: CPTII,S$GLB,, | Performed by: PHYSICIAN ASSISTANT

## 2025-04-02 RX ORDER — CETIRIZINE HYDROCHLORIDE 10 MG/1
10 TABLET ORAL DAILY
Qty: 30 TABLET | Refills: 0 | Status: SHIPPED | OUTPATIENT
Start: 2025-04-02

## 2025-04-02 RX ORDER — BENZONATATE 200 MG/1
200 CAPSULE ORAL 2 TIMES DAILY PRN
Qty: 30 CAPSULE | Refills: 0 | Status: SHIPPED | OUTPATIENT
Start: 2025-04-02

## 2025-04-02 RX ORDER — PROMETHAZINE HYDROCHLORIDE AND DEXTROMETHORPHAN HYDROBROMIDE 6.25; 15 MG/5ML; MG/5ML
5 SYRUP ORAL NIGHTLY PRN
Qty: 118 ML | Refills: 0 | Status: SHIPPED | OUTPATIENT
Start: 2025-04-02

## 2025-04-02 RX ORDER — GUAIFENESIN 1200 MG/1
1200 TABLET, EXTENDED RELEASE ORAL 2 TIMES DAILY
Qty: 20 TABLET | Refills: 0 | Status: SHIPPED | OUTPATIENT
Start: 2025-04-02 | End: 2025-04-12

## 2025-04-02 NOTE — PATIENT INSTRUCTIONS
Start zyrtec once daily for postnasal drip  Start mucinex twice daily for thick chest congestion  Start tessalon pearls up to twice during the day for cough  Start promethazine dm nightly for severe cough  Follow if no relief or worsening symptoms in 2 weeks

## 2025-04-02 NOTE — PROGRESS NOTES
" Patient ID: Debbie Vo is a 64 y.o. female.     Chief Complaint: Cough    Ms. Vo is a 64 year old female with history of HTN, gout, and OA who presents for evaluation of cold symptoms.     Pt has been having he a productive cough, chills, headache, and postnasal drip for the last 2 days. Denies any recent sick contact. Sputum is clear. She admits that her throat is a little sore from coughing so much. She denies fever, SOB, wheezing, or ear pain. She has tried taking cough medicine, but it hasn't helped her cough. She reports drinking plenty of water and resting to try to manage symptoms.     She is agreeable to schedule for mammogram          Review of Systems  Review of Systems   Constitutional:  Positive for chills. Negative for fever.   HENT:  Positive for congestion and sinus pain. Negative for ear discharge and ear pain.    Eyes:  Negative for discharge.   Respiratory:  Positive for cough and sputum production. Negative for hemoptysis, shortness of breath and wheezing.    Cardiovascular:  Negative for chest pain and palpitations.   Gastrointestinal:  Negative for abdominal pain, nausea and vomiting.   Genitourinary:  Negative for urgency.   Musculoskeletal:  Negative for myalgias.   Neurological:  Positive for headaches. Negative for weakness.   Psychiatric/Behavioral:  Negative for depression.        Currently Medications  Medications Ordered Prior to Encounter[1]    Physical  Exam  Vitals:    04/02/25 1327   BP: 116/82   BP Location: Left arm   Patient Position: Sitting   Pulse: (!) 113   Temp: 98.6 °F (37 °C)   TempSrc: Oral   SpO2: 97%   Weight: 130.7 kg (288 lb 4 oz)   Height: 5' 8" (1.727 m)      Body mass index is 43.83 kg/m².  Wt Readings from Last 3 Encounters:   04/02/25 130.7 kg (288 lb 4 oz)   03/20/25 130.6 kg (287 lb 13 oz)   03/11/25 (!) 137 kg (302 lb 0.5 oz)       Physical Exam  Constitutional:       General: She is not in acute distress.     Appearance: She is morbidly " "obese.   HENT:      Head: Normocephalic.      Right Ear: Hearing normal. A middle ear effusion is present. Tympanic membrane is injected.      Left Ear: Hearing normal.      Ears:      Comments: Left ear with cerumen.      Mouth/Throat:      Mouth: Mucous membranes are moist.      Pharynx: No pharyngeal swelling, oropharyngeal exudate or posterior oropharyngeal erythema.   Cardiovascular:      Rate and Rhythm: Normal rate and regular rhythm.      Pulses: Normal pulses.      Heart sounds: Normal heart sounds.   Pulmonary:      Effort: No respiratory distress.      Breath sounds: Normal breath sounds.   Abdominal:      General: There is no distension.   Musculoskeletal:         General: No swelling or tenderness.   Lymphadenopathy:      Cervical: No cervical adenopathy.   Skin:     General: Skin is warm.      Capillary Refill: Capillary refill takes less than 2 seconds.      Coloration: Skin is not jaundiced.   Neurological:      General: No focal deficit present.      Mental Status: She is alert and oriented to person, place, and time.   Psychiatric:         Mood and Affect: Mood normal.         Labs:    Complete Blood Count  Lab Results   Component Value Date    RBC 5.24 10/01/2024    HGB 14.2 10/01/2024    HCT 45.4 10/01/2024    MCV 87 10/01/2024    MCH 27.1 10/01/2024    MCHC 31.3 (L) 10/01/2024    RDW 14.4 10/01/2024     10/01/2024    MPV 10.8 10/01/2024    GRAN 5.1 10/01/2024    GRAN 61.3 10/01/2024    LYMPH 2.3 10/01/2024    LYMPH 27.1 10/01/2024    MONO 0.8 10/01/2024    MONO 9.5 10/01/2024    EOS 0.1 10/01/2024    BASO 0.06 10/01/2024    EOSINOPHIL 1.0 10/01/2024    BASOPHIL 0.7 10/01/2024    DIFFMETHOD Automated 10/01/2024       Comprehensive Metabolic Panel  No results found for: "GLU", "BUN", "CREATININE", "NA", "K", "CL", "PROT", "ALBUMIN", "BILITOT", "AST", "ALKPHOS", "CO2", "ALT", "ANIONGAP", "EGFRNONAA", "ESTGFRAFRICA"    TSH  No results found for: "TSH"    Imaging:  FL Fluoro for Pain " Management  See OP Notes for results.     IMPRESSION: See OP Notes for results.     This procedure was auto-finalized by: Virtual Radiologist      Assessment/Plan:    1. Acute nasopharyngitis  -     guaiFENesin (MUCINEX) 1,200 mg Ta12; Take 1,200 mg by mouth 2 (two) times a day. for 10 days  Dispense: 20 tablet; Refill: 0  -     cetirizine (ZYRTEC) 10 MG tablet; Take 1 tablet (10 mg total) by mouth once daily.  Dispense: 30 tablet; Refill: 0  -     promethazine-dextromethorphan (PROMETHAZINE-DM) 6.25-15 mg/5 mL Syrp; Take 5 mLs by mouth nightly as needed (cough).  Dispense: 118 mL; Refill: 0  -     benzonatate (TESSALON) 200 MG capsule; Take 1 capsule (200 mg total) by mouth 2 (two) times daily as needed for Cough.  Dispense: 30 capsule; Refill: 0    2. Acute cough  -     guaiFENesin (MUCINEX) 1,200 mg Ta12; Take 1,200 mg by mouth 2 (two) times a day. for 10 days  Dispense: 20 tablet; Refill: 0  -     cetirizine (ZYRTEC) 10 MG tablet; Take 1 tablet (10 mg total) by mouth once daily.  Dispense: 30 tablet; Refill: 0  -     promethazine-dextromethorphan (PROMETHAZINE-DM) 6.25-15 mg/5 mL Syrp; Take 5 mLs by mouth nightly as needed (cough).  Dispense: 118 mL; Refill: 0  -     benzonatate (TESSALON) 200 MG capsule; Take 1 capsule (200 mg total) by mouth 2 (two) times daily as needed for Cough.  Dispense: 30 capsule; Refill: 0    3. Morbid obesity with BMI of 40.0-44.9, adult  Assessment & Plan:  - Body mass index is 43.83 kg/m².  - Recommendation for healthy diet and increasing exercise as tolerated with goal of 150min/week. Recommend weight loss.        4. Essential hypertension  Assessment & Plan:  - Chronic, stable  - Continue amlodipine and metoprolol  - Follow with PCP                Discussed how to stay healthy including: diet, exercise, refraining from smoking and discussed screening exams / tests needed for age, sex and family Hx.    This note was generated with the assistance of ambient listening technology.  Verbal consent was obtained by the patient and accompanying visitor(s) for the recording of patient appointment to facilitate this note. I attest to having reviewed and edited the generated note for accuracy, though some syntax or spelling errors may persist. Please contact the author of this note for any clarification.       RTC every 3-6 months with PCP, or RAUDEL Olguin PA-C            [1]   Current Outpatient Medications on File Prior to Visit   Medication Sig Dispense Refill    albuterol (VENTOLIN HFA) 90 mcg/actuation inhaler Inhale 2 puffs into the lungs every 6 (six) hours as needed for Wheezing. Rescue 18 g 3    allopurinoL (ZYLOPRIM) 300 MG tablet Take 1 tablet (300 mg total) by mouth once daily. 90 tablet 3    amLODIPine (NORVASC) 5 MG tablet Take 1 tablet (5 mg total) by mouth once daily. 90 tablet 4    ammonium lactate (LAC-HYDRIN) 12 % lotion Apply to damp skin after bathing 225 g 11    colchicine (COLCRYS) 0.6 mg tablet Take 1 tablet (0.6 mg total) by mouth once daily. 7 tablet 0    FLUoxetine 20 MG capsule Take 1 capsule (20 mg total) by mouth once daily. 90 capsule 4    fluticasone propionate (FLONASE) 50 mcg/actuation nasal spray 1 spray (50 mcg total) by Each Nostril route once daily. 10 mL 5    gabapentin (NEURONTIN) 300 MG capsule Take 1 capsule (300 mg total) by mouth 3 (three) times daily as needed (pain). 270 capsule 3    loratadine (CLARITIN) 10 mg tablet Take 1 tablet (10 mg total) by mouth once daily. 90 tablet 3    meclizine (ANTIVERT) 25 mg tablet TAKE 1 TABLET BY MOUTH THREE TIMES DAILY AS NEEDED 50 tablet 1    metoprolol succinate (TOPROL-XL) 100 MG 24 hr tablet Take 1 tablet (100 mg total) by mouth once daily. 90 tablet 4    neomycin-polymyxin-hydrocortisone (CORTISPORIN) 3.5-10,000-0.5 mg/g-unit/g-% cream Apply topically 2 (two) times daily. 10 g 2    neomycin-polymyxin-hydrocortisone (CORTISPORIN) 3.5-10,000-1 mg/mL-unit/mL-% otic suspension Place 3 drops into both ears 3  (three) times daily. 10 mL 0    triamcinolone acetonide 0.1% (KENALOG) 0.1 % cream Apply topically 2 (two) times daily as needed (dryness or itching). 80 g 3    valACYclovir (VALTREX) 1000 MG tablet Take 1 tablet (1,000 mg total) by mouth 3 (three) times daily. for 5 days 15 tablet 0    valsartan (DIOVAN) 320 MG tablet TAKE ONE TABLET BY MOUTH ONCE DAILY 90 tablet 3    colchicine-probenecid 0.5-500 mg (CO-BENEMID) 500-0.5 mg Tab Take 1 tablet by mouth once daily. (Patient not taking: Reported on 4/2/2025) 7 tablet 2    famotidine (PEPCID) 20 MG tablet Take 1 tablet (20 mg total) by mouth 2 (two) times daily. 84 tablet 0    omeprazole (PRILOSEC) 20 MG capsule Take 1 capsule (20 mg total) by mouth once daily. (Patient not taking: Reported on 4/2/2025) 90 capsule 3     No current facility-administered medications on file prior to visit.

## 2025-04-03 ENCOUNTER — TELEPHONE (OUTPATIENT)
Dept: FAMILY MEDICINE | Facility: CLINIC | Age: 65
End: 2025-04-03
Payer: MEDICARE

## 2025-04-03 NOTE — TELEPHONE ENCOUNTER
----- Message from Ellie sent at 4/3/2025 11:26 AM CDT -----  Type:  RX Refill RequestWho Called: Pt Refill or New Rx: Refill RX Name and Strength: all of the prescriptions that were prescribed by provider Preferred Pharmacy with phone number:[facility] [address] [facility] [id number] [place] [address]ld: [phone] Fax: [phone] or Mail Order: Local Ordering Provider: Sharif Would the patient rather a call back or a response via [facility]? Call back Best Call Back Number: [phone] Information: Please be advised, pt states that she had a refill request sent over yesterday, but didn't hear anything back, pt would like a call back once refills have been sent to pharmacy

## 2025-04-03 NOTE — ASSESSMENT & PLAN NOTE
- Body mass index is 43.83 kg/m².  - Recommendation for healthy diet and increasing exercise as tolerated with goal of 150min/week. Recommend weight loss.

## 2025-04-03 NOTE — TELEPHONE ENCOUNTER
Ellie Shultz Staff  Caller: Unspecified (Today, 11:26 AM)  Type:  RX Refill Request    Who Called: Pt  Refill or New Rx: Refill  RX Name and Strength: all of the prescriptions that were prescribed by provider  Preferred Pharmacy with phone number:  [facility] [place] [facility] [address]  [address] [place] [address]  Phone: [phone] Fax: [phone]  Local or Mail Order: Local  Ordering Provider: Sharif  Would the patient rather a call back or a response via [facility]? Call back  Best Call Back Number: [phone]  Additional Information: Please be advised, pt states that she had a refill request sent over yesterday, but didn't hear anything back, pt would like a call back once refills have been sent to pharmacy

## 2025-04-10 DIAGNOSIS — R42 VERTIGO: ICD-10-CM

## 2025-04-10 DIAGNOSIS — I10 ESSENTIAL HYPERTENSION: ICD-10-CM

## 2025-04-10 DIAGNOSIS — M54.16 LUMBAR RADICULOPATHY, CHRONIC: ICD-10-CM

## 2025-04-10 DIAGNOSIS — K21.9 GASTROESOPHAGEAL REFLUX DISEASE WITHOUT ESOPHAGITIS: ICD-10-CM

## 2025-04-10 DIAGNOSIS — M10.9 ACUTE GOUT INVOLVING TOE OF RIGHT FOOT, UNSPECIFIED CAUSE: ICD-10-CM

## 2025-04-10 DIAGNOSIS — J30.2 SEASONAL ALLERGIES: ICD-10-CM

## 2025-04-10 DIAGNOSIS — R09.82 POST-NASAL DRIP: ICD-10-CM

## 2025-04-10 RX ORDER — COLCHICINE 0.6 MG/1
0.6 TABLET ORAL DAILY
Qty: 7 TABLET | Refills: 0 | Status: SHIPPED | OUTPATIENT
Start: 2025-04-10

## 2025-04-10 RX ORDER — FLUTICASONE PROPIONATE 50 MCG
1 SPRAY, SUSPENSION (ML) NASAL DAILY
Qty: 10 ML | Refills: 5 | Status: SHIPPED | OUTPATIENT
Start: 2025-04-10

## 2025-04-10 RX ORDER — OMEPRAZOLE 20 MG/1
20 CAPSULE, DELAYED RELEASE ORAL DAILY
Qty: 90 CAPSULE | Refills: 3 | Status: SHIPPED | OUTPATIENT
Start: 2025-04-10

## 2025-04-10 RX ORDER — ALLOPURINOL 300 MG/1
300 TABLET ORAL DAILY
Qty: 90 TABLET | Refills: 3 | Status: SHIPPED | OUTPATIENT
Start: 2025-04-10

## 2025-04-10 RX ORDER — GABAPENTIN 300 MG/1
300 CAPSULE ORAL 3 TIMES DAILY PRN
Qty: 270 CAPSULE | Refills: 3 | Status: SHIPPED | OUTPATIENT
Start: 2025-04-10

## 2025-04-10 RX ORDER — AMLODIPINE BESYLATE 5 MG/1
5 TABLET ORAL DAILY
Qty: 90 TABLET | Refills: 4 | Status: SHIPPED | OUTPATIENT
Start: 2025-04-10 | End: 2026-04-10

## 2025-04-10 RX ORDER — LORATADINE 10 MG/1
10 TABLET ORAL DAILY
Qty: 90 TABLET | Refills: 3 | Status: SHIPPED | OUTPATIENT
Start: 2025-04-10

## 2025-04-10 RX ORDER — MECLIZINE HYDROCHLORIDE 25 MG/1
25 TABLET ORAL 3 TIMES DAILY PRN
Qty: 50 TABLET | Refills: 1 | Status: SHIPPED | OUTPATIENT
Start: 2025-04-10

## 2025-04-10 NOTE — TELEPHONE ENCOUNTER
Care Due:                  Date            Visit Type   Department     Provider  --------------------------------------------------------------------------------                                EP -                              PRIMARY      [facility] FAMILY  Last Visit: 03-      CARE ([facility])   MEDICINE       Sergio Pitt  Next Visit: None Scheduled  None         None Found                                                            Last  Test          Frequency    Reason                     Performed    Due Date  --------------------------------------------------------------------------------    Office Visit  15 months..  FLUoxetine, allopurinoL,   03- 06-                             amLODIPine,                             colchicine-probenecid,                             loratadine, metoprolol,                             omeprazole, valACYclovir,                             valsartan................    Uric Acid...  12 months..  allopurinoL,               Not Found    Overdue                             colchicine-probenecid....    [employer] Embedded Care Due Messages. Reference number: [id number].   4/10/2025 2:21:09 PM CDT

## 2025-04-10 NOTE — TELEPHONE ENCOUNTER
Pt is also requesting neomycin-polymyxin-hydrocortisone cream. It would not let me pend this Rx.    Shana Johnson Staff  Caller: [phone] (Today,  1:59 PM)  Type:  RX Refill Request    1.  Who Called: PT  Refill or New Rx: Refills  RX Name and Strength: gabapentin (NEURONTIN) 300 MG capsule 270 capsule  How is the patient currently taking it? (ex. 1XDay): Take 1 capsule (300 mg total) by mouth 3 (three) times daily as needed (pain).  Is this a 30 day or 90 day RX: 90  Preferred Pharmacy with phone number[facility] [place] [address] [address] [address] Phone: [phone] Fax: [phone]    2. fluticasone propionate (FLONASE) 50 mcg/actuation nasal spray 10 mL    3. neomycin-polymyxin-hydrocortisone (CORTISPORIN) 3.5-10,000-1 mg/mL-unit/mL-% otic suspension 10 mL    4. allopurinoL (ZYLOPRIM) 300 MG tablet 90 tablet    5. colchicine (COLCRYS) 0.6 mg tablet 7 tablet    6. amLODIPine (NORVASC) 5 MG tablet 90 tablet    7. loratadine (CLARITIN) 10 mg tablet 90 tablet    8. omeprazole (PRILOSEC) 20 MG capsule 90 capsule    9. meclizine (ANTIVERT) 25 mg tablet 50 tablet    10. neomycin-polymyxin-hydrocortisone (CORTISPORIN) 3.5-10,000-0.5 mg/g-unit/g-% cream 10 g

## 2025-04-25 ENCOUNTER — OFFICE VISIT (OUTPATIENT)
Dept: PAIN MEDICINE | Facility: CLINIC | Age: 65
End: 2025-04-25
Payer: MEDICARE

## 2025-04-25 VITALS
HEIGHT: 68 IN | WEIGHT: 293 LBS | HEART RATE: 101 BPM | DIASTOLIC BLOOD PRESSURE: 98 MMHG | BODY MASS INDEX: 44.41 KG/M2 | RESPIRATION RATE: 17 BRPM | SYSTOLIC BLOOD PRESSURE: 150 MMHG

## 2025-04-25 DIAGNOSIS — M51.362 DEGENERATION OF INTERVERTEBRAL DISC OF LUMBAR REGION WITH DISCOGENIC BACK PAIN AND LOWER EXTREMITY PAIN: ICD-10-CM

## 2025-04-25 DIAGNOSIS — M47.816 LUMBAR SPONDYLOSIS: ICD-10-CM

## 2025-04-25 DIAGNOSIS — M54.16 LUMBAR RADICULOPATHY: Primary | ICD-10-CM

## 2025-04-25 PROCEDURE — 99999 PR PBB SHADOW E&M-EST. PATIENT-LVL IV: CPT | Mod: PBBFAC,,, | Performed by: ANESTHESIOLOGY

## 2025-04-25 NOTE — PROGRESS NOTES
Interventional Pain Progress Note       Referring Physician: No ref. provider found    PCP: Sergio Pitt MD    Chief Complaint:  lower back pain        SUBJECTIVE:    Interval History (04/25/2025):      Patient returns to clinic after procedure.  Patient reports 80% relief after bilateral L4-5 transforaminal epidural steroid injection on 03/20/2025.  Patient reports occasional dull pain in her lower back, but overall she is much improved.  Pain is currently rated a 1/10. Denies any fevers, chills, changes in gait, saddle anesthesia, or bowel and bladder incontinence    Interval History (03/07/2025):      Patient returns to clinic for evaluation of lower back pain.  Since last being seen, patient reports continued lower back pain that starts in the midline and radiates down her right lower extremity along the lateral aspect to just below the knee.  Patient reports occasional radiation down her left lower extremity, but her right lower extremity is her primarily pain.  Pain is worse with prolonged standing and walking, better with sitting down.  Pain is currently rated as 7/10. Denies any fevers, chills, changes in gait, saddle anesthesia, or bowel and bladder incontinence    Initial HPI:     Debbie Vo is a 64 y.o. female who presents to the clinic for the evaluation of lower back pain.   Patient reports 5 year history of lower back pain that is slightly increased after right TKA on 11/11/2024.  Patient denies any previous surgical intervention on her lumbar spine.  Patient denies any other surgeries on her bilateral lower extremities besides her right TKA.    Pain described as an aching stabbing pain that starts in the right buttocks area.  This pain then radiates along the posterior and lateral aspect to the mid calf.  Patient denies any significant left lower extremity pain.  Pain is worse with lying supine, better with sitting up.  Pain is currently rated as 7/10. Patient denies any fevers,  chills, saddle anesthesia, or bowel and bladder incontinence.      Non-Pharmacologic Treatments:  Physical Therapy/Home Exercise: yes  Ice/Heat:yes  TENS: yes  Acupuncture: no  Massage: yes  Chiropractic: no        Previous Pain Medications:  NSAIDs, Tylenol, muscle relaxers, neuropathics, opioids, topicals       report:  Reviewed and consistent with medication use as prescribed.    Pain Procedures:   - 03/20/2025:  Bilateral L4-5 transforaminal epidural steroid injection, 80% relief    Pain Disability Index Review:         4/25/2025    12:35 PM 1/28/2025    11:11 AM 6/4/2018    11:14 AM   Last 3 PDI Scores   Pain Disability Index (PDI) [phone]       Imaging:     MRI lumbar spine without contrast, 03/04/2025, imaging center [facility]    STUDY:  MRI lumbar spine    HISTORY:  Chronic low-back pain radiating into legs for years.    TECHNIQUE:   Sagittal T1 weighted T2 weighted and STIR images of the lumbar spine axial T1 weighted and T2 weighted images of the lumbar spine are presented.    FINDINGS:   Coronal  view demonstrates mild levoscoliosis.  The height of the lumbar vertebral bodies is maintained.  There is 2 mm of degenerative anterolisthesis of L4 on L5 and of L5 on S1.  There is some loss of T2 disc signal from the L2-3 through L5-S1 levels consistent with disc desiccation discogenic change.  The lower cord and conus are without signal abnormalities.    L1-2:  There is 1-2 mm of disc bulging eccentric to the right with small superimposed broad-based right lateral disc herniation which contributes to moderate right neural canal narrowing.  The left neural canal remains patent superiorly. No central canal stenosis is seen.    L2-3:  There is 1-2 mm of disc bulging combining with ligamentous and facet hypertrophy result in moderate bilateral neural canal narrowing.  No central canal stenosis is seen.     L3-4:  There is 1-2 mm of disc bulging combining with marked ligamentous and facet hypertrophy  result in moderate bilateral neural canal narrowing.  There is mild central canal narrowing.    L4-5:  There is disc bulging or pseudo bulging related to mild degenerative anterolisthesis of L4 on L5 and this combines with ligamentous and facet hypertrophy result in moderate bilateral neural canal narrowing.  No central canal stenosis is seen.    L5-S1:  There is disc bulging or pseudo bulging related to degenerative anterolisthesis of L5 on S1 and there is left neural canal narrowing which is moderate. The right neural canal remains patent superiorly.  No central canal compromise is seen.    IMPRESSION:       Disc bulging eccentric to the right with small superimposed broad-based right lateral disc herniation L1-2 with right neural canal narrowing.     Disc bulging and bilateral neural canal narrowing L2-3.     Disc bulging bilateral neural canal narrowing and mild central canal stenosis L3-4.     Disc bulge or pseudo bulging related to mild degenerative anterolisthesis of L4 on L5 with bilateral neural canal narrowing.     Disc bulging or pseudo bulging related to degenerative anterolisthesis of L5 on S1 with left neural canal narrowing.    Electronically Signed by DOMINIQUE HODGE at 05-Mar-2025 07:15:05 AM  Contrast Type and Amount: NONE        Results for orders placed during the hospital encounter of 08/24/23    X-ray Knee Ortho Bilateral    Narrative  EXAMINATION:  XR KNEE ORTHO BILAT    CLINICAL HISTORY:  Unilateral primary osteoarthritis, right knee    TECHNIQUE:  AP standing, lateral and Merchant views of both knees were performed.    COMPARISON:  07/02/2022    FINDINGS:  Tricompartment degenerative changes, noting advanced joint space loss with extensive subchondral sclerosis/cystic change and osteophytosis.  No fracture.  No marrow replacement process.  Alignment within normal limits.    Impression  Advanced degenerative changes.      Electronically signed by: William Arvizu,  MD  Date:    08/24/2023  Time:    14:32          Results for orders placed during the hospital encounter of 03/08/18    MRI Lumbar Spine Without Contrast    Narrative  EXAMINATION:  MRI LUMBAR SPINE WITHOUT CONTRAST    CLINICAL HISTORY:  Low back pain, >6wks conservative tx, persistent-progressive sx, surgical candidate;Lumbar radiculopathy, >6wks conservative tx, persistent-progressive sx, surgical candidate; Radiculopathy, lumbar region    TECHNIQUE:  Multiplanar, multisequence MR images were acquired from the thoracolumbar junction to the sacrum without the administration of contrast.    COMPARISON:  CT abdomen pelvis 03/01/2016.    FINDINGS:  Alignment: Slight exaggeration of the normal lumbar lordosis.  Subtle anterolisthesis at L3-4, L4-5 and L5-S1..    Vertebrae: Normal marrow signal. No fracture.    Cord: Normal.  Conus terminates in good position.    Degenerative findings:    T12-L1, L1-2, L2-3: No focal disc herniation, spinal canal stenosis or significant neural foraminal narrowing.  Mild facet arthropathy.    L3-4: Moderate facet arthropathy, left more than right.  There is associated anterolisthesis with unroofing of the disc and mild disc bulging.  Mild endplate marginal osteophyte formation laterally.  Constellation of findings results in left greater than right lateral recess and neural foraminal encroachment.  Only modest narrowing of overall spinal canal diameter.    L4-L5: Moderate facet arthropathy, left more than right.  Mild anterolisthesis with unroofing of the disc.  Minimal disc bulging and degenerative endplate changes.  There is no significant narrowing of the overall spinal canal diameter.  Left greater than right lateral recess stenosis minimal neural foraminal encroachment.    L5-S1: Moderate facet arthropathy.  There is subtle anterolisthesis with degenerative disc disease including loss disc height, degenerative endplate change in mild endplate marginal osteophyte formation.  No  spinal stenosis or high-grade neural foraminal narrowing.    Paraspinal muscles & soft tissues: Unremarkable.    IMPRESSION:    Moderate facet arthropathy in the mid to lower lumbar spine with associated anterolisthesis and degenerative disc disease.  This is most pronounced at the L3-4 level where there is mild encroachment of the overall spinal canal diameter with bilateral lateral recess and neural foraminal narrowing.  Individual disc levels further detailed above.      Electronically signed by: Esequiel Chapa MD  Date:    03/29/2018  Time:    08:21      Past Medical History:   Diagnosis Date    Benign essential hypertension     Colon cancer     Depression     Gout, unspecified     Osteoarthritis      Past Surgical History:   Procedure Laterality Date    CHOLECYSTECTOMY      COLON SURGERY  2002    colon resection    COLONOSCOPY N/A 03/24/2016    Procedure: COLONOSCOPY;  Surgeon: Ernst Bobby MD;  Location: [facility] [place] ENDO;  Service: Endoscopy;  Laterality: N/A;  Needs Flex sigmoidoscopy    EPIDURAL STEROID INJECTION INTO LUMBAR SPINE N/A 06/21/2018    Procedure: Injection-steroid-epidural-lumbar;  Surgeon: Avi Morales Jr., MD;  Location: [facility] [place] PAIN MGT;  Service: Pain Management;  Laterality: N/A;  ORAL SEDATION    HYSTERECTOMY      INJECTION OF ANESTHETIC AGENT INTO SACROILIAC JOINT Bilateral 08/25/2021    Procedure: BLOCK, SACROILIAC JOINT*-- bilateral;  Surgeon: Lucia Carroll MD;  Location: [facility] [place] PAIN MGT;  Service: Pain Management;  Laterality: Bilateral;    PARTIAL HYSTERECTOMY      SELECTIVE INJECTION OF ANESTHETIC AGENT AROUND LUMBAR SPINAL NERVE ROOT BY TRANSFORAMINAL APPROACH Bilateral 3/20/2025    Procedure: Bilateral L4/5 TF SIMONA;  Surgeon: Wilbur Karimi MD;  Location: [facility] PAIN MGT;  Service: Pain Management;  Laterality: Bilateral;    TOTAL KNEE ARTHROPLASTY Right 11/11/2024    Procedure: ARTHROPLASTY, KNEE, TOTAL monoblock;  Surgeon: Rich Reid MD;  Location: [facility] [place] OR;  Service:  Orthopedics;  Laterality: Right;  bmi - 43.58     Social History     Socioeconomic History    Marital status: Single   Tobacco Use    Smoking status: Never     Passive exposure: Never    Smokeless tobacco: Never   Substance and Sexual Activity    Alcohol use: No    Drug use: No    Sexual activity: Yes     Partners: Male     Social Drivers of Health     Financial Resource Strain: High Risk (7/23/2024)    Received from [facility] SDOH Screening     In the past year, have you been unable to get any of the following when you really needed them? choose all that apply.: Internet     In the past year, have you been unable to get any of the following when you really needed them? choose all that apply.: Clothing     In the past year, have you been unable to get any of the following when you really needed them? choose all that apply.: Medicine or health care   Food Insecurity: High Risk (7/23/2024)    Received from [facility] SDOH Screening     In the past 2 months, did you or others you live with eat smaller meals or skip meals because you didn't have money for food?: Yes   Transportation Needs: No Transportation Needs (7/8/2022)    PRAPARE - Transportation     Lack of Transportation (Medical): No     Lack of Transportation (Non-Medical): No   Physical Activity: Inactive (7/8/2022)    Exercise Vital Sign     Days of Exercise per Week: 0 days     Minutes of Exercise per Session: 0 min   Stress: No Stress Concern Present (7/8/2022)    [employer] - Occupational Stress Questionnaire     Feeling of Stress : Not at all   Housing Stability: High Risk (7/23/2024)    Received from [facility] SDOH Screening     In the past year, have you been unable to get any of the following when you really needed them? choose all that apply.: Utilities (electric, gas, and water)     Family History   Problem Relation Name Age of Onset    Heart disease Mother      Cancer Mother           colon cancer    Diabetes Mother      Colon cancer Mother      Cancer Father          Liver cancer    Kidney disease Father      Liver cancer Father      Cancer Sister Ana Luisa         breast cancer    BRCA 1/2 Sister Ana Luisa     Breast cancer Sister Ana Luisa     Cancer Brother Seng         Liver cancer    Liver cancer Brother Seng     Cancer Brother Lazaro         colon cancer    Colon cancer Brother Lazaro     Cancer Brother Da         thoat cancer    Throat cancer Brother Da     No Known Problems Brother Joe     No Known Problems Son x4        Review of patient's allergies indicates:  No Known Allergies    Current Outpatient Medications   Medication Sig    albuterol (VENTOLIN HFA) 90 mcg/actuation inhaler Inhale 2 puffs into the lungs every 6 (six) hours as needed for Wheezing. Rescue    allopurinoL (ZYLOPRIM) 300 MG tablet Take 1 tablet (300 mg total) by mouth once daily.    amLODIPine (NORVASC) 5 MG tablet Take 1 tablet (5 mg total) by mouth once daily.    ammonium lactate (LAC-HYDRIN) 12 % lotion Apply to damp skin after bathing    benzonatate (TESSALON) 200 MG capsule Take 1 capsule (200 mg total) by mouth 2 (two) times daily as needed for Cough.    cetirizine (ZYRTEC) 10 MG tablet Take 1 tablet (10 mg total) by mouth once daily.    colchicine (COLCRYS) 0.6 mg tablet Take 1 tablet (0.6 mg total) by mouth once daily.    FLUoxetine 20 MG capsule Take 1 capsule (20 mg total) by mouth once daily.    fluticasone propionate (FLONASE) 50 mcg/actuation nasal spray 1 spray (50 mcg total) by Each Nostril route once daily.    gabapentin (NEURONTIN) 300 MG capsule Take 1 capsule (300 mg total) by mouth 3 (three) times daily as needed (pain).    loratadine (CLARITIN) 10 mg tablet Take 1 tablet (10 mg total) by mouth once daily.    meclizine (ANTIVERT) 25 mg tablet Take 1 tablet (25 mg total) by mouth 3 (three) times daily as needed for Dizziness.    metoprolol succinate (TOPROL-XL) 100 MG 24 hr tablet Take 1 tablet  (100 mg total) by mouth once daily.    neomycin-polymyxin-hydrocortisone (CORTISPORIN) 3.5-10,000-0.5 mg/g-unit/g-% cream Apply topically 2 (two) times daily.    neomycin-polymyxin-hydrocortisone (CORTISPORIN) 3.5-10,000-1 mg/mL-unit/mL-% otic suspension Place 3 drops into both ears 3 (three) times daily.    omeprazole (PRILOSEC) 20 MG capsule Take 1 capsule (20 mg total) by mouth once daily.    promethazine-dextromethorphan (PROMETHAZINE-DM) 6.25-15 mg/5 mL Syrp Take 5 mLs by mouth nightly as needed (cough).    triamcinolone acetonide 0.1% (KENALOG) 0.1 % cream Apply topically 2 (two) times daily as needed (dryness or itching).    valACYclovir (VALTREX) 1000 MG tablet Take 1 tablet (1,000 mg total) by mouth 3 (three) times daily. for 5 days    valsartan (DIOVAN) 320 MG tablet TAKE ONE TABLET BY MOUTH ONCE DAILY    colchicine-probenecid 0.5-500 mg (CO-BENEMID) 500-0.5 mg Tab Take 1 tablet by mouth once daily. (Patient not taking: Reported on 4/25/2025)    famotidine (PEPCID) 20 MG tablet Take 1 tablet (20 mg total) by mouth 2 (two) times daily.     No current facility-administered medications for this visit.         ROS  Review of Systems   Constitutional:  Negative for chills, diaphoresis, fatigue and fever.   Respiratory:  Negative for chest tightness, shortness of breath, wheezing and stridor.    Cardiovascular:  Positive for leg swelling. Negative for chest pain.   Gastrointestinal:  Negative for blood in stool, diarrhea, nausea and vomiting.   Endocrine: Negative for cold intolerance and heat intolerance.   Genitourinary:  Negative for dysuria, hematuria and urgency.   Musculoskeletal:  Positive for arthralgias, back pain, gait problem, joint swelling and myalgias. Negative for neck pain and neck stiffness.   Skin:  Negative for rash.   Neurological:  Positive for weakness and numbness. Negative for tremors, seizures, speech difficulty, light-headedness and headaches.   Hematological:  Does not bruise/bleed  "easily.   Psychiatric/Behavioral:  Negative for agitation, confusion and suicidal ideas.             OBJECTIVE:  BP (!) 150/98 (BP Location: Left arm, Patient Position: Sitting)   Pulse 101   Resp 17   Ht 5' 8" (1.727 m)   Wt 133.4 kg (294 lb 1.5 oz)   BMI 44.72 kg/m²         Physical Exam  Constitutional:       Appearance: Normal appearance.   HENT:      Head: Normocephalic and atraumatic.   Eyes:      Extraocular Movements: Extraocular movements intact.      Pupils: Pupils are equal, round, and reactive to light.   Cardiovascular:      Pulses: Normal pulses.   Pulmonary:      Effort: Pulmonary effort is normal.   Musculoskeletal:      Cervical back: Normal range of motion and neck supple.   Skin:     General: Skin is warm.      Capillary Refill: Capillary refill takes less than 2 seconds.   Neurological:      Mental Status: She is alert and oriented to person, place, and time.      Sensory: No sensory deficit.      Motor: No weakness or abnormal muscle tone.      Gait: Gait normal.      Deep Tendon Reflexes:      Reflex Scores:       Patellar reflexes are 2+ on the right side and 2+ on the left side.       Achilles reflexes are 2+ on the right side and 2+ on the left side.  Psychiatric:         Mood and Affect: Mood normal.         Behavior: Behavior normal.         Thought Content: Thought content normal.           Musculoskeletal:    Incision: no  Pain with Flexion: no  Pain with Extension: yes  ROM:  Decreased  Paraspinous TTP:  Mild in the right  Facet TTP:  Negative bilaterally  Facet Loading:  Negative bilaterally  SLR:  Negative bilaterally  SIJ TTP:  Negative bilaterally  VESTA:  Negative bilaterally      LABS:  Lab Results   Component Value Date    WBC 8.38 10/01/2024    HGB 14.2 10/01/2024    HCT 45.4 10/01/2024    MCV 87 10/01/2024     10/01/2024       CMP  Sodium   Date Value Ref Range Status   10/01/2024 141 136 - 145 mmol/L Final     Potassium   Date Value Ref Range Status   10/01/2024 " 4.0 3.5 - 5.1 mmol/L Final     Chloride   Date Value Ref Range Status   10/01/2024 107 95 - 110 mmol/L Final     CO2   Date Value Ref Range Status   10/01/2024 24 23 - 29 mmol/L Final     Glucose   Date Value Ref Range Status   10/01/2024 96 70 - 110 mg/dL Final     BUN   Date Value Ref Range Status   10/01/2024 11 8 - 23 mg/dL Final     Creatinine   Date Value Ref Range Status   10/01/2024 0.7 0.5 - 1.4 mg/dL Final     Calcium   Date Value Ref Range Status   10/01/2024 9.5 8.7 - 10.5 mg/dL Final     Total Protein   Date Value Ref Range Status   10/01/2024 7.6 6.0 - 8.4 g/dL Final     Albumin   Date Value Ref Range Status   10/01/2024 3.8 3.5 - 5.2 g/dL Final   10/01/2024 3.8 3.5 - 5.2 g/dL Final     Total Bilirubin   Date Value Ref Range Status   10/01/2024 1.3 (H) 0.1 - 1.0 mg/dL Final     Comment:     For infants and newborns, interpretation of results should be based  on gestational age, weight and in agreement with clinical  observations.    Premature Infant recommended reference ranges:  Up to 24 hours.............<8.0 mg/dL  Up to 48 hours............<12.0 mg/dL  3-5 days..................<15.0 mg/dL  6-29 days.................<15.0 mg/dL       Alkaline Phosphatase   Date Value Ref Range Status   10/01/2024 68 55 - 135 U/L Final     AST   Date Value Ref Range Status   10/01/2024 11 10 - 40 U/L Final     ALT   Date Value Ref Range Status   10/01/2024 15 10 - 44 U/L Final     Anion Gap   Date Value Ref Range Status   10/01/2024 10 8 - 16 mmol/L Final     eGFR if    Date Value Ref Range Status   12/16/2020 >60.0 >60 mL/min/1.73 m^2 Final     eGFR if non    Date Value Ref Range Status   12/16/2020 >60.0 >60 mL/min/1.73 m^2 Final     Comment:     Calculation used to obtain the estimated glomerular filtration  rate (eGFR) is the CKD-EPI equation.          Lab Results   Component Value Date    HGBA1C 5.5 07/05/2023             ASSESSMENT:       64 y.o. year old female with lower back  pain, consistent with     1. Lumbar radiculopathy        2. Degeneration of intervertebral disc of lumbar region with discogenic back pain and lower extremity pain        3. Lumbar spondylosis            Lumbar radiculopathy    Degeneration of intervertebral disc of lumbar region with discogenic back pain and lower extremity pain    Lumbar spondylosis               PLAN:   - Interventions:    None at this time.  Can repeat injection as needed  - - 03/20/2025:  Bilateral L4-5 transforaminal epidural steroid injection, 80% relief  - Anticoagulation use:   No no anticoagulation    - Medications:  - continue gabapentin 300 mg 3 times a day       - Therapy:   - patient has completed 8 weeks of formal physical therapy within the last 3 months with mild relief.  Continue home exercises    - Imaging/Diagnostic:   - updated x-rays and MRI lumbar spine reviewed.  There is grade 1 anterolisthesis of L4 upon L5 and L5 upon S1.  There is associated facet hypertrophy and ligament hypertrophy at L4-5 with moderate bilateral foraminal stenosis and no canal stenosis.  -x-rays of bilateral knees and previous MRI lumbar spine from 2018 reviewed    - Consults:   - continue follow up with Orthopedics.  Patient is status post right TKA      - Patient Questions: Answered all of the patient's questions regarding diagnosis, therapy, and treatment     This condition does not require this patient to take time off of work, and the primary goal of our Pain Management services is to improve the patient's functional capacity.     - Follow up visit: return to clinic PRN          The above plan and management options were discussed at length with patient. Patient is in agreement with the above and verbalized understanding.    I discussed the goals of interventional chronic pain management with the patient on today's visit.  I explained the utility of injections for diagnostic and therapeutic purposes.  We discussed a multimodal approach to pain  including treating the patient's given worst pain at any given time.  We will use a systematic approach to addressing pain.  We will also adopt a multimodal approach that includes injections, adjuvant medications, physical therapy, at times psychiatry.  There may be a limited role for opioid use intermittently in the treatment of pain, more particularly for acute pain although no one approach can be used as a sole treatment modality.    I emphasized the importance of regular exercise, core strengthening and stretching, diet and weight loss as a cornerstone of long-term pain management.      Wilbur Karimi MD  Interventional Pain Management  [facility]    Future Appointments   Date Time Provider Department Center   6/10/2025  1:15 PM [facility] ORTHO CLINIC LIMIT [id number] [facility] ORTHXR [place] [facility]   6/10/2025  1:30 PM Arely Gale PA-C [facility] [id number] [place] [facility]           Disclaimer:  This note was prepared using voice recognition system and is likely to have sound alike errors that may have been overlooked even after proof reading.  Please call me with any questions      I spent a total of 10 minutes on the day of the visit.  This includes face to face time and non-face to face time preparing to see the patient (eg, review of tests), obtaining and/or reviewing separately obtained history, documenting clinical information in the electronic or other health record, independently interpreting results and communicating results to the patient/family/caregiver, or care coordinator.

## 2025-04-29 DIAGNOSIS — L29.9 PRURITUS: ICD-10-CM

## 2025-04-29 NOTE — TELEPHONE ENCOUNTER
No care due was identified.  [facility] Embedded Care Due Messages. Reference number: [id number].   4/29/2025 9:49:35 AM CDT

## 2025-04-29 NOTE — TELEPHONE ENCOUNTER
----- Message from Alissa sent at 4/29/2025  9:43 AM CDT -----  Type:  RX Refill RequestWho Called: Pt Refill or New Rx: Refill RX Name and Strength:colchicine-probenecid 0.5-500 mg (CO-BENEMID) 500-0.5 mg Tab triamcinolone acetonide 0.1% (KENALOG) 0.1 % cream Preferred Pharmacy with phone number:[facility] [place]  JOSIAS Munoz [facility] [id number] [address] [place] [address]: [phone] Fax: [phone] or Mail Order: Local Ordering Provider: Sweetie Would the patient rather a call back or a response via [facility]? Call Best Call Back Number: [phone] Additional Information:

## 2025-04-30 RX ORDER — TRIAMCINOLONE ACETONIDE 1 MG/G
CREAM TOPICAL 2 TIMES DAILY PRN
Qty: 80 G | Refills: 3 | Status: SHIPPED | OUTPATIENT
Start: 2025-04-30

## 2025-04-30 RX ORDER — PROBENECID AND COLCHICINE .5; 5 MG/1; MG/1
1 TABLET ORAL DAILY
Qty: 7 TABLET | Refills: 2 | Status: SHIPPED | OUTPATIENT
Start: 2025-04-30

## 2025-05-24 NOTE — TELEPHONE ENCOUNTER
No care due was identified.  [facility] Embedded Care Due Messages. Reference number: [id number].   5/24/2025 10:29:29 AM CDT

## 2025-05-25 RX ORDER — VALSARTAN 320 MG/1
320 TABLET ORAL
Qty: 90 TABLET | Refills: 2 | Status: SHIPPED | OUTPATIENT
Start: 2025-05-25

## 2025-06-10 DIAGNOSIS — Z47.1 AFTERCARE FOLLOWING RIGHT KNEE JOINT REPLACEMENT SURGERY: Primary | ICD-10-CM

## 2025-06-10 DIAGNOSIS — Z96.651 AFTERCARE FOLLOWING RIGHT KNEE JOINT REPLACEMENT SURGERY: Primary | ICD-10-CM

## 2025-06-11 DIAGNOSIS — Z78.0 MENOPAUSE: ICD-10-CM

## 2025-07-29 ENCOUNTER — TELEPHONE (OUTPATIENT)
Dept: ADMINISTRATIVE | Facility: HOSPITAL | Age: 65
End: 2025-07-29
Payer: MEDICARE

## 2025-07-31 DIAGNOSIS — I10 ESSENTIAL HYPERTENSION: ICD-10-CM

## 2025-07-31 RX ORDER — METOPROLOL SUCCINATE 100 MG/1
100 TABLET, EXTENDED RELEASE ORAL
Qty: 90 TABLET | Refills: 3 | Status: SHIPPED | OUTPATIENT
Start: 2025-07-31

## 2025-07-31 NOTE — TELEPHONE ENCOUNTER
No care due was identified.  [facility] Embedded Care Due Messages. Reference number: [id number].   7/31/2025 2:02:46 PM CDT

## 2025-08-28 ENCOUNTER — PATIENT OUTREACH (OUTPATIENT)
Dept: ADMINISTRATIVE | Facility: HOSPITAL | Age: 65
End: 2025-08-28
Payer: MEDICARE

## (undated) DEVICE — DRAPE EXTREMITY ORTHOMAX

## (undated) DEVICE — LAVAGE WOUND BACTISURE 1L BAG

## (undated) DEVICE — CARD UNIV KNEE NAVGTN SW-SCL L

## (undated) DEVICE — SOL NACL IRR 3000ML

## (undated) DEVICE — SUT VICRYL 1 OB 36 CTX

## (undated) DEVICE — Device

## (undated) DEVICE — TIP YANKAUERS BULB NO VENT

## (undated) DEVICE — DURAPREP SURG SCRUB 26ML

## (undated) DEVICE — NDL HYPO STD REG BVL 18GX1.5IN

## (undated) DEVICE — SUT CTD VICRYL 2.0

## (undated) DEVICE — DRAPE INCISE IOBAN 2 23X23IN

## (undated) DEVICE — CONTAINER SPECIMEN STRL 3OZ

## (undated) DEVICE — ALCOHOL ISOPROPYL BLU 70% 16OZ

## (undated) DEVICE — SYS PRINEO SKIN CLOSURE

## (undated) DEVICE — SYR 50CC LL

## (undated) DEVICE — ADHESIVE DERMABOND ADVANCED

## (undated) DEVICE — GLOVE SENSICARE PI SURG 7.5

## (undated) DEVICE — SPONGE COTTON TRAY 4X4IN

## (undated) DEVICE — BLADE SAW RECIP 76MMX12.5MM

## (undated) DEVICE — NDL 22GA X1 1/2 REG BEVEL

## (undated) DEVICE — CLOSURE SKIN STERI STRIP 1/2X4

## (undated) DEVICE — ELECTRODE BLD 1 INCH TEFLON

## (undated) DEVICE — DRESSING LEUKOPLAST FLEX 1X3IN

## (undated) DEVICE — BLADE 90X13X1.27MM

## (undated) DEVICE — COVER HD BACK TABLE 6FT

## (undated) DEVICE — PULSAVAC ZIMMER

## (undated) DEVICE — DRAPE CASSETTE 20 X 40

## (undated) DEVICE — PIN PINABALL HEADLESS
Type: IMPLANTABLE DEVICE | Site: KNEE | Status: NON-FUNCTIONAL
Removed: 2024-11-11

## (undated) DEVICE — BATTERY INSTRUMENT

## (undated) DEVICE — SOL POVIDONE PREP IODINE 4 OZ

## (undated) DEVICE — TUBING 4-LEAD ARTHOSCOPY

## (undated) DEVICE — ADAPTER DUAL IRRIGATION

## (undated) DEVICE — DRESSING OPTIFOAM AG 4X12IN

## (undated) DEVICE — SUT STRATAFIX PDS 1 CTX 18IN

## (undated) DEVICE — GLOVE SENSICARE PI GRN 8

## (undated) DEVICE — ELECTRODE REM PLYHSV RETURN 9

## (undated) DEVICE — HOOD T-5 TEAR AWAY STERILE